# Patient Record
Sex: FEMALE | Race: WHITE | NOT HISPANIC OR LATINO
[De-identification: names, ages, dates, MRNs, and addresses within clinical notes are randomized per-mention and may not be internally consistent; named-entity substitution may affect disease eponyms.]

---

## 2014-07-23 RX ORDER — FLUVOXAMINE MALEATE 25 MG/1
1 TABLET ORAL
Qty: 0 | Refills: 0 | COMMUNITY
Start: 2014-07-23

## 2017-04-17 ENCOUNTER — APPOINTMENT (OUTPATIENT)
Dept: INTERNAL MEDICINE | Facility: CLINIC | Age: 52
End: 2017-04-17

## 2017-04-17 VITALS
SYSTOLIC BLOOD PRESSURE: 115 MMHG | HEIGHT: 66 IN | WEIGHT: 128 LBS | TEMPERATURE: 100.4 F | OXYGEN SATURATION: 98 % | BODY MASS INDEX: 20.57 KG/M2 | HEART RATE: 107 BPM | DIASTOLIC BLOOD PRESSURE: 70 MMHG

## 2017-04-19 ENCOUNTER — APPOINTMENT (OUTPATIENT)
Dept: INTERNAL MEDICINE | Facility: CLINIC | Age: 52
End: 2017-04-19

## 2017-04-21 ENCOUNTER — APPOINTMENT (OUTPATIENT)
Dept: INTERNAL MEDICINE | Facility: CLINIC | Age: 52
End: 2017-04-21

## 2017-04-21 ENCOUNTER — LABORATORY RESULT (OUTPATIENT)
Age: 52
End: 2017-04-21

## 2017-04-21 VITALS
TEMPERATURE: 99.3 F | SYSTOLIC BLOOD PRESSURE: 106 MMHG | HEIGHT: 66 IN | DIASTOLIC BLOOD PRESSURE: 64 MMHG | OXYGEN SATURATION: 98 % | WEIGHT: 128 LBS | BODY MASS INDEX: 20.57 KG/M2 | HEART RATE: 88 BPM

## 2017-04-21 RX ORDER — AZITHROMYCIN DIHYDRATE 250 MG/1
250 TABLET, FILM COATED ORAL
Qty: 1 | Refills: 0 | Status: COMPLETED | COMMUNITY
Start: 2017-04-17 | End: 2017-04-21

## 2017-04-21 RX ORDER — UBIDECARENONE/VIT E ACET 100MG-5
25 MCG CAPSULE ORAL
Qty: 90 | Refills: 3 | Status: ACTIVE | COMMUNITY
Start: 2017-04-21 | End: 1900-01-01

## 2017-04-21 RX ORDER — SACCHAROMYCES BOULARDII 50 MG
250 CAPSULE ORAL
Qty: 30 | Refills: 3 | Status: COMPLETED | COMMUNITY
Start: 2017-04-21 | End: 2017-08-19

## 2017-04-24 LAB
25(OH)D3 SERPL-MCNC: 33.9 NG/ML
ALBUMIN SERPL ELPH-MCNC: 3.4 G/DL
ALP BLD-CCNC: 54 U/L
ALT SERPL-CCNC: 8 U/L
ANION GAP SERPL CALC-SCNC: 15 MMOL/L
ASO AB SER LA-ACNC: 36 IU/ML
AST SERPL-CCNC: 10 U/L
BASOPHILS # BLD AUTO: 0.06 K/UL
BASOPHILS NFR BLD AUTO: 1.8 %
BILIRUB SERPL-MCNC: <0.2 MG/DL
BUN SERPL-MCNC: 12 MG/DL
CALCIUM SERPL-MCNC: 9.2 MG/DL
CHLORIDE SERPL-SCNC: 101 MMOL/L
CO2 SERPL-SCNC: 24 MMOL/L
CREAT SERPL-MCNC: 0.84 MG/DL
EOSINOPHIL # BLD AUTO: 0.14 K/UL
EOSINOPHIL NFR BLD AUTO: 4.4 %
GLUCOSE SERPL-MCNC: 90 MG/DL
HBV CORE IGG+IGM SER QL: NONREACTIVE
HBV SURFACE AB SER QL: NONREACTIVE
HBV SURFACE AG SER QL: NONREACTIVE
HCT VFR BLD CALC: 34.8 %
HCV AB SER QL: NONREACTIVE
HCV S/CO RATIO: 0.2 S/CO
HGB BLD-MCNC: 10.8 G/DL
HIV1+2 AB SPEC QL IA.RAPID: NONREACTIVE
LYMPHOCYTES # BLD AUTO: 1.57 K/UL
LYMPHOCYTES NFR BLD AUTO: 49.6 %
MAN DIFF?: NORMAL
MCHC RBC-ENTMCNC: 29.3 PG
MCHC RBC-ENTMCNC: 31 GM/DL
MCV RBC AUTO: 94.3 FL
MONOCYTES # BLD AUTO: 0.93 K/UL
MONOCYTES NFR BLD AUTO: 29.2 %
NEUTROPHILS # BLD AUTO: 0.34 K/UL
NEUTROPHILS NFR BLD AUTO: 10.6 %
PLATELET # BLD AUTO: 302 K/UL
POTASSIUM SERPL-SCNC: 4 MMOL/L
PROT SERPL-MCNC: 7.2 G/DL
RBC # BLD: 3.69 M/UL
RBC # FLD: 13 %
SODIUM SERPL-SCNC: 140 MMOL/L
VIT B12 SERPL-MCNC: 802 PG/ML
WBC # FLD AUTO: 3.17 K/UL

## 2017-05-14 ENCOUNTER — INPATIENT (INPATIENT)
Facility: HOSPITAL | Age: 52
LOS: 3 days | Discharge: HOME | DRG: 871 | End: 2017-05-18
Attending: HOSPITALIST | Admitting: HOSPITALIST
Payer: COMMERCIAL

## 2017-05-14 VITALS
SYSTOLIC BLOOD PRESSURE: 114 MMHG | TEMPERATURE: 99 F | OXYGEN SATURATION: 98 % | RESPIRATION RATE: 20 BRPM | HEART RATE: 92 BPM | DIASTOLIC BLOOD PRESSURE: 74 MMHG

## 2017-05-14 DIAGNOSIS — R50.9 FEVER, UNSPECIFIED: ICD-10-CM

## 2017-05-14 DIAGNOSIS — R91.8 OTHER NONSPECIFIC ABNORMAL FINDING OF LUNG FIELD: ICD-10-CM

## 2017-05-14 DIAGNOSIS — A41.9 SEPSIS, UNSPECIFIED ORGANISM: ICD-10-CM

## 2017-05-14 DIAGNOSIS — F32.9 MAJOR DEPRESSIVE DISORDER, SINGLE EPISODE, UNSPECIFIED: ICD-10-CM

## 2017-05-14 DIAGNOSIS — D70.0 CONGENITAL AGRANULOCYTOSIS: ICD-10-CM

## 2017-05-14 DIAGNOSIS — F41.9 ANXIETY DISORDER, UNSPECIFIED: ICD-10-CM

## 2017-05-14 DIAGNOSIS — Z29.9 ENCOUNTER FOR PROPHYLACTIC MEASURES, UNSPECIFIED: ICD-10-CM

## 2017-05-14 LAB
ALBUMIN SERPL ELPH-MCNC: 3.3 G/DL — SIGNIFICANT CHANGE UP (ref 3.3–5)
ALP SERPL-CCNC: 50 U/L — SIGNIFICANT CHANGE UP (ref 40–120)
ALT FLD-CCNC: 9 U/L RC — LOW (ref 10–45)
APPEARANCE UR: CLEAR — SIGNIFICANT CHANGE UP
AST SERPL-CCNC: 11 U/L — SIGNIFICANT CHANGE UP (ref 10–40)
BASE EXCESS BLDV CALC-SCNC: 3.6 MMOL/L — HIGH (ref -2–2)
BILIRUB SERPL-MCNC: 0.3 MG/DL — SIGNIFICANT CHANGE UP (ref 0.2–1.2)
BILIRUB UR-MCNC: NEGATIVE — SIGNIFICANT CHANGE UP
BUN SERPL-MCNC: 10 MG/DL — SIGNIFICANT CHANGE UP (ref 7–23)
CA-I SERPL-SCNC: 1.16 MMOL/L — SIGNIFICANT CHANGE UP (ref 1.12–1.3)
CALCIUM SERPL-MCNC: 9 MG/DL — SIGNIFICANT CHANGE UP (ref 8.4–10.5)
CHLORIDE BLDV-SCNC: 102 MMOL/L — SIGNIFICANT CHANGE UP (ref 96–108)
CHLORIDE SERPL-SCNC: 100 MMOL/L — SIGNIFICANT CHANGE UP (ref 96–108)
CO2 BLDV-SCNC: 28 MMOL/L — SIGNIFICANT CHANGE UP (ref 22–30)
CO2 SERPL-SCNC: 25 MMOL/L — SIGNIFICANT CHANGE UP (ref 22–31)
COLOR SPEC: SIGNIFICANT CHANGE UP
CREAT SERPL-MCNC: 0.8 MG/DL — SIGNIFICANT CHANGE UP (ref 0.5–1.3)
DIFF PNL FLD: NEGATIVE — SIGNIFICANT CHANGE UP
GAS PNL BLDV: 133 MMOL/L — LOW (ref 136–145)
GAS PNL BLDV: SIGNIFICANT CHANGE UP
GAS PNL BLDV: SIGNIFICANT CHANGE UP
GLUCOSE BLDV-MCNC: 135 MG/DL — HIGH (ref 70–99)
GLUCOSE SERPL-MCNC: 134 MG/DL — HIGH (ref 70–99)
GLUCOSE UR QL: NEGATIVE — SIGNIFICANT CHANGE UP
HCO3 BLDV-SCNC: 26 MMOL/L — SIGNIFICANT CHANGE UP (ref 21–29)
HCT VFR BLD CALC: 31.8 % — LOW (ref 34.5–45)
HCT VFR BLDA CALC: 35 % — LOW (ref 39–50)
HGB BLD CALC-MCNC: 11.4 G/DL — LOW (ref 11.5–15.5)
HGB BLD-MCNC: 10.9 G/DL — LOW (ref 11.5–15.5)
HOROWITZ INDEX BLDV+IHG-RTO: SIGNIFICANT CHANGE UP
KETONES UR-MCNC: NEGATIVE — SIGNIFICANT CHANGE UP
LACTATE BLDV-MCNC: 1.1 MMOL/L — SIGNIFICANT CHANGE UP (ref 0.7–2)
LEUKOCYTE ESTERASE UR-ACNC: NEGATIVE — SIGNIFICANT CHANGE UP
LYMPHOCYTES # BLD AUTO: 0.7 K/UL — LOW (ref 1–3.3)
LYMPHOCYTES # BLD AUTO: 25 % — SIGNIFICANT CHANGE UP (ref 13–44)
MCHC RBC-ENTMCNC: 31.4 PG — SIGNIFICANT CHANGE UP (ref 27–34)
MCHC RBC-ENTMCNC: 34.2 GM/DL — SIGNIFICANT CHANGE UP (ref 32–36)
MCV RBC AUTO: 91.6 FL — SIGNIFICANT CHANGE UP (ref 80–100)
MONOCYTES # BLD AUTO: SIGNIFICANT CHANGE UP (ref 0–0.9)
MONOCYTES NFR BLD AUTO: 24 % — HIGH (ref 2–14)
NEUTROPHILS # BLD AUTO: SIGNIFICANT CHANGE UP (ref 1.8–7.4)
NEUTROPHILS NFR BLD AUTO: 35 % — LOW (ref 43–77)
NEUTS BAND # BLD: 9 % — HIGH (ref 0–8)
NITRITE UR-MCNC: NEGATIVE — SIGNIFICANT CHANGE UP
PCO2 BLDV: 35 MMHG — SIGNIFICANT CHANGE UP (ref 35–50)
PH BLDV: 7.49 — HIGH (ref 7.35–7.45)
PH UR: 6.5 — SIGNIFICANT CHANGE UP (ref 5–8)
PLAT MORPH BLD: NORMAL — SIGNIFICANT CHANGE UP
PLATELET # BLD AUTO: 233 K/UL — SIGNIFICANT CHANGE UP (ref 150–400)
PO2 BLDV: 64 MMHG — HIGH (ref 25–45)
POTASSIUM BLDV-SCNC: 3.6 MMOL/L — SIGNIFICANT CHANGE UP (ref 3.5–5)
POTASSIUM SERPL-MCNC: 3.9 MMOL/L — SIGNIFICANT CHANGE UP (ref 3.5–5.3)
POTASSIUM SERPL-SCNC: 3.9 MMOL/L — SIGNIFICANT CHANGE UP (ref 3.5–5.3)
PROT SERPL-MCNC: 7.1 G/DL — SIGNIFICANT CHANGE UP (ref 6–8.3)
PROT UR-MCNC: NEGATIVE — SIGNIFICANT CHANGE UP
RBC # BLD: 3.47 M/UL — LOW (ref 3.8–5.2)
RBC # FLD: 11.5 % — SIGNIFICANT CHANGE UP (ref 10.3–14.5)
RBC BLD AUTO: SIGNIFICANT CHANGE UP
SAO2 % BLDV: 93 % — HIGH (ref 67–88)
SODIUM SERPL-SCNC: 139 MMOL/L — SIGNIFICANT CHANGE UP (ref 135–145)
SP GR SPEC: 1.01 — LOW (ref 1.01–1.02)
UROBILINOGEN FLD QL: NEGATIVE — SIGNIFICANT CHANGE UP
VARIANT LYMPHS # BLD: 7 % — HIGH (ref 0–6)
WBC # BLD: 2.9 K/UL — LOW (ref 3.8–10.5)
WBC # FLD AUTO: 2.9 K/UL — LOW (ref 3.8–10.5)

## 2017-05-14 PROCEDURE — 99223 1ST HOSP IP/OBS HIGH 75: CPT | Mod: GC

## 2017-05-14 PROCEDURE — 93010 ELECTROCARDIOGRAM REPORT: CPT

## 2017-05-14 PROCEDURE — 99285 EMERGENCY DEPT VISIT HI MDM: CPT | Mod: 25

## 2017-05-14 PROCEDURE — 71250 CT THORAX DX C-: CPT | Mod: 26

## 2017-05-14 RX ORDER — FLUVOXAMINE MALEATE 25 MG/1
150 TABLET ORAL AT BEDTIME
Qty: 0 | Refills: 0 | Status: DISCONTINUED | OUTPATIENT
Start: 2017-05-14 | End: 2017-05-15

## 2017-05-14 RX ORDER — CLONAZEPAM 1 MG
1 TABLET ORAL THREE TIMES A DAY
Qty: 0 | Refills: 0 | Status: DISCONTINUED | OUTPATIENT
Start: 2017-05-14 | End: 2017-05-18

## 2017-05-14 RX ORDER — ACETAMINOPHEN 500 MG
650 TABLET ORAL EVERY 6 HOURS
Qty: 0 | Refills: 0 | Status: DISCONTINUED | OUTPATIENT
Start: 2017-05-14 | End: 2017-05-18

## 2017-05-14 RX ORDER — SODIUM CHLORIDE 9 MG/ML
1000 INJECTION INTRAMUSCULAR; INTRAVENOUS; SUBCUTANEOUS ONCE
Qty: 0 | Refills: 0 | Status: COMPLETED | OUTPATIENT
Start: 2017-05-14 | End: 2017-05-14

## 2017-05-14 RX ORDER — CEFEPIME 1 G/1
INJECTION, POWDER, FOR SOLUTION INTRAMUSCULAR; INTRAVENOUS
Qty: 0 | Refills: 0 | Status: DISCONTINUED | OUTPATIENT
Start: 2017-05-14 | End: 2017-05-15

## 2017-05-14 RX ORDER — ONDANSETRON 8 MG/1
4 TABLET, FILM COATED ORAL EVERY 6 HOURS
Qty: 0 | Refills: 0 | Status: DISCONTINUED | OUTPATIENT
Start: 2017-05-14 | End: 2017-05-18

## 2017-05-14 RX ORDER — OLANZAPINE 15 MG/1
30 TABLET, FILM COATED ORAL DAILY
Qty: 0 | Refills: 0 | Status: DISCONTINUED | OUTPATIENT
Start: 2017-05-14 | End: 2017-05-18

## 2017-05-14 RX ORDER — ACETAMINOPHEN 500 MG
1000 TABLET ORAL ONCE
Qty: 0 | Refills: 0 | Status: COMPLETED | OUTPATIENT
Start: 2017-05-14 | End: 2017-05-14

## 2017-05-14 RX ORDER — CEFEPIME 1 G/1
1000 INJECTION, POWDER, FOR SOLUTION INTRAMUSCULAR; INTRAVENOUS EVERY 8 HOURS
Qty: 0 | Refills: 0 | Status: DISCONTINUED | OUTPATIENT
Start: 2017-05-15 | End: 2017-05-15

## 2017-05-14 RX ORDER — CHOLECALCIFEROL (VITAMIN D3) 125 MCG
1000 CAPSULE ORAL DAILY
Qty: 0 | Refills: 0 | Status: DISCONTINUED | OUTPATIENT
Start: 2017-05-14 | End: 2017-05-18

## 2017-05-14 RX ORDER — SODIUM CHLORIDE 9 MG/ML
3 INJECTION INTRAMUSCULAR; INTRAVENOUS; SUBCUTANEOUS ONCE
Qty: 0 | Refills: 0 | Status: COMPLETED | OUTPATIENT
Start: 2017-05-14 | End: 2017-05-14

## 2017-05-14 RX ORDER — ENOXAPARIN SODIUM 100 MG/ML
40 INJECTION SUBCUTANEOUS EVERY 24 HOURS
Qty: 0 | Refills: 0 | Status: DISCONTINUED | OUTPATIENT
Start: 2017-05-14 | End: 2017-05-15

## 2017-05-14 RX ORDER — PIPERACILLIN AND TAZOBACTAM 4; .5 G/20ML; G/20ML
3.38 INJECTION, POWDER, LYOPHILIZED, FOR SOLUTION INTRAVENOUS ONCE
Qty: 0 | Refills: 0 | Status: COMPLETED | OUTPATIENT
Start: 2017-05-14 | End: 2017-05-14

## 2017-05-14 RX ORDER — CEFEPIME 1 G/1
1000 INJECTION, POWDER, FOR SOLUTION INTRAMUSCULAR; INTRAVENOUS ONCE
Qty: 0 | Refills: 0 | Status: COMPLETED | OUTPATIENT
Start: 2017-05-14 | End: 2017-05-14

## 2017-05-14 RX ADMIN — SODIUM CHLORIDE 3 MILLILITER(S): 9 INJECTION INTRAMUSCULAR; INTRAVENOUS; SUBCUTANEOUS at 15:00

## 2017-05-14 RX ADMIN — SODIUM CHLORIDE 1000 MILLILITER(S): 9 INJECTION INTRAMUSCULAR; INTRAVENOUS; SUBCUTANEOUS at 15:09

## 2017-05-14 RX ADMIN — ENOXAPARIN SODIUM 40 MILLIGRAM(S): 100 INJECTION SUBCUTANEOUS at 23:00

## 2017-05-14 RX ADMIN — CEFEPIME 100 MILLIGRAM(S): 1 INJECTION, POWDER, FOR SOLUTION INTRAMUSCULAR; INTRAVENOUS at 23:00

## 2017-05-14 RX ADMIN — Medication 400 MILLIGRAM(S): at 18:34

## 2017-05-14 RX ADMIN — PIPERACILLIN AND TAZOBACTAM 200 GRAM(S): 4; .5 INJECTION, POWDER, LYOPHILIZED, FOR SOLUTION INTRAVENOUS at 19:42

## 2017-05-14 NOTE — H&P ADULT. - HISTORY OF PRESENT ILLNESS
51F PMH congential neutropenia (ANC range 100-900), MVP, anxiety/depression, 15 pack year smoking history, recently seen in [facility] clinic on 4/21/2017 for sore throat and fever was given Z-pack without any symptomatic improvement presents to the hospital with 1 month of cough and cyclic fevers. Patient states that cough and fever presented together ~1 month ago. She will get a fever between 101-104F usually in the AM or in the PM that will spontaneously break on its own. The fevers recur without a noticeable defined pattern, but will usually occur multiple times a week. patient states that she has not been taking any advil or tylenol. The patient went to her psychiatrist to see if a new medication that she had started, Olanzapine, could be responsible and was told that this is very unlikely. The patient was referred to an ID physcian who told the patient that she likely does not have any infection. She later went to an urgent clinic and obtained a CXR and she was told that she has a "mass" somewhere in her lung, but she cannot remember where. At this point is when she presented to the ED for evaluation.    Of note the patient has been hospitalized multiple times for psychiatric reasons, never for medical or surgical reasons. She lives in an apartment in [place] with her mother.     In ED: T: 101  BP: 144/78  HR: 114  RR: 20  SpO2: 96%RA. Patient was provided Zosyn x 1, 1L NS, IV tylenol, and BCx and UCx were sent.

## 2017-05-14 NOTE — ED PROVIDER NOTE - ATTENDING CONTRIBUTION TO CARE
presents for work-up of abnormal chest radiograph, intermittent subjective fever, on my exam:  awake, alert, anxious, normal respiratory pattern Ty Evans MD (attending)

## 2017-05-14 NOTE — H&P ADULT. - NEUROLOGICAL DETAILS
responds to pain/sensation intact/responds to verbal commands/deep reflexes intact/cranial nerves intact/alert and oriented x 3

## 2017-05-14 NOTE — H&P ADULT. - PROBLEM SELECTOR PLAN 6
Patient has h/o congenital neutropenia and is currently not on any long-term treatment. ANC varies between 100 and 900 with an average in to 500's.   - patient currently does not require any neutropenic precautions and does not need to be treated for neutropenic fever at this time  - check CBC with diff daily Patient has h/o congenital neutropenia and is currently not on any long-term treatment. ANC varies between 100 and 900 with an average in to 500's.   - patient currently does not require any neutropenic precautions  - check CBC with diff daily

## 2017-05-14 NOTE — H&P ADULT. - LYMPHATIC
posterior cervical R/supraclavicular R/supraclavicular L/anterior cervical L/posterior cervical L/anterior cervical R

## 2017-05-14 NOTE — H&P ADULT. - PROBLEM SELECTOR PLAN 3
In the context of strong past smoking history, congenital neutropenia, cyclic fevers, and cough, need to r/o neoplasm as primary diagnosis  - TTBx with US  - infective w/u as above In the context of strong past smoking history, congenital neutropenia, cyclic fevers, and cough, need to r/o neoplasm as primary diagnosis  - transthoracic Bx with US  - infective w/u as above

## 2017-05-14 NOTE — H&P ADULT. - NEGATIVE MUSCULOSKELETAL SYMPTOMS
no arthralgia/no joint swelling/no muscle weakness/no myalgia/no muscle cramps/no stiffness/no arthritis/no neck pain

## 2017-05-14 NOTE — H&P ADULT. - PROBLEM SELECTOR PLAN 2
patient with leukopenia, tachycardia and tachypnia suggestive of systemic stress. Doubt infection at this point in favor of neoplasm patient with leukopenia, tachycardia and tachypnia suggestive of systemic stress. Doubt infection at this point but cannot r/o completely  - patient received 1L NS in ED  - f/u BCx and UCx  - Lactate non elevated  - check RVP  - If BCx or UCx return positive or patient develops constitutional symptoms, will pursue antibiotics patient with leukopenia, tachycardia and tachypnia suggestive of systemic stress. Doubt infection at this point but cannot r/o completely  - patient received 1L NS in ED  - f/u BCx and UCx  - Cefepime  - Lactate non elevated  - check RVP patient with leukopenia and bandemia, tachycardia, and tachypnea suggestive of systemic stress. Doubt infection at this point but cannot r/o completely  - patient received 1L NS in ED  - f/u BCx and UCx  - Cefepime  - Lactate non elevated  - check RVP

## 2017-05-14 NOTE — H&P ADULT. - LAB RESULTS AND INTERPRETATION
Labs reviewed and interpreted personally: leukopenia at 2.9, no ANC reported. Stable H/H of 10.9/31.8 which is at patients baseline. Platelets normal. No electrolyte derangements. Creatinine stable at 0.80. Lactate 1.1. UA negative for infection.

## 2017-05-14 NOTE — H&P ADULT. - EKG AND INTERPRETATION
sinus tachycardia to 114. No QTc prolongation or ST changes c/w ischemia. personally reviewed: sinus tachycardia to 114. No QTc prolongation or ST changes c/w ischemia.

## 2017-05-14 NOTE — H&P ADULT. - RS GEN PE MLT RESP DETAILS PC
no rales/clear to auscultation bilaterally/good air movement/no chest wall tenderness/no rhonchi/no wheezes/breath sounds equal/airway patent/no intercostal retractions/respirations non-labored

## 2017-05-14 NOTE — ED ADULT NURSE NOTE - OBJECTIVE STATEMENT
Patient  is  alert  and  oriented  x3.   Color is  good  and  skin warm  to  touch. She  is  c/o  fever, cough, weight  loss  and  weakness.  No   SOB  or  congestion   noted.

## 2017-05-14 NOTE — H&P ADULT. - ASSESSMENT
51F PMH congential neutropenia (ANC range 100-900), MVP, anxiety/depression, 15 pack year smoking history, recently seen in [facility] on 4/21/2017 for sore throat and fever was given Z-pack without any symptomatic improvement presents to the hospital with 1 month of cough and cyclic fevers in the context of a peripheral lung mass found on CT concerning for neoplasm vs infection etiology.

## 2017-05-14 NOTE — H&P ADULT. - MUSCULOSKELETAL
detailed exam normal strength/no calf tenderness/no joint erythema/ROM intact/no joint swelling/no joint warmth details…

## 2017-05-14 NOTE — H&P ADULT. - NEGATIVE ENMT SYMPTOMS
no hearing difficulty/no nasal congestion/no nasal discharge/no vertigo/no sinus symptoms/no ear pain/no tinnitus

## 2017-05-14 NOTE — H&P ADULT. - PROBLEM SELECTOR PLAN 1
Patient has had a fever a/w cough for the past 1 month without and production of phlegm or constitutional symptoms that would suggest systemic infection. currently fever presents in AM or PM and will spontaneously break. Need to r/o neoplasm  - tylenol PRN for fever  - trend fever curve  - patient denies any travel outside of [place] in 10 years, doubt parasitemia, however will check peripheral smear Patient has had a fever a/w cough for the past 1 month without and production of phlegm or constitutional symptoms that would suggest systemic infection. currently fever presents in AM or PM and will spontaneously break. Need to r/o neoplasm  - tylenol PRN for fever  - trend fever curve  - patient denies any travel outside of [place] in 10 years, doubt parasitemia, however will check peripheral smear  -continue with broad spectrum antibiotics  -check RVP Patient has had a fever a/w cough for the past 1 month without any production of phlegm. occasional chills and sweat. currently fever presents in AM or PM and will spontaneously break. Need to r/o neoplasm  - tylenol PRN for fever  - trend fever curve  - patient denies any travel outside of [place] in 10 years, doubt parasitemia, however will check peripheral smear  -continue with broad spectrum antibiotics  -check RVP

## 2017-05-14 NOTE — H&P ADULT. - ATTENDING COMMENTS
52 yo woman with PMH congential neutropenia (ANC range 100-900), MVP, anxiety/depression, 15 pack year smoking history, recently seen in [facility] on 4/21/2017 for sore throat and fever was given Z-pack without any symptomatic improvement presents to the hospital with 1 month of cough and cyclic fevers. Confirmed HPI and ROS. In addition patient states last mammogram without detected malignancy, last colonoscopy without abnormal findings with repeat colo in 10 years, last pap also normal.  Vitals reviewed and physically examined patient: agree with resident's findings.   Labs, imaging and EKG reviewed  Fever: 50 yo woman with PMH congential neutropenia (ANC range 100-900), MVP, anxiety/depression, 15 pack year smoking history, recently seen in [facility] on 4/21/2017 for sore throat and fever was given Z-pack without any symptomatic improvement presents to the hospital with 1 month of cough and cyclic fevers. Confirmed HPI and ROS. In addition patient states last mammogram without detected malignancy, last colonoscopy without abnormal findings with repeat colo in 10 years, last pap also normal.  Vitals reviewed and physically examined patient: agree with resident's findings.   Labs, imaging and EKG reviewed  Fever: Patient has had a fever a/w cough for the past 1 month without production of phlegm. Unclear if infectious versus neoplastic process given CT findings of " Right  lower  lobe  mass  and  ground-glass  opacities  in  the  right  upper lobe." UA not suggestive of UTI  -continue with broad spectrum antibiotics -check RVP F/U blood culture urine cultures. Primary day team to consider ID consult in AM  R lower lobe mass: Primary day team to consult pulmonary in AM with regards to mass/malignancy work up i.e need for biopsy via US vs CT.  Remainder of plan as noted above

## 2017-05-14 NOTE — ED PROVIDER NOTE - OBJECTIVE STATEMENT
50y/o F with PMH neutropenia c/o fevers intermittently x 1 month. Tmax is 104 F. fevers worse at night and in morning. + sweats. c/o productive cough x 1 month. + laryngitis. + post nasal drip. vomited a few days ago. pt went to a PMD last month for these symptoms, pt was given a zpack which didn't help. pt had a CXR and was informed to see a pulmonologist for lung nodule. pt states she went to urgent care today had a CXR and was found to have a L lung mass. pt went to [facility] last week and had a CXR which showed the same thing. also c/o constipation, last BM yesterday. denies any CP, SOB, abd pain, chills, urinary symptoms. last took ibuprofen yesterday. hasn't taken any tyelnol. h/o smoking x 30 years. 50y/o F with PMH neutropenia c/o fevers intermittently x 1 month. Tmax is 104 F. fevers worse at night and in morning. + sweats. c/o productive cough x 1 month. + laryngitis. + post nasal drip. vomited a few days ago. pt went to a PMD last month for these symptoms, pt was given a zpack which didn't help. pt had a CXR and was informed to see a pulmonologist for lung nodule. pt states she went to urgent care today had a CXR and was found to have a L lung mass. pt went to [facility] last week and had a CXR which showed the same thing. also c/o constipation, last BM yesterday. denies any CP, SOB, abd pain, chills, urinary symptoms. last took ibuprofen yesterday. hasn't taken any tyelnol. h/o smoking x 30 years.  PMD Dr. MARY Walton [phone]

## 2017-05-14 NOTE — ED PROVIDER NOTE - PROGRESS NOTE DETAILS
pt feeling well. informed pt of CT results and importance of following up with PMD and likely oncology regarding lung mass. - SADIA Lundberg

## 2017-05-14 NOTE — H&P ADULT. - PROBLEM SELECTOR PLAN 5
patient with h/o depression requiring hospitalizations in the past. Currently stable on regimen.   - will c/w Fluvoxamine and Olanzapine

## 2017-05-14 NOTE — H&P ADULT. - NEGATIVE OPHTHALMOLOGIC SYMPTOMS
no blurred vision L/no blurred vision R/no lacrimation L/no photophobia/no diplopia/no lacrimation R

## 2017-05-14 NOTE — H&P ADULT. - RADIOLOGY RESULTS AND INTERPRETATION
Images reviewed and interpreted personally: CTchest demonstrated RLL mass with groundglass opacities in the RUL concerning for infection vs neoplasm. Images reviewed and interpreted personally: CT chest demonstrated RLL mass with groundglass opacities in the RUL concerning for infection vs neoplasm.

## 2017-05-15 LAB
ALBUMIN SERPL ELPH-MCNC: 3.2 G/DL — LOW (ref 3.3–5)
ALP SERPL-CCNC: 50 U/L — SIGNIFICANT CHANGE UP (ref 40–120)
ALT FLD-CCNC: 10 U/L RC — SIGNIFICANT CHANGE UP (ref 10–45)
ANION GAP SERPL CALC-SCNC: 14 MMOL/L — SIGNIFICANT CHANGE UP (ref 5–17)
APTT BLD: 29.2 SEC — SIGNIFICANT CHANGE UP (ref 27.5–37.4)
AST SERPL-CCNC: 15 U/L — SIGNIFICANT CHANGE UP (ref 10–40)
BASOPHILS # BLD AUTO: 0 K/UL — SIGNIFICANT CHANGE UP (ref 0–0.2)
BILIRUB SERPL-MCNC: 0.3 MG/DL — SIGNIFICANT CHANGE UP (ref 0.2–1.2)
BUN SERPL-MCNC: 8 MG/DL — SIGNIFICANT CHANGE UP (ref 7–23)
CALCIUM SERPL-MCNC: 8.8 MG/DL — SIGNIFICANT CHANGE UP (ref 8.4–10.5)
CHLORIDE SERPL-SCNC: 104 MMOL/L — SIGNIFICANT CHANGE UP (ref 96–108)
CO2 SERPL-SCNC: 23 MMOL/L — SIGNIFICANT CHANGE UP (ref 22–31)
CREAT SERPL-MCNC: 0.81 MG/DL — SIGNIFICANT CHANGE UP (ref 0.5–1.3)
EOSINOPHIL # BLD AUTO: 0 K/UL — SIGNIFICANT CHANGE UP (ref 0–0.5)
GLUCOSE SERPL-MCNC: 130 MG/DL — HIGH (ref 70–99)
HCT VFR BLD CALC: 33.5 % — LOW (ref 34.5–45)
HGB BLD-MCNC: 10.7 G/DL — LOW (ref 11.5–15.5)
HPIV3 RNA SPEC QL NAA+PROBE: DETECTED
INR BLD: 1.33 RATIO — HIGH (ref 0.88–1.16)
LYMPHOCYTES # BLD AUTO: 1.1 K/UL — SIGNIFICANT CHANGE UP (ref 1–3.3)
LYMPHOCYTES # BLD AUTO: 19 % — SIGNIFICANT CHANGE UP (ref 13–44)
MAGNESIUM SERPL-MCNC: 2.1 MG/DL — SIGNIFICANT CHANGE UP (ref 1.6–2.6)
MCHC RBC-ENTMCNC: 29.9 PG — SIGNIFICANT CHANGE UP (ref 27–34)
MCHC RBC-ENTMCNC: 32.1 GM/DL — SIGNIFICANT CHANGE UP (ref 32–36)
MCV RBC AUTO: 93.2 FL — SIGNIFICANT CHANGE UP (ref 80–100)
MONOCYTES # BLD AUTO: 1.5 K/UL — HIGH (ref 0–0.9)
MONOCYTES NFR BLD AUTO: 38 % — HIGH (ref 2–14)
NEUTROPHILS # BLD AUTO: 1.6 K/UL — LOW (ref 1.8–7.4)
NEUTROPHILS NFR BLD AUTO: 33 % — LOW (ref 43–77)
PHOSPHATE SERPL-MCNC: 4.2 MG/DL — SIGNIFICANT CHANGE UP (ref 2.5–4.5)
PLATELET # BLD AUTO: 223 K/UL — SIGNIFICANT CHANGE UP (ref 150–400)
POTASSIUM SERPL-MCNC: 4 MMOL/L — SIGNIFICANT CHANGE UP (ref 3.5–5.3)
POTASSIUM SERPL-SCNC: 4 MMOL/L — SIGNIFICANT CHANGE UP (ref 3.5–5.3)
PROT SERPL-MCNC: 7 G/DL — SIGNIFICANT CHANGE UP (ref 6–8.3)
PROTHROM AB SERPL-ACNC: 14.6 SEC — HIGH (ref 9.8–12.7)
RAPID RVP RESULT: DETECTED
RBC # BLD: 3.59 M/UL — LOW (ref 3.8–5.2)
RBC # FLD: 11.7 % — SIGNIFICANT CHANGE UP (ref 10.3–14.5)
SODIUM SERPL-SCNC: 141 MMOL/L — SIGNIFICANT CHANGE UP (ref 135–145)
WBC # BLD: 4.2 K/UL — SIGNIFICANT CHANGE UP (ref 3.8–10.5)
WBC # FLD AUTO: 4.2 K/UL — SIGNIFICANT CHANGE UP (ref 3.8–10.5)

## 2017-05-15 PROCEDURE — 99233 SBSQ HOSP IP/OBS HIGH 50: CPT | Mod: GC

## 2017-05-15 PROCEDURE — 99223 1ST HOSP IP/OBS HIGH 75: CPT | Mod: GC

## 2017-05-15 RX ORDER — FLUVOXAMINE MALEATE 25 MG/1
150 TABLET ORAL
Qty: 0 | Refills: 0 | Status: DISCONTINUED | OUTPATIENT
Start: 2017-05-15 | End: 2017-05-18

## 2017-05-15 RX ORDER — PIPERACILLIN AND TAZOBACTAM 4; .5 G/20ML; G/20ML
3.38 INJECTION, POWDER, LYOPHILIZED, FOR SOLUTION INTRAVENOUS EVERY 8 HOURS
Qty: 0 | Refills: 0 | Status: DISCONTINUED | OUTPATIENT
Start: 2017-05-15 | End: 2017-05-18

## 2017-05-15 RX ORDER — VANCOMYCIN HCL 1 G
VIAL (EA) INTRAVENOUS
Qty: 0 | Refills: 0 | Status: DISCONTINUED | OUTPATIENT
Start: 2017-05-15 | End: 2017-05-17

## 2017-05-15 RX ORDER — SODIUM CHLORIDE 9 MG/ML
1000 INJECTION INTRAMUSCULAR; INTRAVENOUS; SUBCUTANEOUS
Qty: 0 | Refills: 0 | Status: DISCONTINUED | OUTPATIENT
Start: 2017-05-15 | End: 2017-05-16

## 2017-05-15 RX ORDER — VANCOMYCIN HCL 1 G
1000 VIAL (EA) INTRAVENOUS ONCE
Qty: 0 | Refills: 0 | Status: COMPLETED | OUTPATIENT
Start: 2017-05-15 | End: 2017-05-15

## 2017-05-15 RX ORDER — PIPERACILLIN AND TAZOBACTAM 4; .5 G/20ML; G/20ML
3.38 INJECTION, POWDER, LYOPHILIZED, FOR SOLUTION INTRAVENOUS ONCE
Qty: 0 | Refills: 0 | Status: COMPLETED | OUTPATIENT
Start: 2017-05-15 | End: 2017-05-15

## 2017-05-15 RX ORDER — SODIUM CHLORIDE 9 MG/ML
500 INJECTION INTRAMUSCULAR; INTRAVENOUS; SUBCUTANEOUS ONCE
Qty: 0 | Refills: 0 | Status: DISCONTINUED | OUTPATIENT
Start: 2017-05-15 | End: 2017-05-15

## 2017-05-15 RX ORDER — VANCOMYCIN HCL 1 G
1000 VIAL (EA) INTRAVENOUS EVERY 12 HOURS
Qty: 0 | Refills: 0 | Status: DISCONTINUED | OUTPATIENT
Start: 2017-05-16 | End: 2017-05-17

## 2017-05-15 RX ADMIN — OLANZAPINE 30 MILLIGRAM(S): 15 TABLET, FILM COATED ORAL at 22:03

## 2017-05-15 RX ADMIN — Medication 650 MILLIGRAM(S): at 00:49

## 2017-05-15 RX ADMIN — SODIUM CHLORIDE 75 MILLILITER(S): 9 INJECTION INTRAMUSCULAR; INTRAVENOUS; SUBCUTANEOUS at 10:36

## 2017-05-15 RX ADMIN — Medication 100 MILLIGRAM(S): at 18:31

## 2017-05-15 RX ADMIN — Medication 100 MILLIGRAM(S): at 00:47

## 2017-05-15 RX ADMIN — CEFEPIME 100 MILLIGRAM(S): 1 INJECTION, POWDER, FOR SOLUTION INTRAMUSCULAR; INTRAVENOUS at 06:10

## 2017-05-15 RX ADMIN — FLUVOXAMINE MALEATE 150 MILLIGRAM(S): 25 TABLET ORAL at 12:17

## 2017-05-15 RX ADMIN — Medication 100 MILLIGRAM(S): at 23:52

## 2017-05-15 RX ADMIN — Medication 650 MILLIGRAM(S): at 13:32

## 2017-05-15 RX ADMIN — Medication 650 MILLIGRAM(S): at 01:49

## 2017-05-15 RX ADMIN — PIPERACILLIN AND TAZOBACTAM 200 GRAM(S): 4; .5 INJECTION, POWDER, LYOPHILIZED, FOR SOLUTION INTRAVENOUS at 22:03

## 2017-05-15 RX ADMIN — Medication 250 MILLIGRAM(S): at 18:29

## 2017-05-15 RX ADMIN — ONDANSETRON 4 MILLIGRAM(S): 8 TABLET, FILM COATED ORAL at 00:07

## 2017-05-15 RX ADMIN — Medication 100 MILLIGRAM(S): at 12:12

## 2017-05-15 RX ADMIN — Medication 1000 UNIT(S): at 12:12

## 2017-05-15 RX ADMIN — Medication 650 MILLIGRAM(S): at 14:30

## 2017-05-15 RX ADMIN — FLUVOXAMINE MALEATE 150 MILLIGRAM(S): 25 TABLET ORAL at 23:52

## 2017-05-15 RX ADMIN — Medication 650 MILLIGRAM(S): at 02:41

## 2017-05-15 RX ADMIN — Medication 100 MILLIGRAM(S): at 06:29

## 2017-05-15 NOTE — PROVIDER CONTACT NOTE (CRITICAL VALUE NOTIFICATION) - ACTION/TREATMENT ORDERED:
please place pt on droplet isolation. please place pt on droplet isolation, contact isolation precaution.

## 2017-05-15 NOTE — PROVIDER CONTACT NOTE (OTHER) - SITUATION
pt transfer for +parainfluenza on contact given tylenol 00:49 for discomfort, temp oral now 102.3, notify MD

## 2017-05-16 LAB
-  AMPICILLIN: SIGNIFICANT CHANGE UP
-  CIPROFLOXACIN: SIGNIFICANT CHANGE UP
-  NITROFURANTOIN: SIGNIFICANT CHANGE UP
-  TETRACYCLINE: SIGNIFICANT CHANGE UP
-  VANCOMYCIN: SIGNIFICANT CHANGE UP
ANION GAP SERPL CALC-SCNC: 12 MMOL/L — SIGNIFICANT CHANGE UP (ref 5–17)
BASOPHILS # BLD AUTO: 0.03 K/UL — SIGNIFICANT CHANGE UP (ref 0–0.2)
BASOPHILS NFR BLD AUTO: 0.9 % — SIGNIFICANT CHANGE UP (ref 0–2)
BUN SERPL-MCNC: 4 MG/DL — LOW (ref 7–23)
CALCIUM SERPL-MCNC: 8.4 MG/DL — SIGNIFICANT CHANGE UP (ref 8.4–10.5)
CHLORIDE SERPL-SCNC: 103 MMOL/L — SIGNIFICANT CHANGE UP (ref 96–108)
CO2 SERPL-SCNC: 23 MMOL/L — SIGNIFICANT CHANGE UP (ref 22–31)
CREAT SERPL-MCNC: 0.64 MG/DL — SIGNIFICANT CHANGE UP (ref 0.5–1.3)
CULTURE RESULTS: SIGNIFICANT CHANGE UP
EOSINOPHIL # BLD AUTO: 0 K/UL — SIGNIFICANT CHANGE UP (ref 0–0.5)
EOSINOPHIL NFR BLD AUTO: 0 % — SIGNIFICANT CHANGE UP (ref 0–6)
GLUCOSE SERPL-MCNC: 146 MG/DL — HIGH (ref 70–99)
GRAM STN FLD: SIGNIFICANT CHANGE UP
HCT VFR BLD CALC: 28.8 % — LOW (ref 34.5–45)
HGB BLD-MCNC: 9 G/DL — LOW (ref 11.5–15.5)
LYMPHOCYTES # BLD AUTO: 1.32 K/UL — SIGNIFICANT CHANGE UP (ref 1–3.3)
LYMPHOCYTES # BLD AUTO: 39.9 % — SIGNIFICANT CHANGE UP (ref 13–44)
MCHC RBC-ENTMCNC: 28.1 PG — SIGNIFICANT CHANGE UP (ref 27–34)
MCHC RBC-ENTMCNC: 31.3 GM/DL — LOW (ref 32–36)
MCV RBC AUTO: 90 FL — SIGNIFICANT CHANGE UP (ref 80–100)
METHOD TYPE: SIGNIFICANT CHANGE UP
MONOCYTES # BLD AUTO: 0.95 K/UL — HIGH (ref 0–0.9)
MONOCYTES NFR BLD AUTO: 28.7 % — HIGH (ref 2–14)
NEUTROPHILS # BLD AUTO: 0.95 K/UL — LOW (ref 1.8–7.4)
NEUTROPHILS NFR BLD AUTO: 28.7 % — LOW (ref 43–77)
ORGANISM # SPEC MICROSCOPIC CNT: SIGNIFICANT CHANGE UP
ORGANISM # SPEC MICROSCOPIC CNT: SIGNIFICANT CHANGE UP
PLATELET # BLD AUTO: 221 K/UL — SIGNIFICANT CHANGE UP (ref 150–400)
POTASSIUM SERPL-MCNC: 3.7 MMOL/L — SIGNIFICANT CHANGE UP (ref 3.5–5.3)
POTASSIUM SERPL-SCNC: 3.7 MMOL/L — SIGNIFICANT CHANGE UP (ref 3.5–5.3)
RBC # BLD: 3.2 M/UL — LOW (ref 3.8–5.2)
RBC # FLD: 13.2 % — SIGNIFICANT CHANGE UP (ref 10.3–14.5)
SODIUM SERPL-SCNC: 138 MMOL/L — SIGNIFICANT CHANGE UP (ref 135–145)
SPECIMEN SOURCE: SIGNIFICANT CHANGE UP
SPECIMEN SOURCE: SIGNIFICANT CHANGE UP
WBC # BLD: 3.32 K/UL — LOW (ref 3.8–10.5)
WBC # FLD AUTO: 3.32 K/UL — LOW (ref 3.8–10.5)

## 2017-05-16 PROCEDURE — 99233 SBSQ HOSP IP/OBS HIGH 50: CPT | Mod: GC

## 2017-05-16 RX ORDER — BENZOCAINE AND MENTHOL 5; 1 G/100ML; G/100ML
1 LIQUID ORAL
Qty: 0 | Refills: 0 | Status: DISCONTINUED | OUTPATIENT
Start: 2017-05-16 | End: 2017-05-18

## 2017-05-16 RX ORDER — FLUTICASONE PROPIONATE 50 MCG
1 SPRAY, SUSPENSION NASAL
Qty: 0 | Refills: 0 | Status: DISCONTINUED | OUTPATIENT
Start: 2017-05-16 | End: 2017-05-18

## 2017-05-16 RX ADMIN — Medication 1 SPRAY(S): at 18:00

## 2017-05-16 RX ADMIN — Medication 250 MILLIGRAM(S): at 05:26

## 2017-05-16 RX ADMIN — PIPERACILLIN AND TAZOBACTAM 25 GRAM(S): 4; .5 INJECTION, POWDER, LYOPHILIZED, FOR SOLUTION INTRAVENOUS at 21:17

## 2017-05-16 RX ADMIN — PIPERACILLIN AND TAZOBACTAM 25 GRAM(S): 4; .5 INJECTION, POWDER, LYOPHILIZED, FOR SOLUTION INTRAVENOUS at 06:48

## 2017-05-16 RX ADMIN — Medication 250 MILLIGRAM(S): at 18:00

## 2017-05-16 RX ADMIN — PIPERACILLIN AND TAZOBACTAM 25 GRAM(S): 4; .5 INJECTION, POWDER, LYOPHILIZED, FOR SOLUTION INTRAVENOUS at 13:21

## 2017-05-16 RX ADMIN — FLUVOXAMINE MALEATE 150 MILLIGRAM(S): 25 TABLET ORAL at 11:58

## 2017-05-16 RX ADMIN — Medication 200 MILLIGRAM(S): at 16:13

## 2017-05-16 RX ADMIN — BENZOCAINE AND MENTHOL 1 LOZENGE: 5; 1 LIQUID ORAL at 11:58

## 2017-05-16 RX ADMIN — Medication 100 MILLIGRAM(S): at 11:57

## 2017-05-16 RX ADMIN — Medication 200 MILLIGRAM(S): at 21:17

## 2017-05-16 RX ADMIN — Medication 1000 UNIT(S): at 11:57

## 2017-05-16 RX ADMIN — OLANZAPINE 30 MILLIGRAM(S): 15 TABLET, FILM COATED ORAL at 21:16

## 2017-05-16 RX ADMIN — Medication 100 MILLIGRAM(S): at 05:27

## 2017-05-16 NOTE — PROVIDER CONTACT NOTE (OTHER) - SITUATION
pt admit cough RLL mass, ordered tessalon perles, continuing to cough giving her a headache, pt requesting pt admit cough RLL mass, ordered tessalon perle, continuing to cough giving her a headache, pt requesting something for cough

## 2017-05-17 LAB
BASOPHILS # BLD AUTO: 0.07 K/UL — SIGNIFICANT CHANGE UP (ref 0–0.2)
BASOPHILS NFR BLD AUTO: 2.5 % — HIGH (ref 0–2)
EOSINOPHIL # BLD AUTO: 0.48 K/UL — SIGNIFICANT CHANGE UP (ref 0–0.5)
EOSINOPHIL NFR BLD AUTO: 16.7 % — HIGH (ref 0–6)
HCT VFR BLD CALC: 30.3 % — LOW (ref 34.5–45)
HGB BLD-MCNC: 9.6 G/DL — LOW (ref 11.5–15.5)
LYMPHOCYTES # BLD AUTO: 1.18 K/UL — SIGNIFICANT CHANGE UP (ref 1–3.3)
LYMPHOCYTES # BLD AUTO: 40.8 % — SIGNIFICANT CHANGE UP (ref 13–44)
MCHC RBC-ENTMCNC: 28.4 PG — SIGNIFICANT CHANGE UP (ref 27–34)
MCHC RBC-ENTMCNC: 31.7 GM/DL — LOW (ref 32–36)
MCV RBC AUTO: 89.6 FL — SIGNIFICANT CHANGE UP (ref 80–100)
MONOCYTES # BLD AUTO: 0.65 K/UL — SIGNIFICANT CHANGE UP (ref 0–0.9)
MONOCYTES NFR BLD AUTO: 22.5 % — HIGH (ref 2–14)
NEUTROPHILS # BLD AUTO: 0.51 K/UL — LOW (ref 1.8–7.4)
NEUTROPHILS NFR BLD AUTO: 14.2 % — LOW (ref 43–77)
PLATELET # BLD AUTO: 264 K/UL — SIGNIFICANT CHANGE UP (ref 150–400)
RBC # BLD: 3.38 M/UL — LOW (ref 3.8–5.2)
RBC # FLD: 13.4 % — SIGNIFICANT CHANGE UP (ref 10.3–14.5)
VANCOMYCIN TROUGH SERPL-MCNC: 2.2 UG/ML — LOW (ref 10–20)
VANCOMYCIN TROUGH SERPL-MCNC: 5.2 UG/ML — LOW (ref 10–20)
WBC # BLD: 2.9 K/UL — LOW (ref 3.8–10.5)
WBC # FLD AUTO: 2.9 K/UL — LOW (ref 3.8–10.5)

## 2017-05-17 PROCEDURE — 99233 SBSQ HOSP IP/OBS HIGH 50: CPT | Mod: GC

## 2017-05-17 PROCEDURE — 99233 SBSQ HOSP IP/OBS HIGH 50: CPT

## 2017-05-17 RX ORDER — VANCOMYCIN HCL 1 G
1000 VIAL (EA) INTRAVENOUS EVERY 8 HOURS
Qty: 0 | Refills: 0 | Status: DISCONTINUED | OUTPATIENT
Start: 2017-05-17 | End: 2017-05-18

## 2017-05-17 RX ORDER — VANCOMYCIN HCL 1 G
1000 VIAL (EA) INTRAVENOUS EVERY 12 HOURS
Qty: 0 | Refills: 0 | Status: DISCONTINUED | OUTPATIENT
Start: 2017-05-17 | End: 2017-05-17

## 2017-05-17 RX ORDER — VANCOMYCIN HCL 1 G
1500 VIAL (EA) INTRAVENOUS EVERY 12 HOURS
Qty: 0 | Refills: 0 | Status: DISCONTINUED | OUTPATIENT
Start: 2017-05-17 | End: 2017-05-17

## 2017-05-17 RX ADMIN — Medication 1000 UNIT(S): at 11:31

## 2017-05-17 RX ADMIN — FLUVOXAMINE MALEATE 150 MILLIGRAM(S): 25 TABLET ORAL at 11:31

## 2017-05-17 RX ADMIN — BENZOCAINE AND MENTHOL 1 LOZENGE: 5; 1 LIQUID ORAL at 13:37

## 2017-05-17 RX ADMIN — Medication 200 MILLIGRAM(S): at 17:38

## 2017-05-17 RX ADMIN — OLANZAPINE 30 MILLIGRAM(S): 15 TABLET, FILM COATED ORAL at 22:30

## 2017-05-17 RX ADMIN — Medication 1 SPRAY(S): at 17:37

## 2017-05-17 RX ADMIN — BENZOCAINE AND MENTHOL 1 LOZENGE: 5; 1 LIQUID ORAL at 09:47

## 2017-05-17 RX ADMIN — Medication 200 MILLIGRAM(S): at 07:55

## 2017-05-17 RX ADMIN — FLUVOXAMINE MALEATE 150 MILLIGRAM(S): 25 TABLET ORAL at 00:55

## 2017-05-17 RX ADMIN — ONDANSETRON 4 MILLIGRAM(S): 8 TABLET, FILM COATED ORAL at 22:25

## 2017-05-17 RX ADMIN — Medication 200 MILLIGRAM(S): at 13:37

## 2017-05-17 RX ADMIN — Medication 250 MILLIGRAM(S): at 06:15

## 2017-05-17 RX ADMIN — Medication 200 MILLIGRAM(S): at 05:20

## 2017-05-17 RX ADMIN — PIPERACILLIN AND TAZOBACTAM 25 GRAM(S): 4; .5 INJECTION, POWDER, LYOPHILIZED, FOR SOLUTION INTRAVENOUS at 17:59

## 2017-05-17 RX ADMIN — Medication 250 MILLIGRAM(S): at 22:25

## 2017-05-17 RX ADMIN — PIPERACILLIN AND TAZOBACTAM 25 GRAM(S): 4; .5 INJECTION, POWDER, LYOPHILIZED, FOR SOLUTION INTRAVENOUS at 10:31

## 2017-05-17 RX ADMIN — BENZOCAINE AND MENTHOL 1 LOZENGE: 5; 1 LIQUID ORAL at 01:06

## 2017-05-17 RX ADMIN — Medication 200 MILLIGRAM(S): at 22:30

## 2017-05-17 RX ADMIN — Medication 1 SPRAY(S): at 05:20

## 2017-05-17 NOTE — PROVIDER CONTACT NOTE (OTHER) - ACTION/TREATMENT ORDERED:
MD aware-states to monitor pt and recheck temp in 30 min
MD aware-will order droplet isolation. Logistics made aware.
MD notified
MD notified, give additional dose of tylenol now, recheck
SAID HE WILL TAKECARE OF IT.,WILL INCREASE DOSE OF VANCOMYCIN.

## 2017-05-18 ENCOUNTER — TRANSCRIPTION ENCOUNTER (OUTPATIENT)
Age: 52
End: 2017-05-18

## 2017-05-18 VITALS
DIASTOLIC BLOOD PRESSURE: 66 MMHG | HEART RATE: 91 BPM | RESPIRATION RATE: 18 BRPM | TEMPERATURE: 99 F | OXYGEN SATURATION: 97 % | SYSTOLIC BLOOD PRESSURE: 101 MMHG

## 2017-05-18 PROBLEM — F41.9 ANXIETY DISORDER, UNSPECIFIED: Chronic | Status: ACTIVE | Noted: 2017-05-14

## 2017-05-18 LAB
ANION GAP SERPL CALC-SCNC: 13 MMOL/L — SIGNIFICANT CHANGE UP (ref 5–17)
APTT BLD: 26.3 SEC — LOW (ref 27.5–37.4)
BUN SERPL-MCNC: 8 MG/DL — SIGNIFICANT CHANGE UP (ref 7–23)
CALCIUM SERPL-MCNC: 8.5 MG/DL — SIGNIFICANT CHANGE UP (ref 8.4–10.5)
CHLORIDE SERPL-SCNC: 103 MMOL/L — SIGNIFICANT CHANGE UP (ref 96–108)
CO2 SERPL-SCNC: 26 MMOL/L — SIGNIFICANT CHANGE UP (ref 22–31)
CREAT SERPL-MCNC: 0.78 MG/DL — SIGNIFICANT CHANGE UP (ref 0.5–1.3)
CULTURE RESULTS: SIGNIFICANT CHANGE UP
GLUCOSE SERPL-MCNC: 107 MG/DL — HIGH (ref 70–99)
HCT VFR BLD CALC: 31.7 % — LOW (ref 34.5–45)
HGB BLD-MCNC: 9.8 G/DL — LOW (ref 11.5–15.5)
INR BLD: 1.1 RATIO — SIGNIFICANT CHANGE UP (ref 0.88–1.16)
MAGNESIUM SERPL-MCNC: 2.3 MG/DL — SIGNIFICANT CHANGE UP (ref 1.6–2.6)
MCHC RBC-ENTMCNC: 27.6 PG — SIGNIFICANT CHANGE UP (ref 27–34)
MCHC RBC-ENTMCNC: 30.9 GM/DL — LOW (ref 32–36)
MCV RBC AUTO: 89.3 FL — SIGNIFICANT CHANGE UP (ref 80–100)
PHOSPHATE SERPL-MCNC: 4.2 MG/DL — SIGNIFICANT CHANGE UP (ref 2.5–4.5)
PLATELET # BLD AUTO: 304 K/UL — SIGNIFICANT CHANGE UP (ref 150–400)
POTASSIUM SERPL-MCNC: 4.5 MMOL/L — SIGNIFICANT CHANGE UP (ref 3.5–5.3)
POTASSIUM SERPL-SCNC: 4.5 MMOL/L — SIGNIFICANT CHANGE UP (ref 3.5–5.3)
PROTHROM AB SERPL-ACNC: 12.4 SEC — SIGNIFICANT CHANGE UP (ref 10–13.1)
RBC # BLD: 3.55 M/UL — LOW (ref 3.8–5.2)
RBC # FLD: 13.3 % — SIGNIFICANT CHANGE UP (ref 10.3–14.5)
SODIUM SERPL-SCNC: 142 MMOL/L — SIGNIFICANT CHANGE UP (ref 135–145)
SPECIMEN SOURCE: SIGNIFICANT CHANGE UP
WBC # BLD: 3.22 K/UL — LOW (ref 3.8–10.5)
WBC # FLD AUTO: 3.22 K/UL — LOW (ref 3.8–10.5)

## 2017-05-18 PROCEDURE — 80202 ASSAY OF VANCOMYCIN: CPT

## 2017-05-18 PROCEDURE — 99285 EMERGENCY DEPT VISIT HI MDM: CPT | Mod: 25

## 2017-05-18 PROCEDURE — 87186 SC STD MICRODIL/AGAR DIL: CPT

## 2017-05-18 PROCEDURE — 82435 ASSAY OF BLOOD CHLORIDE: CPT

## 2017-05-18 PROCEDURE — 99239 HOSP IP/OBS DSCHRG MGMT >30: CPT

## 2017-05-18 PROCEDURE — 80048 BASIC METABOLIC PNL TOTAL CA: CPT

## 2017-05-18 PROCEDURE — 83735 ASSAY OF MAGNESIUM: CPT

## 2017-05-18 PROCEDURE — 85730 THROMBOPLASTIN TIME PARTIAL: CPT

## 2017-05-18 PROCEDURE — 87486 CHLMYD PNEUM DNA AMP PROBE: CPT

## 2017-05-18 PROCEDURE — 87798 DETECT AGENT NOS DNA AMP: CPT

## 2017-05-18 PROCEDURE — 82803 BLOOD GASES ANY COMBINATION: CPT

## 2017-05-18 PROCEDURE — 87040 BLOOD CULTURE FOR BACTERIA: CPT

## 2017-05-18 PROCEDURE — 87086 URINE CULTURE/COLONY COUNT: CPT

## 2017-05-18 PROCEDURE — 80053 COMPREHEN METABOLIC PANEL: CPT

## 2017-05-18 PROCEDURE — 84100 ASSAY OF PHOSPHORUS: CPT

## 2017-05-18 PROCEDURE — 99233 SBSQ HOSP IP/OBS HIGH 50: CPT

## 2017-05-18 PROCEDURE — 93005 ELECTROCARDIOGRAM TRACING: CPT

## 2017-05-18 PROCEDURE — 84295 ASSAY OF SERUM SODIUM: CPT

## 2017-05-18 PROCEDURE — 96374 THER/PROPH/DIAG INJ IV PUSH: CPT

## 2017-05-18 PROCEDURE — 84132 ASSAY OF SERUM POTASSIUM: CPT

## 2017-05-18 PROCEDURE — 94640 AIRWAY INHALATION TREATMENT: CPT

## 2017-05-18 PROCEDURE — 82330 ASSAY OF CALCIUM: CPT

## 2017-05-18 PROCEDURE — 87581 M.PNEUMON DNA AMP PROBE: CPT

## 2017-05-18 PROCEDURE — 85610 PROTHROMBIN TIME: CPT

## 2017-05-18 PROCEDURE — 85014 HEMATOCRIT: CPT

## 2017-05-18 PROCEDURE — 81003 URINALYSIS AUTO W/O SCOPE: CPT

## 2017-05-18 PROCEDURE — 87633 RESP VIRUS 12-25 TARGETS: CPT

## 2017-05-18 PROCEDURE — 82947 ASSAY GLUCOSE BLOOD QUANT: CPT

## 2017-05-18 PROCEDURE — 83605 ASSAY OF LACTIC ACID: CPT

## 2017-05-18 PROCEDURE — 87070 CULTURE OTHR SPECIMN AEROBIC: CPT

## 2017-05-18 PROCEDURE — 85027 COMPLETE CBC AUTOMATED: CPT

## 2017-05-18 PROCEDURE — 71250 CT THORAX DX C-: CPT

## 2017-05-18 RX ORDER — VANCOMYCIN HCL 1 G
1250 VIAL (EA) INTRAVENOUS EVERY 8 HOURS
Qty: 0 | Refills: 0 | Status: DISCONTINUED | OUTPATIENT
Start: 2017-05-18 | End: 2017-05-18

## 2017-05-18 RX ORDER — VANCOMYCIN HCL 1 G
1000 VIAL (EA) INTRAVENOUS EVERY 8 HOURS
Qty: 0 | Refills: 0 | Status: DISCONTINUED | OUTPATIENT
Start: 2017-05-18 | End: 2017-05-18

## 2017-05-18 RX ORDER — FLUTICASONE PROPIONATE 50 MCG
1 SPRAY, SUSPENSION NASAL
Qty: 1 | Refills: 0 | OUTPATIENT
Start: 2017-05-18 | End: 2017-06-01

## 2017-05-18 RX ADMIN — Medication 1 SPRAY(S): at 06:38

## 2017-05-18 RX ADMIN — Medication 200 MILLIGRAM(S): at 06:38

## 2017-05-18 RX ADMIN — FLUVOXAMINE MALEATE 150 MILLIGRAM(S): 25 TABLET ORAL at 11:37

## 2017-05-18 RX ADMIN — BENZOCAINE AND MENTHOL 1 LOZENGE: 5; 1 LIQUID ORAL at 11:36

## 2017-05-18 RX ADMIN — Medication 250 MILLIGRAM(S): at 06:36

## 2017-05-18 RX ADMIN — PIPERACILLIN AND TAZOBACTAM 25 GRAM(S): 4; .5 INJECTION, POWDER, LYOPHILIZED, FOR SOLUTION INTRAVENOUS at 00:53

## 2017-05-18 RX ADMIN — Medication 200 MILLIGRAM(S): at 13:19

## 2017-05-18 RX ADMIN — Medication 200 MILLIGRAM(S): at 13:18

## 2017-05-18 RX ADMIN — Medication 200 MILLIGRAM(S): at 01:44

## 2017-05-18 RX ADMIN — FLUVOXAMINE MALEATE 150 MILLIGRAM(S): 25 TABLET ORAL at 00:52

## 2017-05-18 RX ADMIN — PIPERACILLIN AND TAZOBACTAM 25 GRAM(S): 4; .5 INJECTION, POWDER, LYOPHILIZED, FOR SOLUTION INTRAVENOUS at 09:08

## 2017-05-18 RX ADMIN — Medication 1000 UNIT(S): at 11:36

## 2017-05-18 NOTE — PROVIDER CONTACT NOTE (MEDICATION) - BACKGROUND
FEVER.
anxiety depression congenital neutropenia coughing nonproductive
hx anxiety depression, cough, fevers, congenital neutropenia

## 2017-05-18 NOTE — DISCHARGE NOTE ADULT - CARE PROVIDER_API CALL
Tyrone Uribe), Internal Medicine; Pulmonary Disease  [address] [place] [address]  Phone: [phone]  Fax: [phone]

## 2017-05-18 NOTE — DISCHARGE NOTE ADULT - PLAN OF CARE
Please follow up with pulmonologist in 1-2 weeks after discharge Unknown etiology of lung mass. Please continue to finish antibiotics at home. If experience repeated fever, sob at home, please return to ER or call pulmonology clinic. Management Your repeated fever, cough likely secondary to parainfluenza virus infection. Please continue to hydrate yourself at home after discharge. Only supportive care needed for viral URI symptoms.

## 2017-05-18 NOTE — PROVIDER CONTACT NOTE (MEDICATION) - SITUATION
see above note.
pt admit cough RLL mass, states takes fluoxamine 150mg bid, discrepancy from what h&p says, notify md
pt admit for cough and RLL mass, coughing nonproductive, c/o sore throat from coughing, notify MD

## 2017-05-18 NOTE — PROVIDER CONTACT NOTE (MEDICATION) - REASON
pt c/o sore throat from coughing
pt states takes fluvoxamine 150mg bid
refused 1400hrs  Vancomycin,for  Discharge home.

## 2017-05-18 NOTE — DISCHARGE NOTE ADULT - CARE PLAN
Principal Discharge DX:	Lung mass  Secondary Diagnosis:	Fever Principal Discharge DX:	Lung mass  Goal:	Please follow up with pulmonologist in 1-2 weeks after discharge  Instructions for follow-up, activity and diet:	Unknown etiology of lung mass. Please continue to finish antibiotics at home. If experience repeated fever, sob at home, please return to ER or call pulmonology clinic.  Secondary Diagnosis:	Fever  Goal:	Management  Instructions for follow-up, activity and diet:	Your repeated fever, cough likely secondary to parainfluenza virus infection. Please continue to hydrate yourself at home after discharge. Only supportive care needed for viral URI symptoms.

## 2017-05-18 NOTE — DISCHARGE NOTE ADULT - ADDITIONAL INSTRUCTIONS
Please follow up with Dr. Uribe for assessment and management of your lung mass.  You have an appointment scheduled on June 2, 2017 @ 11:00 am.  Please call the office at [phone] if  you should have any questions, or need to re-schedule your appointment time. Dr. Uribe's office is located at [address] [place] [address]

## 2017-05-18 NOTE — DISCHARGE NOTE ADULT - HOSPITAL COURSE
51F PMH congential neutropenia (ANC range 100-900), MVP, anxiety/depression, 15 pack year smoking history, recently seen in [facility] on 4/21/2017 for sore throat and fever was given Z-pack without any symptomatic improvement presents to the hospital with 1 month of cough and cyclic fevers. In the ER, patient was found to have parainfluenza positive and CT chest identified RLL mass consistent with PNA vs. tumor.    After admission, antibiotics was initiated with vanc and zosyn for empiric coverage. Pulm service consulted and plan was made for IR bx of RLL mass. However, this procedure was postponed due to patient's isolation status. Patient remains afebrile after admission and her cough significantly improved with antitussive medications given. Patient's respiratory status remains stable and decision made to dc patient to home and follow up with pulm in 2 weeks for further management. Of note, patient has had hx of severe depression. Throughout this hospitalization, patient's mood remains stable and she was kept on her home dosage of psych meds. 51F PMH congential neutropenia (ANC range 100-900), MVP, anxiety/depression, 15 pack year smoking history, recently seen in [facility] clinic on 4/21/2017 for sore throat and fever was given Z-pack without any symptomatic improvement presents to the hospital with 1 month of cough and cyclic fevers. In the ER, patient was found to have parainfluenza positive and CT chest identified RLL mass consistent with PNA vs. tumor.    After admission, antibiotics was initiated with vanc and zosyn for empiric coverage due to suspicion of patient's RLL mass being a pulmonary abscess. Pulm service consulted and plan was made for IR bx of RLL mass. However, this procedure was postponed due to patient's isolation status. Patient remained afebrile after admission and her cough significantly improved with antitussive medications. Patient's respiratory status remained stable despite consistently subtherapeutic Vancomycin trough, and decision made to dc patient to home on oral doxycycline, and to follow up with pulm in 2 weeks for further management.     Of note, patient has had hx of severe depression/schizoaffective Disorder. Throughout this hospitalization, patient's mood remains stable and she was kept on her home dosage of psych meds.    Patient discarged home in stable condition, afebrile >48 hours, with no outpatient needs for home care or PT 51F PMH congential neutropenia (ANC range 100-900), MVP, anxiety/depression, 15 pack year smoking history, recently seen in [facility] clinic on 4/21/2017 for sore throat and fever was given Z-pack without any symptomatic improvement presents to the hospital with 1 month of cough and cyclic fevers. In the ER, patient was found to have parainfluenza positive and CT chest identified RLL mass consistent with PNA vs. tumor.    After admission, antibiotics was initiated with vanc and zosyn for empiric coverage due to suspicion of patient's RLL mass being a pulmonary abscess. Pulm service consulted and plan was made for IR bx of RLL mass. However, this procedure was postponed due to patient's isolation status. Patient remained afebrile after admission and her cough significantly improved with antitussive medications. Patient's respiratory status remained stable despite consistently subtherapeutic Vancomycin trough, and decision made to dc patient to home on oral doxycycline, and to follow up with pulm in 2 weeks for further management with possible outpatient biopsy and continued surveillance     Of note, patient has had hx of severe depression/schizoaffective Disorder. Throughout this hospitalization, patient's mood remains stable and she was kept on her home dosage of psych meds.    Patient discharged home in stable condition, afebrile >48 hours, with no outpatient needs for home care or PT.    Discharge time spent: 40 min

## 2017-05-18 NOTE — DISCHARGE NOTE ADULT - PATIENT PORTAL LINK FT
“You can access the Follow[facility] Patient Portal, offered by [facility], by registering with the following website: [email or web address]”

## 2017-05-18 NOTE — DISCHARGE NOTE ADULT - MEDICATION SUMMARY - MEDICATIONS TO TAKE
I will START or STAY ON the medications listed below when I get home from the hospital:    clonazePAM 1 mg oral tablet  -- 1 tab(s) by mouth 2 times a day  -- Indication: For Anxiety    fluvoxaMINE 150 mg oral capsule, extended release  -- 1 cap(s) by mouth 2 times a day  -- Indication: For Depression, unspecified depression type    doxycycline hyclate 100 mg oral delayed release tablet  -- 1 tab(s) by mouth 2 times a day  -- Avoid prolonged or excessive exposure to direct and/or artificial sunlight while taking this medication.  Do not chew, break, or crush.  Do not take dairy products, antacids, or iron preparations within one hour of this medication.  Do not take this drug if you are pregnant.  Finish all this medication unless otherwise directed by prescriber.  Medication should be taken with plenty of water.    -- Indication: For PNA    OLANZapine  -- 30 milligram(s) by mouth once a day  -- Indication: For Depression, unspecified depression type    benzonatate 100 mg oral capsule  -- 2 cap(s) by mouth every 8 hours  -- Indication: For Cough    guaiFENesin 100 mg/5 mL oral liquid  -- 10 milliliter(s) by mouth every 6 hours, As needed, Cough  -- Indication: For Cough    fluticasone 50 mcg/inh nasal spray  -- 1 spray(s) into nose 2 times a day  -- Indication: For Congestion    Vitamin D3 1000 intl units oral capsule  -- 1 cap(s) by mouth once a day  -- Indication: For Nutrition    biotin 1000 mcg oral tablet  -- 1 tab(s) by mouth once a day  -- Indication: For Nutrition

## 2017-05-18 NOTE — PROVIDER CONTACT NOTE (MEDICATION) - ASSESSMENT
STABLE
a&ox4, states takes fluoxamine 150mg bid, discrepancy from what h&p says, receiving iv fluids, zosyn and vanco iv on contact yony, last dose 12noon
a&ox4, iv ns infusing, receiving zosyn and vanco iv, c/o sore throat from coughing

## 2017-05-19 LAB
CULTURE RESULTS: SIGNIFICANT CHANGE UP
CULTURE RESULTS: SIGNIFICANT CHANGE UP
SPECIMEN SOURCE: SIGNIFICANT CHANGE UP
SPECIMEN SOURCE: SIGNIFICANT CHANGE UP

## 2017-06-01 ENCOUNTER — APPOINTMENT (OUTPATIENT)
Dept: INTERNAL MEDICINE | Facility: CLINIC | Age: 52
End: 2017-06-01

## 2017-06-01 VITALS
DIASTOLIC BLOOD PRESSURE: 60 MMHG | OXYGEN SATURATION: 98 % | HEART RATE: 93 BPM | BODY MASS INDEX: 20.57 KG/M2 | HEIGHT: 66 IN | WEIGHT: 128 LBS | SYSTOLIC BLOOD PRESSURE: 110 MMHG

## 2017-06-02 ENCOUNTER — APPOINTMENT (OUTPATIENT)
Dept: PULMONOLOGY | Facility: CLINIC | Age: 52
End: 2017-06-02

## 2017-06-02 VITALS
OXYGEN SATURATION: 98 % | SYSTOLIC BLOOD PRESSURE: 112 MMHG | RESPIRATION RATE: 14 BRPM | TEMPERATURE: 98.2 F | DIASTOLIC BLOOD PRESSURE: 78 MMHG | HEART RATE: 86 BPM

## 2017-06-08 ENCOUNTER — APPOINTMENT (OUTPATIENT)
Dept: PULMONOLOGY | Facility: CLINIC | Age: 52
End: 2017-06-08

## 2017-06-08 ENCOUNTER — APPOINTMENT (OUTPATIENT)
Dept: INTERNAL MEDICINE | Facility: CLINIC | Age: 52
End: 2017-06-08

## 2017-06-08 ENCOUNTER — LABORATORY RESULT (OUTPATIENT)
Age: 52
End: 2017-06-08

## 2017-06-08 VITALS
TEMPERATURE: 98.6 F | DIASTOLIC BLOOD PRESSURE: 62 MMHG | HEIGHT: 66 IN | RESPIRATION RATE: 14 BRPM | SYSTOLIC BLOOD PRESSURE: 110 MMHG | BODY MASS INDEX: 20.09 KG/M2 | HEART RATE: 105 BPM | OXYGEN SATURATION: 98 % | WEIGHT: 125 LBS

## 2017-06-08 RX ORDER — OLANZAPINE 10 MG/1
10 TABLET, FILM COATED ORAL
Qty: 30 | Refills: 0 | Status: ACTIVE | COMMUNITY
Start: 2017-02-21

## 2017-06-08 RX ORDER — TOBRAMYCIN AND DEXAMETHASONE 3; 1 MG/ML; MG/ML
0.3-0.1 SUSPENSION/ DROPS OPHTHALMIC
Qty: 5 | Refills: 0 | Status: DISCONTINUED | COMMUNITY
Start: 2017-02-08

## 2017-06-08 RX ORDER — KETOCONAZOLE 20 MG/G
2 CREAM TOPICAL
Qty: 60 | Refills: 0 | Status: DISCONTINUED | COMMUNITY
Start: 2016-10-13

## 2017-06-08 RX ORDER — DOXYCYCLINE HYCLATE 100 MG/1
100 TABLET ORAL
Qty: 28 | Refills: 0 | Status: DISCONTINUED | COMMUNITY
Start: 2017-05-18

## 2017-06-12 ENCOUNTER — RESULT REVIEW (OUTPATIENT)
Age: 52
End: 2017-06-12

## 2017-06-12 LAB
ALBUMIN SERPL ELPH-MCNC: 3.7 G/DL
ALP BLD-CCNC: 55 U/L
ALT SERPL-CCNC: 7 U/L
ANION GAP SERPL CALC-SCNC: 16 MMOL/L
AST SERPL-CCNC: 12 U/L
BASOPHILS # BLD AUTO: 0.04 K/UL
BASOPHILS NFR BLD AUTO: 1 %
BILIRUB SERPL-MCNC: <0.2 MG/DL
BUN SERPL-MCNC: 13 MG/DL
CALCIUM SERPL-MCNC: 9.1 MG/DL
CHLORIDE SERPL-SCNC: 100 MMOL/L
CO2 SERPL-SCNC: 24 MMOL/L
CREAT SERPL-MCNC: 0.68 MG/DL
EOSINOPHIL # BLD AUTO: 0.13 K/UL
EOSINOPHIL NFR BLD AUTO: 3 %
GLUCOSE SERPL-MCNC: 88 MG/DL
HCT VFR BLD CALC: 34.3 %
HGB BLD-MCNC: 10.7 G/DL
LYMPHOCYTES # BLD AUTO: 2.19 K/UL
LYMPHOCYTES NFR BLD AUTO: 50 %
MAN DIFF?: NORMAL
MCHC RBC-ENTMCNC: 28.4 PG
MCHC RBC-ENTMCNC: 31.2 GM/DL
MCV RBC AUTO: 91 FL
MONOCYTES # BLD AUTO: 1.27 K/UL
MONOCYTES NFR BLD AUTO: 29 %
NEUTROPHILS # BLD AUTO: 0.7 K/UL
NEUTROPHILS NFR BLD AUTO: 16 %
PLATELET # BLD AUTO: 257 K/UL
POTASSIUM SERPL-SCNC: 4.1 MMOL/L
PROT SERPL-MCNC: 7.5 G/DL
RBC # BLD: 3.77 M/UL
RBC # FLD: 14.9 %
SODIUM SERPL-SCNC: 140 MMOL/L
WBC # FLD AUTO: 4.38 K/UL

## 2017-06-18 ENCOUNTER — INPATIENT (INPATIENT)
Facility: HOSPITAL | Age: 52
LOS: 10 days | Discharge: HOME | DRG: 871 | End: 2017-06-29
Attending: INTERNAL MEDICINE | Admitting: HOSPITALIST
Payer: COMMERCIAL

## 2017-06-18 VITALS
SYSTOLIC BLOOD PRESSURE: 110 MMHG | OXYGEN SATURATION: 97 % | RESPIRATION RATE: 20 BRPM | HEART RATE: 90 BPM | TEMPERATURE: 98 F | DIASTOLIC BLOOD PRESSURE: 70 MMHG

## 2017-06-18 DIAGNOSIS — J18.0 BRONCHOPNEUMONIA, UNSPECIFIED ORGANISM: ICD-10-CM

## 2017-06-18 DIAGNOSIS — Z29.9 ENCOUNTER FOR PROPHYLACTIC MEASURES, UNSPECIFIED: ICD-10-CM

## 2017-06-18 DIAGNOSIS — D70.0 CONGENITAL AGRANULOCYTOSIS: ICD-10-CM

## 2017-06-18 DIAGNOSIS — D64.9 ANEMIA, UNSPECIFIED: ICD-10-CM

## 2017-06-18 DIAGNOSIS — F25.9 SCHIZOAFFECTIVE DISORDER, UNSPECIFIED: ICD-10-CM

## 2017-06-18 DIAGNOSIS — R91.8 OTHER NONSPECIFIC ABNORMAL FINDING OF LUNG FIELD: ICD-10-CM

## 2017-06-18 DIAGNOSIS — J18.1 LOBAR PNEUMONIA, UNSPECIFIED ORGANISM: ICD-10-CM

## 2017-06-18 DIAGNOSIS — A41.9 SEPSIS, UNSPECIFIED ORGANISM: ICD-10-CM

## 2017-06-18 DIAGNOSIS — F41.9 ANXIETY DISORDER, UNSPECIFIED: ICD-10-CM

## 2017-06-18 LAB
ALBUMIN SERPL ELPH-MCNC: 3.1 G/DL — LOW (ref 3.3–5)
ALP SERPL-CCNC: 49 U/L — SIGNIFICANT CHANGE UP (ref 40–120)
ALT FLD-CCNC: 10 U/L RC — SIGNIFICANT CHANGE UP (ref 10–45)
ANION GAP SERPL CALC-SCNC: 12 MMOL/L — SIGNIFICANT CHANGE UP (ref 5–17)
APPEARANCE UR: CLEAR — SIGNIFICANT CHANGE UP
APTT BLD: 28.4 SEC — SIGNIFICANT CHANGE UP (ref 27.5–37.4)
AST SERPL-CCNC: 7 U/L — LOW (ref 10–40)
BACTERIA # UR AUTO: ABNORMAL /HPF
BASOPHILS # BLD AUTO: 0 K/UL — SIGNIFICANT CHANGE UP (ref 0–0.2)
BASOPHILS NFR BLD AUTO: 0.8 % — SIGNIFICANT CHANGE UP (ref 0–2)
BILIRUB SERPL-MCNC: 0.1 MG/DL — LOW (ref 0.2–1.2)
BILIRUB UR-MCNC: NEGATIVE — SIGNIFICANT CHANGE UP
BUN SERPL-MCNC: 4 MG/DL — LOW (ref 7–23)
CALCIUM SERPL-MCNC: 8.7 MG/DL — SIGNIFICANT CHANGE UP (ref 8.4–10.5)
CHLORIDE SERPL-SCNC: 103 MMOL/L — SIGNIFICANT CHANGE UP (ref 96–108)
CO2 SERPL-SCNC: 27 MMOL/L — SIGNIFICANT CHANGE UP (ref 22–31)
COLOR SPEC: SIGNIFICANT CHANGE UP
CREAT SERPL-MCNC: 0.8 MG/DL — SIGNIFICANT CHANGE UP (ref 0.5–1.3)
DIFF PNL FLD: NEGATIVE — SIGNIFICANT CHANGE UP
EOSINOPHIL # BLD AUTO: 0 K/UL — SIGNIFICANT CHANGE UP (ref 0–0.5)
EOSINOPHIL NFR BLD AUTO: 2 % — SIGNIFICANT CHANGE UP (ref 0–6)
EPI CELLS # UR: SIGNIFICANT CHANGE UP /HPF
GAS PNL BLDV: SIGNIFICANT CHANGE UP
GLUCOSE SERPL-MCNC: 128 MG/DL — HIGH (ref 70–99)
GLUCOSE UR QL: NEGATIVE — SIGNIFICANT CHANGE UP
HCT VFR BLD CALC: 31.9 % — LOW (ref 34.5–45)
HGB BLD-MCNC: 10 G/DL — LOW (ref 11.5–15.5)
INR BLD: 1.32 RATIO — HIGH (ref 0.88–1.16)
KETONES UR-MCNC: NEGATIVE — SIGNIFICANT CHANGE UP
LEUKOCYTE ESTERASE UR-ACNC: NEGATIVE — SIGNIFICANT CHANGE UP
LYMPHOCYTES # BLD AUTO: 1.6 K/UL — SIGNIFICANT CHANGE UP (ref 1–3.3)
LYMPHOCYTES # BLD AUTO: 41 % — SIGNIFICANT CHANGE UP (ref 13–44)
MCHC RBC-ENTMCNC: 28.6 PG — SIGNIFICANT CHANGE UP (ref 27–34)
MCHC RBC-ENTMCNC: 31.4 GM/DL — LOW (ref 32–36)
MCV RBC AUTO: 91.1 FL — SIGNIFICANT CHANGE UP (ref 80–100)
MONOCYTES # BLD AUTO: 1.4 K/UL — HIGH (ref 0–0.9)
MONOCYTES NFR BLD AUTO: 34 % — HIGH (ref 2–14)
NEUTROPHILS # BLD AUTO: 0.5 K/UL — LOW (ref 1.8–7.4)
NEUTROPHILS NFR BLD AUTO: 21 % — LOW (ref 43–77)
NITRITE UR-MCNC: NEGATIVE — SIGNIFICANT CHANGE UP
PH UR: 6.5 — SIGNIFICANT CHANGE UP (ref 5–8)
PLATELET # BLD AUTO: 292 K/UL — SIGNIFICANT CHANGE UP (ref 150–400)
POTASSIUM SERPL-MCNC: 3.3 MMOL/L — LOW (ref 3.5–5.3)
POTASSIUM SERPL-SCNC: 3.3 MMOL/L — LOW (ref 3.5–5.3)
PROT SERPL-MCNC: 6.7 G/DL — SIGNIFICANT CHANGE UP (ref 6–8.3)
PROT UR-MCNC: NEGATIVE — SIGNIFICANT CHANGE UP
PROTHROM AB SERPL-ACNC: 14.5 SEC — HIGH (ref 9.8–12.7)
RBC # BLD: 3.5 M/UL — LOW (ref 3.8–5.2)
RBC # FLD: 13.5 % — SIGNIFICANT CHANGE UP (ref 10.3–14.5)
SODIUM SERPL-SCNC: 142 MMOL/L — SIGNIFICANT CHANGE UP (ref 135–145)
SP GR SPEC: 1 — SIGNIFICANT CHANGE UP (ref 1.01–1.02)
UROBILINOGEN FLD QL: NEGATIVE — SIGNIFICANT CHANGE UP
WBC # BLD: 3.5 K/UL — LOW (ref 3.8–10.5)
WBC # FLD AUTO: 3.5 K/UL — LOW (ref 3.8–10.5)
WBC UR QL: SIGNIFICANT CHANGE UP /HPF (ref 0–5)

## 2017-06-18 PROCEDURE — 71260 CT THORAX DX C+: CPT | Mod: 26

## 2017-06-18 PROCEDURE — 71020: CPT | Mod: 26

## 2017-06-18 PROCEDURE — 99285 EMERGENCY DEPT VISIT HI MDM: CPT

## 2017-06-18 PROCEDURE — 99223 1ST HOSP IP/OBS HIGH 75: CPT

## 2017-06-18 RX ORDER — VANCOMYCIN HCL 1 G
1000 VIAL (EA) INTRAVENOUS ONCE
Qty: 0 | Refills: 0 | Status: COMPLETED | OUTPATIENT
Start: 2017-06-18 | End: 2017-06-18

## 2017-06-18 RX ORDER — OLANZAPINE 15 MG/1
30 TABLET, FILM COATED ORAL DAILY
Qty: 0 | Refills: 0 | Status: DISCONTINUED | OUTPATIENT
Start: 2017-06-18 | End: 2017-06-18

## 2017-06-18 RX ORDER — SODIUM CHLORIDE 9 MG/ML
1000 INJECTION INTRAMUSCULAR; INTRAVENOUS; SUBCUTANEOUS ONCE
Qty: 0 | Refills: 0 | Status: COMPLETED | OUTPATIENT
Start: 2017-06-18 | End: 2017-06-18

## 2017-06-18 RX ORDER — ONDANSETRON 8 MG/1
4 TABLET, FILM COATED ORAL ONCE
Qty: 0 | Refills: 0 | Status: COMPLETED | OUTPATIENT
Start: 2017-06-18 | End: 2017-06-18

## 2017-06-18 RX ORDER — CLONAZEPAM 1 MG
1 TABLET ORAL
Qty: 0 | Refills: 0 | Status: DISCONTINUED | OUTPATIENT
Start: 2017-06-18 | End: 2017-06-18

## 2017-06-18 RX ORDER — FLUVOXAMINE MALEATE 25 MG/1
150 TABLET ORAL
Qty: 0 | Refills: 0 | Status: DISCONTINUED | OUTPATIENT
Start: 2017-06-18 | End: 2017-06-29

## 2017-06-18 RX ORDER — OLANZAPINE 15 MG/1
20 TABLET, FILM COATED ORAL AT BEDTIME
Qty: 0 | Refills: 0 | Status: DISCONTINUED | OUTPATIENT
Start: 2017-06-18 | End: 2017-06-29

## 2017-06-18 RX ORDER — OLANZAPINE 15 MG/1
5 TABLET, FILM COATED ORAL
Qty: 0 | Refills: 0 | Status: DISCONTINUED | OUTPATIENT
Start: 2017-06-18 | End: 2017-06-29

## 2017-06-18 RX ORDER — FLUTICASONE PROPIONATE 50 MCG
1 SPRAY, SUSPENSION NASAL
Qty: 0 | Refills: 0 | Status: DISCONTINUED | OUTPATIENT
Start: 2017-06-18 | End: 2017-06-29

## 2017-06-18 RX ORDER — OLANZAPINE 15 MG/1
30 TABLET, FILM COATED ORAL
Qty: 0 | Refills: 0 | COMMUNITY

## 2017-06-18 RX ORDER — CEFEPIME 1 G/1
1000 INJECTION, POWDER, FOR SOLUTION INTRAMUSCULAR; INTRAVENOUS EVERY 12 HOURS
Qty: 0 | Refills: 0 | Status: DISCONTINUED | OUTPATIENT
Start: 2017-06-18 | End: 2017-06-19

## 2017-06-18 RX ORDER — CHOLECALCIFEROL (VITAMIN D3) 125 MCG
1000 CAPSULE ORAL DAILY
Qty: 0 | Refills: 0 | Status: DISCONTINUED | OUTPATIENT
Start: 2017-06-18 | End: 2017-06-29

## 2017-06-18 RX ORDER — HEPARIN SODIUM 5000 [USP'U]/ML
5000 INJECTION INTRAVENOUS; SUBCUTANEOUS EVERY 8 HOURS
Qty: 0 | Refills: 0 | Status: COMPLETED | OUTPATIENT
Start: 2017-06-18 | End: 2017-06-18

## 2017-06-18 RX ORDER — ACETAMINOPHEN 500 MG
1000 TABLET ORAL ONCE
Qty: 0 | Refills: 0 | Status: COMPLETED | OUTPATIENT
Start: 2017-06-18 | End: 2017-06-18

## 2017-06-18 RX ORDER — VANCOMYCIN HCL 1 G
1000 VIAL (EA) INTRAVENOUS EVERY 12 HOURS
Qty: 0 | Refills: 0 | Status: DISCONTINUED | OUTPATIENT
Start: 2017-06-18 | End: 2017-06-22

## 2017-06-18 RX ORDER — POTASSIUM CHLORIDE 20 MEQ
40 PACKET (EA) ORAL ONCE
Qty: 0 | Refills: 0 | Status: COMPLETED | OUTPATIENT
Start: 2017-06-18 | End: 2017-06-18

## 2017-06-18 RX ADMIN — FLUVOXAMINE MALEATE 150 MILLIGRAM(S): 25 TABLET ORAL at 23:03

## 2017-06-18 RX ADMIN — SODIUM CHLORIDE 1000 MILLILITER(S): 9 INJECTION INTRAMUSCULAR; INTRAVENOUS; SUBCUTANEOUS at 12:58

## 2017-06-18 RX ADMIN — Medication 1000 MILLIGRAM(S): at 19:45

## 2017-06-18 RX ADMIN — Medication 40 MILLIEQUIVALENT(S): at 15:02

## 2017-06-18 RX ADMIN — OLANZAPINE 20 MILLIGRAM(S): 15 TABLET, FILM COATED ORAL at 22:33

## 2017-06-18 RX ADMIN — ONDANSETRON 4 MILLIGRAM(S): 8 TABLET, FILM COATED ORAL at 18:15

## 2017-06-18 RX ADMIN — HEPARIN SODIUM 5000 UNIT(S): 5000 INJECTION INTRAVENOUS; SUBCUTANEOUS at 22:34

## 2017-06-18 RX ADMIN — Medication 400 MILLIGRAM(S): at 18:15

## 2017-06-18 RX ADMIN — CEFEPIME 100 MILLIGRAM(S): 1 INJECTION, POWDER, FOR SOLUTION INTRAMUSCULAR; INTRAVENOUS at 15:12

## 2017-06-18 RX ADMIN — SODIUM CHLORIDE 1000 MILLILITER(S): 9 INJECTION INTRAMUSCULAR; INTRAVENOUS; SUBCUTANEOUS at 15:30

## 2017-06-18 RX ADMIN — Medication 250 MILLIGRAM(S): at 16:15

## 2017-06-18 NOTE — H&P ADULT - HISTORY OF PRESENT ILLNESS
51F PMH congenital cyclic neutropenia, anxiety/depression, RLL lung mass identified approx 1 month ago presents for further evaluation of fever and cough. The patient was admitted at [facility] (5/14-5/18/17) for evaluation of similar symptoms and at the time was RVP positive for parainfluenza but was found to have 4cm RLL lung mass. She was treated initially with vancomycin and zosyn and had subsequent improvement. Plan was made initially to obtain biopsy of the mass at that time, but this was deferred as the patient had clinical improvement. She was discharged on doxycycline PO and continued to improve.     About one week ago,       However, over the last week to 10 days she has had progressive coughing with clear sputum and mild shortness of breath. She has had low grade fevers but then today had a temperature 102.7 at home. Her cough has not resolved without nebulizers. She denies sick contacts. She is a former smoker. On her last visit she was supposed to get a biospy of her RLL mass but this was deferred because of her clinical improvement. 51F PMH congenital cyclic neutropenia, anxiety/depression, RLL lung mass identified approx 1 month ago presents for further evaluation of fever and cough. The patient was admitted at [facility] (5/14-5/18/17) for evaluation of similar symptoms and at the time was RVP positive for parainfluenza but was found to have 4cm RLL lung mass. She was treated initially with vancomycin and zosyn and had subsequent improvement. Plan was made initially to obtain biopsy of the mass at that time, but this was deferred as the patient had clinical improvement. She was discharged on doxycycline PO and continued to improve.     About one week ago, she again developed a cough productive of clear sputum. Also endorses mild dyspnea. Starting a few days ago, she had low grade fevers, but had a fever of 102.7F today at home and so presented. Former smoker, no sick contacts, no travel.     ED course: had fever of 103, tachycardia during fever episodes, but otherwise HD stable. Received 2L bolus, vancomycin x 1 and cefepime x 1. BCx sent. 51F PMH congenital cyclic neutropenia, anxiety/depression/schizoaffective disorder, RLL lung mass identified approx 1 month ago presents for further evaluation of fever and cough. The patient was admitted at [facility] (5/14-5/18/17) for evaluation of similar symptoms and at the time was RVP positive for parainfluenza but was found to have 4cm RLL lung mass. She was treated initially with vancomycin and zosyn and had subsequent improvement. Plan was made initially to obtain biopsy of the mass at that time, but this was deferred as the patient had clinical improvement. She was discharged on doxycycline PO and continued to improve.     About one week ago, she again developed a cough productive of clear sputum. Also endorses mild dyspnea. Starting a few days ago, she had low grade fevers, but had a fever of 102.7F today at home and so presented. Former smoker, no sick contacts, no travel.     ED course: had fever of 103, tachycardia during fever episodes, but otherwise HD stable. Received 2L bolus, vancomycin x 1 and cefepime x 1. BCx sent. 51F PMH congenital cyclic neutropenia, anxiety/depression/schizoaffective disorder, RLL lung mass identified approx 1 month ago presents for further evaluation of fever and cough. The patient was admitted at [facility] (5/14-5/18/17) for evaluation of similar symptoms and at the time was RVP positive for parainfluenza but was found to have 4cm RLL lung mass. She was treated initially with vancomycin and zosyn and had subsequent improvement. Plan was made initially to obtain biopsy of the mass at that time, but this was deferred as the patient had clinical improvement. She was discharged on doxycycline PO and continued to improve.     About one week ago, she again developed a cough productive of clear sputum. Not as severe as previous, but can occur in 20 minute fits. Also endorses mild dyspnea with exertion. Starting a few days ago, she had low grade fevers, but had a fever of 102.7F today at home and so presented. Former smoker, no sick contacts, no travel. Endorses 5lbs weight loss since May.      ED course: had fever of 103, tachycardia during fever episodes, but otherwise HD stable. Received 2L bolus, vancomycin x 1 and cefepime x 1. BCx sent.

## 2017-06-18 NOTE — H&P ADULT - NSHPLABSRESULTS_GEN_ALL_CORE
LABS:             10.0   3.5   )-----------( 292               31.9     142  |  103  |  4<L>  ----------------------------<  128<H>  3.3<L>   |  27  |  0.80    Ca    8.7      2017 12:15  TPro  6.7  /  Alb  3.1<L>  /  TBili  0.1<L>  /  DBili  x   /  AST  7<L>  /  ALT  10  /  AlkPhos  49  06-18  PT: 14.5 sec;   INR: 1.32 ratio     PTT: 28.4 sec      Urinalysis Basic -    Color: PL Yellow / Appearance: Clear / S.005 / pH: x  Gluc: x / Ketone: Negative  / Bili: Negative / Urobili: Negative   Blood: x / Protein: Negative / Nitrite: Negative   Leuk Esterase: Negative / RBC: x / WBC 0-2 /HPF   Sq Epi: x / Non Sq Epi: OCC /HPF / Bacteria: Few /HPF LABS:             10.0   3.5   )-----------( 292               31.9     142  |  103  |  4<L>  ----------------------------<  128<H>  3.3<L>   |  27  |  0.80    Ca    8.7      2017 12:15  TPro  6.7  /  Alb  3.1<L>  /  TBili  0.1<L>  /  DBili  x   /  AST  7<L>  /  ALT  10  /  AlkPhos  49  06-18  PT: 14.5 sec;   INR: 1.32 ratio     PTT: 28.4 sec    Urinalysis Basic -    Color: PL Yellow / Appearance: Clear / S.005 / pH: x  Gluc: x / Ketone: Negative  / Bili: Negative / Urobili: Negative   Blood: x / Protein: Negative / Nitrite: Negative   Leuk Esterase: Negative / RBC: x / WBC 0-2 /HPF   Sq Epi: x / Non Sq Epi: OCC /HPF / Bacteria: Few /HPF    CT chest with contrast:  FINDINGS:     AIRWAYS, LUNGS AND PLEURA: The central tracheobronchial tree is patent.   There is a 3.7 x 4.3 x 3.5 cm (series 2, image 60) mass in the right   lower lobe, previously measuring 3.9 x 3.8 cm. There has been interval   development of a 3 x 2.9 cm hypodense rim-enhancing fluid collection   containing internal focus of gas. There is a 4 mm right lower lobe nodule   (series 2, image 74) series.    MEDIASTINUM : There are no enlarged mediastinal, hilar or axillary lymph   nodes. The visualized portion of the thyroid gland is unremarkable.     HEART AND VESSELS: Theheart is normal in size.  There are no   atherosclerotic calcifications of the aorta.  There is trace pericardial   fluid.     UPPER ABDOMEN: Small hiatal hernia. Several scattered hypodense lesions   are noted throughout the liver and spleen.     BONES AND SOFT TISSUES: The bones are unremarkable.  The soft tissues are   unremarkable.    IMPRESSION:   A 4.3 cm mass in the right lower lobe demonstrates interval enlargement   and development of an internal hypodense, rim-enhancing fluid collection  with a focus of gas. This likely represents a pulmonary abscess. However   an underlying malignancy must be excluded and follow-up CT chest is   recommended to confirm resolution.    Labs and imaging personally reviewed and interpreted by me.

## 2017-06-18 NOTE — H&P ADULT - PROBLEM SELECTOR PLAN 6
--VTE PPX with lovenox as IR guided biopsy unlikely to occur tomorrow. --VTE PPX with HSQ x 1, then hold further doses in case of IR biopsy tomorrow. --will us broad spectrum antibiotics as patient currently neutropenic (vancomycin/cefepime)

## 2017-06-18 NOTE — H&P ADULT - NSHPREVIEWOFSYSTEMS_GEN_ALL_CORE
Review of Systems:   CONSTITUTIONAL: No fever, weight loss, or fatigue  EYES: No eye pain, visual disturbances, or discharge  ENMT:  No difficulty hearing, tinnitus, vertigo; No sinus or throat pain  NECK: No pain or stiffness  BREASTS: No pain, masses, or nipple discharge  RESPIRATORY: No cough, wheezing, chills or hemoptysis; No shortness of breath  CARDIOVASCULAR: No chest pain, palpitations, dizziness, or leg swelling  GASTROINTESTINAL: No abdominal or epigastric pain. No nausea, vomiting, or hematemesis; No diarrhea or constipation. No melena or hematochezia.  GENITOURINARY: No dysuria, frequency, hematuria, or incontinence  NEUROLOGICAL: No headaches, memory loss, loss of strength, numbness, or tremors  SKIN: No itching, burning, rashes, or lesions   LYMPH NODES: No enlarged glands  ENDOCRINE: No heat or cold intolerance; No hair loss  MUSCULOSKELETAL: No joint pain or swelling; No muscle, back, or extremity pain  PSYCHIATRIC: No depression, anxiety, mood swings, or difficulty sleeping  HEME/LYMPH: No easy bruising, or bleeding gums  ALLERY AND IMMUNOLOGIC: No hives or eczema Review of Systems:   CONSTITUTIONAL: +FEVER, WEIGHT LOSS, MILD FATIGUE; -night sweats  EYES: No eye pain, visual disturbances, or discharge  ENMT:  No difficulty hearing, tinnitus, vertigo; No sinus or throat pain  NECK: No pain or stiffness  BREASTS: No pain, masses, or nipple discharge  RESPIRATORY: +COUGH, MILD DYSPNEA; No wheezing, chills or hemoptysis;  CARDIOVASCULAR: No chest pain, palpitations, dizziness, or leg swelling  GASTROINTESTINAL: No abdominal or epigastric pain. No nausea, vomiting, or hematemesis; No diarrhea or constipation. No melena or hematochezia.  GENITOURINARY: No dysuria, frequency, hematuria, or incontinence  NEUROLOGICAL: No headaches, memory loss, loss of strength, numbness, or tremors  SKIN: No itching, burning, rashes, or lesions   LYMPH NODES: No enlarged glands  ENDOCRINE: No heat or cold intolerance; No hair loss  MUSCULOSKELETAL: No joint pain or swelling; No muscle, back, or extremity pain  PSYCHIATRIC: No depression, anxiety, mood swings, or difficulty sleeping  HEME/LYMPH: No easy bruising, or bleeding gums  ALLERY AND IMMUNOLOGIC: No hives or eczema

## 2017-06-18 NOTE — H&P ADULT - PROBLEM SELECTOR PLAN 2
--Recurrent fevers and cough, initially symptoms had responded to antibiotics  --will continue to cover with broad spectrum antibiotics with vancomycin and cefepime given pt's immunocompromised status.

## 2017-06-18 NOTE — H&P ADULT - PROBLEM SELECTOR PLAN 3
--Secondary to lung mass which possibly represents complicated infection.  --Lactate WNL  --IVF resuscitated with 2L NS; pt eating and drinking so will hold of on further IVF  --follow up cultures  --hemodynamic monitoring

## 2017-06-18 NOTE — CONSULT NOTE ADULT - PROBLEM SELECTOR RECOMMENDATION 9
-will review imaging with radiology  -the lesion is the same lesion as prior but now appears to have become cystic with a well defined wall and an area of cavitation  -this may be a primary malignancy that has cavitated vs a cavitary pneumonia vs lung absess  -I suspect this will need to be biopsied - I think the safest and easiest biopsy would be a transthoracic approach but given the cystic nature I am not sure how good a biopsy would be. We will need to discuss this with the radiology team and get their thoughts.  -I think she needs to be admitted for IV antibiotics and to determine a plan of action for her RLL mass.

## 2017-06-18 NOTE — ED PROVIDER NOTE - PHYSICAL EXAMINATION
Kristina Alvarez, DO: PE: CONSTITUTIONAL: Well appearing, well nourished, in no apparent distress. ENMT: Airway patent, nasal mucosa clear, mouth with normal mucosa. HEAD: NCAT EYES: PERRL, EOMI CARDIAC: RRR, no m/r/g, no pedal edema RESPIRATORY: CTA b/l, no adventitious sounds, no active coughing, non-tachypneic, talking in full sentences GI: Abdomen non-distended, non-tender MSK: Spine appears normal, range of motion is not limited, no muscle/joint tenderness NEURO: CNII-XII grossly intact, 5/5 strength, full sensation all extremities, gait intact SKIN: No rash

## 2017-06-18 NOTE — H&P ADULT - ATTENDING COMMENTS
NIGHT HOSPITALIST:  Patient UNKNOWN to me previously, assigned to me at this point via the ER to admit this 50 y/o F--patient followed at [address] [place] [address] and also by Dr. Uribe of pulmonary medicine--patient with a history of congenital cyclic neutropenia, anxiety/depression/schizoaffective disorder with a RIGHT lower lobe mass identified one month previously with referral for persistent paroxysms of fever and cough, with patient with a recent admission to [place] last month with treatment with IV antibiotics with tissue diagnosis deferred with improvement of symptoms but patient self refers as above.  Patient with continued paroxysms of coughing with examiner.  Patient also notes episodes from the past fall and earlier this year of dripping sensation with hoarseness>>no symptoms present.  Patient with 2.5 kg weight loss in the past month.  No chest pain/pressure.  No HA, no focal weakness.  No abdominal pain.  No red blood per rectum or melena.  No back pain, no tearing back pain.  NO hematuria, no dysuria.  No suicidal or homicidal ideation.  Remaining review of systems not contributory.  Past tobacco history as above. NIGHT HOSPITALIST:  Patient UNKNOWN to me previously, assigned to me at this point via the ER to admit this 52 y/o F--patient followed at [address] [place] [address] and also by Dr. Uribe of pulmonary medicine--patient with a history of congenital cyclic neutropenia, anxiety/depression/schizoaffective disorder with a RIGHT lower lobe mass identified one month previously with referral for persistent paroxysms of fever and cough, with patient with a recent admission to [place] last month with treatment with IV antibiotics with tissue diagnosis deferred with improvement of symptoms but patient self refers as above.  Patient with continued paroxysms of coughing with examiner.  Patient also notes episodes from the past fall and earlier this year of dripping sensation with hoarseness>>no symptoms present.  Patient with 2.5 kg weight loss in the past month.  No chest pain/pressure.  No HA, no focal weakness.  No abdominal pain.  No red blood per rectum or melena.  No back pain, no tearing back pain.  NO hematuria, no dysuria.  No suicidal or homicidal ideation.  Remaining review of systems not contributory.  Past tobacco history with 15 pack year history.  Physical exam with a middle aged, chronically ill appearing F, appears older than stated age with diffuse sarcopenia. NIGHT HOSPITALIST:  Patient UNKNOWN to me previously, assigned to me at this point via the ER to admit this 52 y/o F--patient followed at [address] [place] [address] and also by Dr. Uribe of pulmonary medicine--patient with a history of congenital cyclic neutropenia, anxiety/depression/schizoaffective disorder with a RIGHT lower lobe mass identified one month previously with referral for persistent paroxysms of fever and cough, with patient with a recent admission to [place] last month with treatment with IV antibiotics with tissue diagnosis deferred with improvement of symptoms but patient self refers as above.  Patient with continued paroxysms of coughing with examiner.  Patient also notes episodes from the past fall and earlier this year of dripping sensation with hoarseness>>no symptoms present.  Patient with 2.5 kg weight loss in the past month.  No chest pain/pressure.  No HA, no focal weakness.  No abdominal pain.  No red blood per rectum or melena.  No back pain, no tearing back pain.  NO hematuria, no dysuria.  No suicidal or homicidal ideation.  Remaining review of systems not contributory.  Past tobacco history with 15 pack year history.  Physical exam with a middle aged, chronically ill appearing F, appears older than stated age with diffuse sarcopenia.  BP  94/58  HR  100  RR 16.  Afebrile.  98% on RA.  HEENT< PERRL EOMI, bitemporal wasting.  Neck supple, chest with decreased breath sounds at the bases with occasional polyphonic wheeze.  Cor s1 s2 tachycardia.  Abdomen scaphoid soft nontender, no rebound.  Declined breast exam.  Ext with diffuse sarcopenia.  WBC 3.5.  Hgb 10.0.  Platelets of 292K.  ANC of 805.  INR 1.3.  K+ 3.3.  Random glucose of 128.  Cr 0.8.  Alb 3.1.  U/A negative.  Chest radiograph reviewed with RIGHT lower lobe opacity.  CTT with 4.3 cm mass RIGHT lower lobe with rim enhancing fluid collection. NIGHT HOSPITALIST:  Patient UNKNOWN to me previously, assigned to me at this point via the ER to admit this 50 y/o F--patient followed at [address] [place] [address] and also by Dr. Uribe of pulmonary medicine--patient with a history of congenital cyclic neutropenia, anxiety/depression/schizoaffective disorder with a RIGHT lower lobe mass identified one month previously with referral for persistent paroxysms of fever and cough, with patient with a recent admission to [place] last month with treatment with IV antibiotics with tissue diagnosis deferred with improvement of symptoms but patient self refers as above.  Patient with continued paroxysms of coughing with examiner.  Patient also notes episodes from the past fall and earlier this year of dripping sensation with hoarseness>>no symptoms present.  Patient with 2.5 kg weight loss in the past month.  No chest pain/pressure.  No HA, no focal weakness.  No abdominal pain.  No red blood per rectum or melena.  No back pain, no tearing back pain.  NO hematuria, no dysuria.  No suicidal or homicidal ideation.  Remaining review of systems not contributory.  Past tobacco history with 15 pack year history.  Physical exam with a middle aged, chronically ill appearing F, appears older than stated age with diffuse sarcopenia.  BP  94/58  HR  100  RR 16.  Afebrile.  98% on RA.  HEENT< PERRL EOMI, bitemporal wasting.  Neck supple, chest with decreased breath sounds at the bases with occasional polyphonic wheeze.  Cor s1 s2 tachycardia.  Abdomen scaphoid soft nontender, no rebound.  Declined breast exam.  Ext with diffuse sarcopenia.  WBC 3.5.  Hgb 10.0.  Platelets of 292K.  ANC of 805.  INR 1.3.  K+ 3.3.  Random glucose of 128.  Cr 0.8.  Alb 3.1.  U/A negative.  Chest radiograph reviewed with RIGHT lower lobe opacity.  CTT with 4.3 cm mass RIGHT lower lobe with rim enhancing fluid collection.  EKG tracing reviewed with NSR at 85.  Admitted to medicine.   Appreciate Dr. Uribe's evaluation.  Will continue with IV antibiotics as above with anticipated plan for tissue diagnosis--TB excluded from previous work-up.  Would consider formal ENT evaluation to exclude associated processes parallel to the pulmonary process (i.e. nasopharyngeal or throat neoplasm).  Prognosis guarded.  Emotional support provided to patient.  Patient aware of course and agrees with plan/care as above.  Care reviewed with Dr. Koenig.  Care assumed by the DAY HOSPITALIST.

## 2017-06-18 NOTE — H&P ADULT - ASSESSMENT
51F PMH congenital cyclic neutropenia, anxiety/depression/schizoaffective disorder, RLL lung mass identified approx 1 month ago presents for further evaluation of recurrent fever and cough with associated RLL lung mass, concerning for complicated infection vs malignancy.

## 2017-06-18 NOTE — CONSULT NOTE ADULT - ASSESSMENT
51F congenital cyclic neutropenia, anxiety/depression presenting with cough and fevers and now with a persistent RLL mass which has a cystic quality possibly related to superinfection vs cavitation

## 2017-06-18 NOTE — ED PROVIDER NOTE - ATTENDING CONTRIBUTION TO CARE
****ATTENDING**** 52yo f hx listed pw cough x 1 week. Patient states she has history of congenital neutropenia, treated recently for lung infection completed 2 weeks ago now w new non productive cough. No SOB, palp. + Chronic fevers. No recent travel. On exam, Patient is awake,alert,oriented x 3. Patient is well appearing and in no acute distress. Patient's chest is clear to ausculation, +s1s2. Abdomen is soft nd/nt +BS. Extremity with no swelling or calf tenderness. Patient being eval for lung infection vs mass. Check Labs, CT and Pulm consult.

## 2017-06-18 NOTE — CONSULT NOTE ADULT - RS GEN PE MLT RESP DETAILS PC
airway patent/good air movement/no rhonchi/no wheezes/breath sounds equal/normal/no rales/respirations non-labored

## 2017-06-18 NOTE — H&P ADULT - NSHPSOCIALHISTORY_GEN_ALL_CORE
lives at home with parents lives at home with parents, former smoker lives at home with mother, former smoker 15 pack years, quit 5 years ago, rare ETOH use, no illicit drugs, works as office temp, independent of ADLs, psychiatric disease well controlled

## 2017-06-18 NOTE — H&P ADULT - PROBLEM SELECTOR PLAN 4
--c/w home medications klonopin, fluvoxamine, olanzapine --not present on labwork 2 years ago, new since May  --no signs of bleeding based on history  --possibly secondary to inflammatory process vs malignancy in the setting of lung mass  --will do preliminary workup with iron studies

## 2017-06-18 NOTE — CONSULT NOTE ADULT - SUBJECTIVE AND OBJECTIVE BOX
INDIRA SMITH  MRN-[id number]  Patient is a 51y old  Female who presents with a chief complaint of cough and fevers    HPI: 51F congenital cyclic neutropenia, anxiety/depression presents for evaluation of cough and fever. She was here a little more than 1 month ago with similar symptoms at which time she was found to have a RLL mass and parainfluenza infection. At that time she improved, and defervesced, with IV antibiotics. She was discharged on PO antibiotics and she felt much better. She was well enough to return to work.    However, over the last week to 10 days she has had progressive coughing with clear sputum and mild shortness of breath. She has had low grade fevers but then today had a temperature 102.7 at home. Her cough has not resolved without nebulizers. She denies sick contacts. She is a former smoker. On her last visit she was supposed to get a biospy of her RLL mass but this was deferred because of her clinical improvement.     PAST MEDICAL & SURGICAL HISTORY:  Congenital neutropenia  Anxiety  Depression  No significant past surgical history    FAMILY HISTORY:  Family history of heart disease (Mother)    SOCIAL HISTORY:  Smoking: former  EtOH Use: denies  Marital Status: single  Occupation: works in [place]  Exposures: denies  Recent Travel: denies    Allergies: No Known Allergies    HOME MEDICATIONS: reviewed    OBJECTIVE:  ICU Vital Signs Last 24 Hrs  T(C): 36.8, Max: 36.8 (06-18 @ 13:03)  T(F): 98.3, Max: 98.3 (06-18 @ 13:03)  HR: 89 (89 - 90)  BP: 105/66 (105/66 - 110/70)  RR: 20 (20 - 20)  SpO2: 99% (97% - 99%)    HOSPITAL MEDICATIONS:  MEDICATIONS  (STANDING):  potassium chloride    Tablet ER 40milliEquivalent(s) Oral once  cefepime  IVPB 1000milliGRAM(s) IV Intermittent every 12 hours  vancomycin  IVPB 1000milliGRAM(s) IV Intermittent once    MEDICATIONS  (PRN):      LABS:                        10.0   3.5   )-----------( 292      ( 18 Jun 2017 12:15 )             31.9     06-18    142  |  103  |  4<L>  ----------------------------<  128<H>  3.3<L>   |  27  |  0.80    Ca    8.7      18 Jun 2017 12:15    TPro  6.7  /  Alb  3.1<L>  /  TBili  0.1<L>  /  DBili  x   /  AST  7<L>  /  ALT  10  /  AlkPhos  49  06-18  PT/INR - ( 18 Jun 2017 12:15 )   PT: 14.5 sec;   INR: 1.32 ratio    PTT - ( 18 Jun 2017 12:15 )  PTT:28.4 sec  Venous Blood Gas:  06-18 @ 12:15  7.39/51/30/30/47  VBG Lactate: 1.6      MICROBIOLOGY:     RADIOLOGY:  [ x] Reviewed and interpreted by me  RLL cystic mass with pocket of air -slightly larger and different central area than on prior CT

## 2017-06-18 NOTE — ED PROVIDER NOTE - OBJECTIVE STATEMENT
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 Pt dx with PNA vs. pulmonary abscess ~1 month ago, In ED 1 mo. ago, CT chest identified RLL mass consistent with PNA vs. tumor, pt treated with vanc/zosyn for empiric coverage due to suspicion of pulmonary abscess - pt d/c'd home on oral doxy for possible o/p biopsy. Pt f/u with Dr. Richard (Pulm) on June 2nd as scheduled who recommended f/u CT in ~2 weeks. Cough had resolved 1 week after d/c. Now with NP cough x 1 week. Pt with intermittent fevers x months. 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 Pt dx with PNA vs. pulmonary abscess ~1 month ago, In ED 1 mo. ago, CT chest identified RLL mass consistent with PNA vs. tumor, pt treated with vanc/zosyn for empiric coverage due to suspicion of pulmonary abscess - pt d/c'd home on oral doxy for possible o/p biopsy. Pt f/u with Dr. Richard (Pulm) on June 2nd as scheduled who recommended f/u CT in ~2 weeks. Cough had resolved 1 week after d/c. Now with NP cough x 1 week. Pt with intermittent fevers x months.    PMD: Clinic 51F PMH congential neutropenia (ANC range 100-900), MVP, anxiety/depression, 15 pack year smoking history per chart review here for cough. Pt dx with PNA vs. pulmonary abscess ~1 month ago, In ED 1 mo. ago, CT chest identified RLL mass consistent with PNA vs. tumor, pt treated with vanc/zosyn for empiric coverage due to suspicion of pulmonary abscess - pt d/c'd home on oral doxy for possible o/p biopsy. Pt f/u with Dr. Richard (Pulm) on June 2nd as scheduled who recommended f/u CT in ~2 weeks. Cough had resolved 1 week after d/c. Now with NP cough x 1 week. Pt with intermittent fevers x months. Took Tylenol at 7 AM.     PMD: Clinic

## 2017-06-18 NOTE — ED PROVIDER NOTE - MEDICAL DECISION MAKING DETAILS
Kristina Alvarez, DO: 51F with abnormal findings on previous CT. Well appearing here with no acute distress & new NP cough. Will touch base with pulmonologist & repeat CT here. Can likely f/u o/p for w/u of acute on chronic findings pending CT.

## 2017-06-18 NOTE — H&P ADULT - PROBLEM SELECTOR PLAN 5
--will us broad spectrum antibiotics as patient currently neutropenic (vancomycin/cefepime) --c/w home medications klonopin, fluvoxamine, olanzapine

## 2017-06-18 NOTE — CONSULT NOTE ADULT - PROBLEM SELECTOR RECOMMENDATION 5
-would start empiric broad spectrum abx - Vanco/Cefepime  -check sputum culture  -check blood cultures  -will likely need lung biopsy

## 2017-06-18 NOTE — ED ADULT NURSE REASSESSMENT NOTE - NS ED NURSE REASSESS COMMENT FT1
pt is resting comfortably in stretcher A&Ox4. IV fluids being administered. VSS. pt is currently afebrile upon assessment. pt awaiting CT scan. safety and comfort measures maintained. plan of care discussed with pt. family at bedside.
pt resting in stretcher A&Ox4. pt has a fever, IV tylenol administered and a liter of fluids is being administered. pt awaiting bed. safety and comfort measures maintained.

## 2017-06-18 NOTE — CONSULT NOTE ADULT - NEGATIVE PSYCHIATRIC SYMPTOMS
no memory loss/no suicidal ideation/no depression/no auditory hallucinations/no visual hallucinations

## 2017-06-18 NOTE — ED PROVIDER NOTE - NS ED ROS FT
Kristina Alvarez DO: No nausea, no vomiting, no dizziness, no change in vision, no neck stiffness, sore throat, no chest pain, no SOB, no abdominal pain, no diarrhea, no joint pain, no rashes, no focal neurologic complaints, no dysuria, no hematuria, no dark or bloody stools, no night sweats, otherwise as HPI.

## 2017-06-18 NOTE — H&P ADULT - PROBLEM SELECTOR PLAN 1
--differential diagnosis concerning for complicated infection such as abscess given walled-off appearance on CT vs malignancy  --Pulm consult following, planning to discuss with radiology best approach for biopsy of lesion, most likely to be performed by IR.  --not hypoxic or tachypnea  --c/w vancomycin and cefepime as per pulmonary --differential diagnosis concerning for complicated infection such as abscess given walled-off appearance on CT vs malignancy  --Pulm consult following, planning to discuss with radiology best approach for biopsy of lesion, most likely to be performed by IR. Will keep NPO past midnight in case she can be added on tomorrow's schedule.  --not hypoxic or tachypnea  --c/w vancomycin and cefepime as per pulmonary

## 2017-06-18 NOTE — ED ADULT NURSE NOTE - OBJECTIVE STATEMENT
50 yo presents to the ED from home c/o cough for a few months. pt states she has recently been treated for pneumonia in may. pt has followed up with pulmonologist and is due for a CT scan next week. according to pt, insurance told her it was too early to get a repeat  is not coughing up any 52 yo presents to the ED from home A&Ox4 c/o cough for a few months. pt states she has recently been treated for pneumonia in may. pt has followed up with pulmonologist and is due for a CT scan next week. according to pt, insurance told her it was too early to get a repeat CT scan. pt denies blood in sputum, runny nose, chills, diaphoresis, chest pain, SOB. lung sounds clear bilaterally. pt states she spikes a fever intermittently, according to pt fever was 102.5 this morning. she took tylenol at 0800, pt afebrile upon arrival to ED. safety and comfort measures maintained.

## 2017-06-18 NOTE — H&P ADULT - NSHPPHYSICALEXAM_GEN_ALL_CORE
Vital Signs Last 24 Hrs  T(C): 39.5, Max: 39.5   HR: 113 (89 - 113)  BP: 116/78 (105/66 - 116/78)  RR: 18 (18 - 20)  SpO2: 100% (97% - 100%)    PHYSICAL EXAM:  GENERAL: NAD, well-developed  HEAD:  Atraumatic, Normocephalic  EYES: EOMI, PERRLA, conjunctiva and sclera clear  NECK: Supple, No JVD  CHEST/LUNG: Clear to auscultation bilaterally; No wheeze  HEART: Regular rate and rhythm; No murmurs, rubs, or gallops  ABDOMEN: Soft, Nontender, Nondistended; Bowel sounds present  EXTREMITIES:  2+ Peripheral Pulses, No clubbing, cyanosis, or edema  PSYCH: AAOx3  NEUROLOGY: non-focal  SKIN: No rashes or lesions Vital Signs Last 24 Hrs  T(C): 39.5, Max: 39.5   HR: 113 (89 - 113)  BP: 116/78 (105/66 - 116/78)  RR: 18 (18 - 20)  SpO2: 100% (97% - 100%)    PHYSICAL EXAM:  GENERAL: NAD, well-developed  HEAD:  Atraumatic, Normocephalic  EYES: EOMI, PERRLA, conjunctiva and sclera clear  NECK: Supple, No JVD  CHEST/LUNG: Course transmitted sounds R base, remaining fields clear to auscultation; No wheeze  HEART: Regular rate and rhythm; No murmurs, rubs, or gallops  ABDOMEN: Soft, Nontender, Nondistended; Bowel sounds present  EXTREMITIES:  2+ Peripheral Pulses, No clubbing, cyanosis, or edema  PSYCH: AAOx3  NEUROLOGY: non-focal  SKIN: No rashes or lesions

## 2017-06-19 ENCOUNTER — APPOINTMENT (OUTPATIENT)
Dept: CT IMAGING | Facility: CLINIC | Age: 52
End: 2017-06-19

## 2017-06-19 DIAGNOSIS — J18.1 LOBAR PNEUMONIA, UNSPECIFIED ORGANISM: ICD-10-CM

## 2017-06-19 DIAGNOSIS — F25.8 OTHER SCHIZOAFFECTIVE DISORDERS: ICD-10-CM

## 2017-06-19 DIAGNOSIS — T17.908A UNSPECIFIED FOREIGN BODY IN RESPIRATORY TRACT, PART UNSPECIFIED CAUSING OTHER INJURY, INITIAL ENCOUNTER: ICD-10-CM

## 2017-06-19 DIAGNOSIS — A41.9 SEPSIS, UNSPECIFIED ORGANISM: ICD-10-CM

## 2017-06-19 LAB
ANION GAP SERPL CALC-SCNC: 13 MMOL/L — SIGNIFICANT CHANGE UP (ref 5–17)
BUN SERPL-MCNC: 6 MG/DL — LOW (ref 7–23)
CALCIUM SERPL-MCNC: 9.3 MG/DL — SIGNIFICANT CHANGE UP (ref 8.4–10.5)
CHLORIDE SERPL-SCNC: 103 MMOL/L — SIGNIFICANT CHANGE UP (ref 96–108)
CO2 SERPL-SCNC: 25 MMOL/L — SIGNIFICANT CHANGE UP (ref 22–31)
CREAT SERPL-MCNC: 0.83 MG/DL — SIGNIFICANT CHANGE UP (ref 0.5–1.3)
CULTURE RESULTS: SIGNIFICANT CHANGE UP
FERRITIN SERPL-MCNC: 116 NG/ML — SIGNIFICANT CHANGE UP (ref 15–150)
FOLATE SERPL-MCNC: 18.9 NG/ML — SIGNIFICANT CHANGE UP (ref 4.8–24.2)
GLUCOSE SERPL-MCNC: 119 MG/DL — HIGH (ref 70–99)
GRAM STN FLD: SIGNIFICANT CHANGE UP
HAPTOGLOB SERPL-MCNC: 315 MG/DL — HIGH (ref 34–200)
HCT VFR BLD CALC: 31.5 % — LOW (ref 34.5–45)
HGB BLD-MCNC: 9.8 G/DL — LOW (ref 11.5–15.5)
HIV 1+2 AB+HIV1 P24 AG SERPL QL IA: SIGNIFICANT CHANGE UP
IRON SATN MFR SERPL: 18 % — SIGNIFICANT CHANGE UP (ref 14–50)
IRON SATN MFR SERPL: 28 UG/DL — LOW (ref 30–160)
LDH SERPL L TO P-CCNC: 155 U/L — SIGNIFICANT CHANGE UP (ref 50–242)
MCHC RBC-ENTMCNC: 27.6 PG — SIGNIFICANT CHANGE UP (ref 27–34)
MCHC RBC-ENTMCNC: 31.1 GM/DL — LOW (ref 32–36)
MCV RBC AUTO: 88.7 FL — SIGNIFICANT CHANGE UP (ref 80–100)
PLATELET # BLD AUTO: 321 K/UL — SIGNIFICANT CHANGE UP (ref 150–400)
POTASSIUM SERPL-MCNC: 4.5 MMOL/L — SIGNIFICANT CHANGE UP (ref 3.5–5.3)
POTASSIUM SERPL-SCNC: 4.5 MMOL/L — SIGNIFICANT CHANGE UP (ref 3.5–5.3)
RBC # BLD: 3.55 M/UL — LOW (ref 3.8–5.2)
RBC # BLD: 3.55 M/UL — LOW (ref 3.8–5.2)
RBC # FLD: 14.8 % — HIGH (ref 10.3–14.5)
RETICS #: 40.8 K/UL — SIGNIFICANT CHANGE UP (ref 25–125)
RETICS/RBC NFR: 1.2 % — SIGNIFICANT CHANGE UP (ref 0.5–2.5)
SODIUM SERPL-SCNC: 141 MMOL/L — SIGNIFICANT CHANGE UP (ref 135–145)
SPECIMEN SOURCE: SIGNIFICANT CHANGE UP
SPECIMEN SOURCE: SIGNIFICANT CHANGE UP
TIBC SERPL-MCNC: 155 UG/DL — LOW (ref 220–430)
UIBC SERPL-MCNC: 127 UG/DL — SIGNIFICANT CHANGE UP (ref 110–370)
VIT B12 SERPL-MCNC: 891 PG/ML — SIGNIFICANT CHANGE UP (ref 243–894)
WBC # BLD: 3.11 K/UL — LOW (ref 3.8–10.5)
WBC # FLD AUTO: 3.11 K/UL — LOW (ref 3.8–10.5)

## 2017-06-19 PROCEDURE — 99233 SBSQ HOSP IP/OBS HIGH 50: CPT

## 2017-06-19 PROCEDURE — 99223 1ST HOSP IP/OBS HIGH 75: CPT | Mod: GC

## 2017-06-19 PROCEDURE — 99222 1ST HOSP IP/OBS MODERATE 55: CPT

## 2017-06-19 PROCEDURE — 99233 SBSQ HOSP IP/OBS HIGH 50: CPT | Mod: GC

## 2017-06-19 RX ORDER — ONDANSETRON 8 MG/1
4 TABLET, FILM COATED ORAL ONCE
Qty: 0 | Refills: 0 | Status: COMPLETED | OUTPATIENT
Start: 2017-06-19 | End: 2017-06-19

## 2017-06-19 RX ORDER — CEFEPIME 1 G/1
2000 INJECTION, POWDER, FOR SOLUTION INTRAMUSCULAR; INTRAVENOUS EVERY 8 HOURS
Qty: 0 | Refills: 0 | Status: DISCONTINUED | OUTPATIENT
Start: 2017-06-19 | End: 2017-06-29

## 2017-06-19 RX ORDER — ACETAMINOPHEN 500 MG
650 TABLET ORAL ONCE
Qty: 0 | Refills: 0 | Status: COMPLETED | OUTPATIENT
Start: 2017-06-19 | End: 2017-06-19

## 2017-06-19 RX ADMIN — Medication 200 MILLIGRAM(S): at 02:09

## 2017-06-19 RX ADMIN — Medication 100 MILLIGRAM(S): at 12:20

## 2017-06-19 RX ADMIN — FLUVOXAMINE MALEATE 150 MILLIGRAM(S): 25 TABLET ORAL at 06:17

## 2017-06-19 RX ADMIN — Medication 1 SPRAY(S): at 06:15

## 2017-06-19 RX ADMIN — FLUVOXAMINE MALEATE 150 MILLIGRAM(S): 25 TABLET ORAL at 22:04

## 2017-06-19 RX ADMIN — Medication 1000 UNIT(S): at 12:20

## 2017-06-19 RX ADMIN — ONDANSETRON 4 MILLIGRAM(S): 8 TABLET, FILM COATED ORAL at 13:11

## 2017-06-19 RX ADMIN — OLANZAPINE 20 MILLIGRAM(S): 15 TABLET, FILM COATED ORAL at 22:04

## 2017-06-19 RX ADMIN — Medication 100 MILLIGRAM(S): at 22:03

## 2017-06-19 RX ADMIN — Medication 250 MILLIGRAM(S): at 17:34

## 2017-06-19 RX ADMIN — Medication 200 MILLIGRAM(S): at 04:32

## 2017-06-19 RX ADMIN — Medication 250 MILLIGRAM(S): at 06:18

## 2017-06-19 RX ADMIN — Medication 650 MILLIGRAM(S): at 10:10

## 2017-06-19 RX ADMIN — Medication 200 MILLIGRAM(S): at 16:53

## 2017-06-19 RX ADMIN — CEFEPIME 100 MILLIGRAM(S): 1 INJECTION, POWDER, FOR SOLUTION INTRAMUSCULAR; INTRAVENOUS at 04:22

## 2017-06-19 RX ADMIN — Medication 200 MILLIGRAM(S): at 20:36

## 2017-06-19 RX ADMIN — CEFEPIME 100 MILLIGRAM(S): 1 INJECTION, POWDER, FOR SOLUTION INTRAMUSCULAR; INTRAVENOUS at 17:34

## 2017-06-19 RX ADMIN — OLANZAPINE 5 MILLIGRAM(S): 15 TABLET, FILM COATED ORAL at 06:16

## 2017-06-19 RX ADMIN — Medication 1 SPRAY(S): at 17:34

## 2017-06-19 RX ADMIN — Medication 200 MILLIGRAM(S): at 10:10

## 2017-06-19 NOTE — PROGRESS NOTE ADULT - PROBLEM SELECTOR PLAN 2
-continue broad spectrum antibiotics  -please check sputum cultures  -please check cultures from abscess drainage/biopsy tomorrow  -will need long term IV abx -continue broad spectrum antibiotics  -please check sputum cultures  -please check cultures from abscess drainage/biopsy tomorrow  -will need long term IV abx  -consider ID eval  -quant gold

## 2017-06-19 NOTE — CONSULT NOTE ADULT - SUBJECTIVE AND OBJECTIVE BOX
Patient is a 51y old  Female who presents with a chief complaint of neutropenic sepsis (18 Jun 2017 21:00)    HPI:  51F PMH congenital cyclic neutropenia, anxiety/depression/schizoaffective disorder, RLL lung mass identified approx 1 month ago presents for further evaluation of fever and cough. The patient was admitted at [facility] (5/14-5/18/17) for evaluation of similar symptoms and at the time was RVP positive for parainfluenza but was found to have 4cm RLL lung mass. She was treated initially with vancomycin and zosyn and had subsequent improvement. Plan was made initially to obtain biopsy of the mass at that time, but this was deferred as the patient had clinical improvement. She was discharged on doxycycline PO and continued to improve. Per pt she gets sick 2-3 times a year, doesn't get her counts monitored periodically so doesn't know if the episodes coincide with her cyclical neutropenia.    About one week ago, she again developed a cough productive of clear sputum. Not as severe as previous, but can occur in 20 minute fits. Also endorses mild dyspnea with exertion. Starting a few days ago, she had low grade fevers, but had a fever of 102.7F today at home and so presented. Former smoker, no sick contacts, no pets, no travel. Endorses 5lbs weight loss since May.      ED course: had fever of 103, tachycardia during fever episodes, but otherwise HD stable.      PAST MEDICAL & SURGICAL HISTORY:  Lung mass  Congenital neutropenia  Anxiety  Depression  No significant past surgical history      Social history:  Lives with family, former smoker.    FAMILY HISTORY:  Family history of heart disease (Father)    REVIEW OF SYSTEMS  General: Some malaise. + Fevers.    Skin: No rash  	  ENMT: No nasal congestion or throat pain.    Respiratory and Thorax: + cough, +sputum. MIld shortness of breath on exertion.  	  Cardiovascular:	No chest pain,    Gastrointestinal:	+ nausea, no abdominal pain or diarrhea. vomited twice yesterday.    Genitourinary:	No dysuria, frequency.     Musculoskeletal; No joint swelling or pain.    Neurological: No confusion.	    Psychiatric: Flat affect    Endocrine: Some weight loss.      Allergies    No Known Allergies        Antimicrobials:    cefepime  IVPB 1000milliGRAM(s) IV Intermittent every 12 hours  vancomycin  IVPB 1000milliGRAM(s) IV Intermittent every 12 hours        Vital Signs Last 24 Hrs  T(C): 36.8, Max: 39.5 (06-18 @ 18:12)  T(F): 98.2, Max: 103.1 (06-18 @ 18:12)  HR: 86 (86 - 114)  BP: 103/69 (94/58 - 116/78)  BP(mean): --  RR: 16 (16 - 18)  SpO2: 100% (96% - 100%)    PHYSICAL EXAM:    Constitutional: Comfortable. Awake and alert    Eyes: No discharge or conjunctival injection    ENMT:  No thrush. No pharyngeal exudate or erythema.    Neck: Supple, No LN.    Respiratory: Good air entry bilaterally.    Cardiovascular: S1 S2 wnl, No murmurs.    Gastrointestinal: Soft BS(+) no tenderness.    Genitourinary: No CVA tendereness     Extremities: No edema.    Vascular: peripheral pulses felt    Neurological: AAO X 3,No grossly focal deficits    Skin: No rash    Musculoskeletal: No joint swelling or LOM    Psychiatric: Slight flat affect.                                9.8    3.11  )-----------( 321      ( 19 Jun 2017 07:40 )             31.5         06-19    141  |  103  |  6<L>  ----------------------------<  119<H>  4.5   |  25  |  0.83    Ca    9.3      19 Jun 2017 07:44    TPro  6.7  /  Alb  3.1<L>  /  TBili  0.1<L>  /  DBili  x   /  AST  7<L>  /  ALT  10  /  AlkPhos  49  06-18      RECENT CULTURES:  In lab         Radiology: Films Reviewed by me [ x]    EXAM:  CT CHEST IC                            PROCEDURE DATE:  06/18/2017        IMPRESSION:   A 4.3 cm mass in the right lower lobe demonstrates interval enlargement   and development of an internal hypodense, rim-enhancing fluid collection  with a focus of gas. This likely represents a pulmonary abscess. However   an underlying malignancy must be excluded and follow-up CT chest is   recommended to confirm resolution.

## 2017-06-19 NOTE — PROGRESS NOTE ADULT - PROBLEM SELECTOR PLAN 1
from [place] pneumonia/ Abscess -Continue with Cefepime and Vancomycin, follow up cultures.  ID consult called. Follow up recs.

## 2017-06-19 NOTE — PROGRESS NOTE ADULT - PROBLEM SELECTOR PLAN 2
--Abscess vs Mass   --Pulm follow up appreciated.   Ultrasound biopsy of lesion likely by IR - AM tomorrow.   Keep patient NPO after Midnight. --Abscess vs Mass   --Pulm follow up appreciated.   Ultrasound biopsy of lesion by IR - AM tomorrow.   Keep patient NPO after Midnight.

## 2017-06-19 NOTE — PROGRESS NOTE ADULT - SUBJECTIVE AND OBJECTIVE BOX
Patient is a 51y old  Female who presents with a chief complaint of fever, cough     SUBJECTIVE / OVERNIGHT EVENTS: no events Overnight, has cough.     MEDICATIONS  (STANDING):  cefepime  IVPB 1000milliGRAM(s) IV Intermittent every 12 hours  fluvoxaMINE 150milliGRAM(s) Oral two times a day  fluticasone propionate 50 MICROgram(s)/spray Nasal Spray 1Spray(s) Both Nostrils two times a day  cholecalciferol 1000Unit(s) Oral daily  vancomycin  IVPB 1000milliGRAM(s) IV Intermittent every 12 hours  OLANZapine 20milliGRAM(s) Oral at bedtime  OLANZapine 5milliGRAM(s) Oral <User Schedule>    MEDICATIONS  (PRN):  benzonatate 100milliGRAM(s) Oral every 8 hours PRN Cough  clonazePAM Tablet 1milliGRAM(s) Oral two times a day PRN anxiety  guaiFENesin    Syrup 200milliGRAM(s) Oral every 4 hours PRN Cough    T(C): 37, Max: 38.5 (06-19 @ 05:27)  HR: 86 (86 - 114)  BP: 108/73 (108/73 - 109/66)  RR: 18 (18 - 18)  SpO2: 98% (98% - 98%)  Wt(kg): --    PHYSICAL EXAM:  GENERAL: NAD, well-developed  HEAD:  Atraumatic, Normocephalic  NECK: Supple, No JVD  CHEST/LUNG: Clear to auscultation bilaterally; No wheeze  HEART: Regular rate and rhythm; No murmurs, rubs, or gallops  ABDOMEN: Soft, Nontender, Nondistended; Bowel sounds present  EXTREMITIES:  2+ Peripheral Pulses  PSYCH: AAOx3  NEUROLOGY: non-focal  SKIN: No rashes or lesions                          9.8    3.11  )-----------( 321      ( 19 Jun 2017 07:40 )             31.5           LIVER FUNCTIONS - ( 18 Jun 2017 12:15 )  Alb: 3.1 g/dL / Pro: 6.7 g/dL / ALK PHOS: 49 U/L / ALT: 10 U/L RC / AST: 7 U/L / GGT: x           PT/INR - ( 18 Jun 2017 12:15 )   PT: 14.5 sec;   INR: 1.32 ratio         PTT - ( 18 Jun 2017 12:15 )  PTT:28.4 sec  141|103|6<119  4.5|25|0.83  9.3,--,--  06-19 @ 07:44      RADIOLOGY & ADDITIONAL TESTS:    Imaging Personally Reviewed:    Consultant(s) Notes Reviewed:  Pulmonary     Care Discussed with Consultants/Other Providers:

## 2017-06-19 NOTE — CONSULT NOTE ADULT - ASSESSMENT
51 y o female with cyclical neutropenia, RLL lung mass identified approx 1 month ago a/w with fever, neutropenia concern for Lung abscess.    Plan:   Continue with cefepime,  increase dose to 2 gm iv q8h.  Continue with Vancomycin 1gm iv q12h.  Follow up Blood cultures and urine cultures.  Sputum cx pending collection.  Plan for biopsy tmrw.  Please send Bacterial, Fungal and AFB cultures along with path.  Ordered fungitell, galactomannan, crypt serum antigen.  HIV in lab.  Pulmonary on board.

## 2017-06-19 NOTE — PROGRESS NOTE ADULT - ASSESSMENT
51F congenital cyclic neutropenia, schizoaffective presenting with cough and fevers and now with a persistent RLL mass which has a cystic quality possibly related to superinfection vs cavitation

## 2017-06-19 NOTE — PROGRESS NOTE ADULT - SUBJECTIVE AND OBJECTIVE BOX
INDIRA SMITH: [id number]  CHIEF COMPLAINT: Patient is a 51y old  Female who presents with a chief complaint of fever, cough (18 Jun 2017 19:40)    Interval Events: Chest imaging reviewed with radiology - suspicious for lung abscess  Still having productive cough. Anxious about diagnosis    REVIEW OF SYSTEMS:  Constitutional: feels anxious  CV: denies CP or palpitations  Resp: productive cough, intermittent SOB  GI: no abdominal pain  Hematologic/Lymphatic: neutropenia  [x ] All other systems negative      OBJECTIVE:  ICU Vital Signs Last 24 Hrs  T(C): 37.8, Max: 39.5 (06-18 @ 18:12)  T(F): 100.1, Max: 103.1 (06-18 @ 18:12)  HR: 106 (89 - 114)  BP: 109/61 (94/58 - 116/78)  RR: 18 (17 - 20)  SpO2: 98% (96% - 100%)    HOSPITAL MEDICATIONS:  MEDICATIONS  (STANDING):  cefepime  IVPB 1000milliGRAM(s) IV Intermittent every 12 hours  fluvoxaMINE 150milliGRAM(s) Oral two times a day  fluticasone propionate 50 MICROgram(s)/spray Nasal Spray 1Spray(s) Both Nostrils two times a day  cholecalciferol 1000Unit(s) Oral daily  vancomycin  IVPB 1000milliGRAM(s) IV Intermittent every 12 hours  OLANZapine 20milliGRAM(s) Oral at bedtime  OLANZapine 5milliGRAM(s) Oral <User Schedule>  acetaminophen   Tablet 650milliGRAM(s) Oral once    MEDICATIONS  (PRN):  benzonatate 100milliGRAM(s) Oral every 8 hours PRN Cough  clonazePAM Tablet 1milliGRAM(s) Oral two times a day PRN anxiety  guaiFENesin    Syrup 200milliGRAM(s) Oral every 4 hours PRN Cough      LABS:                        9.8    3.11  )-----------( 321      ( 19 Jun 2017 07:40 )             31.5     06-18    142  |  103  |  4<L>  ----------------------------<  128<H>  3.3<L>   |  27  |  0.80    Ca    8.7      18 Jun 2017 12:15    TPro  6.7  /  Alb  3.1<L>  /  TBili  0.1<L>  /  DBili  x   /  AST  7<L>  /  ALT  10  /  AlkPhos  49  06-18    PT/INR - ( 18 Jun 2017 12:15 )   PT: 14.5 sec;   INR: 1.32 ratio         PTT - ( 18 Jun 2017 12:15 )  PTT:28.4 sec    Venous Blood Gas:  06-18 @ 12:15  7.39/51/30/30/47  VBG Lactate: 1.6      MICROBIOLOGY:     RADIOLOGY:  [ x] Reviewed and interpreted by me  IMPRESSION: CT CHEST  A 4.3 cm mass in the right lower lobe demonstrates interval enlargement   and development of an internal hypodense, rim-enhancing fluid collection  with a focus of gas. This likely represents a pulmonary abscess. However   an underlying malignancy must be excluded and follow-up CT chest is   recommended to confirm resolution.

## 2017-06-19 NOTE — PROGRESS NOTE ADULT - PROBLEM SELECTOR PLAN 1
-persistent lung mass with cystic features - possibly consistent with lung abscess  -will need US guided biopsy - have arranged for biopsy with Dr. Beth of radiology for tomorrow 6/20/17 at 3pm

## 2017-06-20 ENCOUNTER — RESULT REVIEW (OUTPATIENT)
Age: 52
End: 2017-06-20

## 2017-06-20 DIAGNOSIS — R05 COUGH: ICD-10-CM

## 2017-06-20 LAB
ANION GAP SERPL CALC-SCNC: 13 MMOL/L — SIGNIFICANT CHANGE UP (ref 5–17)
BASOPHILS # BLD AUTO: 0 K/UL — SIGNIFICANT CHANGE UP (ref 0–0.2)
BASOPHILS NFR BLD AUTO: 0 % — SIGNIFICANT CHANGE UP (ref 0–2)
BUN SERPL-MCNC: 5 MG/DL — LOW (ref 7–23)
CALCIUM SERPL-MCNC: 9.1 MG/DL — SIGNIFICANT CHANGE UP (ref 8.4–10.5)
CHLORIDE SERPL-SCNC: 103 MMOL/L — SIGNIFICANT CHANGE UP (ref 96–108)
CO2 SERPL-SCNC: 27 MMOL/L — SIGNIFICANT CHANGE UP (ref 22–31)
CREAT SERPL-MCNC: 0.64 MG/DL — SIGNIFICANT CHANGE UP (ref 0.5–1.3)
CRYPTOC AG FLD QL: NEGATIVE — SIGNIFICANT CHANGE UP
EOSINOPHIL # BLD AUTO: 0.1 K/UL — SIGNIFICANT CHANGE UP (ref 0–0.5)
EOSINOPHIL NFR BLD AUTO: 1 % — SIGNIFICANT CHANGE UP (ref 0–6)
GLUCOSE SERPL-MCNC: 119 MG/DL — HIGH (ref 70–99)
HCT VFR BLD CALC: 31.1 % — LOW (ref 34.5–45)
HGB BLD-MCNC: 9.9 G/DL — LOW (ref 11.5–15.5)
LYMPHOCYTES # BLD AUTO: 1 K/UL — SIGNIFICANT CHANGE UP (ref 1–3.3)
LYMPHOCYTES # BLD AUTO: 36 % — SIGNIFICANT CHANGE UP (ref 13–44)
MCHC RBC-ENTMCNC: 28.8 PG — SIGNIFICANT CHANGE UP (ref 27–34)
MCHC RBC-ENTMCNC: 31.8 GM/DL — LOW (ref 32–36)
MCV RBC AUTO: 90.6 FL — SIGNIFICANT CHANGE UP (ref 80–100)
MONOCYTES # BLD AUTO: 1 K/UL — HIGH (ref 0–0.9)
MONOCYTES NFR BLD AUTO: 22 % — HIGH (ref 2–14)
NEUTROPHILS # BLD AUTO: 1.1 K/UL — LOW (ref 1.8–7.4)
NEUTROPHILS NFR BLD AUTO: 29 % — LOW (ref 43–77)
PLATELET # BLD AUTO: 281 K/UL — SIGNIFICANT CHANGE UP (ref 150–400)
POTASSIUM SERPL-MCNC: 3.8 MMOL/L — SIGNIFICANT CHANGE UP (ref 3.5–5.3)
POTASSIUM SERPL-SCNC: 3.8 MMOL/L — SIGNIFICANT CHANGE UP (ref 3.5–5.3)
RBC # BLD: 3.43 M/UL — LOW (ref 3.8–5.2)
RBC # FLD: 13.4 % — SIGNIFICANT CHANGE UP (ref 10.3–14.5)
SODIUM SERPL-SCNC: 143 MMOL/L — SIGNIFICANT CHANGE UP (ref 135–145)
WBC # BLD: 3.2 K/UL — LOW (ref 3.8–10.5)
WBC # FLD AUTO: 3.2 K/UL — LOW (ref 3.8–10.5)

## 2017-06-20 PROCEDURE — 88305 TISSUE EXAM BY PATHOLOGIST: CPT | Mod: 26

## 2017-06-20 PROCEDURE — 88341 IMHCHEM/IMCYTCHM EA ADD ANTB: CPT | Mod: 26

## 2017-06-20 PROCEDURE — 10160 PNXR ASPIR ABSC HMTMA BULLA: CPT

## 2017-06-20 PROCEDURE — 76942 ECHO GUIDE FOR BIOPSY: CPT | Mod: 26

## 2017-06-20 PROCEDURE — 99232 SBSQ HOSP IP/OBS MODERATE 35: CPT

## 2017-06-20 PROCEDURE — 99233 SBSQ HOSP IP/OBS HIGH 50: CPT

## 2017-06-20 PROCEDURE — 88342 IMHCHEM/IMCYTCHM 1ST ANTB: CPT | Mod: 26

## 2017-06-20 PROCEDURE — 71010: CPT | Mod: 26

## 2017-06-20 PROCEDURE — 88173 CYTOPATH EVAL FNA REPORT: CPT | Mod: 26

## 2017-06-20 PROCEDURE — 88312 SPECIAL STAINS GROUP 1: CPT | Mod: 26

## 2017-06-20 RX ORDER — ONDANSETRON 8 MG/1
4 TABLET, FILM COATED ORAL ONCE
Qty: 0 | Refills: 0 | Status: COMPLETED | OUTPATIENT
Start: 2017-06-20 | End: 2017-06-20

## 2017-06-20 RX ADMIN — ONDANSETRON 4 MILLIGRAM(S): 8 TABLET, FILM COATED ORAL at 21:10

## 2017-06-20 RX ADMIN — FLUVOXAMINE MALEATE 150 MILLIGRAM(S): 25 TABLET ORAL at 05:19

## 2017-06-20 RX ADMIN — Medication 200 MILLIGRAM(S): at 05:19

## 2017-06-20 RX ADMIN — Medication 1 SPRAY(S): at 21:27

## 2017-06-20 RX ADMIN — Medication 100 MILLIGRAM(S): at 08:19

## 2017-06-20 RX ADMIN — Medication 1 SPRAY(S): at 05:19

## 2017-06-20 RX ADMIN — CEFEPIME 100 MILLIGRAM(S): 1 INJECTION, POWDER, FOR SOLUTION INTRAMUSCULAR; INTRAVENOUS at 00:26

## 2017-06-20 RX ADMIN — CEFEPIME 100 MILLIGRAM(S): 1 INJECTION, POWDER, FOR SOLUTION INTRAMUSCULAR; INTRAVENOUS at 08:19

## 2017-06-20 RX ADMIN — Medication 250 MILLIGRAM(S): at 05:23

## 2017-06-20 RX ADMIN — Medication 250 MILLIGRAM(S): at 17:51

## 2017-06-20 RX ADMIN — OLANZAPINE 20 MILLIGRAM(S): 15 TABLET, FILM COATED ORAL at 21:13

## 2017-06-20 RX ADMIN — OLANZAPINE 5 MILLIGRAM(S): 15 TABLET, FILM COATED ORAL at 05:19

## 2017-06-20 RX ADMIN — CEFEPIME 100 MILLIGRAM(S): 1 INJECTION, POWDER, FOR SOLUTION INTRAMUSCULAR; INTRAVENOUS at 17:52

## 2017-06-20 RX ADMIN — FLUVOXAMINE MALEATE 150 MILLIGRAM(S): 25 TABLET ORAL at 21:13

## 2017-06-20 RX ADMIN — Medication 200 MILLIGRAM(S): at 22:55

## 2017-06-20 NOTE — DISCHARGE NOTE ADULT - MEDICATION SUMMARY - MEDICATIONS TO TAKE
I will START or STAY ON the medications listed below when I get home from the hospital:    fluvoxaMINE 50 mg oral tablet  -- 3 tab(s) by mouth 2 times a day  -- Indication: For Schizoaffective disorder    OLANZapine 5 mg oral tablet  -- 1 tab(s) by mouth once a day  at 6 AM  -- Indication: For Schizoaffective disorder    OLANZapine 20 mg oral tablet  -- 1 tab(s) by mouth once a day (at bedtime)  -- Indication: For Schizoaffective disorder    benzonatate 100 mg oral capsule  -- 1 cap(s) by mouth every 8 hours, As needed, Cough  -- Indication: For Cough    ketoconazole 2% topical cream  -- 1 application on skin 2 times a day  to toe nails  -- Indication: For toe fungus    polyethylene glycol 3350 oral powder for reconstitution  -- 17 gram(s) by mouth once a day  -- Indication: For Constipation    senna oral tablet  -- 2 tab(s) by mouth once a day (at bedtime)  -- Indication: For Constipation    fluticasone 50 mcg/inh nasal spray  -- 1 spray(s) into nose 2 times a day  -- Indication: For Nasal congestion    amoxicillin-clavulanate 875 mg-125 mg oral tablet  -- 1 tab(s) by mouth every 12 hours  until 7/17/17  -- Finish all this medication unless otherwise directed by prescriber.  Take with food or milk.    -- Indication: For Lung abscess    pantoprazole 40 mg oral delayed release tablet  -- 1 tab(s) by mouth once a day (before a meal)  -- Indication: For Stomach  protection    cholecalciferol oral tablet  -- 1000 unit(s) by mouth once a day  -- Indication: For Ssupplement    biotin 1000 mcg oral tablet  -- 1 tab(s) by mouth once a day  -- Indication: For Supplement

## 2017-06-20 NOTE — PROGRESS NOTE ADULT - PROBLEM SELECTOR PLAN 1
--differential diagnosis concerning for complicated infection such as abscess given walled-off appearance on CT vs malignancy  --Appreciate pulm recs, plan for biopsy of rll mass/ lesion Pulm.  --not hypoxic or tachypnic  --c/w vancomycin and cefepime

## 2017-06-20 NOTE — DISCHARGE NOTE ADULT - HOSPITAL COURSE
HPI:    51F PMH congenital cyclic neutropenia, anxiety/depression/schizoaffective disorder, RLL lung mass identified approx 1 month ago presents for further evaluation of fever and cough. The patient was admitted at [facility] (5/14-5/18/17) for evaluation of similar symptoms and at the time was RVP positive for parainfluenza but was found to have 4cm RLL lung mass. She was treated initially with vancomycin and zosyn and had subsequent improvement. Plan was made initially to obtain biopsy of the mass at that time, but this was deferred as the patient had clinical improvement. She was discharged on doxycycline PO and continued to improve.     About one week ago, she again developed a cough productive of clear sputum. Not as severe as previous, but can occur in 20 minute fits. Also endorses mild dyspnea with exertion. Starting a few days ago, she had low grade fevers, but had a fever of 102.7F today at home and so presented. Former smoker, no sick contacts, no travel. Endorses 5lbs weight loss since May.      ED course: had fever of 103, tachycardia during fever episodes, but otherwise HD stable. Received 2L bolus, vancomycin x 1 and cefepime x 1. BCx sent.    Hospital course:    Pt admitted sepsis secondary to lung consolidation/ abscess s/p drainage. ID consulted initially on IV vancomycin and cefepime, aspirate biopsy growing alpha strep, continued on cefepime. Serum quant, fungitell, hiv negative. Pending full path results from drainage. To follow up with pulm as outpt. Hospital course complicated by dysphagia, s/p SS eval/ MBS showing oropharyngeal dysphagia. CT head negative for acute infarct. Evaluated by GI for outpt endoscopic intervention/ dysmotility studies. Psych eval pending if dysphagia 2/2 antipsychotics. HPI:    51F PMH congenital cyclic neutropenia, anxiety/depression/schizoaffective disorder, RLL lung mass identified approx 1 month ago presents for further evaluation of fever and cough. The patient was admitted at [facility] (5/14-5/18/17) for evaluation of similar symptoms and at the time was RVP positive for parainfluenza but was found to have 4cm RLL lung mass. She was treated initially with vancomycin and zosyn and had subsequent improvement. Plan was made initially to obtain biopsy of the mass at that time, but this was deferred as the patient had clinical improvement. She was discharged on doxycycline PO and continued to improve.     About one week ago, she again developed a cough productive of clear sputum. Not as severe as previous, but can occur in 20 minute fits. Also endorses mild dyspnea with exertion. Starting a few days ago, she had low grade fevers, but had a fever of 102.7F today at home and so presented. Former smoker, no sick contacts, no travel. Endorses 5lbs weight loss since May.      ED course: had fever of 103, tachycardia during fever episodes, but otherwise HD stable. Received 2L bolus, vancomycin x 1 and cefepime x 1. BCx sent.    Hospital course:    Pt admitted sepsis secondary to lung consolidation/ abscess s/p drainage. ID consulted initially on IV vancomycin and cefepime, aspirate biopsy growing alpha strep, continued on cefepime. Serum quant, fungitell, hiv negative. Pending full path results from drainage. To follow up with pulm as outpt. Hospital course complicated by dysphagia, s/p SS eval/ MBS showing oropharyngeal dysphagia Patient was started on Dysphasic 3 diet which she tolerates. CT head negative for acute infarct. Evaluated by GI for outpt endoscopic intervention/ dysmotility studies. Patient was seen by Psychiatry who recommend to continue her current Psychiatry medications since she has been stable on the current medications and is unlikely to be the culprit for the Dysphagia,        Patient will be discharged on  Augmentin 875mg oral BID  until   7/17/17 as per ID   - CT chest in 6-8 weeks to ensure resolution  - Cont flonase for post nasal drip and upper airway cough syndrome  - Chest PT, incentive spirometry, OBC/ ambulate as tolerated    Pt will F/up with her PCP ( Dr. THEODORE Walton, Psychiatrist ( Dr Slim Galindo ) , Hematologist and Pulmonary specialist up D/C  F/UP with ID specialist upon D/C   Patient understands and agrees with the instructions

## 2017-06-20 NOTE — PROGRESS NOTE ADULT - ASSESSMENT
51 y o female with cyclical neutropenia, RLL lung mass identified approx 1 month ago a/w with fever, neutropenia concern for Lung abscess. Resolved fever.    Plan:   Continue with cefepime at 2 gm iv q8h.  Continue with Vancomycin 1gm iv q12h.  Follow up Blood cultures and urine cultures.  Sputum cx prelim.  Plan for biopsy today.  Please send Bacterial, Fungal and AFB cultures along with path.  Follow up fungitell, galactomannan, crypt serum antigen.  HIV negative.  Pulmonary on board.

## 2017-06-20 NOTE — PROGRESS NOTE ADULT - ASSESSMENT
51F congenital cyclic neutropenia, schizoaffective presenting with cough and fevers and now with a persistent RLL mass which has a cystic quality possibly related to superinfection vs cavitation vs malignancy

## 2017-06-20 NOTE — PROGRESS NOTE ADULT - SUBJECTIVE AND OBJECTIVE BOX
51y old  Female who presents with a chief complaint of neutropenic sepsis (20 Jun 2017 12:26)      Interval history:  Afebrile today, no N/V/D, no abdominal pain, cough persists, some phlegm, denies dysuria.      Antimicrobials:  vancomycin  IVPB 1000milliGRAM(s) IV Intermittent every 12 hours  cefepime  IVPB 2000milliGRAM(s) IV Intermittent every 8 hours    REVIEW OF SYSTEMS:  As above.    Vital Signs Last 24 Hrs  T(C): 37.3, Max: 37.6 (06-19 @ 21:04)  T(F): 99.1, Max: 99.7 (06-19 @ 21:04)  HR: 79 (78 - 94)  BP: 100/69 (100/69 - 116/75)  BP(mean): --  RR: 18 (16 - 18)  SpO2: 98% (98% - 100%)    PHYSICAL EXAM:  No acute distress. Awake, communicative.  Cardiovascular: S1S2 normal.  Lungs: positive air entry B/L lung fields.  Gastrointestinal: soft, nontender, nondistended.  Extremities: no edema.  IV sites not inflammed.                            9.9    3.2   )-----------( 281      ( 20 Jun 2017 09:40 )             31.1   06-20    143  |  103  |  5<L>  ----------------------------<  119<H>  3.8   |  27  |  0.64    Ca    9.1      20 Jun 2017 09:40          RECENT CULTURES:  06-19 @ 16:42 .Sputum Sputum   prelim           06-18 @ 17:05 .Blood Blood   No growth to date.    06-18 @ 16:53 .Urine Clean Catch (Midstream)   <10,000 CFU/ml Normal Urogenital renny present

## 2017-06-20 NOTE — DISCHARGE NOTE ADULT - MEDICATION SUMMARY - MEDICATIONS TO STOP TAKING
I will STOP taking the medications listed below when I get home from the hospital:    clonazePAM 1 mg oral tablet  -- 1 tab(s) by mouth 2 times a day    doxycycline hyclate 100 mg oral delayed release tablet  -- 1 tab(s) by mouth 2 times a day  -- Avoid prolonged or excessive exposure to direct and/or artificial sunlight while taking this medication.  Do not chew, break, or crush.  Do not take dairy products, antacids, or iron preparations within one hour of this medication.  Do not take this drug if you are pregnant.  Finish all this medication unless otherwise directed by prescriber.  Medication should be taken with plenty of water.    guaiFENesin 100 mg/5 mL oral liquid  -- 10 milliliter(s) by mouth every 6 hours, As needed, Cough

## 2017-06-20 NOTE — PROGRESS NOTE ADULT - PROBLEM SELECTOR PLAN 1
-persistent lung mass with cystic features - possibly consistent with lung abscess  -will need US guided biopsy - have arranged for biopsy with Dr. Beth of radiology for today at 3pm  -please check cultures and pathology from biopsy

## 2017-06-20 NOTE — DISCHARGE NOTE ADULT - NS AS DC FOLLOWUP STROKE INST
Smoking Cessation/Influenza vaccination (VIS Pub Date: August 19, 2014)/Pneumonia Smoking Cessation/Pneumonia

## 2017-06-20 NOTE — PROGRESS NOTE ADULT - PROBLEM SELECTOR PLAN 2
--Recurrent fevers and cough, initially symptoms had responded to antibiotics  --will continue to cover with broad spectrum antibiotics with vancomycin and cefepime given pt's immunocompromised status.  -ID follow up  -follow up blood cultures  -Follow Bacterial, Fungal and AFB cultures along with path.  -Follow up fungitell, galactomannan, crypt serum antigen.

## 2017-06-20 NOTE — PROGRESS NOTE ADULT - SUBJECTIVE AND OBJECTIVE BOX
Patient is a 51y old  Female who presents with a chief complaint of neutropenic sepsis (20 Jun 2017 12:26)      SUBJECTIVE / OVERNIGHT EVENTS: No chest pain, sob, chills, fevers, appetite ok    MEDICATIONS  (STANDING):  fluvoxaMINE 150milliGRAM(s) Oral two times a day  fluticasone propionate 50 MICROgram(s)/spray Nasal Spray 1Spray(s) Both Nostrils two times a day  cholecalciferol 1000Unit(s) Oral daily  vancomycin  IVPB 1000milliGRAM(s) IV Intermittent every 12 hours  OLANZapine 20milliGRAM(s) Oral at bedtime  OLANZapine 5milliGRAM(s) Oral <User Schedule>  cefepime  IVPB 2000milliGRAM(s) IV Intermittent every 8 hours    MEDICATIONS  (PRN):  benzonatate 100milliGRAM(s) Oral every 8 hours PRN Cough  clonazePAM Tablet 1milliGRAM(s) Oral two times a day PRN anxiety  guaiFENesin    Syrup 200milliGRAM(s) Oral every 4 hours PRN Cough      Vital Signs Last 24 Hrs  T(C): 37.3, Max: 37.6 (06-19 @ 21:04)  HR: 79 (78 - 94)  BP: 100/69 (100/69 - 116/75)  RR: 18 (16 - 18)  SpO2: 98% (98% - 100%)  Wt(kg): --  CAPILLARY BLOOD GLUCOSE    I&O's Summary  I & Os for 24h ending 20 Jun 2017 07:00  =============================================  IN: 1210 ml / OUT: 0 ml / NET: 1210 ml    I & Os for current day (as of 20 Jun 2017 15:30)  =============================================  IN: 0 ml / OUT: 0 ml / NET: 0 ml      PHYSICAL EXAM:  GENERAL: NAD, thin  HEAD:  Atraumatic, Normocephalic  EYES: EOMI, PERRLA, conjunctiva and sclera clear  NECK: Supple, No JVD  CHEST/LUNG: Clear to auscultation bilaterally; No wheeze  HEART: Regular rate and rhythm; No murmurs, rubs, or gallops  ABDOMEN: Soft, Nontender, Nondistended; Bowel sounds present  EXTREMITIES:  2+ Peripheral Pulses, No clubbing, cyanosis, or edema  PSYCH: AAOx3  NEUROLOGY: non-focal  SKIN: No rashes or lesions    LABS:                        9.9    3.2   )-----------( 281      ( 20 Jun 2017 09:40 )             31.1     06-20    143  |  103  |  5<L>  ----------------------------<  119<H>  3.8   |  27  |  0.64    Ca    9.1      20 Jun 2017 09:40                RADIOLOGY & ADDITIONAL TESTS:    Imaging Personally Reviewed:    Consultant(s) Notes Reviewed:  pulm, id    Care Discussed with Consultants/Other Providers:

## 2017-06-20 NOTE — DISCHARGE NOTE ADULT - MEDICATION SUMMARY - MEDICATIONS TO CHANGE
I will SWITCH the dose or number of times a day I take the medications listed below when I get home from the hospital:    OLANZapine  -- 30 milligram(s) by mouth once a day    benzonatate 100 mg oral capsule  -- 2 cap(s) by mouth every 8 hours

## 2017-06-20 NOTE — PROGRESS NOTE ADULT - SUBJECTIVE AND OBJECTIVE BOX
INDIRA SMITHRUPINDER-[id number]  CHIEF COMPLAINT: Patient is a 51y old  Female who presents with a chief complaint of neutropenic sepsis (18 Jun 2017 21:00)    Interval Events: For IR guided lung biopsy today.  Cough slightly improved. Some diaphoresis.    REVIEW OF SYSTEMS:  Constitutional: [ ] negative [x ] fevers [- ] chills [x ] weight loss [ ] weight gain  HEENT: [x ] negative [ ] dry eyes [ ] eye irritation [ ] postnasal drip [ ] nasal congestion  CV: [x ] negative  [ ] chest pain [ ] orthopnea [ ] palpitations [ ] murmur  Resp: [ ] negative [x ] cough [x ] shortness of breath [x ] dyspnea [ ] wheezing [x ] sputum [ ] hemoptysis  GI: [x ] negative [ ] nausea [ ] vomiting [ ] diarrhea [ ] constipation [ ] abd pain [ ] dysphagia   : [ x] negative [ ] dysuria [ ] nocturia [ ] hematuria [ ] increased urinary frequency  Musculoskeletal: [x ] negative [ ] back pain [ ] myalgias [ ] arthralgias [ ] fracture  Skin: [x ] negative [- ] rash [ ] itch  Neurological: [ x] negative [ ] headache [ ] dizziness [ ] syncope [ ] weakness [ ] numbness  Psychiatric: [ ] negative [ x] anxiety [ ] depression  Endocrine: x[ ] negative [ ] diabetes [ ] thyroid problem  Hematologic/Lymphatic: [ ] negative [ ] anemia [ ] bleeding problem [x] neutropenia  Allergic/Immunologic: [x ] negative [ ] itchy eyes [ ] nasal discharge [ ] hives [ ] angioedema  [x ] All other systems negative  [ ] Unable to assess ROS because ________    OBJECTIVE:  ICU Vital Signs Last 24 Hrs  T(C): 37.2, Max: 38 (06-19 @ 10:05)  T(F): 99, Max: 100.4 (06-19 @ 10:05)  HR: 78 (78 - 94)  BP: 109/65 (103/69 - 116/75)  RR: 18 (16 - 18)  SpO2: 99% (98% - 100%)    I & Os for current day (as of 06-20 @ 07:39)  =============================================  IN: 1210 ml / OUT: 0 ml / NET: 1210 ml      HOSPITAL MEDICATIONS:    vancomycin  IVPB 1000milliGRAM(s) IV Intermittent every 12 hours  cefepime  IVPB 2000milliGRAM(s) IV Intermittent every 8 hours    benzonatate 100milliGRAM(s) Oral every 8 hours PRN  guaiFENesin    Syrup 200milliGRAM(s) Oral every 4 hours PRN    fluvoxaMINE 150milliGRAM(s) Oral two times a day  OLANZapine 20milliGRAM(s) Oral at bedtime  OLANZapine 5milliGRAM(s) Oral <User Schedule>  clonazePAM Tablet 1milliGRAM(s) Oral two times a day PRN    cholecalciferol 1000Unit(s) Oral daily    fluticasone propionate 50 MICROgram(s)/spray Nasal Spray 1Spray(s) Both Nostrils two times a day      LABS:                        9.8    3.11  )-----------( 321      ( 19 Jun 2017 07:40 )             31.5     Hgb Trend: 9.8<--, 10.0<--  06-19    141  |  103  |  6<L>  ----------------------------<  119<H>  4.5   |  25  |  0.83    Ca    9.3      19 Jun 2017 07:44    TPro  6.7  /  Alb  3.1<L>  /  TBili  0.1<L>  /  DBili  x   /  AST  7<L>  /  ALT  10  /  AlkPhos  49  06-18    Creatinine Trend: 0.83<--, 0.80<--  PT/INR - ( 18 Jun 2017 12:15 )   PT: 14.5 sec;   INR: 1.32 ratio         PTT - ( 18 Jun 2017 12:15 )  PTT:28.4 sec    Venous Blood Gas:  06-18 @ 12:15  7.39/51/30/30/47  VBG Lactate: 1.6      MICROBIOLOGY:   Culture - Sputum . (06.19.17 @ 16:42)    Gram Stain: Rare Squamous epithelial cells per low power field: No polymorphonuclear leukocytes per low power field: Rare Gram Negative Rods per oil power field    HIV-1/2 Antigen/Antibody Screen by [facility] (06.19.17 @ 07:50)  HIV-1/2 Combo Result: Nonreact:     RADIOLOGY:  [ ] Reviewed and interpreted by me

## 2017-06-20 NOTE — DISCHARGE NOTE ADULT - PATIENT PORTAL LINK FT
“You can access the Follow[employer] Patient Portal, offered by [facility], by registering with the following website: [email or web address]”

## 2017-06-20 NOTE — DISCHARGE NOTE ADULT - PLAN OF CARE
sepsis resolves Take all antibiotics as ordered.  Call you Health care provider upon arrival home to make a one week follow up appointment.  If you develop fever, chills, malaise, or change in mental status call your Health Care Provider or go to the Emergency Department.  Nutrition is important, eat small frequent meals to help ensure you get adequate calories.  Do not stay in bed all day!  Increase your activity daily as tolerated. complete antibiotcs  will need repeat CT chest in 6 weeks   Follow up with pulmonlogy Follow up with hematology in 1 week strict aspiration precautions  sit upright for at least1 hour after eating continue current medications  Follow up with Psychiatry Take soft food with nectar consistency fluid  Follow up with PMD for referral to speech pathology continue current medications  Follow up with Psychiatry for medication management

## 2017-06-20 NOTE — DISCHARGE NOTE ADULT - CARE PROVIDER_API CALL
Farhan Dhaliwal), Hematology; HospicePalliative Medicine; Internal Medicine; Medical Oncology  [address] [place] [address]  Phone: [phone]  Fax: [phone]    Annamarie Herrera), Critical Care Medicine; Internal Medicine; Pulmonary Disease  [address] [place] [address]  Phone: [phone]  Fax: [phone]    Casimiro Walton), Internal Medicine  [address] [place] [address]  Phone: [phone]  Fax: [phone]

## 2017-06-20 NOTE — PROGRESS NOTE ADULT - PROBLEM SELECTOR PLAN 2
-continue broad spectrum antibiotics  -followup official sputum culture  -please check cultures from abscess drainage/biopsy tomorrow  -will need long term IV abx  -appreciate ID input  -followup quant gold, fungitell, aspergillus, immunoglobulins -continue broad spectrum antibiotics  -followup official sputum culture  -please check cultures from abscess drainage/biopsy today  -will need long term IV abx  -appreciate ID input  -followup quant gold, fungitell, aspergillus, immunoglobulins

## 2017-06-20 NOTE — DISCHARGE NOTE ADULT - SECONDARY DIAGNOSIS.
Lung abscess Congenital neutropenia Aspiration into airway, subsequent encounter Schizoaffective disorder, unspecified type Dysphagia Depression

## 2017-06-20 NOTE — DISCHARGE NOTE ADULT - ADDITIONAL INSTRUCTIONS
Hematology office will call you for an appointment next week; Ph; [phone] ( [address] [place] [address], [place], [place])  Pulmonary follow up for repeat CT chest in 6 weeks  Psychiatry follow up  PMD follow up

## 2017-06-21 DIAGNOSIS — K22.8 OTHER SPECIFIED DISEASES OF ESOPHAGUS: ICD-10-CM

## 2017-06-21 LAB
ANION GAP SERPL CALC-SCNC: 10 MMOL/L — SIGNIFICANT CHANGE UP (ref 5–17)
BUN SERPL-MCNC: 8 MG/DL — SIGNIFICANT CHANGE UP (ref 7–23)
CALCIUM SERPL-MCNC: 9.1 MG/DL — SIGNIFICANT CHANGE UP (ref 8.4–10.5)
CHLORIDE SERPL-SCNC: 102 MMOL/L — SIGNIFICANT CHANGE UP (ref 96–108)
CO2 SERPL-SCNC: 25 MMOL/L — SIGNIFICANT CHANGE UP (ref 22–31)
CREAT SERPL-MCNC: 0.74 MG/DL — SIGNIFICANT CHANGE UP (ref 0.5–1.3)
CULTURE RESULTS: SIGNIFICANT CHANGE UP
GLUCOSE SERPL-MCNC: 108 MG/DL — HIGH (ref 70–99)
IGA FLD-MCNC: 366 MG/DL — SIGNIFICANT CHANGE UP (ref 68–378)
IGG FLD-MCNC: 1430 MG/DL — SIGNIFICANT CHANGE UP (ref 694–1618)
IGM SERPL-MCNC: 154 MG/DL — SIGNIFICANT CHANGE UP (ref 40–230)
KAPPA LC SER QL IFE: 3.13 MG/DL — HIGH (ref 0.33–1.94)
KAPPA/LAMBDA FREE LIGHT CHAIN RATIO, SERUM: 1.13 RATIO — SIGNIFICANT CHANGE UP (ref 0.26–1.65)
LAMBDA LC SER QL IFE: 2.78 MG/DL — HIGH (ref 0.57–2.63)
NIGHT BLUE STAIN TISS: SIGNIFICANT CHANGE UP
POTASSIUM SERPL-MCNC: 4.3 MMOL/L — SIGNIFICANT CHANGE UP (ref 3.5–5.3)
POTASSIUM SERPL-SCNC: 4.3 MMOL/L — SIGNIFICANT CHANGE UP (ref 3.5–5.3)
SODIUM SERPL-SCNC: 137 MMOL/L — SIGNIFICANT CHANGE UP (ref 135–145)
SPECIMEN SOURCE: SIGNIFICANT CHANGE UP
SPECIMEN SOURCE: SIGNIFICANT CHANGE UP
VANCOMYCIN TROUGH SERPL-MCNC: 9.2 UG/ML — LOW (ref 10–20)

## 2017-06-21 PROCEDURE — 99233 SBSQ HOSP IP/OBS HIGH 50: CPT

## 2017-06-21 PROCEDURE — 99232 SBSQ HOSP IP/OBS MODERATE 35: CPT

## 2017-06-21 RX ORDER — HEPARIN SODIUM 5000 [USP'U]/ML
5000 INJECTION INTRAVENOUS; SUBCUTANEOUS EVERY 8 HOURS
Qty: 0 | Refills: 0 | Status: DISCONTINUED | OUTPATIENT
Start: 2017-06-21 | End: 2017-06-29

## 2017-06-21 RX ORDER — SENNA PLUS 8.6 MG/1
2 TABLET ORAL AT BEDTIME
Qty: 0 | Refills: 0 | Status: DISCONTINUED | OUTPATIENT
Start: 2017-06-21 | End: 2017-06-29

## 2017-06-21 RX ORDER — SODIUM CHLORIDE 9 MG/ML
1000 INJECTION, SOLUTION INTRAVENOUS
Qty: 0 | Refills: 0 | Status: DISCONTINUED | OUTPATIENT
Start: 2017-06-21 | End: 2017-06-22

## 2017-06-21 RX ORDER — DOCUSATE SODIUM 100 MG
200 CAPSULE ORAL AT BEDTIME
Qty: 0 | Refills: 0 | Status: DISCONTINUED | OUTPATIENT
Start: 2017-06-21 | End: 2017-06-29

## 2017-06-21 RX ADMIN — CEFEPIME 100 MILLIGRAM(S): 1 INJECTION, POWDER, FOR SOLUTION INTRAMUSCULAR; INTRAVENOUS at 00:45

## 2017-06-21 RX ADMIN — FLUVOXAMINE MALEATE 150 MILLIGRAM(S): 25 TABLET ORAL at 22:25

## 2017-06-21 RX ADMIN — FLUVOXAMINE MALEATE 150 MILLIGRAM(S): 25 TABLET ORAL at 05:28

## 2017-06-21 RX ADMIN — CEFEPIME 100 MILLIGRAM(S): 1 INJECTION, POWDER, FOR SOLUTION INTRAMUSCULAR; INTRAVENOUS at 17:40

## 2017-06-21 RX ADMIN — Medication 100 MILLIGRAM(S): at 01:25

## 2017-06-21 RX ADMIN — CEFEPIME 100 MILLIGRAM(S): 1 INJECTION, POWDER, FOR SOLUTION INTRAMUSCULAR; INTRAVENOUS at 08:53

## 2017-06-21 RX ADMIN — Medication 1000 UNIT(S): at 11:53

## 2017-06-21 RX ADMIN — SODIUM CHLORIDE 60 MILLILITER(S): 9 INJECTION, SOLUTION INTRAVENOUS at 22:26

## 2017-06-21 RX ADMIN — HEPARIN SODIUM 5000 UNIT(S): 5000 INJECTION INTRAVENOUS; SUBCUTANEOUS at 22:25

## 2017-06-21 RX ADMIN — Medication 1 SPRAY(S): at 17:45

## 2017-06-21 RX ADMIN — Medication 1 SPRAY(S): at 08:57

## 2017-06-21 RX ADMIN — SENNA PLUS 2 TABLET(S): 8.6 TABLET ORAL at 22:25

## 2017-06-21 RX ADMIN — Medication 200 MILLIGRAM(S): at 22:25

## 2017-06-21 RX ADMIN — Medication 250 MILLIGRAM(S): at 17:40

## 2017-06-21 RX ADMIN — OLANZAPINE 5 MILLIGRAM(S): 15 TABLET, FILM COATED ORAL at 05:28

## 2017-06-21 RX ADMIN — OLANZAPINE 20 MILLIGRAM(S): 15 TABLET, FILM COATED ORAL at 22:25

## 2017-06-21 RX ADMIN — Medication 250 MILLIGRAM(S): at 06:43

## 2017-06-21 NOTE — PROGRESS NOTE ADULT - PROBLEM SELECTOR PLAN 1
-persistent lung mass with cystic features - possibly consistent with lung abscess  -s/p lung aspiration  -followup cultures and cytology  -will need followup CXR imaging in few days

## 2017-06-21 NOTE — SWALLOW BEDSIDE ASSESSMENT ADULT - SLP GENERAL OBSERVATIONS
Pt awake, alert, able to follow commands and answer questions appropriately, fluent verbal output, denied communication deficits. Pt observed on SLP arrival in reclined position and soda in her hand, with frequent baseline cough noted. Pt was asked to sit more upright and hold off drinking for a few minutes, and cough was observed to reduce in frequency during the course of the preliminary interview.

## 2017-06-21 NOTE — PROGRESS NOTE ADULT - ASSESSMENT
51F congenital cyclic neutropenia, schizoaffective disorder presenting with cough and fevers and now with a persistent RLL mass which has a cystic quality possibly related to superinfection vs cavitation vs malignancy

## 2017-06-21 NOTE — SWALLOW BEDSIDE ASSESSMENT ADULT - ASR SWALLOW REFERRAL
Suggest GI consult, possible esophagram to further assess c/o s/s suggestive of esophageal dysphagia

## 2017-06-21 NOTE — PROGRESS NOTE ADULT - PROBLEM SELECTOR PLAN 2
-continue broad spectrum antibiotics  -followup official sputum culture  -followup aspiration cultures  -will need long term antibiotics  -appreciate ID input  -followup quant gold, fungitell, aspergillus, immunoglobulins

## 2017-06-21 NOTE — SWALLOW BEDSIDE ASSESSMENT ADULT - SLP PERTINENT HISTORY OF CURRENT PROBLEM
51F PMH congenital cyclic neutropenia, anxiety/depression/schizoaffective disorder, RLL lung mass identified approx 1 month ago presents for further evaluation of fever and cough. Pt admitted at [facility] (5/14-5/18/17) for evaluation of similar symptoms, at the time was RVP positive for parainfluenza but was found to have 4cm RLL lung mass. She was treated initially with vanco and zosyn with improvement. Plan made to obtain biopsy of mass at that time, but then was deferred as Pt had clinical improvement. Pt d/danielle on doxycycline PO and continued to improve. ~1 week ago, again developed cough productive of clear sputum. Not as severe as previous, but can occur in 20 minute fits. Also endorses mild dyspnea with exertion. Starting a few days ago, had low grade fevers. Spiked fever of 102.7F today at home and so presented to ER. Former smoker, no sick contacts, no travel. Endorses 5lbs weight loss since May.

## 2017-06-21 NOTE — SWALLOW BEDSIDE ASSESSMENT ADULT - SWALLOW EVAL: PATIENT/FAMILY GOALS STATEMENT
Pt reports gradual onset of dysphagia over the last couple months. Pt reports that it sometimes "feels like food wont go down." She also reports that she coughs more with drinking than with chewables. Pt reports that symptoms are worse when she eats or drinks in a reclined position.

## 2017-06-21 NOTE — SWALLOW BEDSIDE ASSESSMENT ADULT - SWALLOW EVAL: DIAGNOSIS
Pt presents with evidence of oropharyngeal dysphagia and c/o s/s suggestive of esophageal dysphagia.

## 2017-06-21 NOTE — PROGRESS NOTE ADULT - SUBJECTIVE AND OBJECTIVE BOX
Patient is a 51y old  Female who presents with a chief complaint of neutropenic sepsis (20 Jun 2017 12:26)      SUBJECTIVE / OVERNIGHT EVENTS: No acute events overnight, feels better, cough better, no chest pain, no shortness of breath, bm today     MEDICATIONS  (STANDING):  fluvoxaMINE 150milliGRAM(s) Oral two times a day  fluticasone propionate 50 MICROgram(s)/spray Nasal Spray 1Spray(s) Both Nostrils two times a day  cholecalciferol 1000Unit(s) Oral daily  vancomycin  IVPB 1000milliGRAM(s) IV Intermittent every 12 hours  OLANZapine 20milliGRAM(s) Oral at bedtime  OLANZapine 5milliGRAM(s) Oral <User Schedule>  cefepime  IVPB 2000milliGRAM(s) IV Intermittent every 8 hours    MEDICATIONS  (PRN):  benzonatate 100milliGRAM(s) Oral every 8 hours PRN Cough  clonazePAM Tablet 1milliGRAM(s) Oral two times a day PRN anxiety  guaiFENesin    Syrup 200milliGRAM(s) Oral every 4 hours PRN Cough      Vital Signs Last 24 Hrs  T(C): 37.1, Max: 37.5 (06-20 @ 21:38)  HR: 85 (85 - 96)  BP: 109/74 (109/74 - 131/75)  RR: 18 (17 - 18)  SpO2: 96% (96% - 99%)  Wt(kg): --  CAPILLARY BLOOD GLUCOSE    I&O's Summary  I & Os for 24h ending 21 Jun 2017 07:00  =============================================  IN: 950 ml / OUT: 0 ml / NET: 950 ml    I & Os for current day (as of 21 Jun 2017 14:12)  =============================================  IN: 360 ml / OUT: 0 ml / NET: 360 ml      PHYSICAL EXAM:  GENERAL: NAD, well-developed  HEAD:  Atraumatic, Normocephalic  EYES: EOMI, PERRLA, conjunctiva and sclera clear  NECK: Supple, No JVD  CHEST/LUNG: Clear to auscultation bilaterally; No wheeze  HEART: Regular rate and rhythm; No murmurs, rubs, or gallops  ABDOMEN: Soft, Nontender, Nondistended; Bowel sounds present  EXTREMITIES:  2+ Peripheral Pulses, No clubbing, cyanosis, or edema  PSYCH: AAOx3  NEUROLOGY: non-focal  SKIN: No rashes or lesions    LABS:                        9.9    3.2   )-----------( 281      ( 20 Jun 2017 09:40 )             31.1     06-21    137  |  102  |  8   ----------------------------<  108<H>  4.3   |  25  |  0.74    Ca    9.1      21 Jun 2017 07:23                RADIOLOGY & ADDITIONAL TESTS:    Imaging Personally Reviewed:    Consultant(s) Notes Reviewed:      Care Discussed with Consultants/Other Providers:

## 2017-06-21 NOTE — PROGRESS NOTE ADULT - PROBLEM SELECTOR PLAN 1
--differential diagnosis concerning for complicated infection such as abscess given walled-off appearance on CT vs malignancy  --Appreciate pulm recs, pt s/p lung aspiration and biopsy   --not hypoxic or tachypnic  --c/w vancomycin and cefepime

## 2017-06-21 NOTE — PROGRESS NOTE ADULT - SUBJECTIVE AND OBJECTIVE BOX
INDIRA SMITH  MRN-[id number]  CHIEF COMPLAINT: Patient is a 51y old  Female who presents with a chief complaint of neutropenic sepsis (20 Jun 2017 12:26)    Interval Events: s/p IR aspiration of lung abscess yesterday - await results  Still with intermittent cough but with less phlegm  No pain at site    REVIEW OF SYSTEMS:  Constitutional: afebrile overnight  HEENT: [x ] negative [ ] dry eyes [ ] eye irritation [ ] postnasal drip [ ] nasal congestion  CV: [ x] negative  [ ] chest pain [ ] orthopnea [ ] palpitations [ ] murmur  Resp: [ ] negative [x ] cough [ ] shortness of breath [ ] dyspnea [ ] wheezing [x ] sputum [ ] hemoptysis  Musculoskeletal: [ x] negative [ ] back pain [ ] myalgias [ ] arthralgias [ ] fracture  Skin: [x ] negative [ ] rash [ ] itch  Neurological: [ ] negative [ ] headache [ ] dizziness [ ] syncope [ ] weakness [ ] numbness  Psychiatric: [ ] negative [x ] anxiety [ ] depression  Endocrine: [ ] negative [ ] diabetes [ ] thyroid problem  Hematologic/Lymphatic: [ ] negative [ ] anemia [ ] bleeding problem [x] neutropenia  [x ] All other systems negative  [ ] Unable to assess ROS because ________    OBJECTIVE:  ICU Vital Signs Last 24 Hrs  T(C): 37.4, Max: 37.5 (06-20 @ 21:38)  T(F): 99.4, Max: 99.5 (06-20 @ 21:38)  HR: 90 (79 - 96)  BP: 131/75 (100/69 - 131/75)  RR: 17 (17 - 18)  SpO2: 99% (98% - 99%)    I & Os for current day (as of 06-21 @ 08:08)  =============================================  IN: 950 ml / OUT: 0 ml / NET: 950 ml    CAPILLARY BLOOD GLUCOSE    HOSPITAL MEDICATIONS:    vancomycin  IVPB 1000milliGRAM(s) IV Intermittent every 12 hours  cefepime  IVPB 2000milliGRAM(s) IV Intermittent every 8 hours    benzonatate 100milliGRAM(s) Oral every 8 hours PRN  guaiFENesin    Syrup 200milliGRAM(s) Oral every 4 hours PRN    fluvoxaMINE 150milliGRAM(s) Oral two times a day  OLANZapine 20milliGRAM(s) Oral at bedtime  OLANZapine 5milliGRAM(s) Oral <User Schedule>  clonazePAM Tablet 1milliGRAM(s) Oral two times a day PRN    cholecalciferol 1000Unit(s) Oral daily    fluticasone propionate 50 MICROgram(s)/spray Nasal Spray 1Spray(s) Both Nostrils two times a day      LABS:                        9.9    3.2   )-----------( 281      ( 20 Jun 2017 09:40 )             31.1     Hgb Trend: 9.9<--, 9.8<--, 10.0<--  06-20    143  |  103  |  5<L>  ----------------------------<  119<H>  3.8   |  27  |  0.64    Ca    9.1      20 Jun 2017 09:40      Creatinine Trend: 0.64<--, 0.83<--, 0.80<--    MICROBIOLOGY:     RADIOLOGY:  [ ] Reviewed and interpreted by me

## 2017-06-21 NOTE — SWALLOW BEDSIDE ASSESSMENT ADULT - COMMENTS
ED course: fever of 103, tachycardia during fever episodes, otherwise HD stable. Received 2L bolus, vanco x 1, cefepime x 1.  Pt admitted for lung mass, bronchopneumonia, Neutropenic sepsis, on Abx, normocytic anemia. Pt found to have RLL abscess, s/p aspiration & Bx (6/20).  CT Chest 6/18: 4.3 cm mass in RLL demonstrates interval enlargement and development of an internal hypodense, rim-enhancing fluid collection with a focus of gas. This likely represents a pulmonary abscess. However underlying malignancy must be excluded and f/u CT chest is recommended to confirm resolution.  CXR 6/20: RLL opacity c/w known abscess. No pneumothorax. Clear left lung.  Pulm following: RLL mass which has a cystic quality possibly related to superinfection vs cavitation vs malignancy, lung consolidation - possibly consistent with lung abscess; "on review of her CT scan there is some dilation of her esophagus as well as a hiatal hernia. It is possible that she is chronically aspirating which has lead to the development of this RLL lesion"

## 2017-06-21 NOTE — PROGRESS NOTE ADULT - SUBJECTIVE AND OBJECTIVE BOX
51y old  Female who presents with a chief complaint of neutropenic sepsis (20 Jun 2017 12:26)      Interval history:  Afebrile, no N/V/D, no abdominal pain, cough persists but improved, some blood tinged sputum, denies dysuria.        Antimicrobials:    vancomycin  IVPB 1000milliGRAM(s) IV Intermittent every 12 hours  cefepime  IVPB 2000milliGRAM(s) IV Intermittent every 8 hours    REVIEW OF SYSTEMS:  As above.    Vital Signs Last 24 Hrs  T(C): 37.1, Max: 37.5 (06-20 @ 21:38)  T(F): 98.7, Max: 99.5 (06-20 @ 21:38)  HR: 85 (85 - 96)  BP: 109/74 (109/74 - 131/75)  BP(mean): --  RR: 18 (17 - 18)  SpO2: 96% (96% - 99%)    PHYSICAL EXAM:  No acute distress. AAOX3.  Cardiovascular: S1S2 normal.  Lungs: Good air entry B/L lung fields.  Gastrointestinal: soft, nontender, nondistended.  Extremities: no edema.  IV sites not inflammed.                          9.9    3.2   )-----------( 281      ( 20 Jun 2017 09:40 )             31.1   06-21    137  |  102  |  8   ----------------------------<  108<H>  4.3   |  25  |  0.74    Ca    9.1      21 Jun 2017 07:23          RECENT CULTURES:  06-20 @ 21:15 .Other Lung - Lower Lobe Right     06-19 @ 16:42 .Sputum Sputum   Normal Respiratory Renny present    06-18 @ 17:05 .Blood Blood   No growth to date.    06-18 @ 16:53 .Urine Clean Catch (Midstream)   <10,000 CFU/ml Normal Urogenital renny present      Vancomycin Level, Trough: 9.2 ug/mL (06-21 @ 05:13)

## 2017-06-21 NOTE — PROGRESS NOTE ADULT - ASSESSMENT
51 y o female with cyclical neutropenia, RLL lung mass identified approx 1 month ago a/w with fever, neutropenia concern for Lung abscess. Resolved fever. Clinically improved.     Plan:   Continue with cefepime at 2 gm iv q8h.  Continue with Vancomycin, trough low increase dose to 1250 mg iv q12h.  Follow up Blood cultures and urine cultures.  Sputum cx prelim.  S/P biopsy with aspiration of purulent material.  Follow up aspirate cx. and cytopathology.  Please send Bacterial, Fungal and AFB cultures along with path.  Follow up fungitell, galactomannan.  Crypt serum antigen negative.  HIV negative.  Pulmonary on board.

## 2017-06-22 LAB
ANION GAP SERPL CALC-SCNC: 12 MMOL/L — SIGNIFICANT CHANGE UP (ref 5–17)
BASOPHILS # BLD AUTO: 0.15 K/UL — SIGNIFICANT CHANGE UP (ref 0–0.2)
BASOPHILS NFR BLD AUTO: 6.2 % — HIGH (ref 0–2)
BUN SERPL-MCNC: 6 MG/DL — LOW (ref 7–23)
CALCIUM SERPL-MCNC: 8.7 MG/DL — SIGNIFICANT CHANGE UP (ref 8.4–10.5)
CHLORIDE SERPL-SCNC: 103 MMOL/L — SIGNIFICANT CHANGE UP (ref 96–108)
CO2 SERPL-SCNC: 26 MMOL/L — SIGNIFICANT CHANGE UP (ref 22–31)
CREAT SERPL-MCNC: 0.64 MG/DL — SIGNIFICANT CHANGE UP (ref 0.5–1.3)
EOSINOPHIL # BLD AUTO: 0.2 K/UL — SIGNIFICANT CHANGE UP (ref 0–0.5)
EOSINOPHIL NFR BLD AUTO: 8 % — HIGH (ref 0–6)
FUNGITELL: 46 PG/ML — SIGNIFICANT CHANGE UP (ref 0–59)
GALACTOMANNAN AG SERPL-ACNC: <0.5 INDEX — SIGNIFICANT CHANGE UP
GLUCOSE SERPL-MCNC: 114 MG/DL — HIGH (ref 70–99)
H CAPSUL AG SPEC-ACNC: SIGNIFICANT CHANGE UP
H CAPSUL AG UR QL IA: SIGNIFICANT CHANGE UP
HCT VFR BLD CALC: 30.7 % — LOW (ref 34.5–45)
HGB BLD-MCNC: 9.7 G/DL — LOW (ref 11.5–15.5)
LYMPHOCYTES # BLD AUTO: 1.35 K/UL — SIGNIFICANT CHANGE UP (ref 1–3.3)
LYMPHOCYTES # BLD AUTO: 54.9 % — HIGH (ref 13–44)
M TB TUBERC IFN-G BLD QL: 0 IU/ML — SIGNIFICANT CHANGE UP
M TB TUBERC IFN-G BLD QL: 0.02 IU/ML — SIGNIFICANT CHANGE UP
M TB TUBERC IFN-G BLD QL: NEGATIVE — SIGNIFICANT CHANGE UP
MCHC RBC-ENTMCNC: 27.4 PG — SIGNIFICANT CHANGE UP (ref 27–34)
MCHC RBC-ENTMCNC: 31.6 GM/DL — LOW (ref 32–36)
MCV RBC AUTO: 86.7 FL — SIGNIFICANT CHANGE UP (ref 80–100)
MITOGEN IGNF BCKGRD COR BLD-ACNC: >10 IU/ML — SIGNIFICANT CHANGE UP
MONOCYTES # BLD AUTO: 0.54 K/UL — SIGNIFICANT CHANGE UP (ref 0–0.9)
MONOCYTES NFR BLD AUTO: 22.1 % — HIGH (ref 2–14)
NEUTROPHILS # BLD AUTO: 0.22 K/UL — LOW (ref 1.8–7.4)
NEUTROPHILS NFR BLD AUTO: 8.8 % — LOW (ref 43–77)
PLATELET # BLD AUTO: 321 K/UL — SIGNIFICANT CHANGE UP (ref 150–400)
POTASSIUM SERPL-MCNC: 4.1 MMOL/L — SIGNIFICANT CHANGE UP (ref 3.5–5.3)
POTASSIUM SERPL-SCNC: 4.1 MMOL/L — SIGNIFICANT CHANGE UP (ref 3.5–5.3)
RBC # BLD: 3.54 M/UL — LOW (ref 3.8–5.2)
RBC # FLD: 14.5 % — SIGNIFICANT CHANGE UP (ref 10.3–14.5)
SODIUM SERPL-SCNC: 141 MMOL/L — SIGNIFICANT CHANGE UP (ref 135–145)
WBC # BLD: 2.46 K/UL — LOW (ref 3.8–10.5)
WBC # FLD AUTO: 2.46 K/UL — LOW (ref 3.8–10.5)

## 2017-06-22 PROCEDURE — 70450 CT HEAD/BRAIN W/O DYE: CPT | Mod: 26

## 2017-06-22 PROCEDURE — 74230 X-RAY XM SWLNG FUNCJ C+: CPT | Mod: 26

## 2017-06-22 PROCEDURE — 99233 SBSQ HOSP IP/OBS HIGH 50: CPT

## 2017-06-22 PROCEDURE — 99232 SBSQ HOSP IP/OBS MODERATE 35: CPT

## 2017-06-22 RX ORDER — VANCOMYCIN HCL 1 G
1250 VIAL (EA) INTRAVENOUS EVERY 12 HOURS
Qty: 0 | Refills: 0 | Status: DISCONTINUED | OUTPATIENT
Start: 2017-06-22 | End: 2017-06-23

## 2017-06-22 RX ORDER — POLYETHYLENE GLYCOL 3350 17 G/17G
17 POWDER, FOR SOLUTION ORAL DAILY
Qty: 0 | Refills: 0 | Status: DISCONTINUED | OUTPATIENT
Start: 2017-06-22 | End: 2017-06-29

## 2017-06-22 RX ADMIN — SENNA PLUS 2 TABLET(S): 8.6 TABLET ORAL at 21:05

## 2017-06-22 RX ADMIN — FLUVOXAMINE MALEATE 150 MILLIGRAM(S): 25 TABLET ORAL at 21:05

## 2017-06-22 RX ADMIN — Medication 1 SPRAY(S): at 17:54

## 2017-06-22 RX ADMIN — OLANZAPINE 5 MILLIGRAM(S): 15 TABLET, FILM COATED ORAL at 06:54

## 2017-06-22 RX ADMIN — OLANZAPINE 20 MILLIGRAM(S): 15 TABLET, FILM COATED ORAL at 21:05

## 2017-06-22 RX ADMIN — HEPARIN SODIUM 5000 UNIT(S): 5000 INJECTION INTRAVENOUS; SUBCUTANEOUS at 21:05

## 2017-06-22 RX ADMIN — HEPARIN SODIUM 5000 UNIT(S): 5000 INJECTION INTRAVENOUS; SUBCUTANEOUS at 14:56

## 2017-06-22 RX ADMIN — CEFEPIME 100 MILLIGRAM(S): 1 INJECTION, POWDER, FOR SOLUTION INTRAMUSCULAR; INTRAVENOUS at 03:16

## 2017-06-22 RX ADMIN — CEFEPIME 100 MILLIGRAM(S): 1 INJECTION, POWDER, FOR SOLUTION INTRAMUSCULAR; INTRAVENOUS at 11:51

## 2017-06-22 RX ADMIN — Medication 250 MILLIGRAM(S): at 06:54

## 2017-06-22 RX ADMIN — HEPARIN SODIUM 5000 UNIT(S): 5000 INJECTION INTRAVENOUS; SUBCUTANEOUS at 07:00

## 2017-06-22 RX ADMIN — POLYETHYLENE GLYCOL 3350 17 GRAM(S): 17 POWDER, FOR SOLUTION ORAL at 21:16

## 2017-06-22 RX ADMIN — Medication 1 SPRAY(S): at 06:56

## 2017-06-22 RX ADMIN — Medication 200 MILLIGRAM(S): at 21:05

## 2017-06-22 RX ADMIN — CEFEPIME 100 MILLIGRAM(S): 1 INJECTION, POWDER, FOR SOLUTION INTRAMUSCULAR; INTRAVENOUS at 20:58

## 2017-06-22 RX ADMIN — Medication 166.67 MILLIGRAM(S): at 17:53

## 2017-06-22 RX ADMIN — FLUVOXAMINE MALEATE 150 MILLIGRAM(S): 25 TABLET ORAL at 06:54

## 2017-06-22 RX ADMIN — Medication 1000 UNIT(S): at 11:52

## 2017-06-22 NOTE — SWALLOW VFSS/MBS ASSESSMENT ADULT - ROSENBEK'S PENETRATION ASPIRATION SCALE
(1) no aspiration, contrast does not enter airway (2) contrast enters airway, remains above the vocal cords, no residue remains (penetration) (8) contrast passes glottis, visible subglottic residue remains, absent patient response (aspiration)

## 2017-06-22 NOTE — PROGRESS NOTE ADULT - PROBLEM SELECTOR PLAN 2
--continue vancomycin and cefepime  --aspirate cx. and cytopathology pending  --Negative fungitell  --galactomannan pending.  --crypt serum antigen negative.  --HIV negative  --Quantiferon negative.  --ID recs appreciated

## 2017-06-22 NOTE — SWALLOW VFSS/MBS ASSESSMENT ADULT - NS SWALLOW VFSS REC ASPIR MON
Monitor for s/s aspiration/laryngeal penetration. If noted:  D/C p.o. intake, provide non-oral nutrition/hydration/meds, and contact this service @ [phone]/pneumonia/change of breathing pattern/fever/throat clearing/cough/gurgly voice/upper respiratory infection

## 2017-06-22 NOTE — SWALLOW VFSS/MBS ASSESSMENT ADULT - RECOMMENDED CONSISTENCY
From an oropharyngeal standpoint, Pt appears to tolerate Dysphagia 3 with Nectar-thick liquids: 1) Small single bites and sips at slow rate 5) Successive swallows for oral and pharyngeal clearance, 6) Alternate food and liquids to aid in oral, pharyngeal and esophageal clearance, 7) Aspiration precautions. Monitor for s/s aspiration/laryngeal penetration. If noted:  D/C p.o. intake, provide non-oral nutrition/hydration/meds.

## 2017-06-22 NOTE — PROGRESS NOTE ADULT - PROBLEM SELECTOR PLAN 1
-persistent lung mass with cystic features - possibly consistent with lung abscess  -s/p lung aspiration  -followup cytology  -cultures so far without any growth  -will need followup CXR imaging Sun/Mon

## 2017-06-22 NOTE — SWALLOW VFSS/MBS ASSESSMENT ADULT - DEMONSTRATES NEED FOR REFERRAL TO ANOTHER SERVICE
Consider GI consult, esophagram to further assess esophageal phase of swallow, given CT Chest findings as commented on by Pulmonology, and given Pt's c/o substernal retention. Consider Neuro, ENT for w/u for etiology of dysphagia

## 2017-06-22 NOTE — PROGRESS NOTE ADULT - PROBLEM SELECTOR PLAN 2
-continue broad spectrum antibiotics as per ID  -sputum culture with normal respiratory renny  -aspiration cultures thus far nonrevealing  -f/u ID regarding duration of antibiotics - patient has defervesced and had clinical improvement of cough with antibiotics and aspiration of fluid  -quant gold and fungitell negative

## 2017-06-22 NOTE — SWALLOW VFSS/MBS ASSESSMENT ADULT - LARYNGEAL PENETRATION DURING THE SWALLOW - SILENT
Mild/over the laryngeal surface of the epiglottis, suspected combination of new material and residue, with retrieval on successive swallows shallow, over the laryngeal surface of the epiglottis, with retrieval/Trace deep, over the laryngeal surface of the epiglottis, without retrieval/Mild

## 2017-06-22 NOTE — PROGRESS NOTE ADULT - ASSESSMENT
51 y o female with cyclical neutropenia, RLL lung mass identified approx 1 month ago a/w with fever, neutropenia concern for Lung abscess. Resolved fever. Clinically improved.     Plan:   Continue with cefepime at 2 gm iv q8h.  Continue with Vancomycin, trough low, increased dose to 1250 mg iv q12h.  S/P biopsy with aspiration of purulent material.  Follow up aspirate cx. and cytopathology.  Negative fungitell, galactomannan pending.  Crypt serum antigen negative.  HIV negative. Quantiferon gold negative.  Pulmonary on board.

## 2017-06-22 NOTE — PROGRESS NOTE ADULT - SUBJECTIVE AND OBJECTIVE BOX
51y old  Female who presents with a chief complaint of neutropenic sepsis       Interval history:  Afebrile, no N/V/D, no abdominal pain, cough, dry, better, denies dysuria. + constipation.        Antimicrobials:    vancomycin  IVPB 1000milliGRAM(s) IV Intermittent every 12 hours  cefepime  IVPB 2000milliGRAM(s) IV Intermittent every 8 hours    REVIEW OF SYSTEMS:  As above.    Vital Signs Last 24 Hrs  T(C): 36.7, Max: 37.4 (06-21 @ 21:25)  T(F): 98, Max: 99.4 (06-21 @ 21:25)  HR: 71 (71 - 85)  BP: 96/60 (96/60 - 108/70)  BP(mean): --  RR: 17 (17 - 18)  SpO2: 98% (96% - 98%)    PHYSICAL EXAM:  Pleasant patient in no acute distress. AAOX3.  Lungs: Good air entry B/L lung fields.  Gastrointestinal: soft, nontender, nondistended.  Extremities: no edema.  IV sites Rt arm some erythema.                            9.7    2.46  )-----------( 321      ( 22 Jun 2017 07:21 )             30.7   06-22    141  |  103  |  6<L>  ----------------------------<  114<H>  4.1   |  26  |  0.64    Ca    8.7      22 Jun 2017 07:30          RECENT CULTURES:  06-20 @ 21:15  .Other Lung - Lower Lobe Right  NTD    06-20 @ 21:12  .Abscess  AFB smear negative.  No growth

## 2017-06-22 NOTE — SWALLOW VFSS/MBS ASSESSMENT ADULT - SLP GENERAL OBSERVATIONS
Pt received in radiology, secure in GILBERT chair. Pt awake and alert, cooperative, following directions for the purposes of the exam.

## 2017-06-22 NOTE — SWALLOW VFSS/MBS ASSESSMENT ADULT - LARYNGEAL PENETRATION AFTER THE SWALLOW - SILENT
Trace/shallow, over the laryngeal surface of the epiglottis, suspected over the laryngeal surface of the aryepiglottic folds from vallecular residue, with retrieval

## 2017-06-22 NOTE — SWALLOW VFSS/MBS ASSESSMENT ADULT - ESOPHAGEAL STAGE
trace retention in the cervical esophagus + trace retention in the cervical esophagus  + premature esophageal open noted on one trial  ? delayed clearance of esophagus on A-P view + trace retention in the cervical esophagus  + air distention + air distention  ? delayed clearance of esophagus on A-P view + premature esophageal opening  + air distention  ? delayed emptying of esophagus on A-P view +premature esophageal opening

## 2017-06-22 NOTE — SWALLOW VFSS/MBS ASSESSMENT ADULT - DIAGNOSTIC IMPRESSIONS
Pt presents with an oropharyngeal dysphagia notable for pharyngeal residue and silent aspiration of thin liquids. Pt presents with an oropharyngeal dysphagia notable for pharyngeal residue and silent aspiration of thin liquids. Esophageal phase of swallow notable for trace retention of material in proximal esophagus in lateral view, and ? latency of clearance of material on cursory scan in A-P view. Consider GI consult, esophagram to further assess esophageal phase of swallow, given CT Chest findings as commented on by Pulmonology, and given Pt's c/o substernal retention.    Disorders: reduced tongue to palate contact, reduced BOT to posterior pharyngeal wall contact, reduced anterior hyo-laryngeal excursion, reduced supraglottic sensation, reduced subglottic sensation.

## 2017-06-22 NOTE — SWALLOW VFSS/MBS ASSESSMENT ADULT - SLP PERTINENT HISTORY OF CURRENT PROBLEM
51F PMH congenital cyclic neutropenia, anxiety/depression/schizoaffective disorder, RLL lung mass identified approx 1 month ago presents for further evaluation of fever and cough. Pt admitted at [facility] (5/14-5/18/17) for evaluation of similar symptoms, at the time was RVP positive for parainfluenza but was found to have 4cm RLL lung mass. She was treated initially with vanco and zosyn with improvement. Plan made to obtain biopsy of mass at that time, but then was deferred as Pt had clinical improvement. Pt d/danielle on doxycycline PO and continued to improve. ~1 week ago, again developed cough productive of clear sputum. Not as severe as previous, but can occur in 20 minute fits. Also endorses mild dyspnea with exertion. Starting a few days ago, had low grade fevers. Spiked fever of 102.7F today at home and so presented to ER. Former smoker, no sick contacts, no travel. Endorses 5lbs weight loss since May.

## 2017-06-22 NOTE — PROGRESS NOTE ADULT - SUBJECTIVE AND OBJECTIVE BOX
Patient is a 51y old  Female who presents with a chief complaint of neutropenic sepsis (20 Jun 2017 12:26)      SUBJECTIVE / OVERNIGHT EVENTS: Feels better, cough better, wants to eat, no chest pain,no sob     MEDICATIONS  (STANDING):  fluvoxaMINE 150milliGRAM(s) Oral two times a day  fluticasone propionate 50 MICROgram(s)/spray Nasal Spray 1Spray(s) Both Nostrils two times a day  cholecalciferol 1000Unit(s) Oral daily  OLANZapine 20milliGRAM(s) Oral at bedtime  OLANZapine 5milliGRAM(s) Oral <User Schedule>  cefepime  IVPB 2000milliGRAM(s) IV Intermittent every 8 hours  heparin  Injectable 5000Unit(s) SubCutaneous every 8 hours  docusate sodium 200milliGRAM(s) Oral at bedtime  senna 2Tablet(s) Oral at bedtime  polyethylene glycol 3350 17Gram(s) Oral daily  vancomycin  IVPB 1250milliGRAM(s) IV Intermittent every 12 hours    MEDICATIONS  (PRN):  benzonatate 100milliGRAM(s) Oral every 8 hours PRN Cough  clonazePAM Tablet 1milliGRAM(s) Oral two times a day PRN anxiety  guaiFENesin    Syrup 200milliGRAM(s) Oral every 4 hours PRN Cough      Vital Signs Last 24 Hrs  T(C): 36.7, Max: 37.4 (06-21 @ 21:25)  HR: 71 (71 - 85)  BP: 96/60 (96/60 - 108/70)  RR: 17 (17 - 18)  SpO2: 98% (96% - 98%)  Wt(kg): --  CAPILLARY BLOOD GLUCOSE    I&O's Summary  I & Os for 24h ending 22 Jun 2017 07:00  =============================================  IN: 720 ml / OUT: 725 ml / NET: -5 ml    I & Os for current day (as of 22 Jun 2017 14:50)  =============================================  IN: 0 ml / OUT: 0 ml / NET: 0 ml      PHYSICAL EXAM:  GENERAL: NAD, thin  HEAD:  Atraumatic, Normocephalic  EYES: EOMI, PERRLA, conjunctiva and sclera clear  NECK: Supple, No JVD  CHEST/LUNG: Clear to auscultation bilaterally; No wheeze  HEART: Regular rate and rhythm;S1S2  ABDOMEN: Soft, Nontender, Nondistended; Bowel sounds present  EXTREMITIES:  2+ Peripheral Pulses, No clubbing, cyanosis, or edema  PSYCH: AAOx3  NEUROLOGY: non-focal  SKIN: No rashes or lesions    LABS:                        9.7    2.46  )-----------( 321      ( 22 Jun 2017 07:21 )             30.7     06-22    141  |  103  |  6<L>  ----------------------------<  114<H>  4.1   |  26  |  0.64    Ca    8.7      22 Jun 2017 07:30                RADIOLOGY & ADDITIONAL TESTS:    Imaging Personally Reviewed:    Consultant(s) Notes Reviewed:  pulm, id    Care Discussed with Consultants/Other Providers:

## 2017-06-22 NOTE — PROGRESS NOTE ADULT - MS EXT PE MLT D E PC
no cyanosis/normal/no clubbing/no pedal edema
no pedal edema/no cyanosis/normal/no clubbing
no cyanosis/no clubbing/normal/no pedal edema

## 2017-06-22 NOTE — SWALLOW VFSS/MBS ASSESSMENT ADULT - THE ABOVE FINDINGS WERE DISCUSSED WITH
Nursing/Covering NP GABRIELLA Amin Nursing/Family/Covering NP GABRIELLA Amin, Pt's mother/Patient Patient/Family/Nursing/Covering NP Eloisa, GABRIELLA Carlin, Pt's mother, Dr. Taylor

## 2017-06-22 NOTE — SWALLOW VFSS/MBS ASSESSMENT ADULT - ORAL PHASE
trace nasal regurgitation, trace lingual residue trace nasal regurgitation with trace residue on posterior velar surface, trace lingual residue max spillover to the valleculae max spillover to the valleculae, up to max spillover to the pyriform sinuses, mild deep silent laryngeal penetration just before swallow trigger, trace nasal regurgitation, trace lingual residue trace lingual residue

## 2017-06-22 NOTE — PROGRESS NOTE ADULT - PROBLEM SELECTOR PLAN 1
--differential diagnosis concerning for complicated infection such as abscess given walled-off appearance on CT vs malignancy  --Appreciate pulm recs, pt s/p lung aspiration and biopsy   --not hypoxic or tachypnic  --c/w vancomycin and cefepime  --vancomycin increased given low trough

## 2017-06-23 DIAGNOSIS — R13.12 DYSPHAGIA, OROPHARYNGEAL PHASE: ICD-10-CM

## 2017-06-23 LAB
ANION GAP SERPL CALC-SCNC: 11 MMOL/L — SIGNIFICANT CHANGE UP (ref 5–17)
BASOPHILS # BLD AUTO: 0.14 K/UL — SIGNIFICANT CHANGE UP (ref 0–0.2)
BASOPHILS NFR BLD AUTO: 4.4 % — HIGH (ref 0–2)
BUN SERPL-MCNC: 9 MG/DL — SIGNIFICANT CHANGE UP (ref 7–23)
CALCIUM SERPL-MCNC: 9.1 MG/DL — SIGNIFICANT CHANGE UP (ref 8.4–10.5)
CHLORIDE SERPL-SCNC: 99 MMOL/L — SIGNIFICANT CHANGE UP (ref 96–108)
CO2 SERPL-SCNC: 29 MMOL/L — SIGNIFICANT CHANGE UP (ref 22–31)
CREAT SERPL-MCNC: 0.65 MG/DL — SIGNIFICANT CHANGE UP (ref 0.5–1.3)
CULTURE RESULTS: SIGNIFICANT CHANGE UP
CULTURE RESULTS: SIGNIFICANT CHANGE UP
EOSINOPHIL # BLD AUTO: 0.14 K/UL — SIGNIFICANT CHANGE UP (ref 0–0.5)
EOSINOPHIL NFR BLD AUTO: 4.4 % — SIGNIFICANT CHANGE UP (ref 0–6)
FERRITIN SERPL-MCNC: 99 NG/ML — SIGNIFICANT CHANGE UP (ref 15–150)
FOLATE SERPL-MCNC: 10.3 NG/ML — SIGNIFICANT CHANGE UP (ref 4.8–24.2)
GLUCOSE SERPL-MCNC: 100 MG/DL — HIGH (ref 70–99)
HCT VFR BLD CALC: 32.2 % — LOW (ref 34.5–45)
HGB BLD-MCNC: 9.8 G/DL — LOW (ref 11.5–15.5)
IRON SATN MFR SERPL: 27 % — SIGNIFICANT CHANGE UP (ref 14–50)
IRON SATN MFR SERPL: 58 UG/DL — SIGNIFICANT CHANGE UP (ref 30–160)
LYMPHOCYTES # BLD AUTO: 1.47 K/UL — SIGNIFICANT CHANGE UP (ref 1–3.3)
LYMPHOCYTES # BLD AUTO: 47.8 % — HIGH (ref 13–44)
MCHC RBC-ENTMCNC: 26.9 PG — LOW (ref 27–34)
MCHC RBC-ENTMCNC: 30.4 GM/DL — LOW (ref 32–36)
MCV RBC AUTO: 88.5 FL — SIGNIFICANT CHANGE UP (ref 80–100)
MONOCYTES # BLD AUTO: 0.63 K/UL — SIGNIFICANT CHANGE UP (ref 0–0.9)
MONOCYTES NFR BLD AUTO: 20.4 % — HIGH (ref 2–14)
NEUTROPHILS # BLD AUTO: 0.68 K/UL — LOW (ref 1.8–7.4)
NEUTROPHILS NFR BLD AUTO: 22.1 % — LOW (ref 43–77)
PLATELET # BLD AUTO: 346 K/UL — SIGNIFICANT CHANGE UP (ref 150–400)
POTASSIUM SERPL-MCNC: 4.9 MMOL/L — SIGNIFICANT CHANGE UP (ref 3.5–5.3)
POTASSIUM SERPL-SCNC: 4.9 MMOL/L — SIGNIFICANT CHANGE UP (ref 3.5–5.3)
RBC # BLD: 3.64 M/UL — LOW (ref 3.8–5.2)
RBC # FLD: 14.6 % — HIGH (ref 10.3–14.5)
SODIUM SERPL-SCNC: 139 MMOL/L — SIGNIFICANT CHANGE UP (ref 135–145)
SPECIMEN SOURCE: SIGNIFICANT CHANGE UP
SPECIMEN SOURCE: SIGNIFICANT CHANGE UP
T PALLIDUM AB TITR SER: NEGATIVE — SIGNIFICANT CHANGE UP
TIBC SERPL-MCNC: 211 UG/DL — LOW (ref 220–430)
UIBC SERPL-MCNC: 153 UG/DL — SIGNIFICANT CHANGE UP (ref 110–370)
VIT B12 SERPL-MCNC: 1030 PG/ML — HIGH (ref 243–894)
WBC # BLD: 3.08 K/UL — LOW (ref 3.8–10.5)
WBC # FLD AUTO: 3.08 K/UL — LOW (ref 3.8–10.5)

## 2017-06-23 PROCEDURE — 99232 SBSQ HOSP IP/OBS MODERATE 35: CPT

## 2017-06-23 PROCEDURE — 99233 SBSQ HOSP IP/OBS HIGH 50: CPT

## 2017-06-23 PROCEDURE — 99223 1ST HOSP IP/OBS HIGH 75: CPT | Mod: GC

## 2017-06-23 PROCEDURE — 99232 SBSQ HOSP IP/OBS MODERATE 35: CPT | Mod: GC

## 2017-06-23 RX ORDER — PANTOPRAZOLE SODIUM 20 MG/1
40 TABLET, DELAYED RELEASE ORAL
Qty: 0 | Refills: 0 | Status: DISCONTINUED | OUTPATIENT
Start: 2017-06-23 | End: 2017-06-29

## 2017-06-23 RX ORDER — ONDANSETRON 8 MG/1
4 TABLET, FILM COATED ORAL ONCE
Qty: 0 | Refills: 0 | Status: DISCONTINUED | OUTPATIENT
Start: 2017-06-23 | End: 2017-06-29

## 2017-06-23 RX ADMIN — CEFEPIME 100 MILLIGRAM(S): 1 INJECTION, POWDER, FOR SOLUTION INTRAMUSCULAR; INTRAVENOUS at 04:47

## 2017-06-23 RX ADMIN — CEFEPIME 100 MILLIGRAM(S): 1 INJECTION, POWDER, FOR SOLUTION INTRAMUSCULAR; INTRAVENOUS at 12:00

## 2017-06-23 RX ADMIN — HEPARIN SODIUM 5000 UNIT(S): 5000 INJECTION INTRAVENOUS; SUBCUTANEOUS at 14:23

## 2017-06-23 RX ADMIN — Medication 1 SPRAY(S): at 06:31

## 2017-06-23 RX ADMIN — Medication 100 MILLIGRAM(S): at 06:37

## 2017-06-23 RX ADMIN — Medication 1 SPRAY(S): at 17:25

## 2017-06-23 RX ADMIN — FLUVOXAMINE MALEATE 150 MILLIGRAM(S): 25 TABLET ORAL at 21:51

## 2017-06-23 RX ADMIN — Medication 200 MILLIGRAM(S): at 21:51

## 2017-06-23 RX ADMIN — CEFEPIME 100 MILLIGRAM(S): 1 INJECTION, POWDER, FOR SOLUTION INTRAMUSCULAR; INTRAVENOUS at 20:42

## 2017-06-23 RX ADMIN — Medication 1000 UNIT(S): at 11:52

## 2017-06-23 RX ADMIN — HEPARIN SODIUM 5000 UNIT(S): 5000 INJECTION INTRAVENOUS; SUBCUTANEOUS at 21:52

## 2017-06-23 RX ADMIN — SENNA PLUS 2 TABLET(S): 8.6 TABLET ORAL at 21:52

## 2017-06-23 RX ADMIN — Medication 5 MILLIGRAM(S): at 15:58

## 2017-06-23 RX ADMIN — FLUVOXAMINE MALEATE 150 MILLIGRAM(S): 25 TABLET ORAL at 06:32

## 2017-06-23 RX ADMIN — POLYETHYLENE GLYCOL 3350 17 GRAM(S): 17 POWDER, FOR SOLUTION ORAL at 11:52

## 2017-06-23 RX ADMIN — Medication 166.67 MILLIGRAM(S): at 06:31

## 2017-06-23 RX ADMIN — HEPARIN SODIUM 5000 UNIT(S): 5000 INJECTION INTRAVENOUS; SUBCUTANEOUS at 06:32

## 2017-06-23 RX ADMIN — Medication 200 MILLIGRAM(S): at 02:14

## 2017-06-23 RX ADMIN — OLANZAPINE 5 MILLIGRAM(S): 15 TABLET, FILM COATED ORAL at 06:32

## 2017-06-23 RX ADMIN — OLANZAPINE 20 MILLIGRAM(S): 15 TABLET, FILM COATED ORAL at 21:52

## 2017-06-23 NOTE — CONSULT NOTE ADULT - SUBJECTIVE AND OBJECTIVE BOX
Chief Complaint:  Patient is a 51y old  Female who presents with a chief complaint of neutropenic sepsis (20 Jun 2017 12:26)      HPI:  50 y/o F PMH congenital cyclic neutropenia, anxiety/depression/schizoaffective disorder, RLL lung mass identified approx 1 month ago presents for further evaluation of fever and cough. The patient was admitted at [facility] (5/14-5/18/17) for evaluation of similar symptoms and at the time was RVP positive for parainfluenza but was found to have 4cm RLL lung mass. She was treated initially with vancomycin and zosyn and had subsequent improvement. Plan was made initially to obtain biopsy of the mass at that time, but this was deferred as the patient had clinical improvement. She was discharged on doxycycline PO and continued to improve.   5/14- Ct chest showed The esophagus is mildly dilated with debris more distally.   6/18 repeat ct did not demonstrate the esophageal dilation or debris  S+w MBS done and showed : Pt presents with an oropharyngeal dysphagia notable for pharyngeal residue and silent aspiration of thin liquids.      Allergies:  No Known Allergies          Hospital Medications:  fluvoxaMINE 150milliGRAM(s) Oral two times a day  benzonatate 100milliGRAM(s) Oral every 8 hours PRN  fluticasone propionate 50 MICROgram(s)/spray Nasal Spray 1Spray(s) Both Nostrils two times a day  cholecalciferol 1000Unit(s) Oral daily  OLANZapine 20milliGRAM(s) Oral at bedtime  OLANZapine 5milliGRAM(s) Oral <User Schedule>  clonazePAM Tablet 1milliGRAM(s) Oral two times a day PRN  guaiFENesin    Syrup 200milliGRAM(s) Oral every 4 hours PRN  cefepime  IVPB 2000milliGRAM(s) IV Intermittent every 8 hours  heparin  Injectable 5000Unit(s) SubCutaneous every 8 hours  docusate sodium 200milliGRAM(s) Oral at bedtime  senna 2Tablet(s) Oral at bedtime  polyethylene glycol 3350 17Gram(s) Oral daily  vancomycin  IVPB 1250milliGRAM(s) IV Intermittent every 12 hours  ondansetron Injectable 4milliGRAM(s) IV Push once      PMHX/PSHX:  Lung mass  Congenital neutropenia  Anxiety  Depression  No significant past surgical history      Family history:  Family history of heart disease (Father)      Social History:     ROS:     General:  No wt loss, fevers, chills,   Eyes:  Good vision,   ENT:  No sore throat, pain, dysphagia, odynophagia  CV:  No pain, palpitations, hypo/hypertension  Resp:  No dyspnea, cough, tachypnea, wheezing  GI:  See HPI  :  No pain, bleeding,   Muscle:  No pain, weakness  Neuro:  No weakness,  memory problems  Psych:  No insomnia, mood problems, depression  Heme:  No petechiae, ecchymosis, easy bruisability  Skin:  No rash, edema      PHYSICAL EXAM:     GENERAL:  No distress  HEENT:  Conjunctivae clear and pink,  no JVD  CHEST:  Full & symmetric excursion, no increased effort, breath sounds clear  HEART:  Regular rhythm, S1, S2, no murmur/rub/S3/S4, no abdominal bruit, no edema  ABDOMEN:  Soft, non-tender, non-distended, normoactive bowel sounds,  no masses ,  EXTREMITIES:  no cyanosis,clubbing or edema  SKIN:  No rash/erythema/warm/dry  NEURO:  Alert, oriented    Vital Signs:  Vital Signs Last 24 Hrs  T(C): 36.8, Max: 37.1 (06-22 @ 21:41)  T(F): 98.3, Max: 98.7 (06-22 @ 21:41)  HR: 84 (71 - 84)  BP: 122/83 (96/60 - 122/83)  BP(mean): --  RR: 18 (16 - 18)  SpO2: 98% (98% - 99%)  Daily     Daily     LABS:                        9.8    3.08  )-----------( 346      ( 23 Jun 2017 07:14 )             32.2     06-22    141  |  103  |  6<L>  ----------------------------<  114<H>  4.1   |  26  |  0.64    Ca    8.7      22 Jun 2017 07:30                Imaging:

## 2017-06-23 NOTE — BEHAVIORAL HEALTH ASSESSMENT NOTE - RISK ASSESSMENT
Patient has h/o chronic mental illness, multiple hospitalizations, suicide attempts and c/o side-effects with meds.  But she has been stable on outpatient treatment for 3 years, compliant with medications and follow-up. No SI/HI/hallucinations or delusions at this time. Lives at home with mother, has support and is currently working at a temp job.  Not an acute risk to self or others at this time

## 2017-06-23 NOTE — BEHAVIORAL HEALTH ASSESSMENT NOTE - NSBHCHARTREVIEWVS_PSY_A_CORE FT
T(C): 36.9, Max: 37.1 (06-22 @ 21:41)  HR: 74 (74 - 84)  BP: 110/65 (108/68 - 122/83)  RR: 18 (16 - 18)  SpO2: 94% (94% - 99%)

## 2017-06-23 NOTE — PROGRESS NOTE ADULT - SUBJECTIVE AND OBJECTIVE BOX
51y old  Female who presents with a chief complaint of neutropenic sepsis (20 Jun 2017 12:26)      Interval history:  Afebrile, no N/V/D, no abdominal pain, cough much better, denies dysuria, occasional phlegm mostly clear.        Antimicrobials:    cefepime  IVPB 2000milliGRAM(s) IV Intermittent every 8 hours    REVIEW OF SYSTEMS:  As above.    Vital Signs Last 24 Hrs  T(C): 36.9, Max: 37.1 (06-22 @ 21:41)  T(F): 98.4, Max: 98.7 (06-22 @ 21:41)  HR: 74 (74 - 84)  BP: 110/65 (108/68 - 122/83)  BP(mean): --  RR: 18 (16 - 18)  SpO2: 94% (94% - 99%)    PHYSICAL EXAM:  AAOX3.  Cardiovascular: S1S2 normal.  Lungs: Good air entry B/L lung fields.  Gastrointestinal: soft, nontender, nondistended.                   9.8    3.08  )-----------( 346      ( 23 Jun 2017 07:14 )             32.2   06-23    139  |  99  |  9   ----------------------------<  100<H>  4.9   |  29  |  0.65    Ca    9.1      23 Jun 2017 07:25          RECENT CULTURES:    06-20 @ 21:12  .Abscess  Moderate Alpha hemolytic strep    06-19 @ 16:42  .Sputum Sputum  Normal Respiratory Noni present    06-18 @ 17:05  .Blood Blood  No growth to date.

## 2017-06-23 NOTE — BEHAVIORAL HEALTH ASSESSMENT NOTE - OTHER PAST PSYCHIATRIC HISTORY (INCLUDE DETAILS REGARDING ONSET, COURSE OF ILLNESS, INPATIENT/OUTPATIENT TREATMENT)
see above tiana prior admissions, last in 2014 at [facility], prior SA by OD and attempting to jump from window, currently in tx with OP psychiatrist Dr. Smalls

## 2017-06-23 NOTE — PROGRESS NOTE ADULT - SUBJECTIVE AND OBJECTIVE BOX
Patient is a 51y old  Female who presents with a chief complaint of neutropenic sepsis (20 Jun 2017 12:26)      SUBJECTIVE / OVERNIGHT EVENTS: Cough better, no chest pain, sob, palpitations, appetite ok    MEDICATIONS  (STANDING):  fluvoxaMINE 150milliGRAM(s) Oral two times a day  fluticasone propionate 50 MICROgram(s)/spray Nasal Spray 1Spray(s) Both Nostrils two times a day  cholecalciferol 1000Unit(s) Oral daily  OLANZapine 20milliGRAM(s) Oral at bedtime  OLANZapine 5milliGRAM(s) Oral <User Schedule>  cefepime  IVPB 2000milliGRAM(s) IV Intermittent every 8 hours  heparin  Injectable 5000Unit(s) SubCutaneous every 8 hours  docusate sodium 200milliGRAM(s) Oral at bedtime  senna 2Tablet(s) Oral at bedtime  polyethylene glycol 3350 17Gram(s) Oral daily  ondansetron Injectable 4milliGRAM(s) IV Push once  pantoprazole    Tablet 40milliGRAM(s) Oral before breakfast    MEDICATIONS  (PRN):  benzonatate 100milliGRAM(s) Oral every 8 hours PRN Cough  clonazePAM Tablet 1milliGRAM(s) Oral two times a day PRN anxiety  guaiFENesin    Syrup 200milliGRAM(s) Oral every 4 hours PRN Cough      Vital Signs Last 24 Hrs  T(C): 36.8, Max: 37.1 (06-22 @ 21:41)  HR: 84 (71 - 84)  BP: 122/83 (96/60 - 122/83)  RR: 18 (16 - 18)  SpO2: 98% (98% - 99%)  Wt(kg): --  CAPILLARY BLOOD GLUCOSE    I&O's Summary  I & Os for 24h ending 23 Jun 2017 07:00  =============================================  IN: 1700 ml / OUT: 0 ml / NET: 1700 ml    I & Os for current day (as of 23 Jun 2017 11:43)  =============================================  IN: 360 ml / OUT: 0 ml / NET: 360 ml      PHYSICAL EXAM:  GENERAL: NAD, thin  HEAD:  Atraumatic, Normocephalic  EYES: EOMI, PERRLA, conjunctiva and sclera clear  NECK: Supple, No JVD  CHEST/LUNG: Clear to auscultation bilaterally; No wheeze  HEART: Regular rate and rhythm; No murmurs, rubs, or gallops  ABDOMEN: Soft, Nontender, Nondistended; Bowel sounds present  EXTREMITIES:  2+ Peripheral Pulses, No clubbing, cyanosis, or edema  PSYCH: AAOx3  NEUROLOGY: non-focal  SKIN: No rashes or lesions    LABS:                        9.8    3.08  )-----------( 346      ( 23 Jun 2017 07:14 )             32.2     06-23    139  |  99  |  9   ----------------------------<  100<H>  4.9   |  29  |  0.65    Ca    9.1      23 Jun 2017 07:25                RADIOLOGY & ADDITIONAL TESTS:    Imaging Personally Reviewed:    Consultant(s) Notes Reviewed:  pulmonary, gi    Care Discussed with Consultants/Other Providers:

## 2017-06-23 NOTE — BEHAVIORAL HEALTH ASSESSMENT NOTE - NSBHCHARTREVIEWINVESTIGATE_PSY_A_CORE FT
Ventricular Rate 85 BPM    Atrial Rate 85 BPM    P-R Interval 128 ms    QRS Duration 74 ms     ms    QTc 452 ms    P Axis 59 degrees    R Axis 55 degrees    T Axis 41 degrees    Diagnosis Line NORMAL SINUS RHYTHM  NORMAL ECG

## 2017-06-23 NOTE — PROGRESS NOTE ADULT - SUBJECTIVE AND OBJECTIVE BOX
INDIRA SMITH  MRN-[id number]  CHIEF COMPLAINT: lung abscess - cough and fevers    Interval Events: Doing well. Cough improving  Cytology still pending  Abscess culture growing alpha strep    REVIEW OF SYSTEMS:  Constitutional: Feels well  Lungs: Breathing comforably. Intermittent cough which has improved.  Cardiovascular: No chest pain or palpitations.  GI: Tolerating dysphagia diet.  [ ] All other systems negative  [ ] Unable to assess ROS because ________    OBJECTIVE:  ICU Vital Signs Last 24 Hrs  T(C): 36.8, Max: 37.1 (06-22 @ 21:41)  T(F): 98.3, Max: 98.7 (06-22 @ 21:41)  HR: 84 (71 - 84)  BP: 122/83 (96/60 - 122/83)  RR: 18 (16 - 18)  SpO2: 98% (98% - 99%)    I & Os for current day (as of 06-23 @ 09:24)  =============================================  IN: 1700 ml / OUT: 0 ml / NET: 1700 ml    HOSPITAL MEDICATIONS:  heparin  Injectable 5000Unit(s) SubCutaneous every 8 hours    cefepime  IVPB 2000milliGRAM(s) IV Intermittent every 8 hours    benzonatate 100milliGRAM(s) Oral every 8 hours PRN  guaiFENesin    Syrup 200milliGRAM(s) Oral every 4 hours PRN    fluvoxaMINE 150milliGRAM(s) Oral two times a day  OLANZapine 20milliGRAM(s) Oral at bedtime  OLANZapine 5milliGRAM(s) Oral <User Schedule>  clonazePAM Tablet 1milliGRAM(s) Oral two times a day PRN  ondansetron Injectable 4milliGRAM(s) IV Push once    docusate sodium 200milliGRAM(s) Oral at bedtime  senna 2Tablet(s) Oral at bedtime  polyethylene glycol 3350 17Gram(s) Oral daily    cholecalciferol 1000Unit(s) Oral daily    fluticasone propionate 50 MICROgram(s)/spray Nasal Spray 1Spray(s) Both Nostrils two times a day      LABS:                        9.8    3.08  )-----------( 346      ( 23 Jun 2017 07:14 )             32.2     Hgb Trend: 9.8<--, 9.7<--, 9.9<--, 9.8<--, 10.0<--  06-23    139  |  99  |  9   ----------------------------<  100<H>  4.9   |  29  |  0.65    Ca    9.1      23 Jun 2017 07:25      Creatinine Trend: 0.65<--, 0.64<--, 0.74<--, 0.64<--, 0.83<--, 0.80<--    MICROBIOLOGY: Culture - Abscess with Gram Stain (06.20.17 @ 21:12) Culture Results: Moderate Alpha hemolytic strep    CT HEAD - Impression: Unremarkable noncontrast CT of the brain. INDIRA SMITH  MRN-[id number]  CHIEF COMPLAINT: lung abscess - cough and fevers    Interval Events: Doing well. Cough improving  Cytology still pending  Abscess culture growing alpha strep    REVIEW OF SYSTEMS:  Constitutional: Feels well  Lungs: Breathing comforably. Intermittent cough which has improved.  Cardiovascular: No chest pain or palpitations.  GI: Tolerating dysphagia diet.  [x ] All other systems negative  [ ] Unable to assess ROS because ________    OBJECTIVE:  ICU Vital Signs Last 24 Hrs  T(C): 36.8, Max: 37.1 (06-22 @ 21:41)  T(F): 98.3, Max: 98.7 (06-22 @ 21:41)  HR: 84 (71 - 84)  BP: 122/83 (96/60 - 122/83)  RR: 18 (16 - 18)  SpO2: 98% (98% - 99%)    I & Os for current day (as of 06-23 @ 09:24)  =============================================  IN: 1700 ml / OUT: 0 ml / NET: 1700 ml    HOSPITAL MEDICATIONS:  heparin  Injectable 5000Unit(s) SubCutaneous every 8 hours    cefepime  IVPB 2000milliGRAM(s) IV Intermittent every 8 hours    benzonatate 100milliGRAM(s) Oral every 8 hours PRN  guaiFENesin    Syrup 200milliGRAM(s) Oral every 4 hours PRN    fluvoxaMINE 150milliGRAM(s) Oral two times a day  OLANZapine 20milliGRAM(s) Oral at bedtime  OLANZapine 5milliGRAM(s) Oral <User Schedule>  clonazePAM Tablet 1milliGRAM(s) Oral two times a day PRN  ondansetron Injectable 4milliGRAM(s) IV Push once    docusate sodium 200milliGRAM(s) Oral at bedtime  senna 2Tablet(s) Oral at bedtime  polyethylene glycol 3350 17Gram(s) Oral daily    cholecalciferol 1000Unit(s) Oral daily    fluticasone propionate 50 MICROgram(s)/spray Nasal Spray 1Spray(s) Both Nostrils two times a day      LABS:                        9.8    3.08  )-----------( 346      ( 23 Jun 2017 07:14 )             32.2     Hgb Trend: 9.8<--, 9.7<--, 9.9<--, 9.8<--, 10.0<--  06-23    139  |  99  |  9   ----------------------------<  100<H>  4.9   |  29  |  0.65    Ca    9.1      23 Jun 2017 07:25      Creatinine Trend: 0.65<--, 0.64<--, 0.74<--, 0.64<--, 0.83<--, 0.80<--    MICROBIOLOGY: Culture - Abscess with Gram Stain (06.20.17 @ 21:12) Culture Results: Moderate Alpha hemolytic strep    CT HEAD - Impression: Unremarkable noncontrast CT of the brain.

## 2017-06-23 NOTE — BEHAVIORAL HEALTH ASSESSMENT NOTE - OTHER
h/o suicidality temp job coming to en end causing her to be stressed patient in bed, not assessed not at this time, intermittent, patient aware with intact reality testing appears younger than stated age, well-groomed, relates in a friendly but anxious manner temp job coming to an end causing her to be stressed

## 2017-06-23 NOTE — BEHAVIORAL HEALTH ASSESSMENT NOTE - SUMMARY
50 y/o female with h/o cyclic neutropenia, Schizophrenia and Depressive Dos NOS came in with c/o cough, RLL lung mass identified. MBS eval, swallowing impaired.  Antipsychotics can cause esophageal dysmotility in patients but unlikely to have caused aspiration and lung abscess in this patient who has been on the current medication and was on same dose for a long time. Patient also has chronic Neutropenia which could be the cause of infection here. Consider Pulm input and follow-up on aspirate lab results.  Considering risks and benefits, it is advisable to contin ue patient on current regimen for psychiatric stability  Outpatient psychiatrist informed about primary team concerns, patient will discuss regimen at next appointment.

## 2017-06-23 NOTE — BEHAVIORAL HEALTH ASSESSMENT NOTE - NSBHCHARTREVIEWIMAGING_PSY_A_CORE FT
CT CHEST IC                          PROCEDURE DATE:  06/18/2017    INTERPRETATION:  EXAMINATION: CT Chest  CLINICAL INDICATION: Patient is presenting with fever cough and mildly   elevated white count. Evaluate for abscess versus mass versus pneumonia.  TECHNIQUE: Noncontrast CT of the chest was obtained.  MIP Images were   reconstructed.  COMPARISON: CT Chest without Contrast on 5/14/2017.    FINDINGS:     AIRWAYS, LUNGS AND PLEURA: The central tracheobronchial tree is patent.   There is a 3.7 x 4.3 x 3.5 cm (series 2, image 60) mass in the right   lower lobe, previously measuring 3.9 x 3.8 cm. There has been interval   development of a 3 x 2.9 cm hypodense rim-enhancing fluid collection   containing internal focus of gas. There is a 4 mm right lower lobe nodule   (series 2, image 74) series.    MEDIASTINUM : There are no enlarged mediastinal, hilar or axillary lymph   nodes. The visualized portion of the thyroid gland is unremarkable.     HEART AND VESSELS: Theheart is normal in size.  There are no   atherosclerotic calcifications of the aorta.  There is trace pericardial   fluid.     UPPER ABDOMEN: Small hiatal hernia. Several scattered hypodense lesions   are noted throughout the liver and spleen.     BONES AND SOFT TISSUES: The bones are unremarkable.  The soft tissues are   unremarkable.    IMPRESSION:   A 4.3 cm mass in the right lower lobe demonstrates interval enlargement   and development of an internal hypodense, rim-enhancing fluid collection  with a focus of gas. This likely represents a pulmonary abscess. However   an underlying malignancy must be excluded and follow-up CT chest is   recommended to confirm resolution.

## 2017-06-23 NOTE — PROGRESS NOTE ADULT - PROBLEM SELECTOR PLAN 2
--continue cefepime  --abscess culture growing alpha strep, path pending  --Negative fungitell  --galactomannan pending.  --crypt serum antigen negative.  --HIV negative  --Quantiferon negative.  --ID recs appreciated

## 2017-06-23 NOTE — BEHAVIORAL HEALTH ASSESSMENT NOTE - NSBHSUICPROTECTFACT_PSY_A_CORE
Future oriented/Positive therapeutic relationships/Responsibility to family and others/Supportive social network or family/Ability to cope with stress/Identifies reasons for living/Engaged in work or school

## 2017-06-23 NOTE — PROGRESS NOTE ADULT - PROBLEM SELECTOR PLAN 1
-persistent lung mass with cystic features - possibly consistent with lung abscess  -s/p lung aspiration  -followup cytology  -cultures now growing alpha strep  -will need followup CXR imaging Sun/Mon - if she is going to be discharged please have CXR done before she leaves and I will follow up the results with her next week

## 2017-06-23 NOTE — PROGRESS NOTE ADULT - PROBLEM SELECTOR PLAN 1
--differential diagnosis concerning for complicated infection such as abscess given walled-off appearance on CT vs malignancy  --Appreciate pulm recs, pt s/p lung aspiration and biopsy   --not hypoxic or tachypnic  --c/w cefepime  --vancomycin discontinued  --Follow up cxr in am

## 2017-06-23 NOTE — BEHAVIORAL HEALTH ASSESSMENT NOTE - NSBHCONSULTMEDS_PSY_A_CORE FT
Continue current doses of home meds  Zyprexa 20+5 mg  Fluvoxamine 150 mg bid  Klonopin1 mg bid PRN anxiety

## 2017-06-23 NOTE — BEHAVIORAL HEALTH ASSESSMENT NOTE - NSBHCHARTREVIEWLAB_PSY_A_CORE FT
CBC Full  -  ( 23 Jun 2017 07:14 )  WBC Count : 3.08 K/uL  Hemoglobin : 9.8 g/dL  Hematocrit : 32.2 %  Platelet Count - Automated : 346 K/uL  Mean Cell Volume : 88.5 fl  Mean Cell Hemoglobin : 26.9 pg  Mean Cell Hemoglobin Concentration : 30.4 gm/dL  Auto Neutrophil # : 0.68 K/uL  Auto Lymphocyte # : 1.47 K/uL  Auto Monocyte # : 0.63 K/uL  Auto Eosinophil # : 0.14 K/uL  Auto Basophil # : 0.14 K/uL  Auto Neutrophil % : 22.1 %  Auto Lymphocyte % : 47.8 %  Auto Monocyte % : 20.4 %  Auto Eosinophil % : 4.4 %  Auto Basophil % : 4.4 %    06-23    139  |  99  |  9   ----------------------------<  100<H>  4.9   |  29  |  0.65    Ca    9.1      23 Jun 2017 07:25

## 2017-06-23 NOTE — CONSULT NOTE ADULT - ASSESSMENT
Impression:  1. Dysphagia- likely oropharyngeal transfer dysphagia  2. Debris in esophagus on CT- resolved on followup.     Plan:  1. Recommend c/w dysphagia diet as per s+w  2. Keep up right at least 45 degrees when eating or drinking  3. PPI qdaily Impression:  1. Dysphagia- likely oropharyngeal transfer dysphagia  2. Debris in esophagus on CT- resolved on followup.     Plan:  1. Recommend c/w dysphagia diet as per s+w  2. Keep up right at least 45 degrees when eating or drinking  3. PPI qdaily  4. F/u Gi outpt for EGD and possible motility study. [phone]

## 2017-06-23 NOTE — BEHAVIORAL HEALTH ASSESSMENT NOTE - CASE SUMMARY
51F with PPHx schizoaffective disorder, PMH cyclic neutropenia a/w RLL lung mass identified approx 1 month ago presents for further evaluation of fever and cough, found to have dysphagia on swallow study, psych called to evaluate potential relation to psychotropics.  Pt with hx of severe psychosis when medications suboptimal, including multiple psychiatric admissions and suicide attempts.  Has been psychiatrically stable since 2014, stable on regimen of Luvox/Zyprexa which has not been recently changed.  Pt denies SI/HI, but presents as somatically preoccupied- focused on a "pulsating sensation" in her vagina which she is able to ignore when engaged in work or activities, a "movement disorder" in her foot, etc.  Pt has a history of multiple medication trials, multiple c/o medication side effects.  Denies substance abuse, persistent depressive sx or manic sx.  Future oriented to finding work, reading a newspaper at bedside.  Dx- Schizoaffective d/o.  Recs: 1. no CO 1:1. 2. C/w home dose Luvox and Zyprexa- unlikely to be cause for dysphagia given these are longstanding medications and risks of psychiatric decompensation outweigh potential benefits.  3. PRN: Haldol 2.5mg PO/IM q6h PRN agitation; Benadryl 50mg PO/IM q6h PRN EPS ppx. 4. Pt prefers to f/u with her own psychiatrist Dr. Smalls after discharge as well as her psychotherapist.  5. Pt does not meet criteria for psychiatric hospitalization. 6. Supportive interventions provided. 51F with PPHx schizoaffective disorder, PMH cyclic neutropenia a/w RLL lung mass identified approx 1 month ago presents for further evaluation of fever and cough, found to have dysphagia on swallow study, psych called to evaluate potential relation to psychotropics.  Pt with hx of severe psychosis when medications suboptimal, including multiple psychiatric admissions and suicide attempts.  Has been psychiatrically stable since 2014, stable on regimen of Luvox/Zyprexa which has not been recently changed.  Pt denies SI/HI, but presents as somatically preoccupied- focused on a "pulsating sensation" in her vagina which she is able to ignore when engaged in work or activities, a "movement disorder" in her foot, etc.  Pt has a history of multiple medication trials, multiple c/o medication side effects.  Denies substance abuse, persistent depressive sx or manic sx.  Denies SIIP/HIIP.  Able to engage in some reality testing regarding her overvalued ideation/somatic preoccupations.  Future oriented to finding work, reading a newspaper at bedside.  Dx- Schizoaffective d/o.  Recs: 1. no CO 1:1. 2. C/w home dose Luvox and Zyprexa- unlikely to be cause for dysphagia given these are longstanding medications and risks of psychiatric decompensation are significant.  3. PRN: Haldol 2.5mg PO/IM q6h PRN agitation; Benadryl 50mg PO/IM q6h PRN EPS ppx. 4. Pt prefers to f/u with her own psychiatrist Dr. Smalls after discharge as well as her psychotherapist.  5. Pt does not meet criteria for psychiatric hospitalization. 6. Supportive interventions provided. 51F with PPHx schizoaffective disorder, PMH cyclic neutropenia a/w RLL lung mass identified approx 1 month ago presents for further evaluation of fever and cough, found to have dysphagia on swallow study, psych called to evaluate potential relation to psychotropics.  Pt with hx of severe psychosis when medications suboptimal, including multiple psychiatric admissions and suicide attempts.  Has been psychiatrically stable since 2014, stable on regimen of Luvox/Zyprexa which has not been recently changed.  Pt denies SI/HI, but presents as somatically preoccupied- focused on a "pulsating sensation" in her vagina which she is able to ignore when engaged in work or activities, a "movement disorder" in her foot, etc.  Pt has a history of multiple medication trials, multiple c/o medication side effects.  Denies substance abuse, persistent depressive sx or manic sx.  Denies SIIP/HIIP.  Able to engage in some reality testing regarding her overvalued ideation/somatic preoccupations.  Future oriented to finding work, reading a newspaper at bedside.  Dx- Schizoaffective d/o.  Recs: 1. no CO 1:1. 2. C/w home dose Luvox and Zyprexa- unlikely to be cause for dysphagia given these are longstanding medications and risks of psychiatric decompensation are significant.  3. PRN: Haldol 2.5mg PO/IM q6h PRN agitation; Benadryl 50mg PO/IM q6h PRN EPS ppx. 4. Pt prefers to f/u with her own psychiatrist Dr. Smalls after discharge as well as her psychotherapist.  5. Pt does not meet criteria for psychiatric hospitalization. 6. Supportive interventions provided.  -ZAINAB [phone]

## 2017-06-24 LAB
ANION GAP SERPL CALC-SCNC: 16 MMOL/L — SIGNIFICANT CHANGE UP (ref 5–17)
BASOPHILS # BLD AUTO: 0.06 K/UL — SIGNIFICANT CHANGE UP (ref 0–0.2)
BASOPHILS NFR BLD AUTO: 2.6 % — HIGH (ref 0–2)
BUN SERPL-MCNC: 9 MG/DL — SIGNIFICANT CHANGE UP (ref 7–23)
CALCIUM SERPL-MCNC: 9.2 MG/DL — SIGNIFICANT CHANGE UP (ref 8.4–10.5)
CHLORIDE SERPL-SCNC: 104 MMOL/L — SIGNIFICANT CHANGE UP (ref 96–108)
CO2 SERPL-SCNC: 22 MMOL/L — SIGNIFICANT CHANGE UP (ref 22–31)
CREAT SERPL-MCNC: 0.73 MG/DL — SIGNIFICANT CHANGE UP (ref 0.5–1.3)
EOSINOPHIL # BLD AUTO: 0.19 K/UL — SIGNIFICANT CHANGE UP (ref 0–0.5)
EOSINOPHIL NFR BLD AUTO: 7.8 % — HIGH (ref 0–6)
GLUCOSE SERPL-MCNC: 96 MG/DL — SIGNIFICANT CHANGE UP (ref 70–99)
HCT VFR BLD CALC: 30.4 % — LOW (ref 34.5–45)
HGB BLD-MCNC: 9.4 G/DL — LOW (ref 11.5–15.5)
LYMPHOCYTES # BLD AUTO: 1.64 K/UL — SIGNIFICANT CHANGE UP (ref 1–3.3)
LYMPHOCYTES # BLD AUTO: 67 % — HIGH (ref 13–44)
MCHC RBC-ENTMCNC: 27.2 PG — SIGNIFICANT CHANGE UP (ref 27–34)
MCHC RBC-ENTMCNC: 30.9 GM/DL — LOW (ref 32–36)
MCV RBC AUTO: 87.9 FL — SIGNIFICANT CHANGE UP (ref 80–100)
MONOCYTES # BLD AUTO: 0.28 K/UL — SIGNIFICANT CHANGE UP (ref 0–0.9)
MONOCYTES NFR BLD AUTO: 11.3 % — SIGNIFICANT CHANGE UP (ref 2–14)
NEUTROPHILS # BLD AUTO: 0.28 K/UL — LOW (ref 1.8–7.4)
NEUTROPHILS NFR BLD AUTO: 9.6 % — LOW (ref 43–77)
PLATELET # BLD AUTO: 334 K/UL — SIGNIFICANT CHANGE UP (ref 150–400)
POTASSIUM SERPL-MCNC: 4.2 MMOL/L — SIGNIFICANT CHANGE UP (ref 3.5–5.3)
POTASSIUM SERPL-SCNC: 4.2 MMOL/L — SIGNIFICANT CHANGE UP (ref 3.5–5.3)
RBC # BLD: 3.46 M/UL — LOW (ref 3.8–5.2)
RBC # FLD: 14.9 % — HIGH (ref 10.3–14.5)
SODIUM SERPL-SCNC: 142 MMOL/L — SIGNIFICANT CHANGE UP (ref 135–145)
WBC # BLD: 2.45 K/UL — LOW (ref 3.8–10.5)
WBC # FLD AUTO: 2.45 K/UL — LOW (ref 3.8–10.5)

## 2017-06-24 PROCEDURE — 71010: CPT | Mod: 26

## 2017-06-24 PROCEDURE — 99233 SBSQ HOSP IP/OBS HIGH 50: CPT | Mod: GC

## 2017-06-24 RX ADMIN — HEPARIN SODIUM 5000 UNIT(S): 5000 INJECTION INTRAVENOUS; SUBCUTANEOUS at 05:24

## 2017-06-24 RX ADMIN — Medication 200 MILLIGRAM(S): at 21:25

## 2017-06-24 RX ADMIN — Medication 1000 UNIT(S): at 12:29

## 2017-06-24 RX ADMIN — CEFEPIME 100 MILLIGRAM(S): 1 INJECTION, POWDER, FOR SOLUTION INTRAMUSCULAR; INTRAVENOUS at 04:49

## 2017-06-24 RX ADMIN — PANTOPRAZOLE SODIUM 40 MILLIGRAM(S): 20 TABLET, DELAYED RELEASE ORAL at 06:23

## 2017-06-24 RX ADMIN — FLUVOXAMINE MALEATE 150 MILLIGRAM(S): 25 TABLET ORAL at 05:24

## 2017-06-24 RX ADMIN — Medication 1 SPRAY(S): at 05:23

## 2017-06-24 RX ADMIN — SENNA PLUS 2 TABLET(S): 8.6 TABLET ORAL at 21:25

## 2017-06-24 RX ADMIN — POLYETHYLENE GLYCOL 3350 17 GRAM(S): 17 POWDER, FOR SOLUTION ORAL at 12:29

## 2017-06-24 RX ADMIN — OLANZAPINE 5 MILLIGRAM(S): 15 TABLET, FILM COATED ORAL at 05:24

## 2017-06-24 RX ADMIN — HEPARIN SODIUM 5000 UNIT(S): 5000 INJECTION INTRAVENOUS; SUBCUTANEOUS at 14:10

## 2017-06-24 RX ADMIN — CEFEPIME 100 MILLIGRAM(S): 1 INJECTION, POWDER, FOR SOLUTION INTRAMUSCULAR; INTRAVENOUS at 20:09

## 2017-06-24 RX ADMIN — FLUVOXAMINE MALEATE 150 MILLIGRAM(S): 25 TABLET ORAL at 21:25

## 2017-06-24 RX ADMIN — OLANZAPINE 20 MILLIGRAM(S): 15 TABLET, FILM COATED ORAL at 21:25

## 2017-06-24 RX ADMIN — Medication 200 MILLIGRAM(S): at 03:31

## 2017-06-24 RX ADMIN — Medication 1 SPRAY(S): at 17:37

## 2017-06-24 RX ADMIN — CEFEPIME 100 MILLIGRAM(S): 1 INJECTION, POWDER, FOR SOLUTION INTRAMUSCULAR; INTRAVENOUS at 12:29

## 2017-06-24 RX ADMIN — HEPARIN SODIUM 5000 UNIT(S): 5000 INJECTION INTRAVENOUS; SUBCUTANEOUS at 21:25

## 2017-06-24 NOTE — PROGRESS NOTE ADULT - SUBJECTIVE AND OBJECTIVE BOX
CC: Fever    HPI: 51 years old woman with a history of leukopenia and depression presents with fever, lung abscess, found to have oropharyngeal dysphagia.  She presently feels well and denies complaints.  ROS is negative.      Meds: cefepime 2 g q8, advair, fluvoxamine, olanzapine, zofran, hsq, vit d, protonix, senna, miralax, klonipin    Physical Exam:  96/64, HR 70, RR 18, O2 98%, T37.2C  GEN: NAD  HEENT: anicteric  CV: S1 and S2  PULM: coarse upper airway breath sounds  ABD: non tender no HSM  NEURO: non focal  VASC: no cyanosis  SKIN: dry  PSYCH: appropriate    Labs:  WBC 2.49  HGB 9.4  normal basic metabolic panel.    CXR -- reviewed by me personally.  right lung opacity noted.

## 2017-06-24 NOTE — PROGRESS NOTE ADULT - PROBLEM SELECTOR PLAN 1
--differential diagnosis concerning for complicated infection such as abscess given walled-off appearance on CT vs malignancy  --Appreciate pulm recs, pt s/p lung aspiration and biopsy   --not hypoxic or tachypnic  --c/w cefepime  --vancomycin discontinued

## 2017-06-25 LAB
BASOPHILS # BLD AUTO: 0.06 K/UL — SIGNIFICANT CHANGE UP (ref 0–0.2)
BASOPHILS NFR BLD AUTO: 2 % — SIGNIFICANT CHANGE UP (ref 0–2)
CULTURE RESULTS: SIGNIFICANT CHANGE UP
EOSINOPHIL # BLD AUTO: 0.09 K/UL — SIGNIFICANT CHANGE UP (ref 0–0.5)
EOSINOPHIL NFR BLD AUTO: 3 % — SIGNIFICANT CHANGE UP (ref 0–6)
HCT VFR BLD CALC: 33 % — LOW (ref 34.5–45)
HGB BLD-MCNC: 10.1 G/DL — LOW (ref 11.5–15.5)
LYMPHOCYTES # BLD AUTO: 1.77 K/UL — SIGNIFICANT CHANGE UP (ref 1–3.3)
LYMPHOCYTES # BLD AUTO: 57 % — HIGH (ref 13–44)
MANUAL SMEAR VERIFICATION: SIGNIFICANT CHANGE UP
MCHC RBC-ENTMCNC: 27 PG — SIGNIFICANT CHANGE UP (ref 27–34)
MCHC RBC-ENTMCNC: 30.6 GM/DL — LOW (ref 32–36)
MCV RBC AUTO: 88.2 FL — SIGNIFICANT CHANGE UP (ref 80–100)
MONOCYTES # BLD AUTO: 0.9 K/UL — SIGNIFICANT CHANGE UP (ref 0–0.9)
MONOCYTES NFR BLD AUTO: 29 % — HIGH (ref 2–14)
NEUTROPHILS # BLD AUTO: 0.28 K/UL — LOW (ref 1.8–7.4)
NEUTROPHILS NFR BLD AUTO: 9 % — LOW (ref 43–77)
PLAT MORPH BLD: NORMAL — SIGNIFICANT CHANGE UP
PLATELET # BLD AUTO: 327 K/UL — SIGNIFICANT CHANGE UP (ref 150–400)
RBC # BLD: 3.74 M/UL — LOW (ref 3.8–5.2)
RBC # FLD: 15 % — HIGH (ref 10.3–14.5)
RBC BLD AUTO: SIGNIFICANT CHANGE UP
SPECIMEN SOURCE: SIGNIFICANT CHANGE UP
WBC # BLD: 3.11 K/UL — LOW (ref 3.8–10.5)
WBC # FLD AUTO: 3.11 K/UL — LOW (ref 3.8–10.5)

## 2017-06-25 PROCEDURE — 99233 SBSQ HOSP IP/OBS HIGH 50: CPT | Mod: GC

## 2017-06-25 RX ADMIN — CEFEPIME 100 MILLIGRAM(S): 1 INJECTION, POWDER, FOR SOLUTION INTRAMUSCULAR; INTRAVENOUS at 19:58

## 2017-06-25 RX ADMIN — Medication 1 SPRAY(S): at 17:23

## 2017-06-25 RX ADMIN — POLYETHYLENE GLYCOL 3350 17 GRAM(S): 17 POWDER, FOR SOLUTION ORAL at 11:25

## 2017-06-25 RX ADMIN — PANTOPRAZOLE SODIUM 40 MILLIGRAM(S): 20 TABLET, DELAYED RELEASE ORAL at 06:55

## 2017-06-25 RX ADMIN — FLUVOXAMINE MALEATE 150 MILLIGRAM(S): 25 TABLET ORAL at 21:36

## 2017-06-25 RX ADMIN — SENNA PLUS 2 TABLET(S): 8.6 TABLET ORAL at 21:36

## 2017-06-25 RX ADMIN — FLUVOXAMINE MALEATE 150 MILLIGRAM(S): 25 TABLET ORAL at 05:27

## 2017-06-25 RX ADMIN — HEPARIN SODIUM 5000 UNIT(S): 5000 INJECTION INTRAVENOUS; SUBCUTANEOUS at 11:26

## 2017-06-25 RX ADMIN — Medication 200 MILLIGRAM(S): at 21:36

## 2017-06-25 RX ADMIN — Medication 1000 UNIT(S): at 11:25

## 2017-06-25 RX ADMIN — OLANZAPINE 5 MILLIGRAM(S): 15 TABLET, FILM COATED ORAL at 05:27

## 2017-06-25 RX ADMIN — CEFEPIME 100 MILLIGRAM(S): 1 INJECTION, POWDER, FOR SOLUTION INTRAMUSCULAR; INTRAVENOUS at 05:22

## 2017-06-25 RX ADMIN — HEPARIN SODIUM 5000 UNIT(S): 5000 INJECTION INTRAVENOUS; SUBCUTANEOUS at 05:29

## 2017-06-25 RX ADMIN — Medication 1 SPRAY(S): at 05:27

## 2017-06-25 RX ADMIN — CEFEPIME 100 MILLIGRAM(S): 1 INJECTION, POWDER, FOR SOLUTION INTRAMUSCULAR; INTRAVENOUS at 11:25

## 2017-06-25 RX ADMIN — OLANZAPINE 20 MILLIGRAM(S): 15 TABLET, FILM COATED ORAL at 21:37

## 2017-06-25 RX ADMIN — HEPARIN SODIUM 5000 UNIT(S): 5000 INJECTION INTRAVENOUS; SUBCUTANEOUS at 21:37

## 2017-06-25 NOTE — PROGRESS NOTE ADULT - SUBJECTIVE AND OBJECTIVE BOX
CC: Fever    HPI: 51 years old woman with a history of leukopenia and depression presents with fever, lung abscess, found to have oropharyngeal dysphagia.  She presently feels well and denies complaints.  ROS is negative.      Meds: cefepime 2 g q8, advair, fluvoxamine, olanzapine, zofran, hsq, vit d, protonix, senna, miralax, klonipin    Physical Exam:  98/62, HR 69, RR 18, O2 98%, T98.5  GEN: NAD  HEENT: anicteric  CV: S1 and S2  PULM: coarse upper airway breath sounds  ABD: non tender no HSM  NEURO: non focal  VASC: no cyanosis  SKIN: dry  PSYCH: appropriate    Labs:  WBC 3.11  HGB 10.1      CXR -- reviewed by me personally.  right lung opacity noted.

## 2017-06-26 DIAGNOSIS — F25.9 SCHIZOAFFECTIVE DISORDER, UNSPECIFIED: ICD-10-CM

## 2017-06-26 DIAGNOSIS — T17.908D UNSPECIFIED FOREIGN BODY IN RESPIRATORY TRACT, PART UNSPECIFIED CAUSING OTHER INJURY, SUBSEQUENT ENCOUNTER: ICD-10-CM

## 2017-06-26 LAB
HCT VFR BLD CALC: 31.5 % — LOW (ref 34.5–45)
HGB BLD-MCNC: 9.9 G/DL — LOW (ref 11.5–15.5)
MCHC RBC-ENTMCNC: 28.2 PG — SIGNIFICANT CHANGE UP (ref 27–34)
MCHC RBC-ENTMCNC: 31.4 GM/DL — LOW (ref 32–36)
MCV RBC AUTO: 89.7 FL — SIGNIFICANT CHANGE UP (ref 80–100)
PLATELET # BLD AUTO: 322 K/UL — SIGNIFICANT CHANGE UP (ref 150–400)
RBC # BLD: 3.51 M/UL — LOW (ref 3.8–5.2)
RBC # FLD: 15.4 % — HIGH (ref 10.3–14.5)
WBC # BLD: 2.78 K/UL — LOW (ref 3.8–10.5)
WBC # FLD AUTO: 2.78 K/UL — LOW (ref 3.8–10.5)

## 2017-06-26 PROCEDURE — 99232 SBSQ HOSP IP/OBS MODERATE 35: CPT

## 2017-06-26 PROCEDURE — 99233 SBSQ HOSP IP/OBS HIGH 50: CPT

## 2017-06-26 RX ORDER — KETOCONAZOLE 20 MG/G
1 AEROSOL, FOAM TOPICAL
Qty: 0 | Refills: 0 | Status: DISCONTINUED | OUTPATIENT
Start: 2017-06-26 | End: 2017-06-29

## 2017-06-26 RX ADMIN — KETOCONAZOLE 1 APPLICATION(S): 20 AEROSOL, FOAM TOPICAL at 21:21

## 2017-06-26 RX ADMIN — Medication 1 SPRAY(S): at 05:17

## 2017-06-26 RX ADMIN — HEPARIN SODIUM 5000 UNIT(S): 5000 INJECTION INTRAVENOUS; SUBCUTANEOUS at 21:12

## 2017-06-26 RX ADMIN — Medication 1 SPRAY(S): at 18:07

## 2017-06-26 RX ADMIN — OLANZAPINE 5 MILLIGRAM(S): 15 TABLET, FILM COATED ORAL at 05:17

## 2017-06-26 RX ADMIN — Medication 200 MILLIGRAM(S): at 21:13

## 2017-06-26 RX ADMIN — SENNA PLUS 2 TABLET(S): 8.6 TABLET ORAL at 21:12

## 2017-06-26 RX ADMIN — HEPARIN SODIUM 5000 UNIT(S): 5000 INJECTION INTRAVENOUS; SUBCUTANEOUS at 05:20

## 2017-06-26 RX ADMIN — Medication 1000 UNIT(S): at 12:01

## 2017-06-26 RX ADMIN — FLUVOXAMINE MALEATE 150 MILLIGRAM(S): 25 TABLET ORAL at 05:17

## 2017-06-26 RX ADMIN — POLYETHYLENE GLYCOL 3350 17 GRAM(S): 17 POWDER, FOR SOLUTION ORAL at 12:01

## 2017-06-26 RX ADMIN — CEFEPIME 100 MILLIGRAM(S): 1 INJECTION, POWDER, FOR SOLUTION INTRAMUSCULAR; INTRAVENOUS at 21:10

## 2017-06-26 RX ADMIN — CEFEPIME 100 MILLIGRAM(S): 1 INJECTION, POWDER, FOR SOLUTION INTRAMUSCULAR; INTRAVENOUS at 04:47

## 2017-06-26 RX ADMIN — PANTOPRAZOLE SODIUM 40 MILLIGRAM(S): 20 TABLET, DELAYED RELEASE ORAL at 05:17

## 2017-06-26 RX ADMIN — HEPARIN SODIUM 5000 UNIT(S): 5000 INJECTION INTRAVENOUS; SUBCUTANEOUS at 13:59

## 2017-06-26 RX ADMIN — OLANZAPINE 20 MILLIGRAM(S): 15 TABLET, FILM COATED ORAL at 21:12

## 2017-06-26 RX ADMIN — CEFEPIME 100 MILLIGRAM(S): 1 INJECTION, POWDER, FOR SOLUTION INTRAMUSCULAR; INTRAVENOUS at 12:09

## 2017-06-26 RX ADMIN — FLUVOXAMINE MALEATE 150 MILLIGRAM(S): 25 TABLET ORAL at 21:12

## 2017-06-26 NOTE — PROGRESS NOTE ADULT - GASTROINTESTINAL DETAILS
nontender/soft/normal/no rigidity/no guarding/bowel sounds normal/no distention
no distention/soft/normal/no guarding/no rigidity/nontender
soft/no rigidity/bowel sounds normal/nontender/normal/no guarding/no distention
soft/no rigidity/normal/no distention/nontender/no guarding
nontender/soft/no rigidity/no distention/normal/no guarding

## 2017-06-26 NOTE — PROGRESS NOTE ADULT - PROBLEM SELECTOR PLAN 6
-Dilatation of esophagus evident on imaging  - pt with evidence of oropharyngeal dysphagia, aspiration to thin liquids, delayed emptying, ?dysmotility  -Dysphagia 3 diet with nectar  -Unusual presentation given age , appreciate GI recs, plan for endoscopic intervention/ dysmotility study as outpt    -No focal neuro defecits on exam, CT head negative for acute infarct   -?side effects of antipsychotics, psych eval pending for optimization/tititration of medications for schizophrenia
-Dilatation of esophagus evident on imaging  - pt with evidence of oropharyngeal dysphagia, aspiration to thin liquids, delayed emptying, ?dysmotility  -Dysphagia 3 diet with nectar  -Unusual presentation given age , appreciate GI recs, plan for endoscopic intervention/ dysmotility study as outpt    -No focal neuro defecits on exam, CT head negative for acute infarct   -?side effects of antipsychotics, psych eval pending for optimization/tititration of medications for schizophrenia
-Hep sq
-on review of her CT scan there is some dilation of her esophagus as well as a hiatal hernia. It is possible that she is chronically aspirating which has lead to the development of this RLL lesion  -speech/swallow evaluation - f/u official MBS report
-on review of her CT scan there is some dilation of her esophagus as well as a hiatal hernia. It is possible that she is chronically aspirating which has lead to the development of this RLL lesion  -Dysphagia diet as per S/S eval  -Aspiration precautions
-on review of her CT scan there is some dilation of her esophagus as well as a hiatal hernia. It is possible that she is chronically aspirating which has lead to the development of this RLL lesion  -speech/swallow evaluation
-on review of her CT scan there is some dilation of her esophagus as well as a hiatal hernia. It is possible that she is chronically aspirating which has lead to the development of this RLL lesion  -speech/swallow evaluation
-on review of her CT scan there is some dilation of her esophagus as well as a hiatal hernia. It is possible that she is chronically aspirating which has lead to the development of this RLL lesion  -speech/swallow evaluation - f/u official MBS report
-on review of her CT scan there is some dilation of her esophagus as well as a hiatal hernia. It is possible that she is chronically aspirating which has lead to the development of this RLL lesion  -speech/swallow evaluation

## 2017-06-26 NOTE — PROGRESS NOTE ADULT - PROBLEM SELECTOR PROBLEM 6
Esophageal dilatation
Esophageal dilatation
Prophylactic measure
Aspiration into airway, initial encounter

## 2017-06-26 NOTE — PROGRESS NOTE ADULT - PROBLEM SELECTOR PLAN 7
-Dilatation of esophagus evident on imaging  -D/w speech and swallow, pt with evidence of oropharyngeal dysphagia, aspiration to thin liquids, delayed emptying, ?dysmotility  -Dysphagia 3 diet with nectar  -Unusual presentation given age , GI eval pending ?esophagram vs endoscopic intervention   -No focal neuro defecits on exam, will work up neurologic etiologies, initially with CT head, may warrant MRI of brain   -?side effects of antipsychotics if above workup unremarkable, will consult psych for optimization/tititration of medications for schizophrenia
-Dilatation of esophagus evident on imaging  -D/w speech and swallow, pt with evidence of oropharyngeal dysphagia, aspiration to thin liquids, delayed emptying, ?dysmotility  -Dysphagia 3 diet with nectar  -Unusual presentation given age , appreciate GI recs, plan for endoscopic intervention/ dysmotility study as outpt    -No focal neuro defecits on exam, CT head negative for acute infarct   -?side effects of antipsychotics, psych eval pending for optimization/tititration of medications for schizophrenia
-Dilatation of esophagus evident on imaging, pending speech swallow evaluation
-continue flonase

## 2017-06-26 NOTE — PROGRESS NOTE ADULT - RS GEN PE MLT RESP DETAILS PC
no wheezes/no rhonchi/airway patent/normal/breath sounds equal/respirations non-labored/clear to auscultation bilaterally/no rales/good air movement
airway patent/no rhonchi/no rales/no wheezes/breath sounds equal/normal/good air movement/respirations non-labored/clear to auscultation bilaterally
breath sounds equal/airway patent/no rhonchi/no rales/clear to auscultation bilaterally/normal/good air movement/no wheezes/respirations non-labored
breath sounds equal/no wheezes/no rhonchi/no rales/good air movement/airway patent/normal
good air movement/breath sounds equal/no rales/no rhonchi/no wheezes/clear to auscultation bilaterally/normal/airway patent/respirations non-labored
airway patent/normal/no rhonchi/no rales/no wheezes/respirations non-labored/clear to auscultation bilaterally/breath sounds equal/good air movement

## 2017-06-26 NOTE — PROGRESS NOTE ADULT - PSYCHIATRIC
Affect and characteristics of appearance, verbalizations, behaviors are appropriate

## 2017-06-26 NOTE — PROGRESS NOTE ADULT - PROBLEM SELECTOR PLAN 1
-persistent lung mass with cystic features - possibly consistent with lung abscess  -s/p lung aspiration -cultures now growing alpha strep  -still awaiting cytology  -CXR reviewed  -will need a followup CT chest in 3 months

## 2017-06-26 NOTE — PROGRESS NOTE ADULT - PROBLEM SELECTOR PLAN 1
Will continue Cefepime / Awaiting on ID f/up about duration of therapy   continue to closely monitor patient   Pulm f/up is appreciated

## 2017-06-26 NOTE — PROGRESS NOTE ADULT - SUBJECTIVE AND OBJECTIVE BOX
INDIRA SMITH  MRN-[id number]  CHIEF COMPLAINT: cough and fever in setting of lung abscess    Interval Events: Cough much improved. No hemoptysis. Walking around without SOB.  Does not really like the dysphagia diet.  Had a Tmax 99.4  CXR reviewed     REVIEW OF SYSTEMS:  Constitutional: low grade temp 99.4  HEENT: [ ] negative [ ] dry eyes [ ] eye irritation [x ] postnasal drip [ ] nasal congestion  CV: [x ] negative  [- ] chest pain [- ] orthopnea [- ] palpitations [ ] murmur  Resp: [x ] negative [- ] cough [- ] shortness of breath [- ] dyspnea [- ] wheezing [- ] sputum [ -] hemoptysis  Skin: [x ] negative [ ] rash [ ] itch  Neurological: [x ] negative [ ] headache [ ] dizziness [ ] syncope [ ] weakness [ ] numbness  Psychiatric: [ ] negative [ x] anxiety [ ] depression  [x ] All other systems negative  [ ] Unable to assess ROS because ________    OBJECTIVE:  ICU Vital Signs Last 24 Hrs  T(C): 37, Max: 37.4 (06-25 @ 17:22)  T(F): 98.6, Max: 99.4 (06-25 @ 17:22)  HR: 72 (72 - 80)  BP: 135/87 (99/69 - 135/87)  RR: 18 (18 - 18)  SpO2: 100% (97% - 100%)    I & Os for current day (as of 06-26 @ 08:15)  =============================================  IN: 800 ml / OUT: 0 ml / NET: 800 ml      HOSPITAL MEDICATIONS:  heparin  Injectable 5000Unit(s) SubCutaneous every 8 hours    cefepime  IVPB 2000milliGRAM(s) IV Intermittent every 8 hours    benzonatate 100milliGRAM(s) Oral every 8 hours PRN  guaiFENesin    Syrup 200milliGRAM(s) Oral every 4 hours PRN    fluvoxaMINE 150milliGRAM(s) Oral two times a day  OLANZapine 20milliGRAM(s) Oral at bedtime  OLANZapine 5milliGRAM(s) Oral <User Schedule>  ondansetron Injectable 4milliGRAM(s) IV Push once    docusate sodium 200milliGRAM(s) Oral at bedtime  senna 2Tablet(s) Oral at bedtime  polyethylene glycol 3350 17Gram(s) Oral daily  pantoprazole    Tablet 40milliGRAM(s) Oral before breakfast    cholecalciferol 1000Unit(s) Oral daily    fluticasone propionate 50 MICROgram(s)/spray Nasal Spray 1Spray(s) Both Nostrils two times a day      LABS:                        9.9    2.78  )-----------( 322      ( 26 Jun 2017 07:15 )             31.5     Hgb Trend: 9.9<--, 10.1<--, 9.4<--, 9.8<--, 9.7<--  06-24    142  |  104  |  9   ----------------------------<  96  4.2   |  22  |  0.73    Ca    9.2      24 Jun 2017 08:44      Creatinine Trend: 0.73<--, 0.65<--, 0.64<--, 0.74<--, 0.64<--, 0.83<--

## 2017-06-26 NOTE — PROGRESS NOTE ADULT - EXTREMITIES
No cyanosis, clubbing or edema
detailed exam

## 2017-06-26 NOTE — PROGRESS NOTE ADULT - GASTROINTESTINAL
detailed exam
Soft, non-tender, no hepatosplenomegaly, normal bowel sounds
detailed exam

## 2017-06-26 NOTE — PROGRESS NOTE ADULT - NS NEC GEN PE MLT EXAM PC
No bruits; no thyromegaly or nodules
detailed exam

## 2017-06-26 NOTE — PROGRESS NOTE ADULT - MUSCULOSKELETAL
No joint pain, swelling or deformity; no limitation of movement

## 2017-06-26 NOTE — PROGRESS NOTE ADULT - ASSESSMENT
51F congenital cyclic neutropenia, schizoaffective disorder presenting with cough and fevers and now with a persistent RLL mass and currently on Cefepime for possible lung abscess.

## 2017-06-26 NOTE — PROGRESS NOTE ADULT - VASCULAR
Equal and normal pulses (carotid, femoral, dorsalis pedis)
detailed exam
detailed exam
Equal and normal pulses (carotid, femoral, dorsalis pedis)

## 2017-06-26 NOTE — PROGRESS NOTE ADULT - PROBLEM SELECTOR PLAN 8
-dysphagia diet as per speech/swallow recs  -GI evaluation noted  -CT Head unrevealing  -patient will need to eat slowly and keep upright for a period of time after eating. All her meals should be eaten while sitting up OOB.
-dysphagia diet as per speech/swallow recs  -GI evaluation in progress  -CT Head unrevealing  -patient will need to eat slowly and keep upright for a period of time after eating. All her meals should be eaten while sitting up OOB.

## 2017-06-26 NOTE — PROGRESS NOTE ADULT - SUBJECTIVE AND OBJECTIVE BOX
51y old  Female who presents with a chief complaint of neutropenic sepsis      Interval history:  Afebrile, no N/V/D, no abdominal pain, cough almost resolved, denies dysuria.        Antimicrobials:    cefepime  IVPB 2000milliGRAM(s) IV Intermittent every 8 hours    REVIEW OF SYSTEMS:  As above.    Vital Signs Last 24 Hrs  T(C): 36.8, Max: 37.1 (06-25 @ 20:47)  T(F): 98.2, Max: 98.8 (06-25 @ 20:47)  HR: 76 (72 - 80)  BP: 98/66 (98/66 - 135/87)  BP(mean): --  RR: 18 (18 - 18)  SpO2: 97% (97% - 100%)    PHYSICAL EXAM:  AAOX3.  Cardiovascular: S1S2 normal.  Lungs: Good air entry B/L lung fields.  Gastrointestinal: soft, nontender, nondistended.  Extremities: no edema.                              9.9    2.78  )-----------( 322      ( 26 Jun 2017 07:15 )             31.5       RECENT CULTURES:    06-20 @ 21:12  .Abscess  Moderate Alpha hemolytic strep

## 2017-06-26 NOTE — PROGRESS NOTE ADULT - PROBLEM SELECTOR PLAN 2
-continue antibiotics as per ID  -sputum culture with normal respiratory renny  -aspiration cultures growing alpha strep  -f/u ID regarding duration of antibiotics - patient has defervesced and had clinical improvement of cough with antibiotics and aspiration of fluid

## 2017-06-26 NOTE — PROGRESS NOTE ADULT - PROBLEM SELECTOR PROBLEM 7
Esophageal dilatation
Upper airway cough syndrome

## 2017-06-26 NOTE — PROGRESS NOTE ADULT - CARDIOVASCULAR DETAILS
positive S1/positive S2
positive S2/positive S1
positive S2/positive S1/tachycardia

## 2017-06-26 NOTE — PROGRESS NOTE ADULT - ASSESSMENT
51 y o female with cyclical neutropenia, RLL lung mass identified approx 1 month ago a/w with fever, neutropenia concern for Lung abscess. Resolved fever. Clinically improved.   Afebrile now, cytopath still pending.    Plan:   Continue with cefepime at 2 gm iv q8h.  Pt with absolute neutropenia.  Will wait another 24 hrs.  Please perform CBC with diff in am.  Culture growing kamari strep.  Negative fungitell, galactomannan negative.  Crypt serum antigen negative.  HIV negative. Quantiferon gold negative.  Histo ag, syphilis screen negative.

## 2017-06-26 NOTE — PROGRESS NOTE ADULT - NEUROLOGICAL
Alert & oriented; no sensory, motor or coordination deficits, normal reflexes
detailed exam
Alert & oriented; no sensory, motor or coordination deficits, normal reflexes

## 2017-06-27 DIAGNOSIS — D70.9 NEUTROPENIA, UNSPECIFIED: ICD-10-CM

## 2017-06-27 LAB
ANION GAP SERPL CALC-SCNC: 14 MMOL/L — SIGNIFICANT CHANGE UP (ref 5–17)
BASOPHILS # BLD AUTO: 0.09 K/UL — SIGNIFICANT CHANGE UP (ref 0–0.2)
BASOPHILS NFR BLD AUTO: 3.5 % — HIGH (ref 0–2)
BUN SERPL-MCNC: 11 MG/DL — SIGNIFICANT CHANGE UP (ref 7–23)
CALCIUM SERPL-MCNC: 8.7 MG/DL — SIGNIFICANT CHANGE UP (ref 8.4–10.5)
CHLORIDE SERPL-SCNC: 100 MMOL/L — SIGNIFICANT CHANGE UP (ref 96–108)
CO2 SERPL-SCNC: 26 MMOL/L — SIGNIFICANT CHANGE UP (ref 22–31)
CREAT SERPL-MCNC: 0.61 MG/DL — SIGNIFICANT CHANGE UP (ref 0.5–1.3)
EOSINOPHIL # BLD AUTO: 0.21 K/UL — SIGNIFICANT CHANGE UP (ref 0–0.5)
EOSINOPHIL NFR BLD AUTO: 7.9 % — HIGH (ref 0–6)
GLUCOSE SERPL-MCNC: 96 MG/DL — SIGNIFICANT CHANGE UP (ref 70–99)
HCT VFR BLD CALC: 32.1 % — LOW (ref 34.5–45)
HGB BLD-MCNC: 10.1 G/DL — LOW (ref 11.5–15.5)
LYMPHOCYTES # BLD AUTO: 1.49 K/UL — SIGNIFICANT CHANGE UP (ref 1–3.3)
LYMPHOCYTES # BLD AUTO: 57.1 % — HIGH (ref 13–44)
MCHC RBC-ENTMCNC: 28 PG — SIGNIFICANT CHANGE UP (ref 27–34)
MCHC RBC-ENTMCNC: 31.5 GM/DL — LOW (ref 32–36)
MCV RBC AUTO: 88.9 FL — SIGNIFICANT CHANGE UP (ref 80–100)
MONOCYTES # BLD AUTO: 0.46 K/UL — SIGNIFICANT CHANGE UP (ref 0–0.9)
MONOCYTES NFR BLD AUTO: 17.5 % — HIGH (ref 2–14)
NEUTROPHILS # BLD AUTO: 0.27 K/UL — LOW (ref 1.8–7.4)
NEUTROPHILS NFR BLD AUTO: 10.5 % — LOW (ref 43–77)
NON-GYNECOLOGICAL CYTOLOGY STUDY: SIGNIFICANT CHANGE UP
PLATELET # BLD AUTO: 331 K/UL — SIGNIFICANT CHANGE UP (ref 150–400)
POTASSIUM SERPL-MCNC: 4.3 MMOL/L — SIGNIFICANT CHANGE UP (ref 3.5–5.3)
POTASSIUM SERPL-SCNC: 4.3 MMOL/L — SIGNIFICANT CHANGE UP (ref 3.5–5.3)
RBC # BLD: 3.61 M/UL — LOW (ref 3.8–5.2)
RBC # FLD: 15.6 % — HIGH (ref 10.3–14.5)
SODIUM SERPL-SCNC: 140 MMOL/L — SIGNIFICANT CHANGE UP (ref 135–145)
WBC # BLD: 2.61 K/UL — LOW (ref 3.8–10.5)
WBC # FLD AUTO: 2.61 K/UL — LOW (ref 3.8–10.5)

## 2017-06-27 PROCEDURE — 99233 SBSQ HOSP IP/OBS HIGH 50: CPT

## 2017-06-27 PROCEDURE — 99233 SBSQ HOSP IP/OBS HIGH 50: CPT | Mod: GC

## 2017-06-27 PROCEDURE — 99232 SBSQ HOSP IP/OBS MODERATE 35: CPT

## 2017-06-27 RX ORDER — FILGRASTIM 480MCG/1.6
300 VIAL (ML) INJECTION DAILY
Qty: 0 | Refills: 0 | Status: DISCONTINUED | OUTPATIENT
Start: 2017-06-27 | End: 2017-06-28

## 2017-06-27 RX ORDER — ACETAMINOPHEN 500 MG
650 TABLET ORAL ONCE
Qty: 0 | Refills: 0 | Status: COMPLETED | OUTPATIENT
Start: 2017-06-27 | End: 2017-06-27

## 2017-06-27 RX ADMIN — Medication 650 MILLIGRAM(S): at 18:40

## 2017-06-27 RX ADMIN — PANTOPRAZOLE SODIUM 40 MILLIGRAM(S): 20 TABLET, DELAYED RELEASE ORAL at 06:12

## 2017-06-27 RX ADMIN — CEFEPIME 100 MILLIGRAM(S): 1 INJECTION, POWDER, FOR SOLUTION INTRAMUSCULAR; INTRAVENOUS at 20:00

## 2017-06-27 RX ADMIN — Medication 1000 UNIT(S): at 12:04

## 2017-06-27 RX ADMIN — OLANZAPINE 20 MILLIGRAM(S): 15 TABLET, FILM COATED ORAL at 22:16

## 2017-06-27 RX ADMIN — Medication 300 MICROGRAM(S): at 18:29

## 2017-06-27 RX ADMIN — Medication 1 SPRAY(S): at 06:11

## 2017-06-27 RX ADMIN — KETOCONAZOLE 1 APPLICATION(S): 20 AEROSOL, FOAM TOPICAL at 06:11

## 2017-06-27 RX ADMIN — POLYETHYLENE GLYCOL 3350 17 GRAM(S): 17 POWDER, FOR SOLUTION ORAL at 12:04

## 2017-06-27 RX ADMIN — KETOCONAZOLE 1 APPLICATION(S): 20 AEROSOL, FOAM TOPICAL at 17:53

## 2017-06-27 RX ADMIN — FLUVOXAMINE MALEATE 150 MILLIGRAM(S): 25 TABLET ORAL at 06:12

## 2017-06-27 RX ADMIN — SENNA PLUS 2 TABLET(S): 8.6 TABLET ORAL at 22:16

## 2017-06-27 RX ADMIN — HEPARIN SODIUM 5000 UNIT(S): 5000 INJECTION INTRAVENOUS; SUBCUTANEOUS at 14:01

## 2017-06-27 RX ADMIN — CEFEPIME 100 MILLIGRAM(S): 1 INJECTION, POWDER, FOR SOLUTION INTRAMUSCULAR; INTRAVENOUS at 06:10

## 2017-06-27 RX ADMIN — CEFEPIME 100 MILLIGRAM(S): 1 INJECTION, POWDER, FOR SOLUTION INTRAMUSCULAR; INTRAVENOUS at 12:06

## 2017-06-27 RX ADMIN — HEPARIN SODIUM 5000 UNIT(S): 5000 INJECTION INTRAVENOUS; SUBCUTANEOUS at 22:16

## 2017-06-27 RX ADMIN — Medication 1 SPRAY(S): at 17:52

## 2017-06-27 RX ADMIN — Medication 650 MILLIGRAM(S): at 17:55

## 2017-06-27 RX ADMIN — OLANZAPINE 5 MILLIGRAM(S): 15 TABLET, FILM COATED ORAL at 06:12

## 2017-06-27 RX ADMIN — Medication 200 MILLIGRAM(S): at 22:16

## 2017-06-27 RX ADMIN — HEPARIN SODIUM 5000 UNIT(S): 5000 INJECTION INTRAVENOUS; SUBCUTANEOUS at 06:11

## 2017-06-27 RX ADMIN — FLUVOXAMINE MALEATE 150 MILLIGRAM(S): 25 TABLET ORAL at 22:16

## 2017-06-27 NOTE — PROGRESS NOTE ADULT - SUBJECTIVE AND OBJECTIVE BOX
51y old  Female who presents with a chief complaint of neutropenic sepsis       Interval history:  Afebrile, no N/V/D, no abdominal pain, occasional cough, denies dysuria.        Antimicrobials:  cefepime  IVPB 2000 milliGRAM(s) IV Intermittent every 8 hours    REVIEW OF SYSTEMS:  As above.    Vital Signs Last 24 Hrs  T(C): 36.8 (06-27-17 @ 12:05), Max: 36.8 (06-26-17 @ 21:20)  T(F): 98.2 (06-27-17 @ 12:05), Max: 98.3 (06-26-17 @ 21:20)  HR: 79 (06-27-17 @ 12:05) (79 - 81)  BP: 103/67 (06-27-17 @ 12:05) (103/67 - 116/80)  BP(mean): --  RR: 18 (06-27-17 @ 12:05) (18 - 18)  SpO2: 100% (06-27-17 @ 12:05) (98% - 100%)    PHYSICAL EXAM:  Pleasant patient in no acute distress. AAOX3.  Cardiovascular: S1S2 normal.  Lungs: Good air entry B/L lung fields.  Gastrointestinal: soft, nontender, nondistended.                              10.1   2.61  )-----------( 331      ( 27 Jun 2017 07:38 )             32.1   06-27    140  |  100  |  11  ----------------------------<  96  4.3   |  26  |  0.61    Ca    8.7      27 Jun 2017 07:21      Cyto-Pathology:   Cytology specimen and cell block consist of benign bronchial  cells and acute inflammatory exudate in a background of  proteinaceous material.    Immunohistochemical stains for MOC31 and BerEP4 reveal no new  findings.    GMS stains were performed twice and are negative for fungal  microorganisms.

## 2017-06-27 NOTE — PROGRESS NOTE ADULT - SUBJECTIVE AND OBJECTIVE BOX
CHIEF COMPLAINT:  Cough and fever, found to have a lung abscess due to alpha hemolytic strept    Interval Events:    No acute events overnight, remains afebrile, on IV Cefepime. Cytopath from lung FNA negative for malignant cells  REVIEW OF SYSTEMS:  Constitutional: [x ] negative [- ] fevers [- ] chills [ ] weight loss [ ] weight gain  HEENT: [x ] negative [- ] dry eyes [ ] eye irritation [- ] postnasal drip [- ] nasal congestion  CV: [x ] negative  [ -] chest pain [- ] orthopnea [- ] palpitations [- ] murmur  Resp: [ ] negative [+ ] cough [- ] shortness of breath [ ] dyspnea [- ] wheezing [+ ] sputum [- ] hemoptysis  GI: [x ] negative [- ] nausea [- ] vomiting [ -] diarrhea [ ] constipation [- ] abd pain [ ] dysphagia   : [ x] negative [- ] dysuria [- ] nocturia [- ] hematuria [ ] increased urinary frequency  Musculoskeletal: [x ] negative [- ] back pain [- ] myalgias [- ] arthralgias [ ] fracture  Skin: [x ] negative [- ] rash [ ] itch  Neurological: [x ] negative [ -] headache [- ] dizziness [ ] syncope [ ] weakness [ ] numbness  Psychiatric: [x ] negative [- ] anxiety [ ] depression  Endocrine: [x ] negative [ ] diabetes [ ] thyroid problem  Hematologic/Lymphatic: [ ] negative [ ] anemia [x ] neutropenia  Allergic/Immunologic: [x ] negative [ ] itchy eyes [ ] nasal discharge [ -] hives [ ] angioedema  [x ] All other systems negative  [ ] Unable to assess ROS because ________    OBJECTIVE:  ICU Vital Signs Last 24 Hrs  T(C): 36.8 (27 Jun 2017 12:05), Max: 36.8 (26 Jun 2017 21:20)  T(F): 98.2 (27 Jun 2017 12:05), Max: 98.3 (26 Jun 2017 21:20)  HR: 79 (27 Jun 2017 12:05) (79 - 81)  BP: 103/67 (27 Jun 2017 12:05) (103/67 - 116/80)  BP(mean): --  ABP: --  ABP(mean): --  RR: 18 (27 Jun 2017 12:05) (18 - 18)  SpO2: 100% (27 Jun 2017 12:05) (98% - 100%)        06-26 @ 07:01 - 06-27 @ 07:00  --------------------------------------------------------  IN: 850 mL / OUT: 0 mL / NET: 850 mL    06-27 @ 07:01 - 06-27 @ 14:48  --------------------------------------------------------  IN: 380 mL / OUT: 0 mL / NET: 380 mL      CAPILLARY BLOOD GLUCOSE          PHYSICAL EXAM:  General: Frail appearing female, NAD, speaking in full sentences  HEENT: PERRLA  Lymph Nodes: No palpable LNs  Neck: Supple  Respiratory: Decreased BS b/l bases, no wheezing or crackles  Cardiovascular: RRR, S1 + S2+0  Abdomen: Soft, NT, ND, BS+  Extremities: No edema, no clubbing or cyanosis  Skin: No rash, chronic skin changes b/l LEs  Neurological: AAOx3, grossly non focal  Psychiatry: No anxiety/depression    HOSPITAL MEDICATIONS:  heparin  Injectable 5000 Unit(s) SubCutaneous every 8 hours    cefepime  IVPB 2000 milliGRAM(s) IV Intermittent every 8 hours        benzonatate 100 milliGRAM(s) Oral every 8 hours PRN  guaiFENesin    Syrup 200 milliGRAM(s) Oral every 4 hours PRN    fluvoxaMINE 150 milliGRAM(s) Oral two times a day  OLANZapine 20 milliGRAM(s) Oral at bedtime  OLANZapine 5 milliGRAM(s) Oral <User Schedule>  ondansetron Injectable 4 milliGRAM(s) IV Push once    docusate sodium 200 milliGRAM(s) Oral at bedtime  senna 2 Tablet(s) Oral at bedtime  polyethylene glycol 3350 17 Gram(s) Oral daily  pantoprazole    Tablet 40 milliGRAM(s) Oral before breakfast        cholecalciferol 1000 Unit(s) Oral daily      fluticasone propionate 50 MICROgram(s)/spray Nasal Spray 1 Spray(s) Both Nostrils two times a day  ketoconazole 2% Cream 1 Application(s) Topical two times a day            LABS:                        10.1   2.61  )-----------( 331      ( 27 Jun 2017 07:38 )             32.1     Hgb Trend: 10.1<--, 9.9<--, 10.1<--, 9.4<--, 9.8<--  06-27    140  |  100  |  11  ----------------------------<  96  4.3   |  26  |  0.61    < from: Xray Modified Barium Swallow (06.22.17 @ 09:51) >  Evidence of silent aspiration on thin liquids.    < end of copied text >  < from: Xray Modified Barium Swallow (06.22.17 @ 09:51) >  Evidence of silent aspiration on thin liquids.    < end of copied text >  Ca    8.7      27 Jun 2017 07:21      Creatinine Trend: 0.61<--, 0.73<--, 0.65<--, 0.64<--, 0.74<--, 0.64<--            MICROBIOLOGY:   Culture - Acid Fast - Other w/Smear (06.20.17 @ 21:15)    Specimen Source: .Other Lung - Lower Lobe Right    Acid Fast Bacilli Smear:   No acid fast bacilli seen by fluorochrome stain    Culture - Abscess with Gram Stain (06.20.17 @ 21:12)    Specimen Source: .Abscess    Culture Results:   Moderate Alpha hemolytic strep    Culture - Sputum . (06.19.17 @ 16:42)    Gram Stain:   Rare Squamous epithelial cells per low power field  No polymorphonuclear leukocytes per low power field  Rare Gram Negative Rods per oil power field    Specimen Source: .Sputum Sputum    Culture Results:   Normal Respiratory Noni present      RADIOLOGY:    < from: Xray Chest 1 View AP/PA (06.24.17 @ 11:03) >  Right lower lobe opacity, displays interval reduction in size.    6/22: Barium Swallow: Evidence of silent aspiration with thin liquids    [ ] Reviewed and interpreted by me    PULMONARY FUNCTION TESTS:    EKG:< from: 12 Lead ECG (06.19.17 @ 00:14) >   NORMAL SINUS RHYTHM

## 2017-06-27 NOTE — DIETITIAN INITIAL EVALUATION ADULT. - NS AS NUTRI INTERV ED CONTENT
Dysphagia 3 diet education provided. Discussed foods recommended and not recommended in diet, good souces of protein to add to meals, healthy snack ideas, importance of thickening liquids to recommended nectar like consistency, and sample menu provided.

## 2017-06-27 NOTE — DIETITIAN INITIAL EVALUATION ADULT. - ENERGY NEEDS
Height:65 inches, Weight: 125 pounds  BMI: 20.8 kg/m2 IBW:125 pounds (+/-10%), %IBW:100%  Pertinent Info: Per chart, 52 y/o female with PMH of schizoaffective disorder, anxiety, depression, RLL lung mass 1 month ago, admitted with neutropenic sepsis, aspiration and lung mass likely lung abscess. No edema, no pressure ulcers noted at this time.

## 2017-06-27 NOTE — PROGRESS NOTE ADULT - ASSESSMENT
51 F with cogenital cyclical neutropenia, schizoaffective disorder, admitted with cough, fever and a RLL opacity, found to have a lung abscess, s/p IR guided aspiration with cultures growing alpha hemolytic strept     1. Lung abscess:  - On IV cefepime per ID recs  - FNA cxs showing alpha hemolytic strept, cytopath negative for malignant cells  - Remaining infectious work up including HIV, fungal studies and quant gold are negative.   - Clinically improving on current antibiotics, awaiting ID recs regarding duration of antibiotic therapy  - Will require a follow up CT chest in 6-8 weeks to ensure resolution  - Cont flonase for post nasal drip and upper airway cough syndrome  - Chest PT, incentive spirometry, OBC/ ambulate as tolerated    2. Cyclical neutropenia:  - Cont to monitor counts    3. Schizoaffective disorder:   - Cont Luvox and Zyprexa    4. Dysphagia/ Aspiration:  - Barium study showing silent aspiration with thin liquids. Now on a dysphagia  diet per speech and swallow recs  - CT head unremarkable, evaluated by GI  - Cont careful monitoring of resp status, aspiration precautions    5. Prophylaxis:  - DVT px with Hep SQ  - PT/OT, OBC as tolerated

## 2017-06-27 NOTE — DIETITIAN INITIAL EVALUATION ADULT. - NS AS NUTRI INTERV MEALS SNACK
Reviewed alternate menu options and menu ordering procedure. Provide food preferences within therapeutic diet when requested. Observed thin water with ice at bedside, discussed importance of modified diet compliance for safety, RN notified.

## 2017-06-27 NOTE — CONSULT NOTE ADULT - SUBJECTIVE AND OBJECTIVE BOX
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     Patient was being worked up outpatient for fever and cough and was found to have a RLL lung mass around 1 month ago. The patient was admitted at [facility] (5/14-5/18/17) for evaluation of similar symptoms and at the time was RVP positive for parainfluenza but was found to have 4cm RLL lung mass. She was treated initially with vancomycin and zosyn and had subsequent improvement. Plan was made initially to obtain biopsy of the mass at that time, but this was deferred as the patient had clinical improvement. She was discharged on doxycycline PO and continued to improve.     About one week ago, she again developed a cough productive of clear sputum. Not as severe as previous, but can occur in 20 minute fits. Also endorses mild dyspnea with exertion. Starting a few days ago, she had low grade fevers, but had a fever of 102.7F today at home and so presented. Former smoker, no sick contacts, no travel. Endorses 5lbs weight loss since May.      ED course: had fever of 103, tachycardia during fever episodes, but otherwise HD stable. Received 2L bolus, vancomycin x 1 and cefepime x 1. BCx sent. (18 Jun 2017 19:40)      Allergies    No Known Allergies    Intolerances        MEDICATIONS  (STANDING):  fluvoxaMINE 150 milliGRAM(s) Oral two times a day  fluticasone propionate 50 MICROgram(s)/spray Nasal Spray 1 Spray(s) Both Nostrils two times a day  cholecalciferol 1000 Unit(s) Oral daily  OLANZapine 20 milliGRAM(s) Oral at bedtime  OLANZapine 5 milliGRAM(s) Oral <User Schedule>  cefepime  IVPB 2000 milliGRAM(s) IV Intermittent every 8 hours  heparin  Injectable 5000 Unit(s) SubCutaneous every 8 hours  docusate sodium 200 milliGRAM(s) Oral at bedtime  senna 2 Tablet(s) Oral at bedtime  polyethylene glycol 3350 17 Gram(s) Oral daily  ondansetron Injectable 4 milliGRAM(s) IV Push once  pantoprazole    Tablet 40 milliGRAM(s) Oral before breakfast  ketoconazole 2% Cream 1 Application(s) Topical two times a day    MEDICATIONS  (PRN):  benzonatate 100 milliGRAM(s) Oral every 8 hours PRN Cough  guaiFENesin    Syrup 200 milliGRAM(s) Oral every 4 hours PRN Cough      PAST MEDICAL & SURGICAL HISTORY:  Lung mass  Congenital neutropenia  Anxiety  Depression  No significant past surgical history      FAMILY HISTORY:  Family history of heart disease (Father)      SOCIAL HISTORY: No EtOH, no tobacco    REVIEW OF SYSTEMS:    CONSTITUTIONAL: No weakness, fevers or chills  EYES/ENT: No visual changes;  No vertigo or throat pain   NECK: No pain or stiffness  RESPIRATORY: No cough, wheezing, hemoptysis; No shortness of breath  CARDIOVASCULAR: No chest pain or palpitations  GASTROINTESTINAL: No abdominal or epigastric pain. No nausea, vomiting, or hematemesis; No diarrhea or constipation. No melena or hematochezia.  GENITOURINARY: No dysuria, frequency or hematuria  NEUROLOGICAL: No numbness or weakness  SKIN: No itching, burning, rashes, or lesions   All other review of systems is negative unless indicated above.        T(F): 98.2 (06-27-17 @ 12:05), Max: 98.3 (06-26-17 @ 21:20)  HR: 79 (06-27-17 @ 12:05)  BP: 103/67 (06-27-17 @ 12:05)  RR: 18 (06-27-17 @ 12:05)  SpO2: 100% (06-27-17 @ 12:05)  Wt(kg): --    GENERAL: NAD, well-developed  HEAD:  Atraumatic, Normocephalic  EYES: EOMI, PERRLA, conjunctiva and sclera clear  NECK: Supple, No JVD  CHEST/LUNG: Clear to auscultation bilaterally; No wheeze  HEART: Regular rate and rhythm; No murmurs, rubs, or gallops  ABDOMEN: Soft, Nontender, Nondistended; Bowel sounds present  EXTREMITIES:  2+ Peripheral Pulses, No clubbing, cyanosis, or edema  NEUROLOGY: non-focal  SKIN: No rashes or lesions                          10.1   2.61  )-----------( 331      ( 27 Jun 2017 07:38 )             32.1       06-27    140  |  100  |  11  ----------------------------<  96  4.3   |  26  |  0.61    Ca    8.7      27 Jun 2017 07:21 HPI:  51F reported congenital cyclic neutropenia, anxiety/depression/schizoaffective disorder presents with worsening fever and cough.     Patient was being worked up outpatient for fever and cough and was found to have a 4cm RLL lung mass during hospitalization at [facility] (5/14-5/18) when she was RVP positive for parainfluenza and was discharged on oral doxycyline. Patient represented to [place] with 102.7F.     Allergies  No Known Allergies    MEDICATIONS  (STANDING):  fluvoxaMINE 150 milliGRAM(s) Oral two times a day  fluticasone propionate 50 MICROgram(s)/spray Nasal Spray 1 Spray(s) Both Nostrils two times a day  cholecalciferol 1000 Unit(s) Oral daily  OLANZapine 20 milliGRAM(s) Oral at bedtime  OLANZapine 5 milliGRAM(s) Oral <User Schedule>  cefepime  IVPB 2000 milliGRAM(s) IV Intermittent every 8 hours  heparin  Injectable 5000 Unit(s) SubCutaneous every 8 hours  docusate sodium 200 milliGRAM(s) Oral at bedtime  senna 2 Tablet(s) Oral at bedtime  polyethylene glycol 3350 17 Gram(s) Oral daily  ondansetron Injectable 4 milliGRAM(s) IV Push once  pantoprazole    Tablet 40 milliGRAM(s) Oral before breakfast  ketoconazole 2% Cream 1 Application(s) Topical two times a day    MEDICATIONS  (PRN):  benzonatate 100 milliGRAM(s) Oral every 8 hours PRN Cough  guaiFENesin    Syrup 200 milliGRAM(s) Oral every 4 hours PRN Cough      PAST MEDICAL & SURGICAL HISTORY:  Lung mass  Congenital neutropenia  Anxiety  Depression    FAMILY HISTORY:  Family history of heart disease (Father)    SOCIAL HISTORY: No EtOH, no tobacco    T(F): 98.2 (06-27-17 @ 12:05), Max: 98.3 (06-26-17 @ 21:20)  HR: 79 (06-27-17 @ 12:05)  BP: 103/67 (06-27-17 @ 12:05)  RR: 18 (06-27-17 @ 12:05)  SpO2: 100% (06-27-17 @ 12:05)  Wt(kg): --    GENERAL: NAD, well-developed  HEAD:  Atraumatic, Normocephalic  EYES: EOMI, PERRLA, conjunctiva and sclera clear  NECK: Supple, No JVD  CHEST/LUNG: Clear to auscultation bilaterally; No wheeze  HEART: Regular rate and rhythm; No murmurs, rubs, or gallops  ABDOMEN: Soft, Nontender, Nondistended; Bowel sounds present  EXTREMITIES:  2+ Peripheral Pulses, No clubbing, cyanosis, or edema  NEUROLOGY: non-focal  SKIN: No rashes or lesions                          10.1   2.61  )-----------( 331      ( 27 Jun 2017 07:38 )             32.1       06-27    140  |  100  |  11  ----------------------------<  96  4.3   |  26  |  0.61    Ca    8.7      27 Jun 2017 07:21 HPI:  51F reported congenital cyclic neutropenia, anxiety/depression/schizoaffective disorder presents with worsening fever and cough.     Patient was being worked up outpatient for fever and cough and was found to have a 4cm RLL lung mass during hospitalization at [facility] (5/14-5/18) when she was RVP positive for parainfluenza and was discharged on oral doxycyline. Patient represented to [place] with 102.7F. Patient states in the past she has been treated with neupogen but she does not follow with a hematologist. She states that she believes she developed mitral valve prolapse as a consequence of neupogen shot.    Allergies  No Known Allergies    MEDICATIONS  (STANDING):  fluvoxaMINE 150 milliGRAM(s) Oral two times a day  fluticasone propionate 50 MICROgram(s)/spray Nasal Spray 1 Spray(s) Both Nostrils two times a day  cholecalciferol 1000 Unit(s) Oral daily  OLANZapine 20 milliGRAM(s) Oral at bedtime  OLANZapine 5 milliGRAM(s) Oral <User Schedule>  cefepime  IVPB 2000 milliGRAM(s) IV Intermittent every 8 hours  heparin  Injectable 5000 Unit(s) SubCutaneous every 8 hours  docusate sodium 200 milliGRAM(s) Oral at bedtime  senna 2 Tablet(s) Oral at bedtime  polyethylene glycol 3350 17 Gram(s) Oral daily  ondansetron Injectable 4 milliGRAM(s) IV Push once  pantoprazole    Tablet 40 milliGRAM(s) Oral before breakfast  ketoconazole 2% Cream 1 Application(s) Topical two times a day    MEDICATIONS  (PRN):  benzonatate 100 milliGRAM(s) Oral every 8 hours PRN Cough  guaiFENesin    Syrup 200 milliGRAM(s) Oral every 4 hours PRN Cough      PAST MEDICAL & SURGICAL HISTORY:  Lung mass  Congenital neutropenia  Anxiety  Depression    FAMILY HISTORY:  Family history of heart disease (Father)    SOCIAL HISTORY: No EtOH, no tobacco    T(F): 98.2 (06-27-17 @ 12:05), Max: 98.3 (06-26-17 @ 21:20)  HR: 79 (06-27-17 @ 12:05)  BP: 103/67 (06-27-17 @ 12:05)  RR: 18 (06-27-17 @ 12:05)  SpO2: 100% (06-27-17 @ 12:05)  Wt(kg): --    GENERAL: NAD, well-developed  HEAD:  Atraumatic, Normocephalic  EYES: EOMI, PERRLA, conjunctiva and sclera clear  NECK: Supple, No JVD  CHEST/LUNG: Clear to auscultation bilaterally; No wheeze  HEART: Regular rate and rhythm; + systolic murmur  ABDOMEN: Soft, Nontender, Nondistended; Bowel sounds present  EXTREMITIES:  2+ Peripheral Pulses, No clubbing, cyanosis, or edema  NEUROLOGY: non-focal  SKIN: No rashes or lesions                          10.1   2.61  )-----------( 331      ( 27 Jun 2017 07:38 )             32.1       06-27    140  |  100  |  11  ----------------------------<  96  4.3   |  26  |  0.61    Ca    8.7      27 Jun 2017 07:21

## 2017-06-27 NOTE — DIETITIAN INITIAL EVALUATION ADULT. - PERTINENT LABORATORY DATA
Na 140 [135 - 145], K+ 4.3 [3.5 - 5.3], BUN 11 [7 - 23], Cr 0.61 [0.50 - 1.30], BG 96 [70 - 99], Ca 8.7 [8.4 - 10.5]

## 2017-06-27 NOTE — CONSULT NOTE ADULT - PROBLEM SELECTOR RECOMMENDATION 9
Patient would benefit from granulocyte colony - stimulating factor such as Neupogen, however patient refused earlier this visit because she states she had 'heart pain' in past with shot and is worried it is contraindicated with olanzapine Patient would benefit from granulocyte colony - stimulating factor such as Neupogen, however patient refused earlier this visit because she was worried it may interact with antipsychotic or cause heart problems  -reviewed side effect profile of neupogen and that it does not interact with olanzapine, patient is agreeable to neupogen now and states she will follow up with [facility] after discharge, [facility] will call patient however if patient does not receive a call for appointment by next week please call [phone] to schedule appointment. [facility] is located at [address] [place] [address]    Ortega Zendejas  PGY-4, Hematology-Oncology Fellow  [phone] ([place]) [phone] ([facility])

## 2017-06-27 NOTE — CONSULT NOTE ADULT - ATTENDING COMMENTS
Noted with oropharyngeal dysphagia. Agree with diet as recommended by speech and swallow. Currently patient states to be tolerating. Will need EGD and possible motility study as outpatient. PPI therapy
Samir Napier  Pager: [phone]. If no response or past 5 pm call [phone].
The patient has cyclic neutropenia and infection. I discussed filgastrin therapy with the patient who has agreed to receive treatment. Discussion of the side effects of treatment was held in the presence of the patient's other
Tyrone Uribe MD

## 2017-06-27 NOTE — DIETITIAN INITIAL EVALUATION ADULT. - OTHER INFO
Pt seen for LOS, verbal consult also received for dysphagia diet education from NP. Pt reports UBW as 128 pounds, dosing wt is 125 pounds and current wt today is 128 pounds. Pt with good appetite and po intakes of meals, 100% po intakes noted per flowsheet Pt seen for LOS, verbal consult also received for dysphagia diet education from NP. Pt reports UBW as 128 pounds, dosing wt is 125 pounds and current wt today is 128 pounds. Pt with good appetite and po intakes of meals, 100% po intakes noted per flowsheet. No GI distress, +BM today. Pt admitted with aspiration, states this is a 'new problem' for her. Per chart, s/p MBS (6/22) recommends Dysphagia 3 Nectar like consistency diet. Pt tolerating current diet consistency with no chewing/swallowing difficulties. NKFA. PTA takes vitamin D3, biotin, and fish oil. Pt receptive to diet education provided.

## 2017-06-27 NOTE — PROGRESS NOTE ADULT - ASSESSMENT
51 y o female with cyclical neutropenia, RLL lung mass identified approx 1 month ago a/w with fever, neutropenia concern for Lung abscess. Resolved fever. Clinically improved.   Afebrile now, cytopath still pending.    Plan:   Pt still with absolute neutropenia, continue with cefepime at 2 gm iv q8h.  Please perform CBC with diff in am.  Culture growing kamari strep.  Negative fungitell, galactomannan negative.  Crypt serum antigen negative.  HIV negative. Quantiferon gold negative.  Histo ag, syphilis screen negative.  Consider hematology evaluation for neutropenia.

## 2017-06-27 NOTE — PROGRESS NOTE ADULT - SUBJECTIVE AND OBJECTIVE BOX
INDIRA SMITH  51y  Female      Patient is a 51y old  Female who presents with a chief complaint of neutropenic sepsis (20 Jun 2017 12:26)      INTERVAL HPI/OVERNIGHT EVENTS:     No overnight event     REVIEW OF SYSTEMS:  CONSTITUTIONAL: No fever, weight loss, or fatigue  EYES: No eye pain, visual disturbances  RESPIRATORY: No cough, wheezing, chills or hemoptysis; No shortness of breath  CARDIOVASCULAR: No chest pain, palpitations, dizziness, or leg swelling  GASTROINTESTINAL: No abdominal or epigastric pain. No nausea, vomiting, or hematemesis; No diarrhea or constipation. No melena or hematochezia.  GENITOURINARY: No dysuria, frequency  NEUROLOGICAL: No headache  MUSCULOSKELETAL: No joint pain or swelling    T(C): 36.8 (06-27-17 @ 12:05), Max: 36.8 (06-26-17 @ 21:20)  HR: 79 (06-27-17 @ 12:05) (79 - 81)  BP: 103/67 (06-27-17 @ 12:05) (103/67 - 116/80)  RR: 18 (06-27-17 @ 12:05) (18 - 18)  SpO2: 100% (06-27-17 @ 12:05) (98% - 100%)  Wt(kg): --Vital Signs Last 24 Hrs  T(C): 36.8 (27 Jun 2017 12:05), Max: 36.8 (26 Jun 2017 21:20)  T(F): 98.2 (27 Jun 2017 12:05), Max: 98.3 (26 Jun 2017 21:20)  HR: 79 (27 Jun 2017 12:05) (79 - 81)  BP: 103/67 (27 Jun 2017 12:05) (103/67 - 116/80)  BP(mean): --  RR: 18 (27 Jun 2017 12:05) (18 - 18)  SpO2: 100% (27 Jun 2017 12:05) (98% - 100%)  Wt(kg): --    PHYSICAL EXAM:  GENERAL: NAD, well-groomed, well-developed  HEAD:  Atraumatic, Normocephalic  EYES: EOMI, PERRLA, conjunctiva and sclera clear  NECK: Supple, No JVD, Normal thyroid  NERVOUS SYSTEM:  Alert & Oriented X3  CHEST/LUNG: Clear to auscultation bilaterally; No rales, rhonchi, wheezing, or rubs  HEART: Regular rate and rhythm; No murmurs, rubs, or gallops  ABDOMEN: Soft, Nontender, Nondistended; Bowel sounds present      LABS:                        10.1   2.61  )-----------( 331      ( 27 Jun 2017 07:38 )             32.1     06-27    140  |  100  |  11  ----------------------------<  96  4.3   |  26  |  0.61    Ca    8.7      27 Jun 2017 07:21          HEALTH ISSUES - PROBLEM Dx:  Neutropenic fever: Neutropenic fever  Schizoaffective disorder, unspecified type: Schizoaffective disorder, unspecified type  Aspiration into airway, subsequent encounter: Aspiration into airway, subsequent encounter  Oropharyngeal dysphagia: Oropharyngeal dysphagia  Esophageal dilatation: Esophageal dilatation  Upper airway cough syndrome: Upper airway cough syndrome  Neutropenic sepsis: Neutropenic sepsis  Aspiration into airway, initial encounter: Aspiration into airway, initial encounter  Other schizoaffective disorders: Other schizoaffective disorders  Lung consolidation: Lung consolidation  Normocytic anemia: Normocytic anemia  Schizoaffective disorder: Schizoaffective disorder  Sepsis: Sepsis  Bronchopneumonia: Bronchopneumonia  Anxiety: Anxiety  Prophylactic measure: Prophylactic measure  Congenital neutropenia: Congenital neutropenia  Lung mass: Lung mass

## 2017-06-28 LAB
BASOPHILS # BLD AUTO: 0.1 K/UL — SIGNIFICANT CHANGE UP (ref 0–0.2)
BASOPHILS NFR BLD AUTO: 0 % — SIGNIFICANT CHANGE UP (ref 0–2)
EOSINOPHIL # BLD AUTO: 0.1 K/UL — SIGNIFICANT CHANGE UP (ref 0–0.5)
EOSINOPHIL NFR BLD AUTO: 1 % — SIGNIFICANT CHANGE UP (ref 0–6)
HCT VFR BLD CALC: 34.2 % — LOW (ref 34.5–45)
HGB BLD-MCNC: 10.9 G/DL — LOW (ref 11.5–15.5)
LYMPHOCYTES # BLD AUTO: 1.9 K/UL — SIGNIFICANT CHANGE UP (ref 1–3.3)
LYMPHOCYTES # BLD AUTO: 12 % — LOW (ref 13–44)
MCHC RBC-ENTMCNC: 28.8 PG — SIGNIFICANT CHANGE UP (ref 27–34)
MCHC RBC-ENTMCNC: 31.8 GM/DL — LOW (ref 32–36)
MCV RBC AUTO: 90.6 FL — SIGNIFICANT CHANGE UP (ref 80–100)
MONOCYTES # BLD AUTO: 1 K/UL — HIGH (ref 0–0.9)
MONOCYTES NFR BLD AUTO: 2 % — SIGNIFICANT CHANGE UP (ref 2–14)
NEUTROPHILS # BLD AUTO: 20.8 K/UL — HIGH (ref 1.8–7.4)
NEUTROPHILS NFR BLD AUTO: 74 % — SIGNIFICANT CHANGE UP (ref 43–77)
PLATELET # BLD AUTO: 267 K/UL — SIGNIFICANT CHANGE UP (ref 150–400)
RBC # BLD: 3.77 M/UL — LOW (ref 3.8–5.2)
RBC # FLD: 15 % — HIGH (ref 10.3–14.5)
WBC # BLD: 23.9 K/UL — HIGH (ref 3.8–10.5)
WBC # FLD AUTO: 23.9 K/UL — HIGH (ref 3.8–10.5)

## 2017-06-28 PROCEDURE — 99232 SBSQ HOSP IP/OBS MODERATE 35: CPT

## 2017-06-28 PROCEDURE — 99233 SBSQ HOSP IP/OBS HIGH 50: CPT

## 2017-06-28 RX ORDER — ACETAMINOPHEN 500 MG
650 TABLET ORAL ONCE
Qty: 0 | Refills: 0 | Status: COMPLETED | OUTPATIENT
Start: 2017-06-28 | End: 2017-06-28

## 2017-06-28 RX ADMIN — OLANZAPINE 20 MILLIGRAM(S): 15 TABLET, FILM COATED ORAL at 21:04

## 2017-06-28 RX ADMIN — PANTOPRAZOLE SODIUM 40 MILLIGRAM(S): 20 TABLET, DELAYED RELEASE ORAL at 06:38

## 2017-06-28 RX ADMIN — CEFEPIME 100 MILLIGRAM(S): 1 INJECTION, POWDER, FOR SOLUTION INTRAMUSCULAR; INTRAVENOUS at 21:03

## 2017-06-28 RX ADMIN — HEPARIN SODIUM 5000 UNIT(S): 5000 INJECTION INTRAVENOUS; SUBCUTANEOUS at 05:04

## 2017-06-28 RX ADMIN — Medication 1 SPRAY(S): at 05:04

## 2017-06-28 RX ADMIN — FLUVOXAMINE MALEATE 150 MILLIGRAM(S): 25 TABLET ORAL at 21:04

## 2017-06-28 RX ADMIN — Medication 650 MILLIGRAM(S): at 01:09

## 2017-06-28 RX ADMIN — Medication 1 SPRAY(S): at 17:58

## 2017-06-28 RX ADMIN — POLYETHYLENE GLYCOL 3350 17 GRAM(S): 17 POWDER, FOR SOLUTION ORAL at 11:31

## 2017-06-28 RX ADMIN — KETOCONAZOLE 1 APPLICATION(S): 20 AEROSOL, FOAM TOPICAL at 17:58

## 2017-06-28 RX ADMIN — SENNA PLUS 2 TABLET(S): 8.6 TABLET ORAL at 21:04

## 2017-06-28 RX ADMIN — OLANZAPINE 5 MILLIGRAM(S): 15 TABLET, FILM COATED ORAL at 05:04

## 2017-06-28 RX ADMIN — KETOCONAZOLE 1 APPLICATION(S): 20 AEROSOL, FOAM TOPICAL at 05:04

## 2017-06-28 RX ADMIN — Medication 200 MILLIGRAM(S): at 21:04

## 2017-06-28 RX ADMIN — CEFEPIME 100 MILLIGRAM(S): 1 INJECTION, POWDER, FOR SOLUTION INTRAMUSCULAR; INTRAVENOUS at 13:41

## 2017-06-28 RX ADMIN — HEPARIN SODIUM 5000 UNIT(S): 5000 INJECTION INTRAVENOUS; SUBCUTANEOUS at 13:41

## 2017-06-28 RX ADMIN — CEFEPIME 100 MILLIGRAM(S): 1 INJECTION, POWDER, FOR SOLUTION INTRAMUSCULAR; INTRAVENOUS at 05:03

## 2017-06-28 RX ADMIN — Medication 1000 UNIT(S): at 11:31

## 2017-06-28 RX ADMIN — FLUVOXAMINE MALEATE 150 MILLIGRAM(S): 25 TABLET ORAL at 05:04

## 2017-06-28 RX ADMIN — HEPARIN SODIUM 5000 UNIT(S): 5000 INJECTION INTRAVENOUS; SUBCUTANEOUS at 21:04

## 2017-06-28 NOTE — PROGRESS NOTE ADULT - SUBJECTIVE AND OBJECTIVE BOX
INDIRA SMITH  51y  Female      Patient is a 51y old  Female who presents with a chief complaint of neutropenic sepsis (20 Jun 2017 12:26)      INTERVAL HPI/OVERNIGHT EVENTS:   Patient was seen by Hem/onc team and she had received one dose of Xarxio on 6/27/17.      REVIEW OF SYSTEMS:  CONSTITUTIONAL: No fever  EYES: No eye pain, visual disturbances, or discharge  NECK: No pain or stiffness  RESPIRATORY: occasional cough, wheezing, chills or hemoptysis; No shortness of breath  CARDIOVASCULAR: No chest pain, palpitations, dizziness, or leg swelling  GASTROINTESTINAL: No abdominal or epigastric pain. No nausea, vomiting, or hematemesis; No diarrhea or constipation. No melena or hematochezia.  NEUROLOGICAL: No headaches  MUSCULOSKELETAL: No joint pain or swelling  PSYCHIATRIC: ho hallucinations     T(C): 37.1 (06-28-17 @ 13:12), Max: 37.1 (06-28-17 @ 05:33)  HR: 89 (06-28-17 @ 13:12) (87 - 97)  BP: 119/68 (06-28-17 @ 13:12) (97/64 - 119/68)  RR: 17 (06-28-17 @ 13:12) (17 - 17)  SpO2: 97% (06-28-17 @ 13:12) (97% - 98%)  Wt(kg): --Vital Signs Last 24 Hrs  T(C): 37.1 (28 Jun 2017 13:12), Max: 37.1 (28 Jun 2017 05:33)  T(F): 98.7 (28 Jun 2017 13:12), Max: 98.7 (28 Jun 2017 05:33)  HR: 89 (28 Jun 2017 13:12) (87 - 97)  BP: 119/68 (28 Jun 2017 13:12) (97/64 - 119/68)  BP(mean): --  RR: 17 (28 Jun 2017 13:12) (17 - 17)  SpO2: 97% (28 Jun 2017 13:12) (97% - 98%)  Wt(kg): --    PHYSICAL EXAM:  GENERAL: NAD, well-groomed, well-developed  HEAD:  Atraumatic, Normocephalic  EYES: EOMI, PERRLA, conjunctiva and sclera clear  NECK: Supple, No JVD, Normal thyroid  NERVOUS SYSTEM:  Alert & Oriented X3, Good concentration  CHEST/LUNG: Clear to auscultation bilaterally; No rales, rhonchi, wheezing, or rubs  HEART: Regular rate and rhythm; No murmurs, rubs, or gallops  ABDOMEN: Soft, Nontender, Nondistended; Bowel sounds present      Consultant(s) Notes Reviewed:  [x ] YES  [ ] NO  Care Discussed with Consultants/Other Providers [ x] YES  [ ] NO    LABS:                        10.9   23.9  )-----------( 267      ( 28 Jun 2017 10:48 )             34.2     06-27    140  |  100  |  11  ----------------------------<  96  4.3   |  26  |  0.61    Ca    8.7      27 Jun 2017           RADIOLOGY & ADDITIONAL TESTS:    Imaging Personally Reviewed:  [ X] YES  CXR which revealed decrease in the size of the RLL opacity     HEALTH ISSUES - PROBLEM Dx:  Neutropenic fever: Neutropenic fever  Schizoaffective disorder, unspecified type: Schizoaffective disorder, unspecified type  Aspiration into airway, subsequent encounter: Aspiration into airway, subsequent encounter  Oropharyngeal dysphagia: Oropharyngeal dysphagia  Esophageal dilatation: Esophageal dilatation  Upper airway cough syndrome: Upper airway cough syndrome  Neutropenic sepsis: Neutropenic sepsis  Aspiration into airway, initial encounter: Aspiration into airway, initial encounter  Other schizoaffective disorders: Other schizoaffective disorders  Lung consolidation: Lung consolidation  Normocytic anemia: Normocytic anemia  Schizoaffective disorder: Schizoaffective disorder  Sepsis: Sepsis  Bronchopneumonia: Bronchopneumonia  Anxiety: Anxiety  Prophylactic measure: Prophylactic measure  Congenital neutropenia: Congenital neutropenia  Lung mass: Lung mass

## 2017-06-28 NOTE — PROGRESS NOTE ADULT - ASSESSMENT
51F congenital cyclic neutropenia, schizoaffective disorder presenting with cough and fevers and now with a persistent RLL mass and currently on Cefepime for possible lung abscess. Patient was seen by Hem/ onc team and has received one dose of Xarxio on 6/27/17 with resolution of Neutropenia

## 2017-06-28 NOTE — PROGRESS NOTE ADULT - SUBJECTIVE AND OBJECTIVE BOX
51y old  Female who presents with a chief complaint of neutropenic sepsis       Interval history:  Afebrile, no N/V/D, no abdominal pain, minimal cough, denies dysuria, had bone pain after neupogen shot.      Antimicrobials:    cefepime  IVPB 2000 milliGRAM(s) IV Intermittent every 8 hours    REVIEW OF SYSTEMS:  As above.    Vital Signs Last 24 Hrs  T(C): 37.1 (06-28-17 @ 13:12), Max: 37.1 (06-28-17 @ 05:33)  T(F): 98.7 (06-28-17 @ 13:12), Max: 98.7 (06-28-17 @ 05:33)  HR: 89 (06-28-17 @ 13:12) (87 - 97)  BP: 119/68 (06-28-17 @ 13:12) (97/64 - 119/68)  BP(mean): --  RR: 17 (06-28-17 @ 13:12) (17 - 17)  SpO2: 97% (06-28-17 @ 13:12) (97% - 98%)    PHYSICAL EXAM:  Patient in no acute distress. AAOX3.  Lungs: Good air entry B/L lung fields.  Gastrointestinal: soft, nontender, nondistended.  Extremities: no edema.                            10.9   23.9  )-----------( 267      ( 28 Jun 2017 10:48 )             34.2   06-27    140  |  100  |  11  ----------------------------<  96  4.3   |  26  |  0.61    Ca    8.7      27 Jun 2017 07:21

## 2017-06-28 NOTE — PROGRESS NOTE ADULT - PROBLEM SELECTOR PLAN 1
Pt will be continued on Cefepime which will be changed to Augment PO BID until 7/17/17 as per ID / Pt is to f/up with ID and pulmonary upon D/C   Patient will need f/up with Hem/onc upon D/C   Increased WBC post Xarxio ( MOnitor CBC as outpatietnt )

## 2017-06-28 NOTE — PROGRESS NOTE ADULT - ASSESSMENT
51 y o female with cyclical neutropenia, RLL lung mass identified approx 1 month ago a/w with fever, neutropenia concern for Lung abscess. Resolved fever. Clinically improved.   Afebrile now, cytopath with acute inflammation.    Plan:   Pt with resolved neutropenia, continue with cefepime at 2 gm iv q8h.  Culture growing kamari strep.  S/P hematology evaluation for neutropenia and neupogen administration.  Needs 4 weeks of abx total for lung abscess until 7/17/17.  Can switch to augmentin 875 mg po bid on discharge to complete the course.

## 2017-06-28 NOTE — PROGRESS NOTE ADULT - ASSESSMENT
51 F with cogenital cyclical neutropenia, schizoaffective disorder, admitted with cough, fever and a RLL opacity, found to have a lung abscess, s/p IR guided aspiration with cultures growing alpha hemolytic strept     1. Lung abscess:  - On IV cefepime per ID recs  - FNA cxs showing alpha hemolytic strept, cytopath negative for malignant cells  - Remaining infectious work up including HIV, fungal studies and quant gold are negative.   - Clinically improving on current antibiotics, plan for a total of 4 weeks of antibiotics (oral Augmentin upon discharge) per ID  - Will require a follow up CT chest in 6-8 weeks to ensure resolution  - Cont flonase for post nasal drip and upper airway cough syndrome  - Chest PT, incentive spirometry, OBC/ ambulate as tolerated    2. Cyclical neutropenia:  - Neutropenia resolved with neupogen    3. Schizoaffective disorder:   - Cont Luvox and Zyprexa    4. Dysphagia/ Aspiration:  - Barium study showing silent aspiration with thin liquids. Now on a dysphagia  diet per speech and swallow recs  - CT head unremarkable, evaluated by GI  - Cont careful monitoring of resp status, aspiration precautions    5. Prophylaxis:  - DVT px with Hep SQ  - PT/OT, OBC as tolerated 51 F with cogenital cyclical neutropenia, schizoaffective disorder, admitted with cough, fever and a RLL opacity, found to have a lung abscess, s/p IR guided aspiration with cultures growing alpha hemolytic strept     1. Lung abscess:  - On IV cefepime per ID recs  - FNA cxs showing alpha hemolytic strept, cytopath negative for malignant cells  - Remaining infectious work up including HIV, fungal studies and quant gold are negative.   - Clinically improving on current antibiotics  - Plan for a total of 4 weeks of antibiotics (oral Augmentin upon discharge) per ID  - Will require a follow up CT chest in 6-8 weeks to ensure resolution  - Cont flonase for post nasal drip and upper airway cough syndrome  - Chest PT, incentive spirometry, OBC/ ambulate as tolerated    2. Cyclical neutropenia:  - Neutropenia resolved with Neupogen    3. Schizoaffective disorder:   - Cont Luvox and Zyprexa    4. Dysphagia/ Aspiration:  - Barium study showing silent aspiration with thin liquids. Now on a dysphagia  diet per speech and swallow recs  - CT head unremarkable, evaluated by GI  - Cont careful monitoring of resp status, aspiration precautions    5. Prophylaxis:  - DVT px with Hep SQ  - PT/OT, OBC as tolerated

## 2017-06-28 NOTE — PROGRESS NOTE ADULT - SUBJECTIVE AND OBJECTIVE BOX
CHIEF COMPLAINT:  Cough and fever, found to have a lung abscess due to alpha hemolytic strept    Interval Events:    Remains afebrile on IV cefepime, neutropenia resolved with Neupogen. Cough improved    REVIEW OF SYSTEMS:  Constitutional: [ ] negative [- ] fevers [- ] chills [ ] weight loss [ ] weight gain  HEENT: [ ] negative [ ] dry eyes [ ] eye irritation [- ] postnasal drip [- ] nasal congestion  CV: [ ] negative  [- ] chest pain [ -] orthopnea [- ] palpitations [ ] murmur  Resp: [ ] negative [+ ] cough [- ] shortness of breath [- ] dyspnea [- ] wheezing [+ ] sputum [- ] hemoptysis  GI: [ ] negative [- ] nausea [- ] vomiting [- ] diarrhea [- ] constipation [- ] abd pain [+ ] dysphagia   : [ ] negative [- ] dysuria [- ] nocturia [- ] hematuria [- ] increased urinary frequency  Musculoskeletal: [ ] negative [- ] back pain [- ] myalgias [- ] arthralgias [ ] fracture  Skin: [ ] negative [- ] rash [ ] itch  Neurological: [ ] negative [- ] headache [- ] dizziness [- ] syncope [- ] weakness [ ] numbness  Psychiatric: [ ] negative [+ ] anxiety [ ] depression  Endocrine: [ ] negative [ ] diabetes [ ] thyroid problem  Hematologic/Lymphatic: [ ] negative [+ ] anemia [ ] bleeding problem   Allergic/Immunologic: [ ] negative [- ] itchy eyes [ ] nasal discharge [- ] hives [ ] angioedema  [x ] All other systems negative  [ ] Unable to assess ROS because ________    OBJECTIVE:  ICU Vital Signs Last 24 Hrs  T(C): 37.1 (28 Jun 2017 13:12), Max: 37.1 (28 Jun 2017 05:33)  T(F): 98.7 (28 Jun 2017 13:12), Max: 98.7 (28 Jun 2017 05:33)  HR: 89 (28 Jun 2017 13:12) (87 - 97)  BP: 119/68 (28 Jun 2017 13:12) (97/64 - 119/68)  BP(mean): --  ABP: --  ABP(mean): --  RR: 17 (28 Jun 2017 13:12) (17 - 17)  SpO2: 97% (28 Jun 2017 13:12) (97% - 98%)        06-27 @ 07:01 - 06-28 @ 07:00  --------------------------------------------------------  IN: 830 mL / OUT: 0 mL / NET: 830 mL    06-28 @ 07:01 - 06-28 @ 16:23  --------------------------------------------------------  IN: 650 mL / OUT: 0 mL / NET: 650 mL      CAPILLARY BLOOD GLUCOSE          PHYSICAL EXAM:  General:  Frail appearing female, NAD, speaking in full sentences  HEENT: PERRLA  Lymph Nodes: No palpable LNs  Neck: Supple  Respiratory: Decreased BS b/l bases, no wheezing or crackles  Cardiovascular: RRR, S1+S2+0  Abdomen: Soft, NT, ND, BS+  Extremities:  No edema, no clubbing or cyanosis  Skin: No rash, chronic skin changes b/l LEs  Neurological: AAOx3, grossly non focal  Psychiatry: flat affect    HOSPITAL MEDICATIONS:  heparin  Injectable 5000 Unit(s) SubCutaneous every 8 hours  cefepime  IVPB 2000 milliGRAM(s) IV Intermittent every 8 hours  benzonatate 100 milliGRAM(s) Oral every 8 hours PRN  guaiFENesin    Syrup 200 milliGRAM(s) Oral every 4 hours PRN  fluvoxaMINE 150 milliGRAM(s) Oral two times a day  OLANZapine 20 milliGRAM(s) Oral at bedtime  OLANZapine 5 milliGRAM(s) Oral <User Schedule>  ondansetron Injectable 4 milliGRAM(s) IV Push once  docusate sodium 200 milliGRAM(s) Oral at bedtime  senna 2 Tablet(s) Oral at bedtime  polyethylene glycol 3350 17 Gram(s) Oral daily  pantoprazole    Tablet 40 milliGRAM(s) Oral before breakfast  cholecalciferol 1000 Unit(s) Oral daily  fluticasone propionate 50 MICROgram(s)/spray Nasal Spray 1 Spray(s) Both Nostrils two times a day  ketoconazole 2% Cream 1 Application(s) Topical two times a day      LABS:                        10.9   23.9  )-----------( 267      ( 28 Jun 2017 10:48 )             34.2     Hgb Trend: 10.9<--, 10.1<--, 9.9<--, 10.1<--, 9.4<--  06-27    140  |  100  |  11  ----------------------------<  96  4.3   |  26  |  0.61    Ca    8.7      27 Jun 2017 07:21      Creatinine Trend: 0.61<--, 0.73<--, 0.65<--, 0.64<--, 0.74<--, 0.64<--            MICROBIOLOGY:   Culture - Acid Fast - Other w/Smear (06.20.17 @ 21:15)    Specimen Source: .Other Lung - Lower Lobe Right    Acid Fast Bacilli Smear:   No acid fast bacilli seen by fluorochrome stain    Culture Results:   No growth at 1 week.    Culture - Fungal, Other (06.20.17 @ 21:15)    Specimen Source: .Other Lung - Lower Lobe Right    Culture Results:   No fungus isolated at 1 week. No additional interim reports will be  issued unless there is a change in culture status.    Culture - Abscess with Gram Stain (06.20.17 @ 21:12)    Specimen Source: .Abscess    Culture Results:   Moderate Alpha hemolytic strep      RADIOLOGY:    < from: Xray Chest 1 View AP/PA (06.24.17 @ 11:03) >  Right lower lobe opacity, displays interval reduction in size.      [ ] Reviewed and interpreted by me    PULMONARY FUNCTION TESTS:    EKG:

## 2017-06-29 VITALS
OXYGEN SATURATION: 99 % | RESPIRATION RATE: 18 BRPM | SYSTOLIC BLOOD PRESSURE: 100 MMHG | TEMPERATURE: 99 F | HEART RATE: 87 BPM | DIASTOLIC BLOOD PRESSURE: 66 MMHG

## 2017-06-29 DIAGNOSIS — J85.2 ABSCESS OF LUNG WITHOUT PNEUMONIA: ICD-10-CM

## 2017-06-29 LAB
BASOPHILS # BLD AUTO: 0.1 K/UL — SIGNIFICANT CHANGE UP (ref 0–0.2)
BASOPHILS NFR BLD AUTO: 1.3 % — SIGNIFICANT CHANGE UP (ref 0–2)
EOSINOPHIL # BLD AUTO: 0.2 K/UL — SIGNIFICANT CHANGE UP (ref 0–0.5)
EOSINOPHIL NFR BLD AUTO: 4.4 % — SIGNIFICANT CHANGE UP (ref 0–6)
HCT VFR BLD CALC: 32.6 % — LOW (ref 34.5–45)
HGB BLD-MCNC: 10.5 G/DL — LOW (ref 11.5–15.5)
LYMPHOCYTES # BLD AUTO: 1.9 K/UL — SIGNIFICANT CHANGE UP (ref 1–3.3)
LYMPHOCYTES # BLD AUTO: 45.5 % — HIGH (ref 13–44)
MCHC RBC-ENTMCNC: 29.5 PG — SIGNIFICANT CHANGE UP (ref 27–34)
MCHC RBC-ENTMCNC: 32.4 GM/DL — SIGNIFICANT CHANGE UP (ref 32–36)
MCV RBC AUTO: 91.3 FL — SIGNIFICANT CHANGE UP (ref 80–100)
MONOCYTES # BLD AUTO: 1 K/UL — HIGH (ref 0–0.9)
MONOCYTES NFR BLD AUTO: 23.1 % — HIGH (ref 2–14)
NEUTROPHILS # BLD AUTO: 1.1 K/UL — LOW (ref 1.8–7.4)
NEUTROPHILS NFR BLD AUTO: 25.7 % — LOW (ref 43–77)
PLATELET # BLD AUTO: 230 K/UL — SIGNIFICANT CHANGE UP (ref 150–400)
RBC # BLD: 3.57 M/UL — LOW (ref 3.8–5.2)
RBC # FLD: 15.1 % — HIGH (ref 10.3–14.5)
WBC # BLD: 4.1 K/UL — SIGNIFICANT CHANGE UP (ref 3.8–10.5)
WBC # FLD AUTO: 4.1 K/UL — SIGNIFICANT CHANGE UP (ref 3.8–10.5)

## 2017-06-29 PROCEDURE — 10160 PNXR ASPIR ABSC HMTMA BULLA: CPT

## 2017-06-29 PROCEDURE — 87075 CULTR BACTERIA EXCEPT BLOOD: CPT

## 2017-06-29 PROCEDURE — 71046 X-RAY EXAM CHEST 2 VIEWS: CPT

## 2017-06-29 PROCEDURE — 88312 SPECIAL STAINS GROUP 1: CPT

## 2017-06-29 PROCEDURE — 82728 ASSAY OF FERRITIN: CPT

## 2017-06-29 PROCEDURE — 86780 TREPONEMA PALLIDUM: CPT

## 2017-06-29 PROCEDURE — 87205 SMEAR GRAM STAIN: CPT

## 2017-06-29 PROCEDURE — 87040 BLOOD CULTURE FOR BACTERIA: CPT

## 2017-06-29 PROCEDURE — 71260 CT THORAX DX C+: CPT

## 2017-06-29 PROCEDURE — 87102 FUNGUS ISOLATION CULTURE: CPT

## 2017-06-29 PROCEDURE — 82803 BLOOD GASES ANY COMBINATION: CPT

## 2017-06-29 PROCEDURE — 70450 CT HEAD/BRAIN W/O DYE: CPT

## 2017-06-29 PROCEDURE — 82746 ASSAY OF FOLIC ACID SERUM: CPT

## 2017-06-29 PROCEDURE — 81001 URINALYSIS AUTO W/SCOPE: CPT

## 2017-06-29 PROCEDURE — 88341 IMHCHEM/IMCYTCHM EA ADD ANTB: CPT

## 2017-06-29 PROCEDURE — 87206 SMEAR FLUORESCENT/ACID STAI: CPT

## 2017-06-29 PROCEDURE — 85014 HEMATOCRIT: CPT

## 2017-06-29 PROCEDURE — 74230 X-RAY XM SWLNG FUNCJ C+: CPT

## 2017-06-29 PROCEDURE — 82330 ASSAY OF CALCIUM: CPT

## 2017-06-29 PROCEDURE — 83605 ASSAY OF LACTIC ACID: CPT

## 2017-06-29 PROCEDURE — 86403 PARTICLE AGGLUT ANTBDY SCRN: CPT

## 2017-06-29 PROCEDURE — 84295 ASSAY OF SERUM SODIUM: CPT

## 2017-06-29 PROCEDURE — 85045 AUTOMATED RETICULOCYTE COUNT: CPT

## 2017-06-29 PROCEDURE — 85610 PROTHROMBIN TIME: CPT

## 2017-06-29 PROCEDURE — 83615 LACTATE (LD) (LDH) ENZYME: CPT

## 2017-06-29 PROCEDURE — 86480 TB TEST CELL IMMUN MEASURE: CPT

## 2017-06-29 PROCEDURE — 87116 MYCOBACTERIA CULTURE: CPT

## 2017-06-29 PROCEDURE — 84132 ASSAY OF SERUM POTASSIUM: CPT

## 2017-06-29 PROCEDURE — 92526 ORAL FUNCTION THERAPY: CPT

## 2017-06-29 PROCEDURE — 80202 ASSAY OF VANCOMYCIN: CPT

## 2017-06-29 PROCEDURE — 88342 IMHCHEM/IMCYTCHM 1ST ANTB: CPT

## 2017-06-29 PROCEDURE — 71045 X-RAY EXAM CHEST 1 VIEW: CPT

## 2017-06-29 PROCEDURE — 82784 ASSAY IGA/IGD/IGG/IGM EACH: CPT

## 2017-06-29 PROCEDURE — 92611 MOTION FLUOROSCOPY/SWALLOW: CPT

## 2017-06-29 PROCEDURE — 82607 VITAMIN B-12: CPT

## 2017-06-29 PROCEDURE — 99239 HOSP IP/OBS DSCHRG MGMT >30: CPT

## 2017-06-29 PROCEDURE — 85730 THROMBOPLASTIN TIME PARTIAL: CPT

## 2017-06-29 PROCEDURE — 82435 ASSAY OF BLOOD CHLORIDE: CPT

## 2017-06-29 PROCEDURE — 87385 HISTOPLASMA CAPSUL AG IA: CPT

## 2017-06-29 PROCEDURE — 85027 COMPLETE CBC AUTOMATED: CPT

## 2017-06-29 PROCEDURE — 80048 BASIC METABOLIC PNL TOTAL CA: CPT

## 2017-06-29 PROCEDURE — 83010 ASSAY OF HAPTOGLOBIN QUANT: CPT

## 2017-06-29 PROCEDURE — 80053 COMPREHEN METABOLIC PANEL: CPT

## 2017-06-29 PROCEDURE — 87389 HIV-1 AG W/HIV-1&-2 AB AG IA: CPT

## 2017-06-29 PROCEDURE — 87086 URINE CULTURE/COLONY COUNT: CPT

## 2017-06-29 PROCEDURE — 92610 EVALUATE SWALLOWING FUNCTION: CPT

## 2017-06-29 PROCEDURE — 96374 THER/PROPH/DIAG INJ IV PUSH: CPT | Mod: XU

## 2017-06-29 PROCEDURE — 99233 SBSQ HOSP IP/OBS HIGH 50: CPT

## 2017-06-29 PROCEDURE — 76942 ECHO GUIDE FOR BIOPSY: CPT

## 2017-06-29 PROCEDURE — 88173 CYTOPATH EVAL FNA REPORT: CPT

## 2017-06-29 PROCEDURE — 88305 TISSUE EXAM BY PATHOLOGIST: CPT

## 2017-06-29 PROCEDURE — 99285 EMERGENCY DEPT VISIT HI MDM: CPT | Mod: 25

## 2017-06-29 PROCEDURE — 87449 NOS EACH ORGANISM AG IA: CPT

## 2017-06-29 PROCEDURE — 94640 AIRWAY INHALATION TREATMENT: CPT

## 2017-06-29 PROCEDURE — 83550 IRON BINDING TEST: CPT

## 2017-06-29 PROCEDURE — 82947 ASSAY GLUCOSE BLOOD QUANT: CPT

## 2017-06-29 PROCEDURE — 87070 CULTURE OTHR SPECIMN AEROBIC: CPT

## 2017-06-29 RX ORDER — KETOCONAZOLE 20 MG/G
1 AEROSOL, FOAM TOPICAL
Qty: 0 | Refills: 0 | DISCHARGE
Start: 2017-06-29

## 2017-06-29 RX ORDER — OLANZAPINE 15 MG/1
1 TABLET, FILM COATED ORAL
Qty: 0 | Refills: 0 | DISCHARGE
Start: 2017-06-29

## 2017-06-29 RX ORDER — CHOLECALCIFEROL (VITAMIN D3) 125 MCG
1 CAPSULE ORAL
Qty: 0 | Refills: 0 | COMMUNITY

## 2017-06-29 RX ORDER — SENNA PLUS 8.6 MG/1
2 TABLET ORAL
Qty: 0 | Refills: 0 | DISCHARGE
Start: 2017-06-29

## 2017-06-29 RX ORDER — POLYETHYLENE GLYCOL 3350 17 G/17G
17 POWDER, FOR SOLUTION ORAL
Qty: 0 | Refills: 0 | DISCHARGE
Start: 2017-06-29

## 2017-06-29 RX ORDER — PANTOPRAZOLE SODIUM 20 MG/1
1 TABLET, DELAYED RELEASE ORAL
Qty: 30 | Refills: 0
Start: 2017-06-29 | End: 2017-07-29

## 2017-06-29 RX ORDER — FLUTICASONE PROPIONATE 50 MCG
1 SPRAY, SUSPENSION NASAL
Qty: 0 | Refills: 0 | DISCHARGE
Start: 2017-06-29

## 2017-06-29 RX ORDER — OLANZAPINE 15 MG/1
1 TABLET, FILM COATED ORAL
Qty: 0 | Refills: 0 | COMMUNITY

## 2017-06-29 RX ORDER — CHOLECALCIFEROL (VITAMIN D3) 125 MCG
1000 CAPSULE ORAL
Qty: 0 | Refills: 0 | DISCHARGE
Start: 2017-06-29

## 2017-06-29 RX ORDER — FLUVOXAMINE MALEATE 25 MG/1
3 TABLET ORAL
Qty: 0 | Refills: 0 | DISCHARGE
Start: 2017-06-29

## 2017-06-29 RX ADMIN — FLUVOXAMINE MALEATE 150 MILLIGRAM(S): 25 TABLET ORAL at 05:22

## 2017-06-29 RX ADMIN — OLANZAPINE 5 MILLIGRAM(S): 15 TABLET, FILM COATED ORAL at 05:21

## 2017-06-29 RX ADMIN — HEPARIN SODIUM 5000 UNIT(S): 5000 INJECTION INTRAVENOUS; SUBCUTANEOUS at 05:21

## 2017-06-29 RX ADMIN — PANTOPRAZOLE SODIUM 40 MILLIGRAM(S): 20 TABLET, DELAYED RELEASE ORAL at 06:46

## 2017-06-29 RX ADMIN — KETOCONAZOLE 1 APPLICATION(S): 20 AEROSOL, FOAM TOPICAL at 05:21

## 2017-06-29 RX ADMIN — CEFEPIME 100 MILLIGRAM(S): 1 INJECTION, POWDER, FOR SOLUTION INTRAMUSCULAR; INTRAVENOUS at 05:21

## 2017-06-29 RX ADMIN — Medication 1000 UNIT(S): at 12:34

## 2017-06-29 RX ADMIN — Medication 1 SPRAY(S): at 05:21

## 2017-06-29 NOTE — PROGRESS NOTE ADULT - SUBJECTIVE AND OBJECTIVE BOX
CHIEF COMPLAINT:    Cough and fever, found to have a lung abscess due to alpha hemolytic strept    Interval Events:    Received Zarxio on 6/27, intially neutropenia resolved now trending down again, clinically improving on IV cefepime, decreased cough, afebrile.     REVIEW OF SYSTEMS:  Constitutional: [ ] negative [- ] fevers [- ] chills [- ] weight loss [ ] weight gain  HEENT: [ ] negative [ ] dry eyes [ ] eye irritation [- ] postnasal drip [- ] nasal congestion  CV: [ ] negative  [- ] chest pain [- ] orthopnea [- ] palpitations [ ] murmur  Resp: [ ] negative [+ ] cough [- ] shortness of breath [- ] dyspnea [- ] wheezing [+ ] sputum [-] hemoptysis  GI: [ ] negative [- ] nausea [- ] vomiting [- ] diarrhea [ ] constipation [- ] abd pain [+ ] dysphagia   : [ ] negative [- ] dysuria [ ] nocturia [- ] hematuria [- ] increased urinary frequency  Musculoskeletal: [ ] negative [ -] back pain [- ] myalgias [ ] arthralgias [ ] fracture  Skin: [ ] negative [- ] rash [ ] itch  Neurological: [ ] negative [- ] headache [- ] dizziness [ ] syncope [ ] weakness [ ] numbness  Psychiatric: [ ] negative [+ ] anxiety [ ] depression  Endocrine: [ ] negative [- ] diabetes [ ] thyroid problem  Hematologic/Lymphatic: [ ] negative [+ ] anemia [ ] bleeding problem  Allergic/Immunologic: [ ] negative [ ] itchy eyes [- ] nasal discharge [ -] hives [ ] angioedema  [x ] All other systems negative  [ ] Unable to assess ROS because ________    OBJECTIVE:  ICU Vital Signs Last 24 Hrs  T(C): 36.6 (29 Jun 2017 04:27), Max: 37.1 (28 Jun 2017 13:12)  T(F): 97.9 (29 Jun 2017 04:27), Max: 98.7 (28 Jun 2017 13:12)  HR: 69 (29 Jun 2017 04:27) (69 - 89)  BP: 115/66 (29 Jun 2017 04:27) (99/69 - 119/68)  BP(mean): --  ABP: --  ABP(mean): --  RR: 18 (29 Jun 2017 04:27) (17 - 18)  SpO2: 96% (29 Jun 2017 04:27) (96% - 97%)        06-28 @ 07:01  -  06-29 @ 07:00  --------------------------------------------------------  IN: 1090 mL / OUT: 0 mL / NET: 1090 mL      CAPILLARY BLOOD GLUCOSE          PHYSICAL EXAM:  General: NAD  HEENT: PERRLA  Lymph Nodes: None palpable  Neck: Supple  Respiratory: Decreased BS b/l bases, no wheezing or crackles  Cardiovascular: RRR, S1+S2+ 0  Abdomen: Soft, NT, ND, BS+  Extremities: No edema  Skin: Chronic changes, no rash  Neurological: AAOx3, grossly non focal  Psychiatry: Flat affect    HOSPITAL MEDICATIONS:  heparin  Injectable 5000 Unit(s) SubCutaneous every 8 hours  cefepime  IVPB 2000 milliGRAM(s) IV Intermittent every 8 hours  benzonatate 100 milliGRAM(s) Oral every 8 hours PRN  guaiFENesin    Syrup 200 milliGRAM(s) Oral every 4 hours PRN  fluvoxaMINE 150 milliGRAM(s) Oral two times a day  OLANZapine 20 milliGRAM(s) Oral at bedtime  OLANZapine 5 milliGRAM(s) Oral <User Schedule>  ondansetron Injectable 4 milliGRAM(s) IV Push once  docusate sodium 200 milliGRAM(s) Oral at bedtime  senna 2 Tablet(s) Oral at bedtime  polyethylene glycol 3350 17 Gram(s) Oral daily  pantoprazole    Tablet 40 milliGRAM(s) Oral before breakfast  cholecalciferol 1000 Unit(s) Oral daily  fluticasone propionate 50 MICROgram(s)/spray Nasal Spray 1 Spray(s) Both Nostrils two times a day  ketoconazole 2% Cream 1 Application(s) Topical two times a day      LABS:                        10.5   4.1   )-----------( 230      ( 29 Jun 2017 06:32 )             32.6     Hgb Trend: 10.5<--, 10.9<--, 10.1<--, 9.9<--, 10.1<--        Creatinine Trend: 0.61<--, 0.73<--, 0.65<--, 0.64<--, 0.74<--, 0.64<--      MICROBIOLOGY:   Culture - Acid Fast - Other w/Smear (06.20.17 @ 21:15)    Specimen Source: .Other Lung - Lower Lobe Right    Acid Fast Bacilli Smear:   No acid fast bacilli seen by fluorochrome stain    Culture Results:   No growth at 1 week.    Culture - Fungal, Other (06.20.17 @ 21:15)    Specimen Source: .Other Lung - Lower Lobe Right    Culture Results:   No fungus isolated at 1 week. No additional interim reports will be  issued unless there is a change in culture status.    Culture - Abscess with Gram Stain (06.20.17 @ 21:12)    Specimen Source: .Abscess    Culture Results:   Moderate Alpha hemolytic strep      RADIOLOGY:    < from: Xray Chest 1 View AP/PA (06.24.17 @ 11:03) >  Right lower lobe opacity, displays interval reduction in size.      [ ] Reviewed and interpreted by me

## 2017-06-29 NOTE — PROGRESS NOTE ADULT - SUBJECTIVE AND OBJECTIVE BOX
INDIRA SMITH  51y  Female      Patient is a 51y old  Female who presents with a chief complaint of neutropenic sepsis (20 Jun 2017 12:26)      INTERVAL HPI/OVERNIGHT EVENTS:    No overnight event. Patient offers no complaints today and is ready to go home.        REVIEW OF SYSTEMS:  General     : no fever or fatigue    ENMT       :  No difficulty hearing, tinnitus, vertigo; No sinus or throat pain  NECK       : No pain or stiffness  RESPIRATORY:  improved cough, No shortness of breath  CARDIOVASCULAR: No chest pain, palpitations, dizziness, or leg swelling  GASTROINTESTINAL: No abdominal or epigastric pain. No nausea, vomiting, or hematemesis; No diarrhea or constipation  NEUROLOGICAL: No headaches  SKIN: No itching  MUSCULOSKELETAL: No joint pain or swelling      T(C): 37 (06-29-17 @ 12:05), Max: 37.1 (06-28-17 @ 13:12)  HR: 87 (06-29-17 @ 12:05) (69 - 89)  BP: 100/66 (06-29-17 @ 12:05) (99/69 - 119/68)  RR: 18 (06-29-17 @ 12:05) (17 - 18)  SpO2: 99% (06-29-17 @ 12:05) (96% - 99%)  Wt(kg): --Vital Signs Last 24 Hrs  T(C): 37 (29 Jun 2017 12:05), Max: 37.1 (28 Jun 2017 13:12)  T(F): 98.6 (29 Jun 2017 12:05), Max: 98.7 (28 Jun 2017 13:12)  HR: 87 (29 Jun 2017 12:05) (69 - 89)  BP: 100/66 (29 Jun 2017 12:05) (99/69 - 119/68)  BP(mean): --  RR: 18 (29 Jun 2017 12:05) (17 - 18)  SpO2: 99% (29 Jun 2017 12:05) (96% - 99%)  Wt(kg): --    PHYSICAL EXAM:  GENERAL: NAD, well-groomed, well-developed  HEAD:  Atraumatic, Normocephalic  EYES: conjunctiva and sclera clear  NECK: Supple, No JVD, Normal thyroid  NERVOUS SYSTEM:  Alert & Oriented X3, Good concentration; Motor Strength 5/5 B/L upper and lower extremities  CHEST/LUNG: Clear to ausculation bilaterally; No rales, rhonchi, wheezing, or rubs  HEART: Regular rate and rhythm; No murmurs, rubs, or gallops  ABDOMEN: Soft, Nontender, Nondistended; Bowel sounds present  EXTREMITIES:  2+ Peripheral Pulses, No clubbing, cyanosis, or edema  SKIN: No gross rashes or lesions    Consultant(s) Notes Reviewed:  [x ] YES/ pulmonary / ID / Hem onc consultants  Care Discussed with Consultants/Other Providers [ x] YES NP     LABS:                        10.5   4.1   )-----------( 230      ( 29 Jun 2017 06:32 )             32.6       HEALTH ISSUES - PROBLEM Dx:  Neutropenic fever: Neutropenic fever  Schizoaffective disorder, unspecified type: Schizoaffective disorder, unspecified type  Aspiration into airway, subsequent encounter: Aspiration into airway, subsequent encounter  Oropharyngeal dysphagia: Oropharyngeal dysphagia  Esophageal dilatation: Esophageal dilatation  Upper airway cough syndrome: Upper airway cough syndrome  Neutropenic sepsis: Neutropenic sepsis  Aspiration into airway, initial encounter: Aspiration into airway, initial encounter  Other schizoaffective disorders: Other schizoaffective disorders  Lung consolidation: Lung consolidation  Normocytic anemia: Normocytic anemia  Schizoaffective disorder: Schizoaffective disorder  Sepsis: Sepsis  Bronchopneumonia: Bronchopneumonia  Anxiety: Anxiety  Prophylactic measure: Prophylactic measure  Congenital neutropenia: Congenital neutropenia  Lung mass: Lung mass

## 2017-06-29 NOTE — PROGRESS NOTE ADULT - PROBLEM SELECTOR PLAN 1
Pt was started on Cefepime on  5/14/17   - FNA cxs showing alpha hemolytic strept, cytopath negative for malignant cells   - Clinically improving on current antibiotics/ Pt will remain on oral antibiotic until 7/17/17 as per ID   - Will require a follow up CT chest in 6-8 weeks to ensure resolution  - Cont flonase for post nasal drip and upper airway cough syndrome  - Chest PT, incentive spirometry, OBC/ ambulate as tolerated Pt was started on Cefepime on  5/14/17   - FNA cxs showing alpha hemolytic strept, cytopath negative for malignant cells   - Clinically improving on current antibiotics/ Pt will remain on oral antibiotic ( Augmentin)  until   7/17/17 as per ID   - CT chest in 6-8 weeks to ensure resolution  - Cont flonase for post nasal drip and upper airway cough syndrome  - Chest PT, incentive spirometry, OBC/ ambulate as tolerated    Pt will F/up with her PCP ( Dr. THEODORE Walton, Psychiatrist ( Dr Slim Galindo ) , Hematologist and Pulmonary specialist up D/C Pt was started on Cefepime on  6/18/17   - FNA cxs showing alpha hemolytic strept, cytopath negative for malignant cells   - Clinically improving on current antibiotics/ Pt will remain on oral antibiotic ( Augmentin)  until   7/17/17 as per ID   - CT chest in 6-8 weeks to ensure resolution  - Cont flonase for post nasal drip and upper airway cough syndrome  - Chest PT, incentive spirometry, OBC/ ambulate as tolerated    Pt will F/up with her PCP ( Dr. THEODORE Walton, Psychiatrist ( Dr Slim Galindo ) , Hematologist and Pulmonary specialist up D/C  F/up with ID specialist

## 2017-06-29 NOTE — PROGRESS NOTE ADULT - PROBLEM SELECTOR PROBLEM 2
Lung mass
Bronchopneumonia
Aspiration into airway, subsequent encounter
Aspiration into airway, subsequent encounter
Congenital neutropenia
Schizoaffective disorder, unspecified type
Lung consolidation

## 2017-06-29 NOTE — PROGRESS NOTE ADULT - PROBLEM SELECTOR PLAN 5
--c/w home medications klonopin, fluvoxamine, olanzapine
-Hep sq
-Hep sq
DVT proph  GI proph   OOB
cont to monitor H/H   F/up with oncologist upon D/C
DVT proph  GI proph   OOB

## 2017-06-29 NOTE — PROGRESS NOTE ADULT - PROBLEM SELECTOR PROBLEM 3
Schizoaffective disorder
Normocytic anemia
Normocytic anemia
Sepsis
Congenital neutropenia
Congenital neutropenia
Schizoaffective disorder
Schizoaffective disorder, unspecified type
Other schizoaffective disorders

## 2017-06-29 NOTE — PROGRESS NOTE ADULT - PROVIDER SPECIALTY LIST ADULT
Hospitalist
Infectious Disease
Pulmonology
Infectious Disease
Hospitalist

## 2017-06-29 NOTE — PROGRESS NOTE ADULT - PROBLEM SELECTOR PROBLEM 5
Prophylactic measure
Prophylactic measure
Schizoaffective disorder
Normocytic anemia
Prophylactic measure

## 2017-06-29 NOTE — PROGRESS NOTE ADULT - PROBLEM SELECTOR PLAN 3
--c/w home medications klonopin, fluvoxamine, olanzapine
--H/H stable  --No active signs or symptoms of bleeding   --Continue to monitor  --may benefit from iron supplement
--H/H stable  --No active signs or symptoms of bleeding   --Continue to monitor  --may benefit from iron supplement
--Secondary to lung consolidation   --Lactate WNL  --follow up cultures  --hemodynamically stable
--Secondary to lung consolidation   --hemodynamically stable  --Continue IV cefepime
--Secondary to lung consolidation   --hemodynamically stable  --Continue IV vanco/cefepime
--Secondary to lung mass which possibly represents complicated infection.  --Lactate WNL  --IVF resuscitated with 2L NS; pt eating and drinking so will hold of on further IVF  --follow up cultures  --hemodynamic monitoring
Continue current therapy  Psychiatrist has seen patient and recommend to current current therapy patient has been stable on the current regimen as stated
F/up Hem/onc consultation
cont Luvox and Zyprexa   Psych f/up as outpatient   no current issues
-cont current regimen
Pt has received Xarxio on 6/27/17   F/UP with Hematology team within one week of D/C
-cont current regimen
-cont current regimen

## 2017-06-29 NOTE — PROGRESS NOTE ADULT - PROBLEM SELECTOR PLAN 4
--VTE PPX with HSQ x 1, then hold further doses in case of IR biopsy tomorrow.
--H/H stable  --No active signs or symptoms of bleeding   --Continue to monitor
--H/H stable  --No active signs or symptoms of bleeding   --Continue to monitor  --Follow up iron studies, folate, b12
--H/H stable  --No active signs or symptoms of bleeding   --Continue to monitor  --Follow up iron studies, folate, b12
--c/w home medications klonopin, fluvoxamine, olanzapine
--c/w home medications klonopin, fluvoxamine, olanzapine
--not present on labwork 2 years ago, new since May  --no signs of bleeding based on history  --possibly secondary to inflammatory process vs malignancy in the setting of lung mass
H/H stable   continue to monitor
cont Dysphasic diet   F/UP with PCP as outpatient
-monitor neutrophil count  -broad spectrum antibiotics
COntinue Dysphasic 3 diet / Pt will get her OTC supply from a pharmacy
-monitor neutrophil count  -broad spectrum antibiotics
-monitor neutrophil count  -broad spectrum antibiotics

## 2017-06-29 NOTE — PROGRESS NOTE ADULT - PROBLEM SELECTOR PROBLEM 1
Neutropenic sepsis
Lung mass
Lung abscess
Lung consolidation
Lung mass

## 2017-06-29 NOTE — PROGRESS NOTE ADULT - PROBLEM SELECTOR PROBLEM 4
Prophylactic measure
Normocytic anemia
Schizoaffective disorder
Schizoaffective disorder
Aspiration into airway, subsequent encounter
Aspiration into airway, subsequent encounter
Normocytic anemia
Congenital neutropenia

## 2017-06-29 NOTE — PROGRESS NOTE ADULT - ASSESSMENT
51F congenital cyclic neutropenia, schizoaffective disorder presented to the hospital with cough and fevers and was found to have  RLL mass with culture growing alpha strep .  Patient was on IV  Cefepime and ID has recommended transition to Augmenting upon D/C.   Patient was seen by Hem/ onc team and has received one dose of Xarxio on 6/27/17 with resolution of Neutropenia

## 2017-06-29 NOTE — PROGRESS NOTE ADULT - ATTENDING COMMENTS
Samir Napier  Pager: [phone]. If no response or past 5 pm call [phone].
Samir Napier  Pager: [phone]. If no response or past 5 pm call [phone].  Please call ID service for questions over weekend at [phone].
Samir Napier  Pager: [phone]. If no response or past 5 pm call [phone].
Samir Napier  Pager: [phone]. If no response or past 5 pm call [phone].
Samir Napier  Pager: [phone]. If no response or past 5 pm call [phone].
Samir Napier  Pager: [phone]. If no response or past 5 pm call [phone].
Samir Napier  Pager: [phone]. If no response or past 5 pm call [phone].
Annamarie Herrera MD  [phone]  Nights and weekends please call: [phone]
Annamarie Herrera MD  [phone]  Nights and weekends please call: [phone]
Annamarie Herrera MD  [phone]  Nights and weekends please call: [phone]
Tyrone Uribe MD   [phone]
Tyrone Uribe MD   [phone]
Tyrone Uribe MD   [phone]
Tyrone Uribe MD   [phone]
Tyrone Uribe MD   [phone]
Tyrone Uribe MD   [phone]

## 2017-06-29 NOTE — PROGRESS NOTE ADULT - ASSESSMENT
51 F with cogenital cyclical neutropenia, schizoaffective disorder, admitted with cough, fever and a RLL opacity, found to have a lung abscess, s/p IR guided aspiration with cultures growing alpha hemolytic strept     1. Lung abscess:  - On IV cefepime per ID recs  - FNA cxs showing alpha hemolytic strept, cytopath negative for malignant cells  - Remaining infectious work up including HIV, fungal studies and quant gold are negative.   - Clinically improving on current antibiotics  - Plan for a total of 4 weeks of antibiotics (oral Augmentin upon discharge) per ID  - Will require a follow up CT chest in 6-8 weeks to ensure resolution  - Cont flonase for post nasal drip and upper airway cough syndrome  - Chest PT, incentive spirometry, OBC/ ambulate as tolerated    2. Cyclical neutropenia:  - Neutropenia initially resolved post Zarxio, WBC/ANC now trending down again  - Cont management per heme    3. Schizoaffective disorder:   - Cont Luvox and Zyprexa    4. Dysphagia/ Aspiration:  - Barium study showing silent aspiration with thin liquids. Now on a dysphagia  diet per speech and swallow recs  - CT head unremarkable, evaluated by GI  - Cont careful monitoring of resp status, aspiration precautions    5. Prophylaxis:  - DVT px with Hep SQ  - PT/OT, OBC as tolerated

## 2017-07-07 ENCOUNTER — APPOINTMENT (OUTPATIENT)
Dept: INTERNAL MEDICINE | Facility: CLINIC | Age: 52
End: 2017-07-07

## 2017-07-07 VITALS
BODY MASS INDEX: 20.25 KG/M2 | HEIGHT: 66 IN | TEMPERATURE: 98.2 F | OXYGEN SATURATION: 99 % | HEART RATE: 76 BPM | SYSTOLIC BLOOD PRESSURE: 100 MMHG | WEIGHT: 126 LBS | DIASTOLIC BLOOD PRESSURE: 70 MMHG

## 2017-07-07 DIAGNOSIS — R14.3 FLATULENCE: ICD-10-CM

## 2017-07-07 DIAGNOSIS — Z87.898 PERSONAL HISTORY OF OTHER SPECIFIED CONDITIONS: ICD-10-CM

## 2017-07-11 ENCOUNTER — OUTPATIENT (OUTPATIENT)
Dept: OUTPATIENT SERVICES | Facility: HOSPITAL | Age: 52
LOS: 1 days | Discharge: HOME | End: 2017-07-11

## 2017-07-11 DIAGNOSIS — D70.9 NEUTROPENIA, UNSPECIFIED: ICD-10-CM

## 2017-07-12 ENCOUNTER — APPOINTMENT (OUTPATIENT)
Dept: HEMATOLOGY ONCOLOGY | Facility: CLINIC | Age: 52
End: 2017-07-12

## 2017-07-12 VITALS
HEART RATE: 97 BPM | OXYGEN SATURATION: 100 % | SYSTOLIC BLOOD PRESSURE: 114 MMHG | WEIGHT: 126.55 LBS | BODY MASS INDEX: 20.34 KG/M2 | TEMPERATURE: 100.1 F | HEIGHT: 65.98 IN | RESPIRATION RATE: 16 BRPM | DIASTOLIC BLOOD PRESSURE: 79 MMHG

## 2017-07-12 LAB
ALBUMIN SERPL ELPH-MCNC: 4 G/DL
ALP BLD-CCNC: 76 U/L
ALT SERPL-CCNC: 11 U/L
ANION GAP SERPL CALC-SCNC: 20 MMOL/L
AST SERPL-CCNC: 12 U/L
BILIRUB SERPL-MCNC: 0.2 MG/DL
BUN SERPL-MCNC: 8 MG/DL
CALCIUM SERPL-MCNC: 9.4 MG/DL
CHLORIDE SERPL-SCNC: 98 MMOL/L
CO2 SERPL-SCNC: 24 MMOL/L
CREAT SERPL-MCNC: 0.84 MG/DL
GLUCOSE SERPL-MCNC: 102 MG/DL
LDH SERPL-CCNC: 172 U/L
POTASSIUM SERPL-SCNC: 4.7 MMOL/L
PROT SERPL-MCNC: 7.7 G/DL
SODIUM SERPL-SCNC: 142 MMOL/L

## 2017-07-19 LAB
CULTURE RESULTS: SIGNIFICANT CHANGE UP
SPECIMEN SOURCE: SIGNIFICANT CHANGE UP

## 2017-08-02 LAB
CULTURE RESULTS: SIGNIFICANT CHANGE UP
SPECIMEN SOURCE: SIGNIFICANT CHANGE UP

## 2017-08-03 ENCOUNTER — APPOINTMENT (OUTPATIENT)
Dept: PULMONOLOGY | Facility: CLINIC | Age: 52
End: 2017-08-03
Payer: MEDICARE

## 2017-08-03 VITALS
HEIGHT: 65.98 IN | OXYGEN SATURATION: 99 % | SYSTOLIC BLOOD PRESSURE: 110 MMHG | HEART RATE: 68 BPM | WEIGHT: 125 LBS | RESPIRATION RATE: 16 BRPM | BODY MASS INDEX: 20.09 KG/M2 | TEMPERATURE: 98 F | DIASTOLIC BLOOD PRESSURE: 70 MMHG

## 2017-08-03 PROCEDURE — 99214 OFFICE O/P EST MOD 30 MIN: CPT

## 2017-08-04 ENCOUNTER — APPOINTMENT (OUTPATIENT)
Dept: INFECTIOUS DISEASE | Facility: CLINIC | Age: 52
End: 2017-08-04

## 2017-09-18 ENCOUNTER — APPOINTMENT (OUTPATIENT)
Dept: OBGYN | Facility: CLINIC | Age: 52
End: 2017-09-18

## 2017-10-04 ENCOUNTER — OUTPATIENT (OUTPATIENT)
Dept: OUTPATIENT SERVICES | Facility: HOSPITAL | Age: 52
LOS: 1 days | Discharge: HOME | End: 2017-10-04

## 2017-10-04 DIAGNOSIS — D70.9 NEUTROPENIA, UNSPECIFIED: ICD-10-CM

## 2017-10-09 ENCOUNTER — RESULT REVIEW (OUTPATIENT)
Age: 52
End: 2017-10-09

## 2017-10-09 ENCOUNTER — APPOINTMENT (OUTPATIENT)
Dept: HEMATOLOGY ONCOLOGY | Facility: CLINIC | Age: 52
End: 2017-10-09
Payer: MEDICARE

## 2017-10-09 VITALS
WEIGHT: 132.28 LBS | DIASTOLIC BLOOD PRESSURE: 70 MMHG | OXYGEN SATURATION: 99 % | BODY MASS INDEX: 21.36 KG/M2 | HEART RATE: 66 BPM | SYSTOLIC BLOOD PRESSURE: 110 MMHG | RESPIRATION RATE: 16 BRPM | TEMPERATURE: 98.7 F

## 2017-10-09 VITALS — BODY MASS INDEX: 22.86 KG/M2 | HEIGHT: 63.78 IN

## 2017-10-09 DIAGNOSIS — Z87.891 PERSONAL HISTORY OF NICOTINE DEPENDENCE: ICD-10-CM

## 2017-10-09 LAB
ALBUMIN SERPL ELPH-MCNC: 3.7 G/DL
ALP BLD-CCNC: 74 U/L
ALT SERPL-CCNC: 20 U/L
ANION GAP SERPL CALC-SCNC: 13 MMOL/L
AST SERPL-CCNC: 15 U/L
BASOPHILS # BLD AUTO: 0.1 K/UL — SIGNIFICANT CHANGE UP (ref 0–0.2)
BASOPHILS NFR BLD AUTO: 1 % — SIGNIFICANT CHANGE UP (ref 0–2)
BILIRUB SERPL-MCNC: <0.2 MG/DL
BUN SERPL-MCNC: 11 MG/DL
CALCIUM SERPL-MCNC: 8.9 MG/DL
CHLORIDE SERPL-SCNC: 101 MMOL/L
CO2 SERPL-SCNC: 24 MMOL/L
CREAT SERPL-MCNC: 0.76 MG/DL
EOSINOPHIL # BLD AUTO: 0.1 K/UL — SIGNIFICANT CHANGE UP (ref 0–0.5)
EOSINOPHIL NFR BLD AUTO: 2 % — SIGNIFICANT CHANGE UP (ref 0–6)
GLUCOSE SERPL-MCNC: 91 MG/DL
HCT VFR BLD CALC: 32.1 % — LOW (ref 34.5–45)
HGB BLD-MCNC: 11 G/DL — LOW (ref 11.5–15.5)
LDH SERPL-CCNC: 109 U/L
LYMPHOCYTES # BLD AUTO: 1.8 K/UL — SIGNIFICANT CHANGE UP (ref 1–3.3)
LYMPHOCYTES # BLD AUTO: 68 % — HIGH (ref 13–44)
MCHC RBC-ENTMCNC: 30.5 PG — SIGNIFICANT CHANGE UP (ref 27–34)
MCHC RBC-ENTMCNC: 34.2 G/DL — SIGNIFICANT CHANGE UP (ref 32–36)
MCV RBC AUTO: 89 FL — SIGNIFICANT CHANGE UP (ref 80–100)
MONOCYTES # BLD AUTO: 0.4 K/UL — SIGNIFICANT CHANGE UP (ref 0–0.9)
MONOCYTES NFR BLD AUTO: 13 % — SIGNIFICANT CHANGE UP (ref 2–14)
NEUTROPHILS # BLD AUTO: 0.4 K/UL — LOW (ref 1.8–7.4)
NEUTROPHILS NFR BLD AUTO: 16 % — LOW (ref 43–77)
PLAT MORPH BLD: NORMAL — SIGNIFICANT CHANGE UP
PLATELET # BLD AUTO: 184 K/UL — SIGNIFICANT CHANGE UP (ref 150–400)
POTASSIUM SERPL-SCNC: 4 MMOL/L
PROT SERPL-MCNC: 6.3 G/DL
RBC # BLD: 3.61 M/UL — LOW (ref 3.8–5.2)
RBC # FLD: 14 % — SIGNIFICANT CHANGE UP (ref 10.3–14.5)
RBC BLD AUTO: SIGNIFICANT CHANGE UP
SODIUM SERPL-SCNC: 138 MMOL/L
WBC # BLD: 2.7 K/UL — LOW (ref 3.8–10.5)
WBC # FLD AUTO: 2.7 K/UL — LOW (ref 3.8–10.5)

## 2017-10-09 PROCEDURE — 99215 OFFICE O/P EST HI 40 MIN: CPT

## 2017-10-10 ENCOUNTER — TRANSCRIPTION ENCOUNTER (OUTPATIENT)
Age: 52
End: 2017-10-10

## 2017-10-11 ENCOUNTER — TRANSCRIPTION ENCOUNTER (OUTPATIENT)
Age: 52
End: 2017-10-11

## 2017-10-12 ENCOUNTER — APPOINTMENT (OUTPATIENT)
Dept: INTERNAL MEDICINE | Facility: CLINIC | Age: 52
End: 2017-10-12

## 2017-11-03 ENCOUNTER — APPOINTMENT (OUTPATIENT)
Dept: INTERNAL MEDICINE | Facility: CLINIC | Age: 52
End: 2017-11-03

## 2017-12-18 ENCOUNTER — APPOINTMENT (OUTPATIENT)
Dept: OBGYN | Facility: CLINIC | Age: 52
End: 2017-12-18

## 2017-12-21 NOTE — DISCHARGE NOTE ADULT - BECAUSE OF A PHYSICAL, MENTAL OR EMOTIONAL CONDITION, DO YOU HAVE DIFFICULTY DOING  ERRANDS ALONE LIKE VISITING A DOCTOR'S OFFICE OR SHOPPING (15 YEARS AND OLDER)
No Consent (Near Eyelid Margin)/Introductory Paragraph: The rationale for Mohs was explained to the patient and consent was obtained. The risks, benefits and alternatives to therapy were discussed in detail. Specifically, the risks of ectropion or eyelid deformity, infection, scarring, bleeding, prolonged wound healing, incomplete removal, allergy to anesthesia, nerve injury and recurrence were addressed. Prior to the procedure, the treatment site was clearly identified and confirmed by the patient. All components of Universal Protocol/PAUSE Rule completed.

## 2018-01-16 ENCOUNTER — APPOINTMENT (OUTPATIENT)
Dept: OBGYN | Facility: CLINIC | Age: 53
End: 2018-01-16

## 2018-01-18 ENCOUNTER — OUTPATIENT (OUTPATIENT)
Dept: OUTPATIENT SERVICES | Facility: HOSPITAL | Age: 53
LOS: 1 days | Discharge: HOME | End: 2018-01-18

## 2018-01-18 DIAGNOSIS — D70.9 NEUTROPENIA, UNSPECIFIED: ICD-10-CM

## 2018-01-19 ENCOUNTER — RESULT REVIEW (OUTPATIENT)
Age: 53
End: 2018-01-19

## 2018-01-19 ENCOUNTER — APPOINTMENT (OUTPATIENT)
Dept: HEMATOLOGY ONCOLOGY | Facility: CLINIC | Age: 53
End: 2018-01-19
Payer: MEDICARE

## 2018-01-19 ENCOUNTER — APPOINTMENT (OUTPATIENT)
Dept: INFUSION THERAPY | Facility: HOSPITAL | Age: 53
End: 2018-01-19
Payer: MEDICARE

## 2018-01-19 VITALS
OXYGEN SATURATION: 100 % | BODY MASS INDEX: 22.44 KG/M2 | DIASTOLIC BLOOD PRESSURE: 78 MMHG | RESPIRATION RATE: 16 BRPM | HEART RATE: 70 BPM | SYSTOLIC BLOOD PRESSURE: 117 MMHG | WEIGHT: 129.85 LBS | TEMPERATURE: 98.2 F

## 2018-01-19 LAB
BASOPHILS NFR BLD AUTO: 3 % — HIGH (ref 0–2)
EOSINOPHIL NFR BLD AUTO: 2 % — SIGNIFICANT CHANGE UP (ref 0–6)
HCT VFR BLD CALC: 38.4 % — SIGNIFICANT CHANGE UP (ref 34.5–45)
HGB BLD-MCNC: 12.6 G/DL — SIGNIFICANT CHANGE UP (ref 11.5–15.5)
LYMPHOCYTES # BLD AUTO: 61 % — HIGH (ref 13–44)
LYMPHOCYTES # BLD AUTO: SIGNIFICANT CHANGE UP (ref 1–3.3)
MCHC RBC-ENTMCNC: 29.5 PG — SIGNIFICANT CHANGE UP (ref 27–34)
MCHC RBC-ENTMCNC: 32.7 G/DL — SIGNIFICANT CHANGE UP (ref 32–36)
MCV RBC AUTO: 90.2 FL — SIGNIFICANT CHANGE UP (ref 80–100)
MONOCYTES NFR BLD AUTO: 14 % — SIGNIFICANT CHANGE UP (ref 2–14)
NEUTROPHILS # BLD AUTO: 0.8 K/UL — LOW (ref 1.8–7.4)
NEUTROPHILS NFR BLD AUTO: 20 % — LOW (ref 43–77)
PLAT MORPH BLD: NORMAL — SIGNIFICANT CHANGE UP
PLATELET # BLD AUTO: 185 K/UL — SIGNIFICANT CHANGE UP (ref 150–400)
RBC # BLD: 4.26 M/UL — SIGNIFICANT CHANGE UP (ref 3.8–5.2)
RBC # FLD: 11.3 % — SIGNIFICANT CHANGE UP (ref 10.3–14.5)
RBC BLD AUTO: SIGNIFICANT CHANGE UP
WBC # BLD: 2.6 K/UL — LOW (ref 3.8–10.5)
WBC # FLD AUTO: 2.6 K/UL — LOW (ref 3.8–10.5)

## 2018-01-19 PROCEDURE — 99215 OFFICE O/P EST HI 40 MIN: CPT | Mod: 25

## 2018-01-19 PROCEDURE — G0008: CPT

## 2018-01-19 RX ORDER — ALBUTEROL SULFATE 90 UG/1
108 (90 BASE) AEROSOL, METERED RESPIRATORY (INHALATION)
Qty: 1 | Refills: 5 | Status: DISCONTINUED | COMMUNITY
Start: 2017-04-21 | End: 2018-01-19

## 2018-01-19 RX ORDER — FLUTICASONE PROPIONATE 50 UG/1
50 SPRAY, METERED NASAL TWICE DAILY
Qty: 1 | Refills: 5 | Status: DISCONTINUED | COMMUNITY
Start: 2017-05-18 | End: 2018-01-19

## 2018-01-19 RX ORDER — BUSPIRONE HYDROCHLORIDE 30 MG/1
30 TABLET ORAL
Qty: 60 | Refills: 0 | Status: DISCONTINUED | COMMUNITY
Start: 2016-10-05 | End: 2018-01-19

## 2018-01-19 RX ORDER — ALBUTEROL SULFATE 108 UG/1
108 (90 BASE) AEROSOL, METERED RESPIRATORY (INHALATION) EVERY 6 HOURS
Qty: 1 | Refills: 4 | Status: DISCONTINUED | COMMUNITY
Start: 2017-06-13 | End: 2018-01-19

## 2018-01-19 RX ORDER — PANTOPRAZOLE 40 MG/1
40 TABLET, DELAYED RELEASE ORAL
Qty: 30 | Refills: 0 | Status: DISCONTINUED | COMMUNITY
Start: 2017-06-29 | End: 2018-01-19

## 2018-01-19 RX ORDER — BENZONATATE 100 MG/1
100 CAPSULE ORAL 3 TIMES DAILY
Qty: 90 | Refills: 0 | Status: DISCONTINUED | COMMUNITY
Start: 2017-06-13 | End: 2018-01-19

## 2018-02-12 ENCOUNTER — APPOINTMENT (OUTPATIENT)
Dept: OBGYN | Facility: CLINIC | Age: 53
End: 2018-02-12
Payer: MEDICARE

## 2018-02-12 VITALS — DIASTOLIC BLOOD PRESSURE: 70 MMHG | BODY MASS INDEX: 22.81 KG/M2 | WEIGHT: 132 LBS | SYSTOLIC BLOOD PRESSURE: 118 MMHG

## 2018-02-12 DIAGNOSIS — N90.89 OTHER SPECIFIED NONINFLAMMATORY DISORDERS OF VULVA AND PERINEUM: ICD-10-CM

## 2018-02-12 PROCEDURE — 99396 PREV VISIT EST AGE 40-64: CPT

## 2018-02-14 LAB — HPV HIGH+LOW RISK DNA PNL CVX: NOT DETECTED

## 2018-02-17 ENCOUNTER — OUTPATIENT (OUTPATIENT)
Dept: OUTPATIENT SERVICES | Facility: HOSPITAL | Age: 53
LOS: 1 days | End: 2018-02-17

## 2018-02-17 ENCOUNTER — APPOINTMENT (OUTPATIENT)
Dept: MAMMOGRAPHY | Facility: IMAGING CENTER | Age: 53
End: 2018-02-17

## 2018-02-17 DIAGNOSIS — Z00.8 ENCOUNTER FOR OTHER GENERAL EXAMINATION: ICD-10-CM

## 2018-02-21 LAB — CYTOLOGY CVX/VAG DOC THIN PREP: NORMAL

## 2018-02-26 ENCOUNTER — APPOINTMENT (OUTPATIENT)
Dept: PULMONOLOGY | Facility: CLINIC | Age: 53
End: 2018-02-26

## 2018-03-16 ENCOUNTER — LABORATORY RESULT (OUTPATIENT)
Age: 53
End: 2018-03-16

## 2018-03-16 ENCOUNTER — APPOINTMENT (OUTPATIENT)
Dept: INTERNAL MEDICINE | Facility: CLINIC | Age: 53
End: 2018-03-16
Payer: MEDICARE

## 2018-03-16 VITALS
BODY MASS INDEX: 22.29 KG/M2 | OXYGEN SATURATION: 98 % | DIASTOLIC BLOOD PRESSURE: 70 MMHG | TEMPERATURE: 99.1 F | SYSTOLIC BLOOD PRESSURE: 120 MMHG | HEART RATE: 72 BPM | WEIGHT: 129 LBS | HEIGHT: 63.78 IN

## 2018-03-16 DIAGNOSIS — Z87.09 PERSONAL HISTORY OF OTHER DISEASES OF THE RESPIRATORY SYSTEM: ICD-10-CM

## 2018-03-16 DIAGNOSIS — R13.12 DYSPHAGIA, OROPHARYNGEAL PHASE: ICD-10-CM

## 2018-03-16 DIAGNOSIS — Z87.891 PERSONAL HISTORY OF NICOTINE DEPENDENCE: ICD-10-CM

## 2018-03-16 DIAGNOSIS — Z87.39 PERSONAL HISTORY OF OTHER DISEASES OF THE MUSCULOSKELETAL SYSTEM AND CONNECTIVE TISSUE: ICD-10-CM

## 2018-03-16 DIAGNOSIS — R93.8 ABNORMAL FINDINGS ON DIAGNOSTIC IMAGING OF OTHER SPECIFIED BODY STRUCTURES: ICD-10-CM

## 2018-03-16 DIAGNOSIS — R69 ILLNESS, UNSPECIFIED: ICD-10-CM

## 2018-03-16 DIAGNOSIS — R13.14 DYSPHAGIA, PHARYNGOESOPHAGEAL PHASE: ICD-10-CM

## 2018-03-16 PROCEDURE — 99213 OFFICE O/P EST LOW 20 MIN: CPT

## 2018-03-20 LAB
ALBUMIN SERPL ELPH-MCNC: 3.8 G/DL
ALP BLD-CCNC: 71 U/L
ALT SERPL-CCNC: 13 U/L
ANION GAP SERPL CALC-SCNC: 15 MMOL/L
AST SERPL-CCNC: 18 U/L
BASOPHILS NFR BLD AUTO: 0.9 %
BILIRUB SERPL-MCNC: 0.3 MG/DL
BUN SERPL-MCNC: 11 MG/DL
CALCIUM SERPL-MCNC: 9.4 MG/DL
CHLORIDE SERPL-SCNC: 99 MMOL/L
CO2 SERPL-SCNC: 24 MMOL/L
CREAT SERPL-MCNC: 0.92 MG/DL
EOSINOPHIL NFR BLD AUTO: 0.9 %
GLUCOSE SERPL-MCNC: 97 MG/DL
HCT VFR BLD CALC: 36.3 %
HGB BLD-MCNC: 12.1 G/DL
LYMPHOCYTES # BLD AUTO: 1.04 K/UL
LYMPHOCYTES NFR BLD AUTO: 35 %
MAN DIFF?: NORMAL
MCHC RBC-ENTMCNC: 29.9 PG
MCHC RBC-ENTMCNC: 33.3 GM/DL
MCV RBC AUTO: 89.6 FL
MONOCYTES NFR BLD AUTO: 33.3 %
NEUTROPHILS # BLD AUTO: 0.63 K/UL
NEUTROPHILS NFR BLD AUTO: 11.1 %
PLATELET # BLD AUTO: 229 K/UL
POTASSIUM SERPL-SCNC: 4.2 MMOL/L
PROT SERPL-MCNC: 7.4 G/DL
RBC # BLD: 4.05 M/UL
RBC # FLD: 13.7 %
SODIUM SERPL-SCNC: 138 MMOL/L
WBC # FLD AUTO: 2.96 K/UL

## 2018-03-21 ENCOUNTER — APPOINTMENT (OUTPATIENT)
Dept: PULMONOLOGY | Facility: CLINIC | Age: 53
End: 2018-03-21

## 2018-04-24 ENCOUNTER — MOBILE ON CALL (OUTPATIENT)
Age: 53
End: 2018-04-24

## 2018-04-27 ENCOUNTER — OTHER (OUTPATIENT)
Age: 53
End: 2018-04-27

## 2018-05-04 ENCOUNTER — FORM ENCOUNTER (OUTPATIENT)
Age: 53
End: 2018-05-04

## 2018-05-05 ENCOUNTER — OUTPATIENT (OUTPATIENT)
Dept: OUTPATIENT SERVICES | Facility: HOSPITAL | Age: 53
LOS: 1 days | End: 2018-05-05
Payer: COMMERCIAL

## 2018-05-05 ENCOUNTER — APPOINTMENT (OUTPATIENT)
Dept: MAMMOGRAPHY | Facility: IMAGING CENTER | Age: 53
End: 2018-05-05
Payer: MEDICARE

## 2018-05-05 ENCOUNTER — APPOINTMENT (OUTPATIENT)
Dept: ULTRASOUND IMAGING | Facility: IMAGING CENTER | Age: 53
End: 2018-05-05
Payer: MEDICARE

## 2018-05-05 DIAGNOSIS — Z00.8 ENCOUNTER FOR OTHER GENERAL EXAMINATION: ICD-10-CM

## 2018-05-05 PROCEDURE — 76641 ULTRASOUND BREAST COMPLETE: CPT | Mod: 26,50

## 2018-05-05 PROCEDURE — 76641 ULTRASOUND BREAST COMPLETE: CPT

## 2018-05-05 PROCEDURE — 77063 BREAST TOMOSYNTHESIS BI: CPT | Mod: 26

## 2018-05-05 PROCEDURE — 77067 SCR MAMMO BI INCL CAD: CPT | Mod: 26

## 2018-05-05 PROCEDURE — 77067 SCR MAMMO BI INCL CAD: CPT

## 2018-05-05 PROCEDURE — 77063 BREAST TOMOSYNTHESIS BI: CPT

## 2018-05-25 ENCOUNTER — MOBILE ON CALL (OUTPATIENT)
Age: 53
End: 2018-05-25

## 2018-06-01 ENCOUNTER — FORM ENCOUNTER (OUTPATIENT)
Age: 53
End: 2018-06-01

## 2018-06-02 ENCOUNTER — APPOINTMENT (OUTPATIENT)
Dept: ULTRASOUND IMAGING | Facility: IMAGING CENTER | Age: 53
End: 2018-06-02
Payer: MEDICARE

## 2018-06-02 ENCOUNTER — OUTPATIENT (OUTPATIENT)
Dept: OUTPATIENT SERVICES | Facility: HOSPITAL | Age: 53
LOS: 1 days | End: 2018-06-02
Payer: COMMERCIAL

## 2018-06-02 DIAGNOSIS — Z00.8 ENCOUNTER FOR OTHER GENERAL EXAMINATION: ICD-10-CM

## 2018-06-02 PROCEDURE — 76642 ULTRASOUND BREAST LIMITED: CPT | Mod: 26,RT

## 2018-06-02 PROCEDURE — 76642 ULTRASOUND BREAST LIMITED: CPT

## 2018-06-20 ENCOUNTER — APPOINTMENT (OUTPATIENT)
Dept: PULMONOLOGY | Facility: CLINIC | Age: 53
End: 2018-06-20

## 2018-06-21 ENCOUNTER — RESULT REVIEW (OUTPATIENT)
Age: 53
End: 2018-06-21

## 2018-06-21 ENCOUNTER — APPOINTMENT (OUTPATIENT)
Dept: PULMONOLOGY | Facility: CLINIC | Age: 53
End: 2018-06-21
Payer: MEDICARE

## 2018-06-21 ENCOUNTER — APPOINTMENT (OUTPATIENT)
Dept: HEMATOLOGY ONCOLOGY | Facility: CLINIC | Age: 53
End: 2018-06-21
Payer: MEDICARE

## 2018-06-21 ENCOUNTER — OUTPATIENT (OUTPATIENT)
Dept: OUTPATIENT SERVICES | Facility: HOSPITAL | Age: 53
LOS: 1 days | Discharge: HOME | End: 2018-06-21

## 2018-06-21 VITALS
HEIGHT: 65 IN | HEART RATE: 84 BPM | OXYGEN SATURATION: 98 % | WEIGHT: 130.07 LBS | RESPIRATION RATE: 16 BRPM | DIASTOLIC BLOOD PRESSURE: 83 MMHG | SYSTOLIC BLOOD PRESSURE: 130 MMHG | TEMPERATURE: 98.8 F | BODY MASS INDEX: 21.67 KG/M2

## 2018-06-21 VITALS
BODY MASS INDEX: 22.48 KG/M2 | SYSTOLIC BLOOD PRESSURE: 130 MMHG | WEIGHT: 130.07 LBS | TEMPERATURE: 98.8 F | OXYGEN SATURATION: 98 % | HEART RATE: 84 BPM | RESPIRATION RATE: 16 BRPM | DIASTOLIC BLOOD PRESSURE: 83 MMHG

## 2018-06-21 VITALS
BODY MASS INDEX: 22.47 KG/M2 | DIASTOLIC BLOOD PRESSURE: 80 MMHG | SYSTOLIC BLOOD PRESSURE: 110 MMHG | HEART RATE: 79 BPM | RESPIRATION RATE: 16 BRPM | WEIGHT: 130 LBS | TEMPERATURE: 98.4 F | HEIGHT: 63.78 IN

## 2018-06-21 DIAGNOSIS — F41.9 ANXIETY DISORDER, UNSPECIFIED: ICD-10-CM

## 2018-06-21 DIAGNOSIS — D70.9 NEUTROPENIA, UNSPECIFIED: ICD-10-CM

## 2018-06-21 LAB
BASOPHILS # BLD AUTO: 0 K/UL — SIGNIFICANT CHANGE UP (ref 0–0.2)
BASOPHILS NFR BLD AUTO: 1.1 % — SIGNIFICANT CHANGE UP (ref 0–2)
EOSINOPHIL # BLD AUTO: 0.2 K/UL — SIGNIFICANT CHANGE UP (ref 0–0.5)
EOSINOPHIL NFR BLD AUTO: 4 % — SIGNIFICANT CHANGE UP (ref 0–6)
HCT VFR BLD CALC: 37.9 % — SIGNIFICANT CHANGE UP (ref 34.5–45)
HGB BLD-MCNC: 12.7 G/DL — SIGNIFICANT CHANGE UP (ref 11.5–15.5)
LYMPHOCYTES # BLD AUTO: 1.9 K/UL — SIGNIFICANT CHANGE UP (ref 1–3.3)
LYMPHOCYTES # BLD AUTO: 51.5 % — HIGH (ref 13–44)
MCHC RBC-ENTMCNC: 30.5 PG — SIGNIFICANT CHANGE UP (ref 27–34)
MCHC RBC-ENTMCNC: 33.6 G/DL — SIGNIFICANT CHANGE UP (ref 32–36)
MCV RBC AUTO: 90.9 FL — SIGNIFICANT CHANGE UP (ref 80–100)
MONOCYTES # BLD AUTO: 1 K/UL — HIGH (ref 0–0.9)
MONOCYTES NFR BLD AUTO: 25.6 % — HIGH (ref 2–14)
NEUTROPHILS # BLD AUTO: 0.7 K/UL — LOW (ref 1.8–7.4)
NEUTROPHILS NFR BLD AUTO: 17.9 % — LOW (ref 43–77)
PLATELET # BLD AUTO: 238 K/UL — SIGNIFICANT CHANGE UP (ref 150–400)
RBC # BLD: 4.17 M/UL — SIGNIFICANT CHANGE UP (ref 3.8–5.2)
RBC # FLD: 12.2 % — SIGNIFICANT CHANGE UP (ref 10.3–14.5)
WBC # BLD: 3.8 K/UL — SIGNIFICANT CHANGE UP (ref 3.8–10.5)
WBC # FLD AUTO: 3.8 K/UL — SIGNIFICANT CHANGE UP (ref 3.8–10.5)

## 2018-06-21 PROCEDURE — 99215 OFFICE O/P EST HI 40 MIN: CPT

## 2018-06-21 PROCEDURE — 99214 OFFICE O/P EST MOD 30 MIN: CPT

## 2018-06-21 RX ORDER — BUSPIRONE HYDROCHLORIDE 15 MG/1
15 TABLET ORAL
Qty: 30 | Refills: 0 | Status: ACTIVE | COMMUNITY
Start: 2018-06-19

## 2018-06-21 RX ORDER — VORTIOXETINE 20 MG/1
20 TABLET, FILM COATED ORAL DAILY
Qty: 30 | Refills: 0 | Status: DISCONTINUED | COMMUNITY
Start: 2018-01-19 | End: 2018-06-21

## 2018-08-31 NOTE — PATIENT PROFILE ADULT. - HEALTHCARE INFORMATION NEEDED, PROFILE
[facility]  Gastroenterology  Consult Note    Patient Name: Ivy Salgado  MRN: [id number]  Admission Date: 8/30/2018  Hospital Length of Stay: 0 days  Code Status: Full Code   Attending Provider: Candice Chamberlain MD   Consulting Provider: Paul Loya MD  Primary Care Physician: Kalia Astorga MD  Principal Problem:Palpitations    Inpatient consult to Gastroenterology  Consult performed by: Paul Loya MD  Consult ordered by: Manish Leung PA-C        Subjective:     HPI:  Ms Salgado is a 36 year old woman with history of chron's disease s/p total colectomy with end ileostomy, HTN, anxiety, who is presentign to the hospital with chest pain and palpitations; GI consulted due to concern for IBD flare.    She was last seen in GI clinic with Dr. Ross (1/2018) at which point she was doing well and due to complications of stelera she was discontinued and planned for repeat MRI and ileoscopy in ~6 months. She was discontinued and placed on prednisone. [facility] for [facility].    She notes that she has been doing unwell for the past 1 month which have significantly worsened over the preceeding 2 weeks. Her symptoms are primarily abdominal pain (she endorses chronic abdominal discomfort which is well managed on intermittent percocet and localized to near stoma) and endorses new pain in the LUQ. Due to cramping and poor appetite she has decreased her PO intake and is currently taking ~no intake - has been presenting ~weekly to [facility] for IV hydration.    On further review she endorses 1 week of night sweats and joint pains. Since hydrated her palpitations and chest pain have resolved (some palpitation with ambulation). Otherwise review of systems negative.      Regarding her IBD history, Ms Salgado follows with Dr. Ross in the clinic.    Prior IBD medications: [not chronological]  - imuran: discontinued due to  pancreatitis  - Methotrexate: discontinued due to leukopenia  - Remicade (years): lost effect  - Humira: SLE-like development  - Cimzia (10 yr duration): stopped due to active disease while on therapy  - Stelera (most recently): no response. stopped 1/2018 due to concern for complications and no active CD (facial rash, singles, recurrent UTI including ESBL, pyelonephritis and nephrolithiasis).    Prior IBD Surgeries:  - total colectomy and end-ileostomy (2012)          Past Medical History:   Diagnosis Date    Abnormal Pap smear 2007    Abnormal Pap smear 5/26/2011    Anemia     Anxiety     Arthritis     C. difficile diarrhea     Crohn's disease     Depression 8/5/2017    Encounter for blood transfusion     Genital HSV     History of colposcopy with cervical biopsy 2007 and 7/2011 2007-LYLA I  and 7/2011- LYLA I    Hypertension     Kidney stone     Kidney stone     Recurrent UTI 4/3/2013    S/P ileostomy 7/9/2012    Sterilization 6/23/2012       Past Surgical History:   Procedure Laterality Date    ABDOMINAL SURGERY      APPENDECTOMY      BLADDER SURGERY      partial cystectomy due to fistula    CKC      COLON SURGERY      COLONOSCOPY      HYSTERECTOMY  04/16/2015    SHELLIE    ILEOSTOMY      OOPHORECTOMY Right 04/16/2015    PORTACATH PLACEMENT      SKIN BIOPSY      SMALL INTESTINE SURGERY      TOTAL COLECTOMY      TUBAL LIGATION  06 06 2012    UPPER GASTROINTESTINAL ENDOSCOPY         Review of patient's allergies indicates:   Allergen Reactions    Vancomycin analogues Hives    Azathioprine sodium      Other reaction(s): pancreatitis  Other reaction(s): pancreatitis    Methotrexate analogues      Family History     Problem Relation (Age of Onset)    Cancer Father, Maternal Grandfather    Colon cancer Father    Crohn's disease Brother    Endometrial cancer Maternal Aunt    Hypertension Mother    Skin cancer Maternal Grandfather        Tobacco Use    Smoking status: Never Smoker     Smokeless tobacco: Never Used   Substance and Sexual Activity    Alcohol use: Yes     Alcohol/week: 0.0 oz     Comment: Patient only drinks wine not regular basis.    Drug use: No    Sexual activity: Yes     Partners: Male     Birth control/protection: Pill, Surgical, Condom     Comment: HYST     Review of Systems   Constitutional: Positive for appetite change, diaphoresis (night sweats), fatigue and unexpected weight change (10 lb/1 week). Negative for activity change, chills and fever.   HENT: Negative for sore throat.    Eyes: Negative for visual disturbance.   Respiratory: Negative for cough and shortness of breath.    Cardiovascular: Negative for chest pain.   Gastrointestinal: Positive for abdominal pain, diarrhea and nausea (  ). Negative for abdominal distention, anal bleeding, constipation and vomiting.   Genitourinary: Negative for dysuria.   Musculoskeletal: Positive for arthralgias. Negative for myalgias.   Skin: Negative for rash.   Neurological: Negative for dizziness and headaches.   Psychiatric/Behavioral: Negative for agitation and confusion.     Objective:     Vital Signs (Most Recent):  Temp: 99 °F (37.2 °C) (08/31/18 1216)  Pulse: 102 (08/31/18 1222)  Resp: 16 (08/31/18 1216)  BP: (!) 136/96 (08/31/18 1222)  SpO2: 100 % (08/31/18 1216) Vital Signs (24h Range):  Temp:  [97.6 °F (36.4 °C)-99 °F (37.2 °C)] 99 °F (37.2 °C)  Pulse:  [] 102  Resp:  [16-18] 16  SpO2:  [94 %-100 %] 100 %  BP: (108-143)/(71-96) 136/96     Weight: 48.1 kg (106 lb 1.6 oz) (08/30/18 1602)  Body mass index is 20.05 kg/m².      Intake/Output Summary (Last 24 hours) at 8/31/2018 1238  Last data filed at 8/31/2018 0656  Gross per 24 hour   Intake 1050 ml   Output 600 ml   Net 450 ml       Lines/Drains/Airways     Central Venous Catheter Line                 Port A Cath Single Lumen 02/16/18 0853 left subclavian 196 days          Drain                 Ileostomy 06/16/12 0000 RLQ 2267 days                Physical Exam    Constitutional: She is oriented to person, place, and time. She appears well-developed and well-nourished. No distress.   Young female, pleasant, no distress. Moving around bed without issue.   HENT:   Head: Normocephalic and atraumatic.   Eyes: No scleral icterus.   Cardiovascular: Normal rate, regular rhythm and normal heart sounds.   Pulmonary/Chest: Effort normal and breath sounds normal. No respiratory distress.   Abdominal: Soft. Bowel sounds are normal. She exhibits no distension. There is tenderness (mild tenderness mid-eigastric). There is no rebound and no guarding.   Ostomy to RLQ   Musculoskeletal: She exhibits no tenderness.   Lymphadenopathy:     She has no cervical adenopathy.   Neurological: She is alert and oriented to person, place, and time.   Skin: Skin is warm.   Psychiatric: She has a normal mood and affect. Her behavior is normal. Judgment and thought content normal.   Nursing note and vitals reviewed.      Significant Labs:  CBC:   Recent Labs   Lab  08/30/18   0918  08/31/18   0400   WBC  6.53  4.03   HGB  12.3  11.0*   HCT  37.7  33.8*   PLT  355*  266     CMP:   Recent Labs   Lab  08/31/18   0400   GLU  96   CALCIUM  8.7   ALBUMIN  3.1*   PROT  6.4   NA  141   K  3.5   CO2  23   CL  112*   BUN  6   CREATININE  0.7   ALKPHOS  74   ALT  6*   AST  14   BILITOT  0.5     Coagulation: No results for input(s): PT, INR, APTT in the last 48 hours.    Significant Imaging:  Imaging results within the past 24 hours have been reviewed.    Assessment/Plan:     Crohn's disease    Ms Salgado is a 36 year old woman with history of chron's disease s/p total colectomy with end ileostomy, HTN, anxiety, who is presentign to the hospital with chest pain and palpitations; GI consulted due to concern for IBD flare.    She is presenting with 1 month of worsening abdominal pain, nausea, no vomitus, increased ileostomy output similar to prior flares.    CRP 2.8 (3 days ago), negative shiga toxin, negative c.diff,  blood cultures NGTD, UA negative. CMP with hypokalemia of 2.9 likely secondary to increased ileostomy output.    Imp: concern for IBD flare    Plan  - start budesonide 9mg daily. Recommend breaking capsule and mixing into apple sauce to ensure adequate small bowel absorption  - please obtain MR enterography  - will plan to start entyvio as outpatient  - plan discussed with primary team            Thank you for your consult. I will follow-up with patient. Please contact us if you have any additional questions.    Paul Loya MD  Gastroenterology  [facility]   none

## 2018-09-04 ENCOUNTER — OUTPATIENT (OUTPATIENT)
Dept: OUTPATIENT SERVICES | Facility: HOSPITAL | Age: 53
LOS: 1 days | Discharge: HOME | End: 2018-09-04

## 2018-09-05 PROBLEM — D70.0: Chronic | Status: ACTIVE | Noted: 2017-05-14

## 2018-09-05 PROBLEM — R91.8 OTHER NONSPECIFIC ABNORMAL FINDING OF LUNG FIELD: Chronic | Status: ACTIVE | Noted: 2017-06-18

## 2018-09-13 DIAGNOSIS — F25.0 SCHIZOAFFECTIVE DISORDER, BIPOLAR TYPE: ICD-10-CM

## 2018-09-21 ENCOUNTER — APPOINTMENT (OUTPATIENT)
Dept: HEMATOLOGY ONCOLOGY | Facility: CLINIC | Age: 53
End: 2018-09-21

## 2018-10-08 ENCOUNTER — APPOINTMENT (OUTPATIENT)
Dept: HEMATOLOGY ONCOLOGY | Facility: CLINIC | Age: 53
End: 2018-10-08

## 2018-11-12 ENCOUNTER — NON-APPOINTMENT (OUTPATIENT)
Age: 53
End: 2018-11-12

## 2018-11-12 ENCOUNTER — APPOINTMENT (OUTPATIENT)
Dept: INTERNAL MEDICINE | Facility: CLINIC | Age: 53
End: 2018-11-12
Payer: COMMERCIAL

## 2018-11-12 VITALS
DIASTOLIC BLOOD PRESSURE: 80 MMHG | SYSTOLIC BLOOD PRESSURE: 120 MMHG | WEIGHT: 130 LBS | OXYGEN SATURATION: 98 % | BODY MASS INDEX: 21.66 KG/M2 | HEIGHT: 65 IN | HEART RATE: 78 BPM

## 2018-11-12 DIAGNOSIS — Z23 ENCOUNTER FOR IMMUNIZATION: ICD-10-CM

## 2018-11-12 DIAGNOSIS — H91.90 UNSPECIFIED HEARING LOSS, UNSPECIFIED EAR: ICD-10-CM

## 2018-11-12 PROCEDURE — 90686 IIV4 VACC NO PRSV 0.5 ML IM: CPT

## 2018-11-12 PROCEDURE — 90472 IMMUNIZATION ADMIN EACH ADD: CPT

## 2018-11-12 PROCEDURE — 99396 PREV VISIT EST AGE 40-64: CPT | Mod: 25

## 2018-11-12 PROCEDURE — 36415 COLL VENOUS BLD VENIPUNCTURE: CPT

## 2018-11-12 PROCEDURE — 90471 IMMUNIZATION ADMIN: CPT

## 2018-11-12 PROCEDURE — 90732 PPSV23 VACC 2 YRS+ SUBQ/IM: CPT

## 2018-11-12 PROCEDURE — 93000 ELECTROCARDIOGRAM COMPLETE: CPT

## 2018-11-12 RX ORDER — LEVOFLOXACIN 500 MG/1
500 TABLET, FILM COATED ORAL DAILY
Qty: 7 | Refills: 0 | Status: COMPLETED | COMMUNITY
Start: 2018-06-21 | End: 2018-11-12

## 2018-11-12 NOTE — REVIEW OF SYSTEMS
[Hearing Loss] : hearing loss [Constipation] : constipation [Depression] : depression [Negative] : Neurological [Earache] : no earache [Nosebleed] : no nosebleeds [Hoarseness] : no hoarseness [Nasal Discharge] : no nasal discharge [Sore Throat] : no sore throat [Postnasal Drip] : no postnasal drip [Abdominal Pain] : no abdominal pain [Nausea] : no nausea [Diarrhea] : diarrhea [Vomiting] : no vomiting [Heartburn] : no heartburn [Melena] : no melena [Suicidal] : not suicidal [Insomnia] : no insomnia [Anxiety] : no anxiety

## 2018-11-12 NOTE — HISTORY OF PRESENT ILLNESS
[FreeTextEntry1] : Comprehensive annual physical examination [de-identified] : Is a 53-year-old female with history of congenital cyclic neutropenia, depression/anxiety, constipation, former smoker, mitral valve prolapse, scoliosis who presents for comprehensive annual physical examination patient feels well complains of depression seen psychiatry denies chest pain, shortness of breath, dyspnea exertion, palpitations, lightheadedness patient also complains of decreased hearing and itchy ears

## 2018-11-12 NOTE — ASSESSMENT
[FreeTextEntry1] : Patient is a 53-year-old female with history of congenital's cyclic neutropenia, depression, anxiety, chronic constipation, pulmonary nodule, right upper lobe, history of mitral valve prolapse former smoker who presents for comprehensive annual physical exam and complains of hearing loss itchy ears\par \par 1.  History of pulmonary nodule right upper lobe\par Suggested follow-up CT in 6 months\par Schedule CT for December\par \par 2.  Cyclic neutropenia\par Check CBC follow-up with hematology\par \par 3.  Hearing loss/pruritus of ears\par Referral to ENT\par \par 4.  Depression/schizoaffective disorder\par Continue current medication follow-up with psych\par \par 5.   mitral valve prolapse\par We will check EKG next visit\par \par #6 former smoker\par Quit several years ago\par Most half a pack per day for 30 years\par \par #7 health maintenance\par Flu shot today\par Pneumococcal today\par Check routine labs\par Follow-up in 2 months

## 2018-11-12 NOTE — PHYSICAL EXAM
[No Acute Distress] : no acute distress [Well Nourished] : well nourished [Well Developed] : well developed [Well-Appearing] : well-appearing [Normal Voice/Communication] : normal voice/communication [Normal Sclera/Conjunctiva] : normal sclera/conjunctiva [PERRL] : pupils equal round and reactive to light [Normal Outer Ear/Nose] : the outer ears and nose were normal in appearance [Normal Oropharynx] : the oropharynx was normal [Normal TMs] : both tympanic membranes were normal [No JVD] : no jugular venous distention [Supple] : supple [No Lymphadenopathy] : no lymphadenopathy [Thyroid Normal, No Nodules] : the thyroid was normal and there were no nodules present [No Respiratory Distress] : no respiratory distress  [Clear to Auscultation] : lungs were clear to auscultation bilaterally [No Accessory Muscle Use] : no accessory muscle use [Normal Rate] : normal rate  [Regular Rhythm] : with a regular rhythm [Normal S1, S2] : normal S1 and S2 [No Murmur] : no murmur heard [Normal Supraclavicular Nodes] : no supraclavicular lymphadenopathy [Normal Axillary Nodes] : no axillary lymphadenopathy [Normal Anterior Cervical Nodes] : no anterior cervical lymphadenopathy [No CVA Tenderness] : no CVA  tenderness [No Spinal Tenderness] : no spinal tenderness [No Joint Swelling] : no joint swelling [Grossly Normal Strength/Tone] : grossly normal strength/tone [No Skin Lesions] : no skin lesions [Normal Gait] : normal gait [Coordination Grossly Intact] : coordination grossly intact [No Focal Deficits] : no focal deficits [Deep Tendon Reflexes (DTR)] : deep tendon reflexes were 2+ and symmetric [Speech Grossly Normal] : speech grossly normal [Memory Grossly Normal] : memory grossly normal [Normal Affect] : the affect was normal [Alert and Oriented x3] : oriented to person, place, and time [Normal Mood] : the mood was normal [Normal Insight/Judgement] : insight and judgment were intact

## 2018-11-12 NOTE — HEALTH RISK ASSESSMENT
[No falls in past year] : Patient reported no falls in the past year [Patient reported mammogram was normal] : Patient reported mammogram was normal [Patient reported PAP Smear was normal] : Patient reported PAP Smear was normal [Alone] : lives alone [Employed] : employed [College] : College [Single] : single [Feels Safe at Home] : Feels safe at home [Fully functional (bathing, dressing, toileting, transferring, walking, feeding)] : Fully functional (bathing, dressing, toileting, transferring, walking, feeding) [Fully functional (using the telephone, shopping, preparing meals, housekeeping, doing laundry, using] : Fully functional and needs no help or supervision to perform IADLs (using the telephone, shopping, preparing meals, housekeeping, doing laundry, using transportation, managing medications and managing finances) [Smoke Detector] : smoke detector [Carbon Monoxide Detector] : carbon monoxide detector [Safety elements used in home] : safety elements used in home [Seat Belt] :  uses seat belt [Sunscreen] : uses sunscreen [] : No [de-identified] : former smoker quit 7 YEARS AGPO [[id number]] : 8 [Change in mental status noted] : No change in mental status noted [Language] : denies difficulty with language [Behavior] : denies difficulty with behavior [Learning/Retaining New Information] : denies difficulty learning/retaining new information [Handling Complex Tasks] : denies difficulty handling complex tasks [Reasoning] : denies difficulty with reasoning [Spatial Ability and Orientation] : denies difficulty with spatial ability and orientation [Sexually Active] : not sexually active [High Risk Behavior] : no high risk behavior [Reports changes in hearing] : Reports no changes in hearing [Reports changes in vision] : Reports no changes in vision [Reports changes in dental health] : Reports no changes in dental health [Guns at Home] : no guns at home [Travel to Developing Areas] : does not  travel to developing areas [TB Exposure] : is not being exposed to tuberculosis [Caregiver Concerns] : does not have caregiver concerns [MammogramDate] : 05/18 [PapSmearDate] : 2018

## 2018-11-20 ENCOUNTER — MED ADMIN CHARGE (OUTPATIENT)
Age: 53
End: 2018-11-20

## 2018-11-24 ENCOUNTER — APPOINTMENT (OUTPATIENT)
Dept: CT IMAGING | Facility: CLINIC | Age: 53
End: 2018-11-24

## 2018-11-24 ENCOUNTER — OUTPATIENT (OUTPATIENT)
Dept: OUTPATIENT SERVICES | Facility: HOSPITAL | Age: 53
LOS: 1 days | End: 2018-11-24

## 2018-11-24 DIAGNOSIS — R50.9 FEVER, UNSPECIFIED: ICD-10-CM

## 2018-11-29 LAB
ALBUMIN SERPL ELPH-MCNC: 3.9 G/DL
ALP BLD-CCNC: 66 U/L
ALT SERPL-CCNC: 13 U/L
ANION GAP SERPL CALC-SCNC: 14 MMOL/L
AST SERPL-CCNC: 14 U/L
BASOPHILS # BLD AUTO: 0.03 K/UL
BASOPHILS NFR BLD AUTO: 1.2 %
BILIRUB SERPL-MCNC: 0.2 MG/DL
BUN SERPL-MCNC: 9 MG/DL
CALCIUM SERPL-MCNC: 9.4 MG/DL
CHLORIDE SERPL-SCNC: 103 MMOL/L
CHOLEST SERPL-MCNC: 209 MG/DL
CHOLEST/HDLC SERPL: 3 RATIO
CO2 SERPL-SCNC: 24 MMOL/L
CREAT SERPL-MCNC: 0.76 MG/DL
EOSINOPHIL # BLD AUTO: 0.04 K/UL
EOSINOPHIL NFR BLD AUTO: 1.7 %
GLUCOSE SERPL-MCNC: 61 MG/DL
HCT VFR BLD CALC: 36.3 %
HDLC SERPL-MCNC: 69 MG/DL
HGB BLD-MCNC: 11.8 G/DL
IMM GRANULOCYTES NFR BLD AUTO: 0 %
LDLC SERPL CALC-MCNC: 130 MG/DL
LYMPHOCYTES # BLD AUTO: 1.42 K/UL
LYMPHOCYTES NFR BLD AUTO: 58.7 %
MAN DIFF?: NORMAL
MCHC RBC-ENTMCNC: 30.1 PG
MCHC RBC-ENTMCNC: 32.5 GM/DL
MCV RBC AUTO: 92.6 FL
MONOCYTES # BLD AUTO: 0.41 K/UL
MONOCYTES NFR BLD AUTO: 16.9 %
NEUTROPHILS # BLD AUTO: 0.52 K/UL
NEUTROPHILS NFR BLD AUTO: 21.5 %
PLATELET # BLD AUTO: 240 K/UL
POTASSIUM SERPL-SCNC: 3.6 MMOL/L
PROT SERPL-MCNC: 7.1 G/DL
RBC # BLD: 3.92 M/UL
RBC # FLD: 14 %
SODIUM SERPL-SCNC: 141 MMOL/L
TRIGL SERPL-MCNC: 52 MG/DL
WBC # FLD AUTO: 2.42 K/UL

## 2018-11-30 ENCOUNTER — FORM ENCOUNTER (OUTPATIENT)
Age: 53
End: 2018-11-30

## 2018-12-01 ENCOUNTER — APPOINTMENT (OUTPATIENT)
Dept: CT IMAGING | Facility: CLINIC | Age: 53
End: 2018-12-01
Payer: COMMERCIAL

## 2018-12-01 ENCOUNTER — OUTPATIENT (OUTPATIENT)
Dept: OUTPATIENT SERVICES | Facility: HOSPITAL | Age: 53
LOS: 1 days | End: 2018-12-01
Payer: COMMERCIAL

## 2018-12-01 DIAGNOSIS — R50.9 FEVER, UNSPECIFIED: ICD-10-CM

## 2018-12-01 PROCEDURE — 71250 CT THORAX DX C-: CPT

## 2018-12-01 PROCEDURE — 71250 CT THORAX DX C-: CPT | Mod: 26

## 2018-12-10 ENCOUNTER — APPOINTMENT (OUTPATIENT)
Dept: OTOLARYNGOLOGY | Facility: CLINIC | Age: 53
End: 2018-12-10
Payer: COMMERCIAL

## 2018-12-10 VITALS
SYSTOLIC BLOOD PRESSURE: 129 MMHG | DIASTOLIC BLOOD PRESSURE: 85 MMHG | TEMPERATURE: 97.8 F | OXYGEN SATURATION: 100 % | RESPIRATION RATE: 17 BRPM | BODY MASS INDEX: 21.83 KG/M2 | HEART RATE: 57 BPM | HEIGHT: 65 IN | WEIGHT: 131 LBS

## 2018-12-10 PROCEDURE — 92550 TYMPANOMETRY & REFLEX THRESH: CPT

## 2018-12-10 PROCEDURE — 99204 OFFICE O/P NEW MOD 45 MIN: CPT

## 2018-12-10 PROCEDURE — 92557 COMPREHENSIVE HEARING TEST: CPT

## 2019-01-04 ENCOUNTER — APPOINTMENT (OUTPATIENT)
Dept: INTERNAL MEDICINE | Facility: CLINIC | Age: 54
End: 2019-01-04
Payer: COMMERCIAL

## 2019-01-04 VITALS
HEIGHT: 65 IN | SYSTOLIC BLOOD PRESSURE: 104 MMHG | BODY MASS INDEX: 21.83 KG/M2 | HEART RATE: 96 BPM | WEIGHT: 131 LBS | OXYGEN SATURATION: 98 % | TEMPERATURE: 98.8 F | DIASTOLIC BLOOD PRESSURE: 74 MMHG

## 2019-01-04 DIAGNOSIS — Z87.898 PERSONAL HISTORY OF OTHER SPECIFIED CONDITIONS: ICD-10-CM

## 2019-01-04 PROCEDURE — 99214 OFFICE O/P EST MOD 30 MIN: CPT

## 2019-01-04 NOTE — HISTORY OF PRESENT ILLNESS
[FreeTextEntry1] : Follow-up for pulmonary nodule/lung abscess and congenital neutropenia [de-identified] : \par \par Patient is a 53-year-old female with history of/2 affective disorder anxiety/depression mitral valve prolapse who presents for follow-up.  Patient feels well had CAT scan she denies fever, chills, cough, shortness of breath.  Notes recent sneezing

## 2019-01-04 NOTE — PHYSICAL EXAM
[No Acute Distress] : no acute distress [Well Nourished] : well nourished [Well Developed] : well developed [Well-Appearing] : well-appearing [Normal Voice/Communication] : normal voice/communication [Normal Outer Ear/Nose] : the outer ears and nose were normal in appearance [Normal Oropharynx] : the oropharynx was normal [Normal TMs] : both tympanic membranes were normal [No JVD] : no jugular venous distention [Supple] : supple [No Lymphadenopathy] : no lymphadenopathy [No Respiratory Distress] : no respiratory distress  [Clear to Auscultation] : lungs were clear to auscultation bilaterally [No Accessory Muscle Use] : no accessory muscle use [Normal Rate] : normal rate  [Regular Rhythm] : with a regular rhythm [Normal S1, S2] : normal S1 and S2 [Soft] : abdomen soft [Non Tender] : non-tender [No HSM] : no HSM [Normal Bowel Sounds] : normal bowel sounds [Normal Supraclavicular Nodes] : no supraclavicular lymphadenopathy [Normal Axillary Nodes] : no axillary lymphadenopathy [Normal Anterior Cervical Nodes] : no anterior cervical lymphadenopathy [Normal Inguinal Nodes] : no inguinal lymphadenopathy [de-identified] : Maxatawny turbinates slightly erythematous

## 2019-01-04 NOTE — ASSESSMENT
[FreeTextEntry1] : \par \par Patient is a 65-year-old female with history of congenital neutropenia, chronic infections recent pneumonia post CT negative who presents for follow-up\par \par #1Congenitall neutropenia\par Observe 1 status post pneumococcal and flu vaccine \par follow-up with hematology\par Up in 3-4 months\par \par 2 history of lung abscess/pneumonia/pulmonary nodule\par Repeat CT negative\par \par 3 sneezing\par Observe for signs of affection\par Nasal saline spray

## 2019-01-08 ENCOUNTER — APPOINTMENT (OUTPATIENT)
Dept: OTOLARYNGOLOGY | Facility: CLINIC | Age: 54
End: 2019-01-08
Payer: COMMERCIAL

## 2019-01-08 DIAGNOSIS — H60.8X3 OTHER OTITIS EXTERNA, BILATERAL: ICD-10-CM

## 2019-01-08 DIAGNOSIS — H90.3 SENSORINEURAL HEARING LOSS, BILATERAL: ICD-10-CM

## 2019-01-08 PROCEDURE — 99213 OFFICE O/P EST LOW 20 MIN: CPT

## 2019-01-08 PROCEDURE — 92620 AUDITORY FUNCTION 60 MIN: CPT

## 2019-01-09 PROBLEM — H90.3 CENTRAL HEARING LOSS, BILATERAL: Status: ACTIVE | Noted: 2018-12-10

## 2019-01-09 PROBLEM — H60.8X3 CHRONIC ACTINIC OTITIS EXTERNA OF BOTH EARS: Status: ACTIVE | Noted: 2018-12-10

## 2019-01-09 NOTE — HISTORY OF PRESENT ILLNESS
[de-identified] : followup 52 yo woman with psych history c/o b hl but  normal. she was scheduled for CAP today and is here to review the results. In addition, she had chronic o.e. which responded well to elocon and no longer has itching of her ears. Unfortunately she told me she took a strong tranquilizer today before her CAP. In addition she takes meds she thinks would affect the results.

## 2019-01-14 ENCOUNTER — APPOINTMENT (OUTPATIENT)
Dept: OTOLARYNGOLOGY | Facility: CLINIC | Age: 54
End: 2019-01-14

## 2019-01-31 ENCOUNTER — APPOINTMENT (OUTPATIENT)
Dept: OTOLARYNGOLOGY | Facility: CLINIC | Age: 54
End: 2019-01-31

## 2019-07-08 ENCOUNTER — APPOINTMENT (OUTPATIENT)
Dept: OBGYN | Facility: CLINIC | Age: 54
End: 2019-07-08
Payer: COMMERCIAL

## 2019-07-08 VITALS
HEIGHT: 65 IN | DIASTOLIC BLOOD PRESSURE: 60 MMHG | BODY MASS INDEX: 21.72 KG/M2 | WEIGHT: 130.38 LBS | SYSTOLIC BLOOD PRESSURE: 122 MMHG

## 2019-07-08 PROCEDURE — 99396 PREV VISIT EST AGE 40-64: CPT

## 2019-07-08 NOTE — PHYSICAL EXAM
[Awake] : awake [Acute Distress] : no acute distress [Alert] : alert [Mass] : no breast mass [Nipple Discharge] : no nipple discharge [Axillary LAD] : no axillary lymphadenopathy [Soft] : soft [Tender] : non tender [Oriented x3] : oriented to person, place, and time [Normal] : uterus [No Bleeding] : there was no active vaginal bleeding [Uterine Adnexae] : were not tender and not enlarged

## 2019-09-20 ENCOUNTER — APPOINTMENT (OUTPATIENT)
Dept: INTERNAL MEDICINE | Facility: CLINIC | Age: 54
End: 2019-09-20

## 2019-10-24 ENCOUNTER — FORM ENCOUNTER (OUTPATIENT)
Age: 54
End: 2019-10-24

## 2019-10-25 ENCOUNTER — OUTPATIENT (OUTPATIENT)
Dept: OUTPATIENT SERVICES | Facility: HOSPITAL | Age: 54
LOS: 1 days | End: 2019-10-25
Payer: COMMERCIAL

## 2019-10-25 ENCOUNTER — APPOINTMENT (OUTPATIENT)
Dept: MAMMOGRAPHY | Facility: IMAGING CENTER | Age: 54
End: 2019-10-25
Payer: COMMERCIAL

## 2019-10-25 ENCOUNTER — APPOINTMENT (OUTPATIENT)
Dept: ULTRASOUND IMAGING | Facility: IMAGING CENTER | Age: 54
End: 2019-10-25
Payer: COMMERCIAL

## 2019-10-25 DIAGNOSIS — Z01.419 ENCOUNTER FOR GYNECOLOGICAL EXAMINATION (GENERAL) (ROUTINE) WITHOUT ABNORMAL FINDINGS: ICD-10-CM

## 2019-10-25 PROCEDURE — 77067 SCR MAMMO BI INCL CAD: CPT | Mod: 26

## 2019-10-25 PROCEDURE — 76641 ULTRASOUND BREAST COMPLETE: CPT

## 2019-10-25 PROCEDURE — 77063 BREAST TOMOSYNTHESIS BI: CPT

## 2019-10-25 PROCEDURE — 77063 BREAST TOMOSYNTHESIS BI: CPT | Mod: 26

## 2019-10-25 PROCEDURE — 77067 SCR MAMMO BI INCL CAD: CPT

## 2019-10-25 PROCEDURE — 76641 ULTRASOUND BREAST COMPLETE: CPT | Mod: 26,50

## 2019-11-25 NOTE — SWALLOW BEDSIDE ASSESSMENT ADULT - MODE OF PRESENTATION
Patient calls stating she was suppose to get a prescription for Nuvaring back in October and tried to get it filled and pharmacy said they did not have any on file. Script was mistakenly sent to [facility], patient will  and have script transferred to [facility].   
self fed

## 2019-12-11 ENCOUNTER — APPOINTMENT (OUTPATIENT)
Dept: ORTHOPEDIC SURGERY | Facility: CLINIC | Age: 54
End: 2019-12-11
Payer: COMMERCIAL

## 2019-12-11 VITALS
SYSTOLIC BLOOD PRESSURE: 105 MMHG | DIASTOLIC BLOOD PRESSURE: 60 MMHG | HEART RATE: 61 BPM | OXYGEN SATURATION: 98 % | HEIGHT: 65 IN | WEIGHT: 128 LBS | BODY MASS INDEX: 21.33 KG/M2

## 2019-12-11 PROCEDURE — 99204 OFFICE O/P NEW MOD 45 MIN: CPT

## 2019-12-11 RX ORDER — BREXPIPRAZOLE 4 MG/1
4 TABLET ORAL
Qty: 30 | Refills: 0 | Status: ACTIVE | COMMUNITY
Start: 2019-10-10

## 2019-12-11 RX ORDER — MOMETASONE FUROATE 1 MG/G
0.1 CREAM TOPICAL
Qty: 1 | Refills: 3 | Status: DISCONTINUED | COMMUNITY
Start: 2018-12-10 | End: 2019-12-11

## 2019-12-11 NOTE — HISTORY OF PRESENT ILLNESS
[de-identified] : Ms. SMITH is a very pleasant 54 year old female who complains of low back pain, initially onset several months ago at the gym, resolved. Pain returned about 6 weeks ago while playing tennis, improved but patient has not returned to normal activities. Pain localized to low back, occasionally to midback. Denies lower extremity radicular pain, denies extremity numbness/weakness/paresthesias.\par \par The patient reports no loss of hand dexterity.\par The patient states there no loss of balance when walking.\par There no sensory loss in the arms or legs\par The patent no difficulty with urination.\par \par The patient no history of previous spine surgery.\par \par The patient has no history of unexpected weight loss, no history of active cancer, no history bladder or bowel dysfunction, no night pain, no fevers or chills.\par \par The past medical history, surgical history, family history, allergies, medications, 10+ point review of systems, family history and social history were reviewed and non contributory.\par \par

## 2019-12-11 NOTE — DISCUSSION/SUMMARY
[de-identified] : Discussed findings with the patient. Ms. Mendez reports several episodes of low back pain over the past several months. Symptoms improved. Neurologically intact. No further work up is necessary at this time. I am recommending a course of physical therapy, referral given. The patient will follow up as needed. All questions answered.

## 2019-12-11 NOTE — PHYSICAL EXAM
[de-identified] : General: patient is well developed, well nourished, in no acute \par distress, alert and oriented x 3. \par \par Mood and affect: normal\par \par Respiratory: no respiratory distress noted\par \par Skin: no scars over spine, skin intact, no erythema, increased warmth\par \par Alignment:The spine is well compensated in the coronal and sagittal plane.  There is no asymmetry on the humphrey forward bend test\par \par Gait: The patient is able to toe walk and heel walk without difficulty. The patient is able to tandem gait without difficulty.\par \par Palpation: no tenderness to palpation spine or paraspinal region\par \par Range of motion: Lumbar spine ROM is full\par \par Neurologic Exam:\par Motor: Manual Muscle testing in the lower extremities is 5 out of 5 in all muscle groups. There is no evidence of muscular atrophy in the lower extremities. Sensory: Sensation to light touch is grossly intact in the lower extremities\par \par Reflexes: DTR are present and symmetric throughout, negative craven bilaterally, negative inverted radial reflex bilaterally, no clonus, plantar responses are flexor\par \par Hip Exam: No pain with internal or external rotation of hips bilaterally\par \par Special tests: Straight leg raise test negative.  Cross straight leg test negative.  LOUIE test negative\par \par Vascular: Examination of the peripheral vascular system demonstrates no evidence of congestion or edema. no evidence of lymphedema bilateral lower extremities, pulses are present and symmetric in both lower extremities.\par \par  [de-identified] : none

## 2019-12-13 ENCOUNTER — LABORATORY RESULT (OUTPATIENT)
Age: 54
End: 2019-12-13

## 2019-12-13 ENCOUNTER — APPOINTMENT (OUTPATIENT)
Dept: INTERNAL MEDICINE | Facility: CLINIC | Age: 54
End: 2019-12-13
Payer: COMMERCIAL

## 2019-12-13 VITALS
SYSTOLIC BLOOD PRESSURE: 102 MMHG | BODY MASS INDEX: 20.91 KG/M2 | OXYGEN SATURATION: 98 % | TEMPERATURE: 97.8 F | DIASTOLIC BLOOD PRESSURE: 64 MMHG | HEIGHT: 65 IN | WEIGHT: 125.5 LBS | HEART RATE: 65 BPM

## 2019-12-13 PROCEDURE — 93000 ELECTROCARDIOGRAM COMPLETE: CPT

## 2019-12-13 PROCEDURE — 99396 PREV VISIT EST AGE 40-64: CPT | Mod: 25

## 2019-12-13 PROCEDURE — G0008: CPT

## 2019-12-13 PROCEDURE — 90686 IIV4 VACC NO PRSV 0.5 ML IM: CPT

## 2019-12-13 PROCEDURE — 36415 COLL VENOUS BLD VENIPUNCTURE: CPT

## 2019-12-13 NOTE — PHYSICAL EXAM
[No Acute Distress] : no acute distress [Well Developed] : well developed [Well-Appearing] : well-appearing [Well Nourished] : well nourished [Normal Sclera/Conjunctiva] : normal sclera/conjunctiva [EOMI] : extraocular movements intact [PERRL] : pupils equal round and reactive to light [Normal Outer Ear/Nose] : the outer ears and nose were normal in appearance [Normal Oropharynx] : the oropharynx was normal [No JVD] : no jugular venous distention [No Lymphadenopathy] : no lymphadenopathy [Supple] : supple [Thyroid Normal, No Nodules] : the thyroid was normal and there were no nodules present [No Respiratory Distress] : no respiratory distress  [No Accessory Muscle Use] : no accessory muscle use [Normal Rate] : normal rate  [Clear to Auscultation] : lungs were clear to auscultation bilaterally [Regular Rhythm] : with a regular rhythm [No Murmur] : no murmur heard [Normal S1, S2] : normal S1 and S2 [No Carotid Bruits] : no carotid bruits [No Abdominal Bruit] : a ~M bruit was not heard ~T in the abdomen [Pedal Pulses Present] : the pedal pulses are present [No Varicosities] : no varicosities [No Extremity Clubbing/Cyanosis] : no extremity clubbing/cyanosis [No Palpable Aorta] : no palpable aorta [No Edema] : there was no peripheral edema [Soft] : abdomen soft [Non Tender] : non-tender [No HSM] : no HSM [No Masses] : no abdominal mass palpated [Non-distended] : non-distended [Normal Bowel Sounds] : normal bowel sounds [Normal Posterior Cervical Nodes] : no posterior cervical lymphadenopathy [Normal Anterior Cervical Nodes] : no anterior cervical lymphadenopathy [No CVA Tenderness] : no CVA  tenderness [No Joint Swelling] : no joint swelling [No Spinal Tenderness] : no spinal tenderness [No Rash] : no rash [Coordination Grossly Intact] : coordination grossly intact [Grossly Normal Strength/Tone] : grossly normal strength/tone [Deep Tendon Reflexes (DTR)] : deep tendon reflexes were 2+ and symmetric [No Focal Deficits] : no focal deficits [Normal Gait] : normal gait [Normal Affect] : the affect was normal [Normal Insight/Judgement] : insight and judgment were intact

## 2019-12-13 NOTE — HISTORY OF PRESENT ILLNESS
[FreeTextEntry1] : Comprehensive annual physical examination [de-identified] : Patient is a healthy 54-year-old female with history of depression/anxiety, schizoaffective disorder, history of neutropenia, scoliosis, former smoker, hearing loss and history of mitral valve prolapse who presents for comprehensive annual physical examination patient feels well denies chest pain, shortness of breath palpitation lightheadedness dizziness occasional constipation

## 2019-12-13 NOTE — ASSESSMENT
[FreeTextEntry1] : Patient is a 54-year-old female with history of mitral valve prolapse, depression, schizophrenia affective disorder, constipation, mitral valve prolapse who presents for comprehensive annual physical examination\par \par 1 history of mitral valve prolapse\par EKG shows sinus seferino in the 50s will check TSH\par echocardiogram rule out mitral valve prolapse\par \par 2 neutropenia\par check CBC presently afebrile\par \par 3 chronic constipation\par continue MiraLAX\par \par 4 psych\par continue current medications\par \par 5 lower back pain\par improved continue physical therapy\par \par 6 health maintenance\par colonoscopy age 50\par mammogram done Pap smear done\par flu shot given\par follow-up in 6 to 12 months

## 2019-12-13 NOTE — HEALTH RISK ASSESSMENT
[Very Good] : ~his/her~ current health as very good [No] : No [No falls in past year] : Patient reported no falls in the past year [0] : 2) Feeling down, depressed, or hopeless: Not at all (0) [HIV test declined] : HIV test declined [Hepatitis C test declined] : Hepatitis C test declined [None] : None [Employed] : employed [Alone] : lives alone [Single] : single [College] : College [# Of Children ___] : has [unfilled] children [Fully functional (bathing, dressing, toileting, transferring, walking, feeding)] : Fully functional (bathing, dressing, toileting, transferring, walking, feeding) [Feels Safe at Home] : Feels safe at home [Fully functional (using the telephone, shopping, preparing meals, housekeeping, doing laundry, using] : Fully functional and needs no help or supervision to perform IADLs (using the telephone, shopping, preparing meals, housekeeping, doing laundry, using transportation, managing medications and managing finances) [Reports normal functional visual acuity (ie: able to read med bottle)] : Reports normal functional visual acuity [Smoke Detector] : smoke detector [Carbon Monoxide Detector] : carbon monoxide detector [Safety elements used in home] : safety elements used in home [Seat Belt] :  uses seat belt [Sunscreen] : uses sunscreen [] : No [de-identified] : Not recently [de-identified] : Healthy [[id number]] : 0 [Change in mental status noted] : No change in mental status noted [Language] : denies difficulty with language [Behavior] : denies difficulty with behavior [Learning/Retaining New Information] : denies difficulty learning/retaining new information [Handling Complex Tasks] : denies difficulty handling complex tasks [Reasoning] : denies difficulty with reasoning [Spatial Ability and Orientation] : denies difficulty with spatial ability and orientation [Sexually Active] : not sexually active [High Risk Behavior] : no high risk behavior [Reports changes in hearing] : Reports no changes in hearing [Reports changes in vision] : Reports no changes in vision [Reports changes in dental health] : Reports no changes in dental health [Guns at Home] : no guns at home [Travel to Developing Areas] : does not  travel to developing areas [TB Exposure] : is not being exposed to tuberculosis [Caregiver Concerns] : does not have caregiver concerns

## 2019-12-27 ENCOUNTER — APPOINTMENT (OUTPATIENT)
Dept: CARDIOLOGY | Facility: CLINIC | Age: 54
End: 2019-12-27
Payer: COMMERCIAL

## 2019-12-27 LAB
ALBUMIN SERPL ELPH-MCNC: 4.3 G/DL
ALP BLD-CCNC: 87 U/L
ALT SERPL-CCNC: 14 U/L
ANION GAP SERPL CALC-SCNC: 13 MMOL/L
AST SERPL-CCNC: 18 U/L
BASOPHILS # BLD AUTO: 0.06 K/UL
BASOPHILS NFR BLD AUTO: 1.7 %
BILIRUB SERPL-MCNC: 0.2 MG/DL
BUN SERPL-MCNC: 7 MG/DL
CALCIUM SERPL-MCNC: 9.5 MG/DL
CHLORIDE SERPL-SCNC: 98 MMOL/L
CHOLEST SERPL-MCNC: 192 MG/DL
CHOLEST/HDLC SERPL: 2.8 RATIO
CO2 SERPL-SCNC: 23 MMOL/L
CREAT SERPL-MCNC: 0.85 MG/DL
EOSINOPHIL # BLD AUTO: 0.03 K/UL
EOSINOPHIL NFR BLD AUTO: 0.9 %
ESTIMATED AVERAGE GLUCOSE: 103 MG/DL
GLUCOSE SERPL-MCNC: 87 MG/DL
HBA1C MFR BLD HPLC: 5.2 %
HCT VFR BLD CALC: 38.5 %
HDLC SERPL-MCNC: 69 MG/DL
HGB BLD-MCNC: 12.1 G/DL
LDLC SERPL CALC-MCNC: 113 MG/DL
LYMPHOCYTES # BLD AUTO: 2.05 K/UL
LYMPHOCYTES NFR BLD AUTO: 60.2 %
MAN DIFF?: NORMAL
MCHC RBC-ENTMCNC: 29.8 PG
MCHC RBC-ENTMCNC: 31.4 GM/DL
MCV RBC AUTO: 94.8 FL
MONOCYTES # BLD AUTO: 0.45 K/UL
MONOCYTES NFR BLD AUTO: 13.3 %
NEUTROPHILS # BLD AUTO: 0.81 K/UL
NEUTROPHILS NFR BLD AUTO: 23.9 %
PLATELET # BLD AUTO: 203 K/UL
POTASSIUM SERPL-SCNC: 4.1 MMOL/L
PROT SERPL-MCNC: 6.9 G/DL
RBC # BLD: 4.06 M/UL
RBC # FLD: 13 %
SODIUM SERPL-SCNC: 134 MMOL/L
TRIGL SERPL-MCNC: 49 MG/DL
TSH SERPL-ACNC: 2.51 UIU/ML
WBC # FLD AUTO: 3.41 K/UL

## 2019-12-27 PROCEDURE — 93306 TTE W/DOPPLER COMPLETE: CPT

## 2020-01-31 NOTE — H&P ADULT. - CENTRAL VENOUS CATHETER
Patient is calling wanting a call back from ALEXEY James. Patient states that she is unsure as to what is going on. She said that when she calls back to speak with ALEXEY James she is always being told that he will call her back and sometimes this does not happen. Patient said that she spoke with ALEXEY James and she was told to give him a call back. Patient can be reached at [phone].    no

## 2020-02-07 ENCOUNTER — APPOINTMENT (OUTPATIENT)
Dept: INTERNAL MEDICINE | Facility: CLINIC | Age: 55
End: 2020-02-07

## 2020-03-13 ENCOUNTER — APPOINTMENT (OUTPATIENT)
Dept: INTERNAL MEDICINE | Facility: CLINIC | Age: 55
End: 2020-03-13
Payer: COMMERCIAL

## 2020-03-13 ENCOUNTER — LABORATORY RESULT (OUTPATIENT)
Age: 55
End: 2020-03-13

## 2020-03-13 VITALS
OXYGEN SATURATION: 98 % | TEMPERATURE: 98.5 F | SYSTOLIC BLOOD PRESSURE: 110 MMHG | HEART RATE: 82 BPM | WEIGHT: 129 LBS | BODY MASS INDEX: 21.49 KG/M2 | HEIGHT: 65 IN | DIASTOLIC BLOOD PRESSURE: 68 MMHG

## 2020-03-13 PROCEDURE — 36415 COLL VENOUS BLD VENIPUNCTURE: CPT

## 2020-03-13 PROCEDURE — 99214 OFFICE O/P EST MOD 30 MIN: CPT | Mod: 25

## 2020-03-13 RX ORDER — IPRATROPIUM BROMIDE 42 UG/1
0.06 SPRAY NASAL 4 TIMES DAILY
Qty: 1 | Refills: 0 | Status: ACTIVE | COMMUNITY
Start: 2020-03-13 | End: 1900-01-01

## 2020-03-13 NOTE — PHYSICAL EXAM
[Normal Outer Ear/Nose] : the outer ears and nose were normal in appearance [Normal Oropharynx] : the oropharynx was normal [No JVD] : no jugular venous distention [No Lymphadenopathy] : no lymphadenopathy [No Respiratory Distress] : no respiratory distress  [No Accessory Muscle Use] : no accessory muscle use [Clear to Auscultation] : lungs were clear to auscultation bilaterally [Normal] : no posterior cervical lymphadenopathy and no anterior cervical lymphadenopathy [de-identified] : Clear mucus mild erythema

## 2020-03-13 NOTE — HISTORY OF PRESENT ILLNESS
[FreeTextEntry8] : \par \par CC: follow-up for neutropenia and concern regarding coronavirus\par \par HPI patient is a 54-year-old female with history of congenital neutropenia, schizoaffective disorder who presents with fear of coronavirus. Try to reassure patient seems to calm down. She denies fever chills nausea vomiting but notes slight runny nose

## 2020-03-13 NOTE — ASSESSMENT
[FreeTextEntry1] : Patient is a 54-year-old female with history of neutropenia congenital, history of mitral valve prolapse, schizoaffective disorder, scoliosis, congenital hearing loss who presents for complaint of neutropenia and in fear of coronavirus with runny nose\par \par 1. Congenital neutropenia\par check CBC reassured patient\par \par 2 rhinitis\par most likely seasonal allergies versus viral\par Atrovent nasal spray and observe\par \par 3. Psych\par try to reassure patient she does not have coronavirus and will be safe

## 2020-03-13 NOTE — REVIEW OF SYSTEMS
[Nasal Discharge] : nasal discharge [Negative] : Genitourinary [Earache] : no earache [Hearing Loss] : no hearing loss [Nosebleed] : no nosebleeds [Sore Throat] : no sore throat [Postnasal Drip] : no postnasal drip

## 2020-03-17 LAB
BASOPHILS # BLD AUTO: 0.07 K/UL
BASOPHILS NFR BLD AUTO: 2.7 %
EOSINOPHIL # BLD AUTO: 0.02 K/UL
EOSINOPHIL NFR BLD AUTO: 0.9 %
HCT VFR BLD CALC: 38.5 %
HGB BLD-MCNC: 12.2 G/DL
LYMPHOCYTES # BLD AUTO: 1.7 K/UL
LYMPHOCYTES NFR BLD AUTO: 63.7 %
MAN DIFF?: NORMAL
MCHC RBC-ENTMCNC: 30 PG
MCHC RBC-ENTMCNC: 31.7 GM/DL
MCV RBC AUTO: 94.6 FL
MONOCYTES # BLD AUTO: 0.4 K/UL
MONOCYTES NFR BLD AUTO: 15 %
NEUTROPHILS # BLD AUTO: 0.47 K/UL
NEUTROPHILS NFR BLD AUTO: 17.7 %
PLATELET # BLD AUTO: 224 K/UL
RBC # BLD: 4.07 M/UL
RBC # FLD: 13.2 %
WBC # FLD AUTO: 2.67 K/UL

## 2020-05-23 NOTE — BEHAVIORAL HEALTH ASSESSMENT NOTE - PRIOR MEDICATION SIDE EFFECTS OR ADVERSE REACTIONS
[de-identified] : \par Constitutional: Well-nourished, well-developed, No acute distress\par Respiratory: Good respiratory effort, no SOB\par Lymphatic: No regional lymphadenopathy, no lymphedema\par Psychiatric: Pleasant and normal affect, alert and oriented x3\par Skin: Clean dry and intact B/L UE/LE\par Musculoskeletal: normal except where as noted in regional exam\par \par left[  Shoulder:\par APPEARANCE: no marked deformities, no swelling or malalignment\par POSITIVE TENDERNES LH biceps, \par NON TENDERNESS: S: supraspinatus, infraspinatus, teres minor, anterior and posterior capsule, \par ROM: limited in all directions due to pain\par SPECIAL TESTS: + charity's; neg empty can;  + hawkin's/heather; \par Vasc: 2+ radial pulse\par Neuro: AIN, PIN, Ulnar nerve intact to motor, DTRs 2+/4 biceps, triceps, brachioradialis\par Sensation: Intact to light touch throughout\par B/L Elbows: No asymmetry, malalignment, or swelling, Full ROM, 5/5 strength in flexion/ext, pronation/supination, Joints stable\par B/L Wrist and Hand: No asymmetry, malalignment, or swelling, Full ROM, 5/5 strength in wrist and long finger flexion/ext, radial/ulnar deviation, Joints stable\par \par \par  [de-identified] : \par The following radiographs were ordered and read by me during this patient's visit. I reviewed each radiograph in detail with the patient and discussed the findings as highlighted below. \par \par 3 views of the left shoulder were obtained today that show demonstrate a large radiopaque density anterior to the humeral head, consistent with calcific tendiniopathy Yes

## 2020-07-22 ENCOUNTER — APPOINTMENT (OUTPATIENT)
Dept: ORTHOPEDIC SURGERY | Facility: CLINIC | Age: 55
End: 2020-07-22

## 2020-07-24 ENCOUNTER — OUTPATIENT (OUTPATIENT)
Dept: OUTPATIENT SERVICES | Facility: HOSPITAL | Age: 55
LOS: 1 days | End: 2020-07-24
Payer: COMMERCIAL

## 2020-07-24 ENCOUNTER — APPOINTMENT (OUTPATIENT)
Dept: MRI IMAGING | Facility: CLINIC | Age: 55
End: 2020-07-24
Payer: COMMERCIAL

## 2020-07-24 DIAGNOSIS — M54.5 LOW BACK PAIN: ICD-10-CM

## 2020-07-24 PROCEDURE — 72148 MRI LUMBAR SPINE W/O DYE: CPT | Mod: 26

## 2020-07-24 PROCEDURE — 72148 MRI LUMBAR SPINE W/O DYE: CPT

## 2020-07-29 ENCOUNTER — APPOINTMENT (OUTPATIENT)
Dept: ORTHOPEDIC SURGERY | Facility: CLINIC | Age: 55
End: 2020-07-29
Payer: COMMERCIAL

## 2020-07-29 DIAGNOSIS — G89.29 LOW BACK PAIN: ICD-10-CM

## 2020-07-29 DIAGNOSIS — M54.5 LOW BACK PAIN: ICD-10-CM

## 2020-07-29 PROCEDURE — 99213 OFFICE O/P EST LOW 20 MIN: CPT | Mod: 95

## 2020-07-29 RX ORDER — ETODOLAC 400 MG/1
400 TABLET, FILM COATED ORAL TWICE DAILY
Qty: 60 | Refills: 0 | Status: ACTIVE | COMMUNITY
Start: 2020-07-29 | End: 1900-01-01

## 2020-07-29 NOTE — PHYSICAL EXAM
[de-identified] : GEN: NAD, A and O x 3 [de-identified] : MR Lumbar 7/2020: mild degenerative disc disease L3-5.  no neurocompressive lesion.  facets appear normal.

## 2020-07-29 NOTE — REASON FOR VISIT
[Home] : at home, [unfilled] , at the time of the visit. [Medical Office: ([facility])___] : at the medical office located in

## 2020-07-29 NOTE — HISTORY OF PRESENT ILLNESS
[de-identified] : three weeks low back pain, pain severe.  non radiating, no numbness/tingling/weakness.  no urologic symptoms.  worse with activity.  went swimming and next day with even worse pain.   had mri lumbar spine.

## 2020-07-29 NOTE — DISCUSSION/SUMMARY
[de-identified] : 3 weeks lower back pain, structurally appears relatively normal on MRI lumbar\par -Etodolac 1 month course, Rx given\par -PT Rx given\par -follow up with physiatry, Dr. Isidro Ferguson, if continues to have pain in 2-3 months\par -no spine surgery indicated\par -all questions answered

## 2020-11-30 RX ORDER — ZOSTER VACCINE RECOMBINANT, ADJUVANTED 50 MCG/0.5
50 KIT INTRAMUSCULAR
Qty: 1 | Refills: 0 | Status: ACTIVE | COMMUNITY
Start: 2020-11-30 | End: 1900-01-01

## 2021-01-29 ENCOUNTER — NON-APPOINTMENT (OUTPATIENT)
Age: 56
End: 2021-01-29

## 2021-02-08 ENCOUNTER — APPOINTMENT (OUTPATIENT)
Dept: INTERNAL MEDICINE | Facility: CLINIC | Age: 56
End: 2021-02-08
Payer: COMMERCIAL

## 2021-02-08 ENCOUNTER — LABORATORY RESULT (OUTPATIENT)
Age: 56
End: 2021-02-08

## 2021-02-08 VITALS
HEIGHT: 65 IN | WEIGHT: 130 LBS | DIASTOLIC BLOOD PRESSURE: 74 MMHG | OXYGEN SATURATION: 97 % | SYSTOLIC BLOOD PRESSURE: 120 MMHG | HEART RATE: 68 BPM | BODY MASS INDEX: 21.66 KG/M2

## 2021-02-08 DIAGNOSIS — Z87.898 PERSONAL HISTORY OF OTHER SPECIFIED CONDITIONS: ICD-10-CM

## 2021-02-08 PROCEDURE — 99072 ADDL SUPL MATRL&STAF TM PHE: CPT

## 2021-02-08 PROCEDURE — 36415 COLL VENOUS BLD VENIPUNCTURE: CPT

## 2021-02-08 PROCEDURE — 99396 PREV VISIT EST AGE 40-64: CPT | Mod: 25

## 2021-02-08 NOTE — REVIEW OF SYSTEMS
[Abdominal Pain] : no abdominal pain [Nausea] : no nausea [Constipation] : constipation [Diarrhea] : diarrhea [Vomiting] : no vomiting [Melena] : no melena [Insomnia] : insomnia [Suicidal] : not suicidal [Anxiety] : anxiety [Depression] : depression [Negative] : Heme/Lymph

## 2021-02-08 NOTE — HISTORY OF PRESENT ILLNESS
[FreeTextEntry1] : CPE and f/u for neutropenia [de-identified] : THe patient is a 54 yo female with history of depression/ schizoaffective disorder, congenital neutropenia, scoliosis, former smoker quit 10 years ago nad h/o MVP who present for CPE and f/u . Patient feels well denies fever, chills SOB, chest pain, palpitation c/o of worsen depression due to pandemic and constipation

## 2021-02-08 NOTE — ASSESSMENT
[FreeTextEntry1] : The patient is a 56 yo female with history of schizoaffective disorder , depression, MVP constipation, former smoker, hearing loss who presents for CPE and followup.\par \par \par 1. Neutropenia\par check CBC\par candidate for covid vaccine\par \par 2. Psych\par stable on meds followup with psychiatrist\par \par 3. Constipation\par probiotics check colonoscopy and tfts\par \par 4. HM\par schedule mammogram GYN followup \par request copy of colonoscopy\par f/u in six months\par check labs including CBC covid chem and thyroids\par \par \par \par \par \par

## 2021-02-08 NOTE — PHYSICAL EXAM
[Normal Sclera/Conjunctiva] : normal sclera/conjunctiva [Normal Outer Ear/Nose] : the outer ears and nose were normal in appearance [Declined Breast Exam] : declined breast exam  [Normal] : affect was normal and insight and judgment were intact

## 2021-02-08 NOTE — HEALTH RISK ASSESSMENT
[Good] : ~his/her~  mood as  good [] : No [No] : No [No falls in past year] : Patient reported no falls in the past year [0] : 2) Feeling down, depressed, or hopeless: Not at all (0) [de-identified] : 1/2 ppd for 34 years quit 10 years ago [de-identified] : some [de-identified] : [id number] [[id number]] : 0 [HIV test declined] : HIV test declined [Hepatitis C test declined] : Hepatitis C test declined [MammogramDate] : yohan [PapSmearDate] : pending

## 2021-02-19 LAB
ALBUMIN SERPL ELPH-MCNC: 4.3 G/DL
ALP BLD-CCNC: 82 U/L
ALT SERPL-CCNC: 13 U/L
ANION GAP SERPL CALC-SCNC: 13 MMOL/L
AST SERPL-CCNC: 15 U/L
BASOPHILS # BLD AUTO: 0.04 K/UL
BASOPHILS NFR BLD AUTO: 1.7 %
BILIRUB SERPL-MCNC: 0.4 MG/DL
BUN SERPL-MCNC: 7 MG/DL
CALCIUM SERPL-MCNC: 9.5 MG/DL
CHLORIDE SERPL-SCNC: 102 MMOL/L
CHOLEST SERPL-MCNC: 236 MG/DL
CO2 SERPL-SCNC: 24 MMOL/L
CREAT SERPL-MCNC: 0.98 MG/DL
EOSINOPHIL # BLD AUTO: 0.11 K/UL
EOSINOPHIL NFR BLD AUTO: 4.3 %
GLUCOSE SERPL-MCNC: 88 MG/DL
HCT VFR BLD CALC: 41.9 %
HDLC SERPL-MCNC: 67 MG/DL
HGB BLD-MCNC: 12.9 G/DL
LDLC SERPL CALC-MCNC: 148 MG/DL
LYMPHOCYTES # BLD AUTO: 1.5 K/UL
LYMPHOCYTES NFR BLD AUTO: 56.9 %
MAN DIFF?: NORMAL
MCHC RBC-ENTMCNC: 30.3 PG
MCHC RBC-ENTMCNC: 30.8 GM/DL
MCV RBC AUTO: 98.4 FL
MONOCYTES # BLD AUTO: 0.48 K/UL
MONOCYTES NFR BLD AUTO: 18.1 %
NEUTROPHILS # BLD AUTO: 0.39 K/UL
NEUTROPHILS NFR BLD AUTO: 13 %
NONHDLC SERPL-MCNC: 168 MG/DL
PLATELET # BLD AUTO: 226 K/UL
POTASSIUM SERPL-SCNC: 4.3 MMOL/L
PROT SERPL-MCNC: 7.1 G/DL
RBC # BLD: 4.26 M/UL
RBC # FLD: 13.4 %
SODIUM SERPL-SCNC: 139 MMOL/L
TRIGL SERPL-MCNC: 102 MG/DL
TSH SERPL-ACNC: 2.49 UIU/ML
WBC # FLD AUTO: 2.64 K/UL

## 2021-02-22 ENCOUNTER — NON-APPOINTMENT (OUTPATIENT)
Age: 56
End: 2021-02-22

## 2021-03-12 NOTE — PROGRESS NOTE ADULT - SUBJECTIVE AND OBJECTIVE BOX
Lens Treatment: INDIRA SMITH  51y  Female      Patient is a 51y old  Female who presents with a chief complaint of neutropenic sepsis (20 Jun 2017 12:26)      INTERVAL HPI/OVERNIGHT EVENTS:  NO current fever , patient states that cough has improved , no diarrhea, no SOB     REVIEW OF SYSTEMS:  CONSTITUTIONAL: No fever  NECK: No pain or stiffness  RESPIRATORY: No cough, wheezing, chills or hemoptysis; No shortness of breath  CARDIOVASCULAR: No chest pain, palpitations, dizziness, or leg swelling  GASTROINTESTINAL: No abdominal or epigastric pain. No nausea, vomiting, or hematemesis; No diarrhea   NEUROLOGICAL: No headaches  PSYCHIATRIC: no report of hallucinations    T(C): 36.8, Max: 37.4 (06-25 @ 17:22)  HR: 76 (72 - 80)  BP: 98/66 (98/66 - 135/87)  RR: 18 (18 - 18)  SpO2: 97% (97% - 100%)  Wt(kg): --Vital Signs Last 24 Hrs  T(C): 36.8, Max: 37.4 (06-25 @ 17:22)  T(F): 98.2, Max: 99.4 (06-25 @ 17:22)  HR: 76 (72 - 80)  BP: 98/66 (98/66 - 135/87)  BP(mean): --  RR: 18 (18 - 18)  SpO2: 97% (97% - 100%)    PHYSICAL EXAM:  GENERAL: NAD, well-groomed, well-developed  HEAD:  Atraumatic, Normocephalic  NECK: Supple, No JVD, Normal thyroid  NERVOUS SYSTEM:  Alert & Oriented X3  CHEST/LUNG: Clear to percussion bilaterally; No rales, rhonchi, wheezing, or rubs  HEART: Regular rate and rhythm; No murmurs, rubs, or gallops  ABDOMEN: Soft, Nontender, Nondistended; Bowel sounds present  EXTREMITIES: No  edema    Consultant(s) Notes Reviewed:  [x ] YES / Pulmonary / Psychiatrist   Care Discussed with Consultants/Other Providers [ x] YES  [ ] NO    LABS:                        9.9    2.78  )-----------( 322      ( 26 Jun 2017 07:15 )             31.5           RADIOLOGY & ADDITIONAL TESTS:    Imaging Personally Reviewed:  [ ] YES  [ ] NO    HEALTH ISSUES - PROBLEM Dx:  Oropharyngeal dysphagia: Oropharyngeal dysphagia  Esophageal dilatation: Esophageal dilatation  Upper airway cough syndrome: Upper airway cough syndrome  Neutropenic sepsis: Neutropenic sepsis  Aspiration into airway, initial encounter: Aspiration into airway, initial encounter  Other schizoaffective disorders: Other schizoaffective disorders  Lung consolidation: Lung consolidation  Normocytic anemia: Normocytic anemia  Schizoaffective disorder: Schizoaffective disorder  Sepsis: Sepsis  Bronchopneumonia: Bronchopneumonia  Anxiety: Anxiety  Prophylactic measure: Prophylactic measure  Congenital neutropenia: Congenital neutropenia  Lung mass: Lung mass

## 2021-03-22 ENCOUNTER — APPOINTMENT (OUTPATIENT)
Dept: OBGYN | Facility: CLINIC | Age: 56
End: 2021-03-22
Payer: COMMERCIAL

## 2021-03-22 VITALS
SYSTOLIC BLOOD PRESSURE: 119 MMHG | HEIGHT: 65 IN | WEIGHT: 134 LBS | BODY MASS INDEX: 22.33 KG/M2 | TEMPERATURE: 97 F | DIASTOLIC BLOOD PRESSURE: 80 MMHG

## 2021-03-22 DIAGNOSIS — J00 ACUTE NASOPHARYNGITIS [COMMON COLD]: ICD-10-CM

## 2021-03-22 PROCEDURE — 99072 ADDL SUPL MATRL&STAF TM PHE: CPT

## 2021-03-22 PROCEDURE — 99396 PREV VISIT EST AGE 40-64: CPT

## 2021-03-22 NOTE — HISTORY OF PRESENT ILLNESS
[FreeTextEntry1] : check up  [Patient reported mammogram was normal] : Patient reported mammogram was normal [Patient reported PAP Smear was normal] : Patient reported PAP Smear was normal [Patient reported colonoscopy was normal] : Patient reported colonoscopy was normal [Mammogramdate] : 2019  [PapSmeardate] : 2019  [ColonoscopyDate] : 2016

## 2021-03-24 LAB
CYTOLOGY CVX/VAG DOC THIN PREP: ABNORMAL
HPV HIGH+LOW RISK DNA PNL CVX: NOT DETECTED

## 2021-04-19 DIAGNOSIS — R92.2 INCONCLUSIVE MAMMOGRAM: ICD-10-CM

## 2021-05-21 ENCOUNTER — APPOINTMENT (OUTPATIENT)
Dept: MAMMOGRAPHY | Facility: IMAGING CENTER | Age: 56
End: 2021-05-21

## 2021-06-29 NOTE — PROGRESS NOTE ADULT - ASSESSMENT
51F congenital cyclic neutropenia, schizoaffective disorder presenting with cough and fevers and now with a persistent RLL mass and currently on Cefepime for possible lung abscess. Ftsg Text: The defect edges were debeveled with a #15 scalpel blade.  Given the location of the defect, shape of the defect and the proximity to free margins a full thickness skin graft was deemed most appropriate.  Using a sterile surgical marker, the primary defect shape was transferred to the donor site. The area thus outlined was incised deep to adipose tissue with a #15 scalpel blade.  The harvested graft was then trimmed of adipose tissue until only dermis and epidermis was left.  The skin margins of the secondary defect were undermined to an appropriate distance in all directions utilizing iris scissors.  The secondary defect was closed with interrupted buried subcutaneous sutures.  The skin edges were then re-apposed with running  sutures.  The skin graft was then placed in the primary defect and oriented appropriately.

## 2021-07-14 NOTE — ED PROVIDER NOTE - CROS ED [facility] ALL NEG
Spoke with patient. She will be traveling on 7/16 and return on 8/3. Needs Rx to be release early with new Rx. Sent.    negative...

## 2021-09-16 ENCOUNTER — APPOINTMENT (OUTPATIENT)
Dept: INTERNAL MEDICINE | Facility: CLINIC | Age: 56
End: 2021-09-16
Payer: COMMERCIAL

## 2021-09-16 VITALS
DIASTOLIC BLOOD PRESSURE: 57 MMHG | HEIGHT: 65 IN | WEIGHT: 120 LBS | OXYGEN SATURATION: 98 % | TEMPERATURE: 97.3 F | SYSTOLIC BLOOD PRESSURE: 91 MMHG | HEART RATE: 62 BPM | BODY MASS INDEX: 19.99 KG/M2

## 2021-09-16 PROCEDURE — G0008: CPT

## 2021-09-16 PROCEDURE — 99214 OFFICE O/P EST MOD 30 MIN: CPT | Mod: 25

## 2021-09-16 PROCEDURE — 90686 IIV4 VACC NO PRSV 0.5 ML IM: CPT

## 2021-09-16 RX ORDER — HYDROGEN PEROXIDE 1.5 %
1.5-6 SOLUTION, NON-ORAL MUCOUS MEMBRANE
Qty: 1 | Refills: 10 | Status: ACTIVE | COMMUNITY
Start: 2021-09-16 | End: 1900-01-01

## 2021-09-16 NOTE — PHYSICAL EXAM
[No Acute Distress] : no acute distress [Well Nourished] : well nourished [Well Developed] : well developed [Well-Appearing] : well-appearing [Normal Sclera/Conjunctiva] : normal sclera/conjunctiva [PERRL] : pupils equal round and reactive to light [EOMI] : extraocular movements intact [Normal Outer Ear/Nose] : the outer ears and nose were normal in appearance [Normal Oropharynx] : the oropharynx was normal [No JVD] : no jugular venous distention [No Lymphadenopathy] : no lymphadenopathy [Supple] : supple [Thyroid Normal, No Nodules] : the thyroid was normal and there were no nodules present [No Respiratory Distress] : no respiratory distress  [No Accessory Muscle Use] : no accessory muscle use [Clear to Auscultation] : lungs were clear to auscultation bilaterally [Normal Rate] : normal rate  [Regular Rhythm] : with a regular rhythm [Normal S1, S2] : normal S1 and S2 [No Murmur] : no murmur heard [No Carotid Bruits] : no carotid bruits [No Abdominal Bruit] : a ~M bruit was not heard ~T in the abdomen [No Varicosities] : no varicosities [Pedal Pulses Present] : the pedal pulses are present [No Edema] : there was no peripheral edema [No Palpable Aorta] : no palpable aorta [No Extremity Clubbing/Cyanosis] : no extremity clubbing/cyanosis [Soft] : abdomen soft [Non Tender] : non-tender [Non-distended] : non-distended [No Masses] : no abdominal mass palpated [No HSM] : no HSM [Normal Bowel Sounds] : normal bowel sounds [Normal Posterior Cervical Nodes] : no posterior cervical lymphadenopathy [Normal Anterior Cervical Nodes] : no anterior cervical lymphadenopathy [No CVA Tenderness] : no CVA  tenderness [No Spinal Tenderness] : no spinal tenderness [No Joint Swelling] : no joint swelling [Grossly Normal Strength/Tone] : grossly normal strength/tone [No Rash] : no rash [Coordination Grossly Intact] : coordination grossly intact [No Focal Deficits] : no focal deficits [Normal Gait] : normal gait [Deep Tendon Reflexes (DTR)] : deep tendon reflexes were 2+ and symmetric [Normal Affect] : the affect was normal [Normal Insight/Judgement] : insight and judgment were intact [de-identified] : tiny area tip of tongue pappilla sl swollen  sensitive NO erythema  no angioedema  throat and tonsils uvula NOT SWOLLEN

## 2021-09-16 NOTE — HISTORY OF PRESENT ILLNESS
[FreeTextEntry8] : Three week ago tip of tongue swollen and red. Thought would go away. Started using Crystal Light so stopped it last week and her symptoms improved.\par \par Pscyh Dr Mateo Smalls  decreased Rexulti 1.5mg  trentelex 20mg daily  melatonin and  incr Belsomra 20mg\par \par No seafood no NUTS  No wheeze No SOB  Thinks infection from not cleaning her pitcher Mid-Level Procedure Text (E): After obtaining clear surgical margins the patient was sent to a mid-level provider for surgical repair.  The patient understands they will receive post-surgical care and follow-up from the mid-level provider.

## 2021-09-16 NOTE — ASSESSMENT
[FreeTextEntry1] : Likely reaction to new melatonin vs generalized allergy\par ENT evaluation  Resolving\par Avoid nuts and shellfish  Seeing Dermatology\par Peroxyl rinse  stop melatonin (refuses)  Advised SUNLIGHT w sunblock to assist with insomnia 60 min a day

## 2021-09-27 NOTE — BEHAVIORAL HEALTH ASSESSMENT NOTE - NSBHLEGAL_PSY_A_CORE
LOV 9/10/2021    Future Appointments   Date Time Provider [place]   10/20/2021  3:00 PM AVIS Burleson - CNP [facility] [address] No

## 2021-09-30 ENCOUNTER — APPOINTMENT (OUTPATIENT)
Dept: OTOLARYNGOLOGY | Facility: CLINIC | Age: 56
End: 2021-09-30
Payer: COMMERCIAL

## 2021-09-30 VITALS
HEIGHT: 65.5 IN | WEIGHT: 119 LBS | HEART RATE: 54 BPM | BODY MASS INDEX: 19.59 KG/M2 | DIASTOLIC BLOOD PRESSURE: 70 MMHG | TEMPERATURE: 97.7 F | SYSTOLIC BLOOD PRESSURE: 102 MMHG

## 2021-09-30 PROCEDURE — 99213 OFFICE O/P EST LOW 20 MIN: CPT

## 2021-09-30 NOTE — END OF VISIT
[FreeTextEntry3] : I saw and examined this patient in person. I have discussed with Tana Hood, Physician Assistant, in detail the above note and agree with the above assessment and plan of care.\par

## 2021-09-30 NOTE — HISTORY OF PRESENT ILLNESS
[de-identified] : Patient has noted a swelling, redness with small  bumps at the tip of the tongue. SHe is not having any issues eating, drinking or swallowing but admits that it gets in the way with speaking because the tongue gets stiff.  She does not have any issues with nasal congsetion or runny nose and she has never had this issue before.

## 2021-09-30 NOTE — ASSESSMENT
[FreeTextEntry1] : Patient with a psychiatric history on quite a few psychiatric medications recently has been complaining of some discomfort of her anterior tongue with some vesicles at the time that seems to finally be improving and resolving. On examination there is still some tenderness on examination however steroids are contraindicated in the event of her underlying psychiatric condition. Since she seems to be improving and heading in the right direction I think wait for observation and being patient will will result in this resolving on its own. I will have her follow-up and see us in 1 month just to ensure total resolution she is okay with that and is in agreement.

## 2021-10-15 ENCOUNTER — NON-APPOINTMENT (OUTPATIENT)
Age: 56
End: 2021-10-15

## 2021-11-04 ENCOUNTER — APPOINTMENT (OUTPATIENT)
Dept: OTOLARYNGOLOGY | Facility: CLINIC | Age: 56
End: 2021-11-04
Payer: COMMERCIAL

## 2021-11-04 VITALS
HEIGHT: 65.5 IN | TEMPERATURE: 97.8 F | DIASTOLIC BLOOD PRESSURE: 73 MMHG | WEIGHT: 119 LBS | BODY MASS INDEX: 19.59 KG/M2 | SYSTOLIC BLOOD PRESSURE: 118 MMHG | HEART RATE: 62 BPM

## 2021-11-04 PROCEDURE — 31231 NASAL ENDOSCOPY DX: CPT

## 2021-11-04 PROCEDURE — 99214 OFFICE O/P EST MOD 30 MIN: CPT | Mod: 25

## 2021-11-04 RX ORDER — MOMETASONE FUROATE 1 MG/G
0.1 CREAM TOPICAL DAILY
Qty: 1 | Refills: 0 | Status: ACTIVE | COMMUNITY
Start: 2021-11-04 | End: 1900-01-01

## 2021-11-04 NOTE — HISTORY OF PRESENT ILLNESS
[de-identified] : patient is now on medication that is a tranquilizer and makes her tongue numb, so she has not had any discomfort. SHe does still see the bumps at the tip of the tongue, however. She is able to eat and drink without issue. \par SHe also had a recent nasal infection and went to her dermatologist who did a nasal swab. Based on the first swab results she was given a cream. SHe repeated the swab and it showed "ciprobacter feundii sensitive to ciprofloxacin" but she wasn’t started on this yet and advised to bring this to dr starks. \par She has had minor itching in both ears that comes and goes as well

## 2021-11-04 NOTE — ASSESSMENT
[FreeTextEntry1] : Patient's tongue lesions have resolved was cultured nasally treated with mupirocin totally asymptomatic last culture grew Citrobacter from the which is sensitive to mupirocin certainly no need to do any additional intervention should be recultured in the future on examination there is no evidence of any acute infection that I am concerned about patient was reassured will continue mupirocin no further acute ENT interventions indicated.

## 2021-11-04 NOTE — DATA REVIEWED
[de-identified] : nasal culture: citrobacter freundii sensitivities performed (gram negative enterobacteriae)

## 2021-11-09 NOTE — CONSULT NOTE ADULT - PROVIDER SPECIALTY LIST ADULT
Pulmonology [FreeTextEntry1] : 62yo F with stage IIIA endometriaal cancer, getting sandwhich chemo RT chemo. \par \par 11/2: She is on first OTV. Doing well, no concerns. Today is her first fx\par \par Last CBC reasonable. Normal Hb, mild low HCT, improved from prior. \par \par 11/9/21 - fx 6/28 no bowel or bladder issues, some mild fatigue

## 2022-01-29 NOTE — PROGRESS NOTE ADULT - [facility] PE MLT D E PC
regular rate and rhythm
No

## 2022-03-28 ENCOUNTER — APPOINTMENT (OUTPATIENT)
Dept: OBGYN | Facility: CLINIC | Age: 57
End: 2022-03-28
Payer: COMMERCIAL

## 2022-03-28 VITALS
BODY MASS INDEX: 20.25 KG/M2 | DIASTOLIC BLOOD PRESSURE: 70 MMHG | HEIGHT: 65.5 IN | SYSTOLIC BLOOD PRESSURE: 110 MMHG | WEIGHT: 123 LBS

## 2022-03-28 DIAGNOSIS — K14.6 GLOSSODYNIA: ICD-10-CM

## 2022-03-28 DIAGNOSIS — R22.0 LOCALIZED SWELLING, MASS AND LUMP, HEAD: ICD-10-CM

## 2022-03-28 DIAGNOSIS — K14.8 OTHER DISEASES OF TONGUE: ICD-10-CM

## 2022-03-28 DIAGNOSIS — A49.8 OTHER BACTERIAL INFECTIONS OF UNSPECIFIED SITE: ICD-10-CM

## 2022-03-28 DIAGNOSIS — L29.9 PRURITUS, UNSPECIFIED: ICD-10-CM

## 2022-03-28 PROCEDURE — 99396 PREV VISIT EST AGE 40-64: CPT

## 2022-03-28 NOTE — PHYSICAL EXAM
[Chaperone Declined] : Patient declined chaperone [Appropriately responsive] : appropriately responsive [Alert] : alert [No Acute Distress] : no acute distress [No Lymphadenopathy] : no lymphadenopathy [Regular Rate Rhythm] : regular rate rhythm [No Murmurs] : no murmurs [Clear to Auscultation B/L] : clear to auscultation bilaterally [Soft] : soft [Non-tender] : non-tender [Non-distended] : non-distended [No HSM] : No HSM [No Lesions] : no lesions [No Mass] : no mass [Oriented x3] : oriented x3 [Examination Of The Breasts] : a normal appearance [No Masses] : no breast masses were palpable [Labia Majora] : normal [Labia Minora] : normal [Atrophy] : atrophy [Uterine Adnexae] : normal [Normal] : normal

## 2022-03-28 NOTE — HISTORY OF PRESENT ILLNESS
[Patient reported mammogram was normal] : Patient reported mammogram was normal [Patient reported PAP Smear was normal] : Patient reported PAP Smear was normal [Patient reported colonoscopy was normal] : Patient reported colonoscopy was normal [FreeTextEntry1] : Patient with some urinary leakage but it is infrequent\par She feels it is related to anxiety  [Mammogramdate] : 2021 [PapSmeardate] : 2021 [ColonoscopyDate] : 2016

## 2022-03-31 ENCOUNTER — LABORATORY RESULT (OUTPATIENT)
Age: 57
End: 2022-03-31

## 2022-03-31 ENCOUNTER — APPOINTMENT (OUTPATIENT)
Dept: INTERNAL MEDICINE | Facility: CLINIC | Age: 57
End: 2022-03-31
Payer: COMMERCIAL

## 2022-03-31 VITALS
HEART RATE: 72 BPM | OXYGEN SATURATION: 96 % | SYSTOLIC BLOOD PRESSURE: 115 MMHG | DIASTOLIC BLOOD PRESSURE: 72 MMHG | TEMPERATURE: 97.8 F | BODY MASS INDEX: 21 KG/M2 | HEIGHT: 64 IN | WEIGHT: 123 LBS

## 2022-03-31 DIAGNOSIS — Z86.79 PERSONAL HISTORY OF OTHER DISEASES OF THE CIRCULATORY SYSTEM: ICD-10-CM

## 2022-03-31 DIAGNOSIS — G89.29 LOW BACK PAIN, UNSPECIFIED: ICD-10-CM

## 2022-03-31 DIAGNOSIS — M77.12 LATERAL EPICONDYLITIS, LEFT ELBOW: ICD-10-CM

## 2022-03-31 DIAGNOSIS — M54.50 LOW BACK PAIN, UNSPECIFIED: ICD-10-CM

## 2022-03-31 DIAGNOSIS — F25.9 SCHIZOAFFECTIVE DISORDER, UNSPECIFIED: ICD-10-CM

## 2022-03-31 PROCEDURE — 36415 COLL VENOUS BLD VENIPUNCTURE: CPT

## 2022-03-31 PROCEDURE — 99396 PREV VISIT EST AGE 40-64: CPT | Mod: 25

## 2022-03-31 NOTE — HEALTH RISK ASSESSMENT
[Very Good] : ~his/her~  mood as very good [Former] : Former [15-19] : 15-19 [No] : No [Never (0 pts)] : Never (0 points) [Patient reported mammogram was normal] : Patient reported mammogram was normal [Patient reported PAP Smear was normal] : Patient reported PAP Smear was normal [HIV test declined] : HIV test declined [Hepatitis C test declined] : Hepatitis C test declined [None] : None [Alone] : lives alone [College] : College [Single] : single [# Of Children ___] : has [unfilled] children [Sexually Active] : sexually active [Fully functional (bathing, dressing, toileting, transferring, walking, feeding)] : Fully functional (bathing, dressing, toileting, transferring, walking, feeding) [Fully functional (using the telephone, shopping, preparing meals, housekeeping, doing laundry, using] : Fully functional and needs no help or supervision to perform IADLs (using the telephone, shopping, preparing meals, housekeeping, doing laundry, using transportation, managing medications and managing finances) [Reports normal functional visual acuity (ie: able to read med bottle)] : Reports normal functional visual acuity [Smoke Detector] : smoke detector [Carbon Monoxide Detector] : carbon monoxide detector [Safety elements used in home] : safety elements used in home [Seat Belt] :  uses seat belt [Sunscreen] : uses sunscreen [de-identified] : Quit 11 years ago [Audit-CScore] : 0 [de-identified] : Started playing tennis [de-identified] : Healthy [Change in mental status noted] : No change in mental status noted [Language] : denies difficulty with language [Behavior] : denies difficulty with behavior [Learning/Retaining New Information] : denies difficulty learning/retaining new information [Handling Complex Tasks] : denies difficulty handling complex tasks [Reasoning] : denies difficulty with reasoning [Spatial Ability and Orientation] : denies difficulty with spatial ability and orientation [High Risk Behavior] : no high risk behavior [Reports changes in hearing] : Reports no changes in hearing [Reports changes in vision] : Reports no changes in vision [Reports changes in dental health] : Reports no changes in dental health [Guns at Home] : no guns at home [Travel to Developing Areas] : does not  travel to developing areas [TB Exposure] : is not being exposed to tuberculosis [Caregiver Concerns] : does not have caregiver concerns [MammogramDate] : Pending [PapSmearDate] : 2021 [ColonoscopyDate] : 2009

## 2022-03-31 NOTE — PHYSICAL EXAM
[Normal Sclera/Conjunctiva] : normal sclera/conjunctiva [Normal Outer Ear/Nose] : the outer ears and nose were normal in appearance [Declined Breast Exam] : declined breast exam  [Normal] : no joint swelling and grossly normal strength and tone [de-identified] : Bilateral wax right greater than left [de-identified] : Small bunion [de-identified] : Recent breast exam by GYN [de-identified] : Left lateral tenderness

## 2022-03-31 NOTE — REVIEW OF SYSTEMS
Call Back DOS 01/27/2020- Patient states she is still not doing better, but she is doing what she can for the flu.   [Negative] : Heme/Lymph

## 2022-03-31 NOTE — ASSESSMENT
[FreeTextEntry1] : Patient is a 56-year-old female with history of depression/schizoaffective disorder, MVP, congenital neutropenia, history of scoliosis, history of former smoker quit 11 years ago who presents for comprehensive annual physical examination complains of left elbow pain\par \par 1. Left elbow point\par tennis elbow\par I splint Advil\par and rest observe\par \par 2 neutropenia congenital\par check CBC stable immunizations up-to-date\par \par 3 schizoaffective disorder/depression\par on multiple medications\par observe follow-up with psychiatry\par \par 4. Ear wax\par over-the-counter removal kits observe\par \par 5 health maintenance\par mammogram pending\par colonoscopy done age 50\par immunizations up-to-date\par GYN done 2021\par check CBC and lipids\par \par 6 subclinical hypothyroidism\par psych started levothyroxine 12.5 µg with improvement\par \par 7 history of mitral valve prolapse\par echo 2019 no MVP but minimal MR

## 2022-03-31 NOTE — HISTORY OF PRESENT ILLNESS
[FreeTextEntry1] : \par \par Comprehensive annual physical examination [de-identified] : The patient is a healthy 56-year-old female with history of depression/schizoaffective disorder, congenital neutropenia, scoliosis, former smoker, history of mitral valve prolapse who presents for comprehensive annual physical examination patient feels well no depression after starting Synthroid 12.5 Lexx's denies cold or heat intolerance chest pain palpitation fever chills depression anhedonia feels well.\par Patient complains of left elbow pain lateral plays tennis lots of typing

## 2022-04-08 LAB
BASOPHILS # BLD AUTO: 0.02 K/UL
BASOPHILS NFR BLD AUTO: 0.7 %
CHOLEST SERPL-MCNC: 187 MG/DL
EOSINOPHIL # BLD AUTO: 0.06 K/UL
EOSINOPHIL NFR BLD AUTO: 2.1 %
HCT VFR BLD CALC: 35.7 %
HDLC SERPL-MCNC: 71 MG/DL
HGB BLD-MCNC: 11.6 G/DL
IMM GRANULOCYTES NFR BLD AUTO: 0 %
LDLC SERPL CALC-MCNC: 106 MG/DL
LYMPHOCYTES # BLD AUTO: 1.75 K/UL
LYMPHOCYTES NFR BLD AUTO: 60.1 %
MAN DIFF?: NORMAL
MCHC RBC-ENTMCNC: 30.4 PG
MCHC RBC-ENTMCNC: 32.5 GM/DL
MCV RBC AUTO: 93.5 FL
MONOCYTES # BLD AUTO: 0.68 K/UL
MONOCYTES NFR BLD AUTO: 23.4 %
NEUTROPHILS # BLD AUTO: 0.4 K/UL
NEUTROPHILS NFR BLD AUTO: 13.7 %
NONHDLC SERPL-MCNC: 116 MG/DL
PLATELET # BLD AUTO: 203 K/UL
RBC # BLD: 3.82 M/UL
RBC # FLD: 13.1 %
TRIGL SERPL-MCNC: 52 MG/DL
WBC # FLD AUTO: 2.91 K/UL

## 2022-04-11 NOTE — PATIENT PROFILE ADULT. - NSTOBACCONEVERSMOKERY/N_GEN_A
FAMILY HISTORY:  Grandparent  Still living? Unknown  Family history of stroke, Age at diagnosis: Age Unknown    
Yes, Non-Core measure site...

## 2022-04-13 ENCOUNTER — NON-APPOINTMENT (OUTPATIENT)
Age: 57
End: 2022-04-13

## 2022-06-02 ENCOUNTER — APPOINTMENT (OUTPATIENT)
Dept: INTERNAL MEDICINE | Facility: CLINIC | Age: 57
End: 2022-06-02
Payer: COMMERCIAL

## 2022-06-02 ENCOUNTER — NON-APPOINTMENT (OUTPATIENT)
Age: 57
End: 2022-06-02

## 2022-06-02 ENCOUNTER — TRANSCRIPTION ENCOUNTER (OUTPATIENT)
Age: 57
End: 2022-06-02

## 2022-06-02 DIAGNOSIS — Z00.00 ENCOUNTER FOR GENERAL ADULT MEDICAL EXAMINATION W/OUT ABNORMAL FINDINGS: ICD-10-CM

## 2022-06-02 DIAGNOSIS — Z87.891 PERSONAL HISTORY OF NICOTINE DEPENDENCE: ICD-10-CM

## 2022-06-02 PROCEDURE — 99214 OFFICE O/P EST MOD 30 MIN: CPT | Mod: 95

## 2022-06-02 NOTE — HISTORY OF PRESENT ILLNESS
[Home] : at home, [unfilled] , at the time of the visit. [Medical Office: ([facility])___] : at the medical office located in  [Verbal consent obtained from patient] : the patient, [unfilled] [FreeTextEntry8] : 57 yo woman w Covid 19, dx this am.\par h/o depresison/schizoaffective ds, neutropenia, former smoker quit 12 yrs ago, 9/11 expos.? No known COPD/asthma.\par Covid vax x 4\par \par Onset yesterday, felt malaise, then started coughing this am, covid rapid test pos. Now temp is 101. No SOB no CP. \par Hasn't taken any advil or tylenol so far. Doesn't have pulse ox at home.\par On several meds incl: Rexulti, Buspar, Belsomra, Clonopin, Gabapentin, vits, folate.\par \par Lives w 90 yo mother, also vax, large apartmt., can isolate.\par Works remotely  for [employer], needs note to stay off work.\par

## 2022-06-02 NOTE — ASSESSMENT
[FreeTextEntry1] : Pt w Covid 19, has neutropenia and former smoker/possible 9-11 expos.\par \par Given she's on  several psych meds, would favor MAB at this time, pt is amenable.\par \par Isolate from elderly mom in apartmt.\par Wear mask if they need to interact.\par \par Call for any concerns, ER for any worrisome sxs.

## 2022-06-02 NOTE — REVIEW OF SYSTEMS
[Fever] : fever [Cough] : cough [Negative] : Heme/Lymph [Chills] : no chills [Shortness Of Breath] : no shortness of breath [Wheezing] : no wheezing

## 2022-06-04 ENCOUNTER — APPOINTMENT (OUTPATIENT)
Dept: DISASTER EMERGENCY | Facility: HOSPITAL | Age: 57
End: 2022-06-04

## 2022-06-04 ENCOUNTER — OUTPATIENT (OUTPATIENT)
Dept: OUTPATIENT SERVICES | Facility: HOSPITAL | Age: 57
LOS: 1 days | End: 2022-06-04

## 2022-06-04 VITALS
TEMPERATURE: 98 F | OXYGEN SATURATION: 97 % | DIASTOLIC BLOOD PRESSURE: 78 MMHG | HEART RATE: 67 BPM | RESPIRATION RATE: 17 BRPM | HEIGHT: 65 IN | SYSTOLIC BLOOD PRESSURE: 120 MMHG | WEIGHT: 126.99 LBS

## 2022-06-04 VITALS
RESPIRATION RATE: 17 BRPM | SYSTOLIC BLOOD PRESSURE: 120 MMHG | DIASTOLIC BLOOD PRESSURE: 72 MMHG | TEMPERATURE: 100 F | HEART RATE: 62 BPM | OXYGEN SATURATION: 99 %

## 2022-06-04 DIAGNOSIS — U07.1 COVID-19: ICD-10-CM

## 2022-06-04 RX ORDER — BEBTELOVIMAB 87.5 MG/ML
175 INJECTION, SOLUTION INTRAVENOUS ONCE
Refills: 0 | Status: COMPLETED | OUTPATIENT
Start: 2022-06-04 | End: 2022-06-04

## 2022-06-04 RX ADMIN — BEBTELOVIMAB 175 MILLIGRAM(S): 87.5 INJECTION, SOLUTION INTRAVENOUS at 16:38

## 2022-06-04 NOTE — MONOCLONAL ANTIBODY INFUSION - EXAM
Exam/findings:  T(C): 36.7 (06-04-22 @ 16:33), Max: 36.7 (06-04-22 @ 16:33)  HR: 67 (06-04-22 @ 16:33) (67 - 67)  BP: 120/78 (06-04-22 @ 16:33) (120/78 - 120/78)  RR: 17 (06-04-22 @ 16:33) (17 - 17)  SpO2: 97% (06-04-22 @ 16:33) (97% - 97%)    PE:   Appearance: NAD	  HEENT:  NC/AT  Cardiovascular:  No edema  Respiratory: no use of accessory muscles  Gastrointestinal:  non-distended   Skin: warm and dry  Neurologic: Non-focal  Extremities: Normal range of motion

## 2022-06-04 NOTE — MONOCLONAL ANTIBODY INFUSION - HOME MEDICATIONS
amoxicillin-clavulanate 875 mg-125 mg oral tablet , 1 tab(s) orally every 12 hours  until 7/17/17  ketoconazole 2% topical cream , 1 application topically 2 times a day  to toe nails  senna oral tablet , 2 tab(s) orally once a day (at bedtime)  polyethylene glycol 3350 oral powder for reconstitution , 17 gram(s) orally once a day  pantoprazole 40 mg oral delayed release tablet , 1 tab(s) orally once a day (before a meal)  fluticasone 50 mcg/inh nasal spray , 1 spray(s) nasal 2 times a day  cholecalciferol oral tablet , 1000 unit(s) orally once a day  benzonatate 100 mg oral capsule , 1 cap(s) orally every 8 hours, As needed, Cough  OLANZapine 20 mg oral tablet , 1 tab(s) orally once a day (at bedtime)  OLANZapine 5 mg oral tablet , 1 tab(s) orally once a day  at 6 AM  fluvoxaMINE 50 mg oral tablet , 3 tab(s) orally 2 times a day  biotin 1000 mcg oral tablet , 1 tab(s) orally once a day

## 2022-06-04 NOTE — MONOCLONAL ANTIBODY INFUSION - ASSESSMENT AND PLAN
CC: Monoclonal Antibody Infusion/COVID 19 Positive  56y Female with hx of Congenital neutropenia, anxiety/depression, mitral valve prolapse, and recent dx of COVID 19+ who presents today for elective Bebtelovimab. Patient has been screened and was deemed to be a candidate.    Symptoms/ Criteria  Date of Symptom Onset: 6/2/22  Symptoms: fatigue, sore throat, congestion, runny nose, sneezing   Date of Positive COVID PCR:   Risk Profile includes:   cardiovascular condition     Vaccination Status: Moderna boosters x 2     PMHx:  Infection due to severe acute respiratory syndrome coronavirus 2 (SARS-CoV-2)    ASSESSMENT:  Pt is COVID positive and symptomatic who was referred for Bebtelovimab monoclonal antibody treatment.    PLAN:  - MAB treatment explained to patient. I have reviewed the Bebtelovimab Emergency Use Authorization (EUA).   - Consent for MAB obtained.   - Risk & benefits discussed. Patient verbalized understanding of plan and agrees to treatment. All questions answered.  - 175mg of Bebtelovimab administered as a single intravenous injection over at least 30 seconds.   - Observe patient for one hour post medication administration and then if stable, discharge home with outpatient follow up as planned by PCP.    POST ASSESSMENT:   Patient completed MAB, and monitored x 1 hour post-infusion with no adverse reactions noted, remained hemodynamically stable.  - Patient tolerated injection well; denies complaints of chest pain/SOB/dizziness/palpitations.   - VSS for discharge home.  - D/C instructions given/ fact sheet included.  - Patient was instructed to self-isolate and use infection control measures (e.g wear mask, isolate, social distance, avoid sharing personal items, clean and disinfect "high touch" surfaces, and frequent handwashing according to the CDC guidelines.   - The patient was informed on what symptoms to be aware of for the next couple of days, and if there are any issues to call the 24/7 clinical call center. Patient was instructed to follow up with primary care provider as needed.    DISCHARGE at 5:45pm.

## 2022-06-13 ENCOUNTER — NON-APPOINTMENT (OUTPATIENT)
Age: 57
End: 2022-06-13

## 2022-06-22 ENCOUNTER — INPATIENT (INPATIENT)
Facility: HOSPITAL | Age: 57
LOS: 28 days | Discharge: HOME | End: 2022-07-21
Attending: STUDENT IN AN ORGANIZED HEALTH CARE EDUCATION/TRAINING PROGRAM | Admitting: PSYCHIATRY & NEUROLOGY

## 2022-06-22 VITALS — RESPIRATION RATE: 16 BRPM | HEIGHT: 65 IN

## 2022-06-22 DIAGNOSIS — F25.9 SCHIZOAFFECTIVE DISORDER, UNSPECIFIED: ICD-10-CM

## 2022-06-22 LAB
ALBUMIN SERPL ELPH-MCNC: 4.6 G/DL — SIGNIFICANT CHANGE UP (ref 3.3–5)
ALP SERPL-CCNC: 78 U/L — SIGNIFICANT CHANGE UP (ref 40–120)
ALT FLD-CCNC: 19 U/L — SIGNIFICANT CHANGE UP (ref 4–33)
AMPHET UR-MCNC: NEGATIVE — SIGNIFICANT CHANGE UP
ANION GAP SERPL CALC-SCNC: 12 MMOL/L — SIGNIFICANT CHANGE UP (ref 7–14)
APAP SERPL-MCNC: <10 UG/ML — LOW (ref 15–25)
APPEARANCE UR: ABNORMAL
AST SERPL-CCNC: 18 U/L — SIGNIFICANT CHANGE UP (ref 4–32)
BACTERIA # UR AUTO: NEGATIVE — SIGNIFICANT CHANGE UP
BARBITURATES UR SCN-MCNC: NEGATIVE — SIGNIFICANT CHANGE UP
BASOPHILS # BLD AUTO: 0.07 K/UL — SIGNIFICANT CHANGE UP (ref 0–0.2)
BASOPHILS NFR BLD AUTO: 3.5 % — HIGH (ref 0–2)
BENZODIAZ UR-MCNC: NEGATIVE — SIGNIFICANT CHANGE UP
BILIRUB SERPL-MCNC: 0.2 MG/DL — SIGNIFICANT CHANGE UP (ref 0.2–1.2)
BILIRUB UR-MCNC: NEGATIVE — SIGNIFICANT CHANGE UP
BUN SERPL-MCNC: 11 MG/DL — SIGNIFICANT CHANGE UP (ref 7–23)
CALCIUM SERPL-MCNC: 10 MG/DL — SIGNIFICANT CHANGE UP (ref 8.4–10.5)
CHLORIDE SERPL-SCNC: 105 MMOL/L — SIGNIFICANT CHANGE UP (ref 98–107)
CO2 SERPL-SCNC: 26 MMOL/L — SIGNIFICANT CHANGE UP (ref 22–31)
COCAINE METAB.OTHER UR-MCNC: NEGATIVE — SIGNIFICANT CHANGE UP
COLOR SPEC: YELLOW — SIGNIFICANT CHANGE UP
COVID-19 SPIKE DOMAIN AB INTERP: POSITIVE
COVID-19 SPIKE DOMAIN ANTIBODY RESULT: >250 U/ML — HIGH
CREAT SERPL-MCNC: 0.83 MG/DL — SIGNIFICANT CHANGE UP (ref 0.5–1.3)
CREATININE URINE RESULT, DAU: 266 MG/DL — SIGNIFICANT CHANGE UP
DIFF PNL FLD: NEGATIVE — SIGNIFICANT CHANGE UP
EGFR: 83 ML/MIN/1.73M2 — SIGNIFICANT CHANGE UP
EOSINOPHIL # BLD AUTO: 0 K/UL — SIGNIFICANT CHANGE UP (ref 0–0.5)
EOSINOPHIL NFR BLD AUTO: 0 % — SIGNIFICANT CHANGE UP (ref 0–6)
EPI CELLS # UR: 4 /HPF — SIGNIFICANT CHANGE UP (ref 0–5)
ETHANOL SERPL-MCNC: <10 MG/DL — SIGNIFICANT CHANGE UP
FLUAV AG NPH QL: SIGNIFICANT CHANGE UP
FLUBV AG NPH QL: SIGNIFICANT CHANGE UP
GIANT PLATELETS BLD QL SMEAR: PRESENT — SIGNIFICANT CHANGE UP
GLUCOSE SERPL-MCNC: 104 MG/DL — HIGH (ref 70–99)
GLUCOSE UR QL: NEGATIVE — SIGNIFICANT CHANGE UP
HCT VFR BLD CALC: 40.3 % — SIGNIFICANT CHANGE UP (ref 34.5–45)
HGB BLD-MCNC: 13.4 G/DL — SIGNIFICANT CHANGE UP (ref 11.5–15.5)
HYALINE CASTS # UR AUTO: 0 /LPF — SIGNIFICANT CHANGE UP (ref 0–7)
IANC: 0.22 K/UL — LOW (ref 1.8–7.4)
KETONES UR-MCNC: ABNORMAL
LEUKOCYTE ESTERASE UR-ACNC: NEGATIVE — SIGNIFICANT CHANGE UP
LYMPHOCYTES # BLD AUTO: 1.38 K/UL — SIGNIFICANT CHANGE UP (ref 1–3.3)
LYMPHOCYTES # BLD AUTO: 68.7 % — HIGH (ref 13–44)
MANUAL SMEAR VERIFICATION: SIGNIFICANT CHANGE UP
MCHC RBC-ENTMCNC: 31.3 PG — SIGNIFICANT CHANGE UP (ref 27–34)
MCHC RBC-ENTMCNC: 33.3 GM/DL — SIGNIFICANT CHANGE UP (ref 32–36)
MCV RBC AUTO: 94.2 FL — SIGNIFICANT CHANGE UP (ref 80–100)
METHADONE UR-MCNC: POSITIVE
MONOCYTES # BLD AUTO: 0.26 K/UL — SIGNIFICANT CHANGE UP (ref 0–0.9)
MONOCYTES NFR BLD AUTO: 13 % — SIGNIFICANT CHANGE UP (ref 2–14)
NEUTROPHILS # BLD AUTO: 0.16 K/UL — LOW (ref 1.8–7.4)
NEUTROPHILS NFR BLD AUTO: 7.8 % — LOW (ref 43–77)
NITRITE UR-MCNC: NEGATIVE — SIGNIFICANT CHANGE UP
OPIATES UR-MCNC: NEGATIVE — SIGNIFICANT CHANGE UP
OXYCODONE UR-MCNC: NEGATIVE — SIGNIFICANT CHANGE UP
PCP SPEC-MCNC: SIGNIFICANT CHANGE UP
PCP UR-MCNC: NEGATIVE — SIGNIFICANT CHANGE UP
PH UR: 6 — SIGNIFICANT CHANGE UP (ref 5–8)
PLAT MORPH BLD: NORMAL — SIGNIFICANT CHANGE UP
PLATELET # BLD AUTO: 238 K/UL — SIGNIFICANT CHANGE UP (ref 150–400)
PLATELET COUNT - ESTIMATE: NORMAL — SIGNIFICANT CHANGE UP
POTASSIUM SERPL-MCNC: 4 MMOL/L — SIGNIFICANT CHANGE UP (ref 3.5–5.3)
POTASSIUM SERPL-SCNC: 4 MMOL/L — SIGNIFICANT CHANGE UP (ref 3.5–5.3)
PROT SERPL-MCNC: 8.5 G/DL — HIGH (ref 6–8.3)
PROT UR-MCNC: ABNORMAL
RBC # BLD: 4.28 M/UL — SIGNIFICANT CHANGE UP (ref 3.8–5.2)
RBC # FLD: 12.9 % — SIGNIFICANT CHANGE UP (ref 10.3–14.5)
RBC BLD AUTO: NORMAL — SIGNIFICANT CHANGE UP
RBC CASTS # UR COMP ASSIST: 4 /HPF — SIGNIFICANT CHANGE UP (ref 0–4)
RSV RNA NPH QL NAA+NON-PROBE: SIGNIFICANT CHANGE UP
SALICYLATES SERPL-MCNC: <0.3 MG/DL — LOW (ref 15–30)
SARS-COV-2 IGG+IGM SERPL QL IA: >250 U/ML — HIGH
SARS-COV-2 IGG+IGM SERPL QL IA: POSITIVE
SARS-COV-2 RNA SPEC QL NAA+PROBE: SIGNIFICANT CHANGE UP
SODIUM SERPL-SCNC: 143 MMOL/L — SIGNIFICANT CHANGE UP (ref 135–145)
SP GR SPEC: 1.03 — SIGNIFICANT CHANGE UP (ref 1–1.05)
THC UR QL: NEGATIVE — SIGNIFICANT CHANGE UP
TOXICOLOGY SCREEN, DRUGS OF ABUSE, SERUM RESULT: SIGNIFICANT CHANGE UP
TSH SERPL-MCNC: 1.78 UIU/ML — SIGNIFICANT CHANGE UP (ref 0.27–4.2)
UROBILINOGEN FLD QL: SIGNIFICANT CHANGE UP
VARIANT LYMPHS # BLD: 7 % — HIGH (ref 0–6)
WBC # BLD: 2.01 K/UL — LOW (ref 3.8–10.5)
WBC # FLD AUTO: 2.01 K/UL — LOW (ref 3.8–10.5)
WBC UR QL: 1 /HPF — SIGNIFICANT CHANGE UP (ref 0–5)

## 2022-06-22 PROCEDURE — 93010 ELECTROCARDIOGRAM REPORT: CPT

## 2022-06-22 PROCEDURE — 99285 EMERGENCY DEPT VISIT HI MDM: CPT | Mod: 25

## 2022-06-22 RX ORDER — LEVOTHYROXINE SODIUM 125 MCG
12.5 TABLET ORAL DAILY
Refills: 0 | Status: DISCONTINUED | OUTPATIENT
Start: 2022-06-22 | End: 2022-07-21

## 2022-06-22 RX ORDER — LEVOTHYROXINE SODIUM 125 MCG
12.5 TABLET ORAL DAILY
Refills: 0 | Status: DISCONTINUED | OUTPATIENT
Start: 2022-06-22 | End: 2022-06-22

## 2022-06-22 RX ORDER — OLANZAPINE 15 MG/1
5 TABLET, FILM COATED ORAL EVERY 6 HOURS
Refills: 0 | Status: DISCONTINUED | OUTPATIENT
Start: 2022-06-22 | End: 2022-06-22

## 2022-06-22 RX ORDER — OLANZAPINE 15 MG/1
5 TABLET, FILM COATED ORAL ONCE
Refills: 0 | Status: DISCONTINUED | OUTPATIENT
Start: 2022-06-22 | End: 2022-07-21

## 2022-06-22 RX ORDER — VORTIOXETINE 5 MG/1
20 TABLET, FILM COATED ORAL DAILY
Refills: 0 | Status: DISCONTINUED | OUTPATIENT
Start: 2022-06-22 | End: 2022-07-21

## 2022-06-22 RX ORDER — GABAPENTIN 400 MG/1
600 CAPSULE ORAL THREE TIMES A DAY
Refills: 0 | Status: DISCONTINUED | OUTPATIENT
Start: 2022-06-22 | End: 2022-07-21

## 2022-06-22 RX ORDER — OLANZAPINE 15 MG/1
5 TABLET, FILM COATED ORAL ONCE
Refills: 0 | Status: DISCONTINUED | OUTPATIENT
Start: 2022-06-22 | End: 2022-06-22

## 2022-06-22 RX ORDER — PANTOPRAZOLE SODIUM 20 MG/1
40 TABLET, DELAYED RELEASE ORAL
Refills: 0 | Status: DISCONTINUED | OUTPATIENT
Start: 2022-06-22 | End: 2022-06-22

## 2022-06-22 RX ORDER — BUPROPION HYDROCHLORIDE 150 MG/1
150 TABLET, EXTENDED RELEASE ORAL DAILY
Refills: 0 | Status: DISCONTINUED | OUTPATIENT
Start: 2022-06-22 | End: 2022-06-22

## 2022-06-22 RX ORDER — BREXPIPRAZOLE 0.25 MG/1
4 TABLET ORAL AT BEDTIME
Refills: 0 | Status: DISCONTINUED | OUTPATIENT
Start: 2022-06-22 | End: 2022-07-06

## 2022-06-22 RX ORDER — GABAPENTIN 400 MG/1
600 CAPSULE ORAL THREE TIMES A DAY
Refills: 0 | Status: DISCONTINUED | OUTPATIENT
Start: 2022-06-22 | End: 2022-06-22

## 2022-06-22 RX ORDER — PANTOPRAZOLE SODIUM 20 MG/1
40 TABLET, DELAYED RELEASE ORAL
Refills: 0 | Status: DISCONTINUED | OUTPATIENT
Start: 2022-06-22 | End: 2022-07-21

## 2022-06-22 RX ORDER — OLANZAPINE 15 MG/1
5 TABLET, FILM COATED ORAL EVERY 6 HOURS
Refills: 0 | Status: DISCONTINUED | OUTPATIENT
Start: 2022-06-22 | End: 2022-07-21

## 2022-06-22 RX ORDER — CLONAZEPAM 1 MG
1 TABLET ORAL
Refills: 0 | Status: DISCONTINUED | OUTPATIENT
Start: 2022-06-22 | End: 2022-06-29

## 2022-06-22 RX ORDER — BUPROPION HYDROCHLORIDE 150 MG/1
150 TABLET, EXTENDED RELEASE ORAL DAILY
Refills: 0 | Status: DISCONTINUED | OUTPATIENT
Start: 2022-06-22 | End: 2022-06-24

## 2022-06-22 RX ORDER — CLONAZEPAM 1 MG
1 TABLET ORAL
Refills: 0 | Status: DISCONTINUED | OUTPATIENT
Start: 2022-06-22 | End: 2022-06-22

## 2022-06-22 RX ADMIN — BREXPIPRAZOLE 4 MILLIGRAM(S): 0.25 TABLET ORAL at 22:29

## 2022-06-22 NOTE — ED BEHAVIORAL HEALTH ASSESSMENT NOTE - THOUGHT CONTENT
Delusions/Preoccupations/Ruminations/Hopelessness/Other Delusions/Ideas of reference/Preoccupations/Ruminations/Hopelessness/Other

## 2022-06-22 NOTE — ED BEHAVIORAL HEALTH ASSESSMENT NOTE - CURRENT MEDICATION
Synthroid 12.5mcg  Rexulti 4mg po qhs  Buspar 30mg po bid  Wellbutrin XL 150mg po daily  Klonopin 1mg po bid PRN  Trintellex 20mg po daily PRN   Gabapentin 1200mg po tid Anxiety

## 2022-06-22 NOTE — ED BEHAVIORAL HEALTH ASSESSMENT NOTE - HPI (INCLUDE ILLNESS QUALITY, SEVERITY, DURATION, TIMING, CONTEXT, MODIFYING FACTORS, ASSOCIATED SIGNS AND SYMPTOMS)
Patient is a 55 yo F currently domiciled with mother, employed, currently single w/ PPHx of schizoaffective disorder (diagnosed 2009), multiple prior hospitalizations, last hospitalization at [facility] in 2014, hx of TMS currently on maintenance treatment w/ last treatment 3 weeks ago brought in BIB brother (Nilesh Mendez) d/t 8 day history of decompensation. Patient states that she has been feeling depressed for the past two weeks. She says that her symptoms started when the "hazing" began , but patient did not elaborate. She says that she is being "tormented and sabotaged" but unable to specify by whom. She says that there are microphones in her bedroom that tell her to "drop dead".  Patient has been unable to sleep for the past 48H because there is "caffeine in the vents". She has also not been able to shower, as she fears that there is something being sprayed on her. Patient has also not been eating over the past few days, as she is unsure as to what she is being feed. Last meal was yesterday. Patient denied SI to writer, but told attending that she was experiencing passive SI but denies intent and plan. Patient denies HI. Patient denies AH/VH. She says that she has remained adherent to medications, but refers to her medications as drugs and says that psychiatrists "take your money".     Patient currently lives at home with "Margaret Mendez." When asked patient if this was her mother, patient reports that she was dropped off at Margaret's house and was adopted. She says that Margaret has been confusing her for her entire life with double talk, "hating & cursing her." She says that this has been ongoing since childhood. Patient states that she has not been able to work because of her mother. She worries that she will become a prostitute/hooker, however she says that Gibson Guardado thinks that she is "deluded" and says that she is an "ugly old hag". Patient did not elaborate on her relation to Carmen.       Per brother, he says that his sister began decompensating 8 days ago. He says that she has been delusional, saying that people are "laughing at her". She has also been paranoid about him, referring to him only as "jackson." He says that her symptoms have been similar to previous presentations in the past. Last hospitalization was 6-7 years at [facility]. He says that patient has been cared for by mother who fell today and is currently being evaluated at [facility].     Per outpatient psychiatrist, patient has been followed for the past 12-13 years.  Psychiatrist states that when patient has had chronic history of paranoia, "psychotic body dysphoria", social isolation, and lack of companionship. He says that her symptoms have been exacerbated by the social isolation of the pandemic. When her paranoid symptoms worsen, she can become more delusional, perseverative, etc. Patient was initially stabilized on zyprexa , however patient developed possible intolerance. Psychiatry states that patient was amenable to switching to Rexulti and had been on a stable dose for the past 3 years until sept of 2021, as the patient began to experience more paranoid symptoms. At this time, psychiatrist increased dose to 3mg qd. He says that he last saw the patient on 6/20/22 due to concerns from family. At this visit, psychiatrist increased dose to 4mg qd. Psychiatrist also notes a longstanding history of difficulty sleeping, stating that patient has tried klonopin, ambien, and belsomra. Patient tries to limit klonopin use due to SE of sleepiness and impairment to remote memory. Patient is a 55 yo F currently domiciled with mother, employed, currently single w/ PPHx of schizoaffective disorder (diagnosed 2009), multiple prior hospitalizations, last hospitalization at [facility] in 2014, hx of TMS currently on maintenance treatment w/ last treatment 3 weeks ago brought in BIB brother (Nilesh Mendez) d/t 8 day history of decompensation. Patient states that she has been feeling depressed for the past two weeks. She says that her symptoms started when the "hazing" began , but patient did not elaborate. She says that she is being "tormented and sabotaged" but unable to specify by whom. She says that there are microphones in her bedroom that tell her to "drop dead" and want to hurt her, but do not tell her how.  Patient has been unable to sleep for the past 48H because there is "caffeine in the vents". She has also not been able to shower, as she fears that there is something being sprayed on her. Patient has also not been eating over the past few days, as she is unsure as to what she is being feed. Last meal was yesterday. Patient denied SI to writer, but told attending that she was experiencing passive SI but denies intent and plan. Patient denies HI. Patient denies VH. She says that she has remained adherent to medications, but refers to her medications as drugs and says that psychiatrists "take your money". Patient believes her symptoms started when she contracted COVID-19 3 weeks ago. Symptom onset: 6/2/22. Patient was treated with monoclonal Ab infusion on 6/4/22. Patient is boosted x2 with Moderna.     Patient currently lives at home with "Margaret Mendez." When asked patient if this was her mother, patient reports that she was dropped off at Margaret's house and was adopted. She says that Margaret has been confusing her for her entire life with double talk, "hating & cursing her." She says that this has been ongoing since childhood. Patient states that she has not been able to work because of her mother. She worries that she will become a prostitute/hooker, however she says that Gibson Guardado thinks that she is "deluded" and says that she is an "ugly old hag". Patient did not elaborate on her relation to Carmen.     Per brother, he says that his sister began decompensating 8 days ago. He says that she has been delusional, saying that people are "laughing at her". She has also been paranoid about him, referring to him only as "jackson." He says that her symptoms have been similar to previous presentations in the past. Last hospitalization was 6-7 years at [facility]. He says that patient has been cared for by mother who fell today and is currently being evaluated at [facility].     Per outpatient psychiatrist, patient has been followed for the past 12-13 years.  Psychiatrist states that when patient has had chronic history of paranoia, "psychotic body dysphoria", social isolation, and lack of companionship. He says that her symptoms have been exacerbated by the social isolation of the pandemic, however patient has maintained employment on [employer]. When her perseverative thoughts on her relationship status and physical appearance worsen, she can become more paranoid and delusional. Patient was initially stabilized on zyprexa after last hospitalization. Psychiatry states that patient was amenable to switching to Rexulti and had been on a stable dose for the past 3 years until sept of 2021, as the patient began to experience more paranoid symptoms. At this time, psychiatrist increased dose to 3mg qd. He says that he last saw the patient on 6/20/22 due to concerns from family. At this visit, psychiatrist increased dose to 4mg qd. Psychiatrist also notes a longstanding history of difficulty sleeping, stating that patient has tried klonopin, ambien, and belsomra. Patient tries to limit klonopin use due to SE of sleepiness and impairment to remote memory. Patient is a 55 yo F currently domiciled with mother, employed, currently single w/ PPHx of schizoaffective disorder (diagnosed 2009), multiple prior hospitalizations, last hospitalization at [facility] in 2014, hx of TMS currently on maintenance treatment w/ last treatment 3 weeks ago brought in BIB brother (Nilesh Mendez) d/t 8 day history of decompensation. Patient states that she has been feeling depressed for the past two weeks. She says that her symptoms started when the "hazing" began , but patient did not elaborate. She says that she is being "tormented and sabotaged" but unable to specify by whom. She says that there are microphones in her bedroom that tell her to "drop dead" and want to hurt her, but do not tell her how.  Patient has been unable to sleep for the past 48H because there is "caffeine in the vents". She has also not been able to shower, as she fears that there is something being sprayed on her. Patient has not eating over the past few days, as she is unsure what she is being fed. Last meal was yesterday. Patient denied SI to writer, but told attending that she was experiencing passive SI but denies intent and plan. Patient denies HI. Patient denies VH. She says that she has remained adherent to medications, but refers to her medications as drugs and says that psychiatrists "take your money". Patient believes her symptoms started when she contracted COVID-19 3 weeks ago. Symptom onset: 6/2/22. Patient was treated with monoclonal Ab infusion on 6/4/22. Patient is boosted x2 with Moderna.     Patient currently lives at home with "Margaret Mendez." When asked patient if this was her mother, patient reports that she was dropped off at Margaret's house as a child and was adopted. She says that Margaret has been confusing her for her entire life with "double talk, hating & cursing" at her. She says that this has been ongoing since childhood. Patient states that she has not been able to work because of her mother. She worries that she will become a prostitute/hooker, however she says that Gibson Guardado thinks that she is "deluded" and says that she is an "ugly old hag and wouldn't be a good prostitute". Patient did not elaborate on her relation to Gibson and Debby.     Per brother, he says that his sister began decompensating 8 days ago. He says that she has been delusional, saying that people are "laughing at her". She has also been paranoid about him, referring to him only as "jackson." He says that her symptoms have been similar to previous presentations in the past. Last hospitalization was 6-7 years at [facility]. He says that patient has been cared for by mother who fell today and is currently being evaluated at [facility].     Per outpatient psychiatrist, patient has been followed for the past 12-13 years. Psychiatrist states that when patient has had chronic history of paranoia, "psychotic body dysphoria", social isolation, and lack of companionship. He says that her symptoms have been exacerbated by the social isolation of the pandemic, however patient has maintained employment on [employer] and had obtained an [employer]. When her perseverative thoughts on her relationship status and physical appearance worsen, she can become more paranoid and delusional. Patient was initially stabilized on zyprexa after last hospitalization. Psychiatry states that patient was amenable to switching to Rexulti and had been on a stable dose for the past 3 years until sept of 2021, as the patient began to experience more paranoid symptoms. At this time, psychiatrist increased dose to 3mg qd. He says that he last saw the patient on 6/20/22 due to concerns from family. At this visit, psychiatrist increased dose to 4mg qd. Psychiatrist also notes a longstanding history of difficulty sleeping, stating that patient has tried klonopin, ambien, and belsomra. Patient tries to limit klonopin use due to SE of sleepiness and impairment to remote memory. Patient is a 57 yo F currently domiciled with mother, employed, currently single w/PMH of MVP, congenital neutropenia, hypothyroidism, PPHx of schizoaffective disorder (diagnosed 2009), multiple prior hospitalizations, last hospitalization at [facility] in 2014, hx of TMS currently on maintenance treatment w/ last treatment 3 weeks ago brought in BIB brother (Nilesh Mendez) d/t 8 day history of decompensation. Patient states that she has been feeling depressed for the past two weeks. She says that her symptoms started when the "hazing" began , but patient did not elaborate. She says that she is being "tormented and sabotaged" but unable to specify by whom. She says that there are microphones in her bedroom that tell her to "drop dead" and want to hurt her, but do not tell her how.  Patient has been unable to sleep for the past 48H because there is "caffeine in the vents". She has also not been able to shower, as she fears that there is something being sprayed on her. Patient has not eating over the past few days, as she is unsure what she is being fed. Last meal was yesterday. Patient denied SI to writer, but told attending that she was experiencing passive SI but denies intent and plan. Patient denies HI. Patient denies VH. She says that she has remained adherent to medications, but refers to her medications as drugs and says that psychiatrists "take your money". Patient believes her symptoms started when she contracted COVID-19 3 weeks ago. Symptom onset: 6/2/22. Patient was treated with monoclonal Ab infusion on 6/4/22. Patient is boosted x2 with Moderna.     Patient currently lives at home with "Margaret Mendez." When asked patient if this was her mother, patient reports that she was dropped off at Margaret's house as a child and was adopted. She says that Margaret has been confusing her for her entire life with "double talk, hating & cursing" at her. She says that this has been ongoing since childhood. Patient states that she has not been able to work because of her mother. She worries that she will become a prostitute/hooker, however she says that Gibson Guardado thinks that she is "deluded" and says that she is an "ugly old hag and wouldn't be a good prostitute". Patient did not elaborate on her relation to Gibson and Debby.     Per brother, he says that his sister began decompensating 8 days ago. He says that she has been delusional, saying that people are "laughing at her". She has also been paranoid about him, referring to him only as "jackson." He says that her symptoms have been similar to previous presentations in the past. Last hospitalization was 6-7 years at [facility]. He says that patient has been cared for by mother who fell today and is currently being evaluated at [facility].     Per outpatient psychiatrist, patient has been followed for the past 12-13 years. Psychiatrist states that when patient has had chronic history of paranoia, "psychotic body dysphoria", social isolation, and lack of companionship. He says that her symptoms have been exacerbated by the social isolation of the pandemic, however patient has maintained employment on [employer] and had obtained an [employer]. When her perseverative thoughts on her relationship status and physical appearance worsen, she can become more paranoid and delusional. Patient was initially stabilized on zyprexa after last hospitalization. Psychiatry states that patient was amenable to switching to Rexulti and had been on a stable dose for the past 3 years until sept of 2021, as the patient began to experience more paranoid symptoms. At this time, psychiatrist increased dose to 3mg qd. He says that he last saw the patient on 6/20/22 due to concerns from family. At this visit, psychiatrist increased dose to 4mg qd. Psychiatrist also notes a longstanding history of difficulty sleeping, stating that patient has tried klonopin, ambien, and belsomra. Patient tries to limit klonopin use due to SE of sleepiness and impairment to remote memory. Patient is a 55 yo F currently domiciled with mother, employed, currently single w/PMH of MVP, congenital neutropenia, hypothyroidism, PPHx of schizoaffective disorder (diagnosed 2009), multiple prior hospitalizations, last hospitalization at [facility] in 2014, hx of TMS currently on maintenance treatment w/ last treatment 3 weeks ago brought in BIB brother (Nilesh Mendez) d/t 8 day history of paranoia.    Patient reports that she was brought to the hospital by "the lady and the jackson" but she's not sure why. She's disorganized in TP and required to be redirected multiple times throughout the interview to get an understand of symptoms/timeline.      She admits that she has been feeling depressed for the past two weeks. She says that her symptoms started when the "hazing" began , but patient did not elaborate. She says that she is being "tormented and sabotaged" but unable to specify by whom. She says that there are microphones in her bedroom that tell her to "drop dead" and want to hurt her, but do not tell her how.  Patient has been unable to sleep for the past 48H because there is "caffeine in the vents". She has also not been able to shower, as she fears that there is something being sprayed on her. Patient has not been eating over the past few days, as she is unsure what she is being fed. Last meal was yesterday. Patient denied SI to writer, but told attending that she was experiencing passive SI but denies intent and plan. Patient denies VH. She says that she has remained adherent to medications, but refers to her medications as drugs and says that psychiatrists "take your money". Patient believes her symptoms started when she contracted COVID-19 3 weeks ago. Symptom onset: 6/2/22. Patient was treated for Covid with monoclonal Ab infusion on 6/4/22. Patient is boosted x2 with Moderna.     Patient currently lives at home with "Margaret Mendez." When asked patient if this was her mother, patient reports that she was dropped off at Margaret's house as a child and was adopted. She says that Margaret has been confusing her for her entire life with "double talk, hating & cursing" at her. She says that this has been ongoing since childhood. Patient states that she has not been able to work because of her mother. 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 Patient did not elaborate on her relation to Gibson and Debby or who they were.     Patient does not report nor exhibit any signs of kiran, including irritable or elevated mood, grandiosity, pressured speech, risk-taking behaviors, increase in productivity or agitation. Patient denies any HI, violent thoughts.    Collateral from Brother Nilesh Doe , he says that his sister began decompensating 8 days ago. He says that she has been delusional, saying that people are "laughing at her". She has also been paranoid about him, referring to him only as "jackson." He says that her symptoms have been similar to previous presentations in the past. Last hospitalization was 6-7 years at [facility]. He says that patient has been cared for by mother who fell today and is currently being evaluated at [facility].     Per outpatient psychiatrist Dr. Smalls, patient has been followed for the past 12-13 years. Psychiatrist states that when patient has had chronic history of paranoia, "psychotic body dysphoria", social isolation, and lack of companionship. He says that her symptoms have been exacerbated by the social isolation of the pandemic, however patient has maintained employment on [employer] and had obtained an [employer]. When her perseverative thoughts on her relationship status and physical appearance worsen, she can become more paranoid and delusional. Patient was initially stabilized on zyprexa after last hospitalization. Psychiatry states that patient was amenable to switching to Rexulti and had been on a stable dose for the past 3 years until sept of 2021, as the patient began to experience more paranoid symptoms. At this time, psychiatrist increased dose to 3mg qd. He says that he last saw the patient on 6/20/22 due to concerns from family. At this visit, psychiatrist increased dose to 4mg qd. Psychiatrist also notes a longstanding history of difficulty sleeping, stating that patient has tried klonopin, ambien, and belsomra. Patient tries to limit klonopin use due to SE of sleepiness and impairment to remote memory.

## 2022-06-22 NOTE — ED PROVIDER NOTE - CLINICAL SUMMARY MEDICAL DECISION MAKING FREE TEXT BOX
This is a 56 yr F, pmh congential neutropenia, lung mass, depression, anxiety with c/o paranoia, delusions. Pt upon arrival calm, blunt affect, does not want to tell her name, slow to respond with very poor eye contact, reports on melissa rodas   Nilesh ( [phone]) brother reports she is not herself for the last 8-10 days. He does not know what could be the triggers. He states she usually goes through this every 3-4 years, supposed to be on meds. She believes people are talking laughing about her, her brother not her brother.  labs- unremarkable   psych inpatient tx

## 2022-06-22 NOTE — ED BEHAVIORAL HEALTH NOTE - BEHAVIORAL HEALTH NOTE
COVID Exposure Screen- Patient  1.	*Have you had a COVID-19 test in the last 90 days?  (x  ) Yes   (  ) No   (  ) Unknown- Reason: _____  IF YES PROCEED TO QUESTION #2. IF NO OR UNKNOWN, PLEASE SKIP TO QUESTION #3.  2.	Date of test(s) and result(s): __06/2/2022 - Monoclonal AB tx on 06/04/2022  3.	*Have you tested positive for COVID-19 antibodies? (  ) Yes   (  ) No   (x  ) Unknown- Reason: _____  IF YES PROCEED TO QUESTION #4. IF NO or UNKNOWN, PLEASE SKIP TO QUESTION #5.  4.	Date of positive antibody test: ________  5.	*Have you received 2 doses of the COVID-19 vaccine? ( x ) Yes   (  ) No   (  ) Unknown- Reason: _____   IF YES PROCEED TO QUESTION #6. IF NO or UNKNOWN, PLEASE SKIP TO QUESTION #7.  6.	Date of second dose: ____moderna >6 months ago + booster____  7.	*In the past 10 days, have you been around anyone with a positive COVID-19 test?* (  ) Yes   ( x ) No   (  ) Unknown- Reason: ____  IF YES PROCEED TO QUESTION #8. IF NO or UNKNOWN, PLEASE SKIP TO QUESTION #13.  8.	Were you within 6 feet of them for at least 15 minutes? (  ) Yes   (  ) No   (  ) Unknown- Reason: _____  9.	Have you provided care for them? (  ) Yes   (  ) No   (  ) Unknown- Reason: ______  10.	Have you had direct physical contact with them (touched, hugged, or kissed them)? (  ) Yes   (  ) No    (  ) Unknown- Reason: _____  11.	Have you shared eating or drinking utensils with them? (  ) Yes   (  ) No    (  ) Unknown- Reason: ____  12.	Have they sneezed, coughed, or somehow gotten respiratory droplets on you? (  ) Yes   (  ) No    (  ) Unknown- Reason: ______  13.	*Have you been out of [place] within the past 10 days?* (  ) Yes   (  x) No   (  ) Unknown- Reason: _____  IF YES PLEASE ANSWER THE FOLLOWING QUESTIONS:  14.	Which state/country have you been to? ______  15.	Were you there over 24 hours? (  ) Yes   (  ) No    (  ) Unknown- Reason: ______  16.	Date of return to [facility]: ______

## 2022-06-22 NOTE — ED BEHAVIORAL HEALTH ASSESSMENT NOTE - OTHER
, Outpatient Psychiatrist Dr. Mateo Smalls paranoid delusions; perseverative thoughts on appearance, intelligence; preoccupied with thoughts of burdening writer tearful paranoid, persecutory delusions; perseverative thoughts on appearance, intelligence; preoccupied with thoughts of burdening writer intense Labile, appears fearful not caring for self secondary to psychosis

## 2022-06-22 NOTE — ED BEHAVIORAL HEALTH ASSESSMENT NOTE - PSYCHIATRIC ISSUES AND PLAN (INCLUDE STANDING AND PRN MEDICATION)
Wellbutrin XL 150mg po daily, Rexulti 4mg po qhs, Trintellex 20mg po daily, buspar 30mg po bid, decrease gabapentin to 600mg po tid, klonopin 1mg po bid PRN for anxiety PRNS: Zyprexa 5mg po/im q6hrs PRN

## 2022-06-22 NOTE — ED PROVIDER NOTE - OBJECTIVE STATEMENT
This is a 56 yr F, pmh congential neutropenia, lung mass, depression, anxiety with c/o paranoia, delusions. Pt upon arrival calm, blunt affect, does not want to tell her name, slow to respond with very poor eye contact, reports on melissa rodas   Nilesh ( [phone]) brother reports she is not herself for the last 8-10 days. He does not know what could be the triggers. He states she usually goes through this every 3-4 years, supposed to be on meds. She believes people are talking laughing about her, her brother not her brother.

## 2022-06-22 NOTE — ED BEHAVIORAL HEALTH ASSESSMENT NOTE - DESCRIPTION
Patient is calm and cooperative in the ED. Remains in good behavorial control. Zyprexa 5mg and Lorazepam congenital neutropenia, mitral valve prolapse, hypothyroidism 55 yo F currently employed on [employer], well-educated, domiciled with 88 yo mother Patient is calm and cooperative in the ED. Remains in good behavorial control. ____________________ administered at _____. Patient is calm and cooperative in the ED. She becomes tearful randomly and states "that people hate her" but she can't tell team who. Remains in good behavorial control.  Vital Signs Last 24 Hrs  T(C): 36.8 (22 Jun 2022 17:08), Max: 36.8 (22 Jun 2022 17:08)  T(F): 98.3 (22 Jun 2022 17:08), Max: 98.3 (22 Jun 2022 17:08)  HR: 65 (22 Jun 2022 17:08) (65 - 65)  BP: 150/90 (22 Jun 2022 17:08) (150/90 - 150/90)  BP(mean): --  RR: 16 (22 Jun 2022 17:08) (16 - 16)  SpO2: 99% (22 Jun 2022 17:08) (99% - 99%)

## 2022-06-22 NOTE — BH PATIENT PROFILE - STATED REASON FOR ADMISSION
"I was confused and thought this jackson and the lady I live with are trying to steal my money and kill me".

## 2022-06-22 NOTE — ED PROVIDER NOTE - CARE PLAN
1 Principal Discharge DX:	Schizo-affective type schizophrenia, subchronic state with acute exacerbation

## 2022-06-22 NOTE — ED BEHAVIORAL HEALTH ASSESSMENT NOTE - NSBHATTESTCOMMENTATTENDFT_PSY_A_CORE
I have personally seen and examined this patient.  I fully participated in the care of this patient.  I have made amendments to the documentation where appropriate and otherwise agree with the history, physical exam, and plan as documented by Student - Miryam Gonzales MS3.    Patient is a 57 yo F employed, currently domiciled with mother, single with PMHx of MVP, congenital neutropenia, and hypothyroidism, PPHx of schizoaffective, multiple prior hospitalizations (last hospitalization 2014), currently on maintenance TMS presenting with 8 days of acute decompensation.   Patient is presenting with symptoms of acute psychosis including paranoia, cap gras delusions despite being adherent with medication. She has not been eating secondary to paranoia, feels her family is not hers and not functioning at her baseline. She is affectively dysregulated, impulsive and poses an acute risk of harm to herself. Pt will require psych hospitalization for stabilization.
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
## 2022-06-22 NOTE — ED ADULT NURSE NOTE - OBJECTIVE STATEMENT
Pt BIB brother for c/o psychosis and paranoia behavior x 8 days.  Pt is guarded and only answering select questions. Pt denies SI/HI, AH/VH, ETOH, substance use.  PMHx schizophrenia.  As per brother, pt is taking meds as prescribed.

## 2022-06-22 NOTE — ED BEHAVIORAL HEALTH ASSESSMENT NOTE - OTHER PAST PSYCHIATRIC HISTORY (INCLUDE DETAILS REGARDING ONSET, COURSE OF ILLNESS, INPATIENT/OUTPATIENT TREATMENT)
Patient was hospitalized 4-5 times, last time in [facility] in 2014 followed by PHP.   No past SA  Currently in outpatient treatment with Dr. Smalls - [phone]  Therapy with Dr. Haile  Receiving maintenance TMS

## 2022-06-22 NOTE — ED BEHAVIORAL HEALTH ASSESSMENT NOTE - SUMMARY
Patient is a 57 yo F employed, currently domiciled with mother, single with PPHx of schizoaffective, multiple prior hospitalizations (last hospitalization 2014), currently on maintenance TMS presenting with 8 days of acute decompensation. Patient is usually high functioning and has been adequately responding to medication regimen. Currently, patient is not at baseline evidenced by decreased ADLs (showering, eating). Patient is also displaying increasing paranoid, persecutory, and Capgrass delusions and illogical reasoning as well as passive SI but no intent or plan. Patient has presented similarly in the past and has responded well to inpatient hospitalization. Patient is a 57 yo F employed, currently domiciled with mother, single with PPHx of schizoaffective, multiple prior hospitalizations (last hospitalization 2014), currently on maintenance TMS presenting with 8 days of acute decompensation. Patient is usually high functioning and had been adequately responding to medication regimen per psychiatrist. Currently, patient is not at baseline evidenced by decreased ADLs (showering, eating). Patient is also displaying increasing paranoid, persecutory, and Capgrass delusions and illogical reasoning as well as passive SI but no intent or plan. Patient has presented similarly in the past and has responded well to inpatient hospitalization. Patient declined to be voluntarily hospitalized, however given reasons stated above, pt would benefit from admission for safety, symptomatic stabilization and optimization of medication. Patient is a 57 yo F employed, currently domiciled with mother, single with PMHx of MVP, congenital neutropenia, and hypothyroidism, PPHx of schizoaffective, multiple prior hospitalizations (last hospitalization 2014), currently on maintenance TMS presenting with 8 days of acute decompensation. Patient is usually high functioning and had been adequately responding to medication regimen per psychiatrist. Currently, patient is not at baseline evidenced by decreased ADLs (showering, eating). Patient is also displaying increasing paranoid, persecutory, and Capgrass delusions and illogical reasoning as well as passive SI but no intent or plan. Patient has presented similarly in the past and has responded well to inpatient hospitalization. Patient declined to be voluntarily hospitalized, however given reasons stated above, pt would benefit from admission for safety, symptomatic stabilization and optimization of medication. Patient is a 55 yo F employed, currently domiciled with mother, single with PMHx of MVP, congenital neutropenia, and hypothyroidism, PPHx of schizoaffective, multiple prior hospitalizations (last hospitalization 2014), currently on maintenance TMS presenting with 8 days of acute decompensation.     Patient is usually high functioning and had been adequately responding to medication regimen per psychiatrist. Currently, patient is not at baseline evidenced by decreased ADLs (showering, not eating secondary to paranoia). Patient is also displaying increasing paranoid, persecutory, and Capgras delusions and illogical reasoning as well as passive SI but no intent or plan. Patient has presented similarly in the past and has responded well to inpatient hospitalization. Patient declined to be voluntarily hospitalized, however given reasons stated above, pt would benefit from admission for safety, symptomatic stabilization and optimization of medication.

## 2022-06-22 NOTE — ED BEHAVIORAL HEALTH ASSESSMENT NOTE - RISK ASSESSMENT
static factors -- past psychiatry history, prior hospitalizations, inadequate response to multiple antipsychotics (Geodon, Abilify, Haldol), COVID-19 infection  modifiable -- lack of ADLs, inability to care for self  protective factors -- employment, supportive family, residential stability, insured, lack of intent and plan static factors -- past psychiatry history, prior hospitalizations, inadequate response to multiple antipsychotics (Geodon, Abilify, Haldol), COVID-19 infection  modifiable -- declining ADLs, inability to care for self, insight and judgment into condition  protective factors -- employment, supportive family, residential stability, insured, lack of intent and plan Moderate Acute Suicide Risk

## 2022-06-22 NOTE — ED BEHAVIORAL HEALTH ASSESSMENT NOTE - VIOLENCE PROTECTIVE FACTORS:
Residential stability/Employment stability/Engagement in treatment/Good treatment response/compliance

## 2022-06-23 LAB
CHOLEST SERPL-MCNC: 177 MG/DL — SIGNIFICANT CHANGE UP
HDLC SERPL-MCNC: 60 MG/DL — SIGNIFICANT CHANGE UP
LIPID PNL WITH DIRECT LDL SERPL: 107 MG/DL — HIGH
NON HDL CHOLESTEROL: 117 MG/DL — SIGNIFICANT CHANGE UP
TRIGL SERPL-MCNC: 48 MG/DL — SIGNIFICANT CHANGE UP

## 2022-06-23 PROCEDURE — 99222 1ST HOSP IP/OBS MODERATE 55: CPT

## 2022-06-23 RX ADMIN — VORTIOXETINE 20 MILLIGRAM(S): 5 TABLET, FILM COATED ORAL at 08:53

## 2022-06-23 RX ADMIN — Medication 12.5 MICROGRAM(S): at 06:25

## 2022-06-23 RX ADMIN — BREXPIPRAZOLE 4 MILLIGRAM(S): 0.25 TABLET ORAL at 20:44

## 2022-06-23 RX ADMIN — GABAPENTIN 600 MILLIGRAM(S): 400 CAPSULE ORAL at 13:11

## 2022-06-23 RX ADMIN — PANTOPRAZOLE SODIUM 40 MILLIGRAM(S): 20 TABLET, DELAYED RELEASE ORAL at 06:44

## 2022-06-23 RX ADMIN — Medication 30 MILLIGRAM(S): at 20:45

## 2022-06-23 RX ADMIN — Medication 1 MILLIGRAM(S): at 00:03

## 2022-06-23 RX ADMIN — GABAPENTIN 600 MILLIGRAM(S): 400 CAPSULE ORAL at 09:00

## 2022-06-23 RX ADMIN — Medication 1 MILLIGRAM(S): at 08:53

## 2022-06-23 RX ADMIN — Medication 30 MILLIGRAM(S): at 08:53

## 2022-06-23 RX ADMIN — OLANZAPINE 5 MILLIGRAM(S): 15 TABLET, FILM COATED ORAL at 13:12

## 2022-06-23 RX ADMIN — GABAPENTIN 600 MILLIGRAM(S): 400 CAPSULE ORAL at 20:45

## 2022-06-23 RX ADMIN — OLANZAPINE 5 MILLIGRAM(S): 15 TABLET, FILM COATED ORAL at 00:03

## 2022-06-23 NOTE — BH INPATIENT PSYCHIATRY ASSESSMENT NOTE - RISK ASSESSMENT
static factors -- past psychiatry history, prior hospitalizations, inadequate response to multiple antipsychotics (Geodon, Abilify, Haldol), COVID-19 infection  modifiable -- declining ADLs, inability to care for self, insight and judgment into condition  protective factors -- employment, supportive family, residential stability, insured, lack of intent and plan

## 2022-06-23 NOTE — BH INPATIENT PSYCHIATRY ASSESSMENT NOTE - NSBHCHARTREVIEWVS_PSY_A_CORE FT
Vital Signs Last 24 Hrs  T(C): 36.7 (06-23-22 @ 06:23), Max: 36.8 (06-22-22 @ 17:08)  T(F): 98 (06-23-22 @ 06:23), Max: 98.3 (06-22-22 @ 17:08)  HR: 68 (06-22-22 @ 20:32) (65 - 68)  BP: 140/91 (06-22-22 @ 20:32) (140/91 - 150/90)  BP(mean): --  RR: 16 (06-22-22 @ 20:32) (16 - 17)  SpO2: 100% (06-22-22 @ 20:32) (99% - 100%)    Orthostatic VS  06-23-22 @ 08:29  Lying BP: --/-- HR: --  Sitting BP: 130/74 HR: 78  Standing BP: 115/76 HR: 88  Site: upper left arm  Mode: electronic  Orthostatic VS  06-23-22 @ 06:23  Lying BP: --/-- HR: --  Sitting BP: 133/77 HR: 70  Standing BP: 109/74 HR: 80  Site: upper left arm  Mode: electronic  Orthostatic VS  06-22-22 @ 19:07  Lying BP: --/-- HR: --  Sitting BP: 145/61 HR: 66  Standing BP: 132/85 HR: 73  Site: --  Mode: --

## 2022-06-23 NOTE — BH INPATIENT PSYCHIATRY ASSESSMENT NOTE - NSBHATTESTAPPBILLTIME_PSY_A_CORE
I attest my time as JOSSELYN is greater than 50% of the total combined time spent on qualifying patient care activities. I have reviewed and verified the documentation.
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
## 2022-06-23 NOTE — BH INPATIENT PSYCHIATRY ASSESSMENT NOTE - OTHER PAST PSYCHIATRIC HISTORY (INCLUDE DETAILS REGARDING ONSET, COURSE OF ILLNESS, INPATIENT/OUTPATIENT TREATMENT)
Patient was hospitalized 4-5 times, last time in [facility] in 2014 followed by PHP.   No past SA  Currently in outpatient treatment with Dr. Smalls - [phone]  Therapy with Dr. Haile  Receiving maintenance TMS
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
## 2022-06-23 NOTE — BH INPATIENT PSYCHIATRY ASSESSMENT NOTE - OTHER
pt reports AH saying "die" and "loser" denies these are command. +VH of seeing the devil sticking out his tongue in patterns on the wall  Labile, appears fearful, guarded, paranoid, superficial  paranoid, persecutory delusions; perseverative thoughts on appearance, intelligence; preoccupied with thoughts of burdening writer

## 2022-06-23 NOTE — BH INPATIENT PSYCHIATRY ASSESSMENT NOTE - NSBHMETABOLIC_PSY_ALL_CORE_FT
BMI: BMI (kg/m2): 18.9 (06-22-22 @ 19:07)  HbA1c:   Glucose:   BP: 140/91 (06-22-22 @ 20:32) (140/91 - 150/90)  Lipid Panel: Date/Time: 06-23-22 @ 08:34  Cholesterol, Serum: 177  Direct LDL: --  HDL Cholesterol, Serum: 60  Total Cholesterol/HDL Ration Measurement: --  Triglycerides, Serum: 48

## 2022-06-23 NOTE — BH INPATIENT PSYCHIATRY ASSESSMENT NOTE - CURRENT MEDICATION
MEDICATIONS  (STANDING):  brexpiprazole 4 milliGRAM(s) Oral at bedtime  buPROPion XL (24-Hour) . 150 milliGRAM(s) Oral daily  busPIRone 30 milliGRAM(s) Oral two times a day  gabapentin 600 milliGRAM(s) Oral three times a day  levothyroxine 12.5 MICROGram(s) Oral daily  pantoprazole    Tablet 40 milliGRAM(s) Oral before breakfast  vortioxetine 20 milliGRAM(s) Oral daily    MEDICATIONS  (PRN):  clonazePAM  Tablet 1 milliGRAM(s) Oral two times a day PRN anxiety  OLANZapine 5 milliGRAM(s) Oral every 6 hours PRN agitation  OLANZapine Injectable 5 milliGRAM(s) IntraMuscular Once PRN severe agitation
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
## 2022-06-23 NOTE — BH INPATIENT PSYCHIATRY ASSESSMENT NOTE - NSDCCRITERIA_PSY_ALL_CORE
CGI <=2, return to baseline functioning as evidenced by behavioral control, staff observation, pt self report

## 2022-06-23 NOTE — PSYCHIATRIC REHAB INITIAL EVALUATION - NSBHPRRECOMMEND_PSY_ALL_CORE
Writer attempted to meet with pt to orient her to psychiatric rehabilitation staff and services on [facility] today, however pt occupied by other members of the treatment team at that time. Therefore, writer will find a more suitable time to connect with the pt and introduce the pt to the unit and to psych rehab staff. The following information will be based off of the pt's chart, and collateral obtained by treatment team members. Pt is a 56 year old woman who is domiciled with her mother and brother whom she refers to as "the man and woman who live with me." Pt seems to be presenting to [facility] with bizarre appearance, bizarre behavior, illogical thought process, psychotic symptoms, and thought broadcasting as she is thinking other peers are saying/doing things that are false and out of touch with reality. Since arrival to unit, pt has already been accusatory of her peers and this morning accused a peer of "spitting" on her although they did not. Pt seems to require much redirection and reality testing at this time. Pt presents with paranoid symptoms as she reported that there were "microphones" in her room and appears with heightened anxiousness. Psychiatric rehabilitation staff will establish a collaborative treatment goal to work on over the next 7 days. Pt. Denies SI/SH/AH/VH/HI. Will continue to support the pt during the current stay towards goal progress.

## 2022-06-23 NOTE — BH INPATIENT PSYCHIATRY ASSESSMENT NOTE - NSCOMMENTSUICRISKFACT_PSY_ALL_CORE
lower L/lower R pt reports passive SI to not wake up, but denies any intent or plan to self harm. Pt able to safety plan and agrees to come to staff immediately with any safety concerns on the unit

## 2022-06-24 LAB
A1C WITH ESTIMATED AVERAGE GLUCOSE RESULT: 5.1 % — SIGNIFICANT CHANGE UP (ref 4–5.6)
ESTIMATED AVERAGE GLUCOSE: 100 — SIGNIFICANT CHANGE UP

## 2022-06-24 PROCEDURE — 99232 SBSQ HOSP IP/OBS MODERATE 35: CPT

## 2022-06-24 RX ORDER — OLANZAPINE 15 MG/1
5 TABLET, FILM COATED ORAL AT BEDTIME
Refills: 0 | Status: DISCONTINUED | OUTPATIENT
Start: 2022-06-24 | End: 2022-06-27

## 2022-06-24 RX ADMIN — Medication 1 MILLIGRAM(S): at 00:07

## 2022-06-24 RX ADMIN — Medication 12.5 MICROGRAM(S): at 06:05

## 2022-06-24 RX ADMIN — GABAPENTIN 600 MILLIGRAM(S): 400 CAPSULE ORAL at 08:04

## 2022-06-24 RX ADMIN — Medication 1 MILLIGRAM(S): at 23:34

## 2022-06-24 RX ADMIN — BUPROPION HYDROCHLORIDE 150 MILLIGRAM(S): 150 TABLET, EXTENDED RELEASE ORAL at 08:04

## 2022-06-24 RX ADMIN — BREXPIPRAZOLE 4 MILLIGRAM(S): 0.25 TABLET ORAL at 20:45

## 2022-06-24 RX ADMIN — GABAPENTIN 600 MILLIGRAM(S): 400 CAPSULE ORAL at 20:45

## 2022-06-24 RX ADMIN — VORTIOXETINE 20 MILLIGRAM(S): 5 TABLET, FILM COATED ORAL at 08:04

## 2022-06-24 RX ADMIN — Medication 30 MILLIGRAM(S): at 20:44

## 2022-06-24 RX ADMIN — OLANZAPINE 5 MILLIGRAM(S): 15 TABLET, FILM COATED ORAL at 20:45

## 2022-06-24 RX ADMIN — GABAPENTIN 600 MILLIGRAM(S): 400 CAPSULE ORAL at 13:14

## 2022-06-24 RX ADMIN — PANTOPRAZOLE SODIUM 40 MILLIGRAM(S): 20 TABLET, DELAYED RELEASE ORAL at 06:23

## 2022-06-24 RX ADMIN — OLANZAPINE 5 MILLIGRAM(S): 15 TABLET, FILM COATED ORAL at 00:07

## 2022-06-24 RX ADMIN — Medication 30 MILLIGRAM(S): at 08:04

## 2022-06-24 NOTE — BH INPATIENT PSYCHIATRY PROGRESS NOTE - NSBHFUPINTERVALHXFT_PSY_A_CORE
On approach, pt observed to  bathroom with the lights off, running the shower. Pt appears paranoid and anxious. Pt states that she was afraid of the water in her home because she thought "my mother, no, that woman" was trying to harm her. Pt states she does not feel the water or food on the unit will harm her and agrees to maintain adequate nutrition and hydration. Pt states that she does not believe in psychiatry, "it's a scam to get money." Pt suddenly asks if she may have a rhinoplasty. Pt unable to state why she feels she needs this surgery, but repeatedly asked permission to be able to have it done. Pt then stated that she does not believe her outpt provider cares what happens to her, "He gives me expensive medications." Pt noted to take zyprexa PRN overnight and pt stated she was able to sleep better with it. 2014 records indicate pt did well on zyprexa in the past. Pt agreed to start zyprexa 5 mg PO QHS tonight. Wellbutrin discontinued as pt states this did not help her depression and "it makes my hair fall out." Wellbutrin may contribute to psychotic presentation and discontinued. Per staff, overnight pt was disorganized, with sxs of thought broadcasting, poor sleep, and paranoia. Will continue to monitor

## 2022-06-24 NOTE — BH INPATIENT PSYCHIATRY PROGRESS NOTE - PRN MEDS
MEDICATIONS  (PRN):  clonazePAM  Tablet 1 milliGRAM(s) Oral two times a day PRN anxiety  OLANZapine 5 milliGRAM(s) Oral every 6 hours PRN agitation  OLANZapine Injectable 5 milliGRAM(s) IntraMuscular Once PRN severe agitation

## 2022-06-24 NOTE — BH SOCIAL WORK INITIAL PSYCHOSOCIAL EVALUATION - OTHER PAST PSYCHIATRIC HISTORY (INCLUDE DETAILS REGARDING ONSET, COURSE OF ILLNESS, INPATIENT/OUTPATIENT TREATMENT)
EMR reports "Patient is a 55 yo F currently domiciled with mother, employed, currently single w/PMH of MVP, congenital neutropenia, hypothyroidism, PPHx of schizoaffective disorder (diagnosed 2009), multiple prior hospitalizations, last hospitalization at [facility] in 2014, hx of TMS currently on maintenance treatment w/ last treatment 3 weeks ago brought in BIB brother (Nilesh Mendez) d/t 8 day history of paranoia."  Pt was hospitalized due to disorganized thoughts and behavior. Pt provided verbal consent to communicate with brother.

## 2022-06-24 NOTE — DIETITIAN INITIAL EVALUATION ADULT - PERTINENT LABORATORY DATA
06-22    143  |  105  |  11  ----------------------------<  104<H>  4.0   |  26  |  0.83    Ca    10.0      22 Jun 2022 15:45    TPro  8.5<H>  /  Alb  4.6  /  TBili  0.2  /  DBili  x   /  AST  18  /  ALT  19  /  AlkPhos  78  06-22  A1C with Estimated Average Glucose Result: 5.1 % (06-24-22 @ 07:55)

## 2022-06-24 NOTE — BH INPATIENT PSYCHIATRY PROGRESS NOTE - CURRENT MEDICATION
MEDICATIONS  (STANDING):  brexpiprazole 4 milliGRAM(s) Oral at bedtime  busPIRone 30 milliGRAM(s) Oral two times a day  gabapentin 600 milliGRAM(s) Oral three times a day  levothyroxine 12.5 MICROGram(s) Oral daily  OLANZapine 5 milliGRAM(s) Oral at bedtime  pantoprazole    Tablet 40 milliGRAM(s) Oral before breakfast  vortioxetine 20 milliGRAM(s) Oral daily    MEDICATIONS  (PRN):  clonazePAM  Tablet 1 milliGRAM(s) Oral two times a day PRN anxiety  OLANZapine 5 milliGRAM(s) Oral every 6 hours PRN agitation  OLANZapine Injectable 5 milliGRAM(s) IntraMuscular Once PRN severe agitation

## 2022-06-24 NOTE — BH INPATIENT PSYCHIATRY PROGRESS NOTE - NSBHMETABOLIC_PSY_ALL_CORE_FT
BMI: BMI (kg/m2): 18.9 (06-22-22 @ 19:07)  HbA1c: A1C with Estimated Average Glucose Result: 5.1 % (06-24-22 @ 07:55)    Glucose:   BP: 140/91 (06-22-22 @ 20:32) (140/91 - 150/90)  Lipid Panel: Date/Time: 06-23-22 @ 08:34  Cholesterol, Serum: 177  Direct LDL: --  HDL Cholesterol, Serum: 60  Total Cholesterol/HDL Ration Measurement: --  Triglycerides, Serum: 48

## 2022-06-24 NOTE — BH INPATIENT PSYCHIATRY PROGRESS NOTE - NSBHCHARTREVIEWVS_PSY_A_CORE FT
Vital Signs Last 24 Hrs  T(C): 36.6 (06-24-22 @ 06:51), Max: 36.7 (06-23-22 @ 20:23)  T(F): 97.9 (06-24-22 @ 06:51), Max: 98 (06-23-22 @ 20:23)  HR: --  BP: --  BP(mean): --  RR: --  SpO2: --    Orthostatic VS  06-24-22 @ 06:51  Lying BP: --/-- HR: --  Sitting BP: 108/75 HR: 64  Standing BP: 118/77 HR: 60  Site: upper left arm  Mode: electronic  Orthostatic VS  06-23-22 @ 20:23  Lying BP: --/-- HR: --  Sitting BP: 119/72 HR: 74  Standing BP: 113/77 HR: 67  Site: --  Mode: --  Orthostatic VS  06-23-22 @ 08:29  Lying BP: --/-- HR: --  Sitting BP: 130/74 HR: 78  Standing BP: 115/76 HR: 88  Site: upper left arm  Mode: electronic  Orthostatic VS  06-23-22 @ 06:23  Lying BP: --/-- HR: --  Sitting BP: 133/77 HR: 70  Standing BP: 109/74 HR: 80  Site: upper left arm  Mode: electronic  Orthostatic VS  06-22-22 @ 19:07  Lying BP: --/-- HR: --  Sitting BP: 145/61 HR: 66  Standing BP: 132/85 HR: 73  Site: --  Mode: --

## 2022-06-24 NOTE — DIETITIAN INITIAL EVALUATION ADULT - OTHER INFO
Patient admitted to [facility] d/t [facility]. As per medical record, patient was not eating for a few days PTA. Patient lying in bed at time of interview. States appetite is good now. Denies any GI distress. Reports she was not weighed on admission and reported she had weighed 110#. As per HIE, Weights: 6/4 119#, 3/28 123#. Had no specific food preferences.

## 2022-06-24 NOTE — BH INPATIENT PSYCHIATRY PROGRESS NOTE - OTHER
Labile, appears fearful, guarded, paranoid, superficial  pt reports AH saying "die" and "loser" denies these are command. +VH of seeing the devil sticking out his tongue in patterns on the wall  paranoid, persecutory delusions; perseverative thoughts on appearance, intelligence; preoccupied with thoughts of burdening writer

## 2022-06-24 NOTE — BH INPATIENT PSYCHIATRY PROGRESS NOTE - NSBHASSESSSUMMFT_PSY_ALL_CORE
Patient is a 57 yo F currently domiciled with mother, employed, currently single w/PMH of MVP, congenital neutropenia, hypothyroidism, PPHx of schizoaffective disorder (diagnosed 2009), multiple prior hospitalizations, last hospitalization at [facility] in 2014, hx of TMS currently on maintenance treatment w/ last treatment 3 weeks ago brought in BIB brother (Nilesh Mendez) d/t 8 day history of paranoia.    Today pt remains disorganized, paranoid, Capgras delusions, labile, tearful, denies active SIIP and agrees to come to staff immediately if safety concerns should arise on the unit, +AH saying derogatory things, denies command in nature. Agrees to re-start zyprexa     PLAN:  -involuntary admission (9.39) to L3   -re-start home meds: rexulti 4 mg PO QD (increased outpt on 6/20/22), buspar 30 mg PO BID, klonopin 1 mg PO PRN BID, gabapentin 600 mg PO TID, synthroid 12.5 mg PO QD, and trintellix 20 mg PO QD (per outpt provider, she is normally on 60 mg PO QD)  -consider alternative antidepressant to reduce polypharmacy  -will d/c wellbutrin   -start zyprexa 5 ng PO QHS as pt reportedly did well on this in the past ([facility] 2014) and to augment antidepressants  -podiatry consult for possible fungal infection  -nutrition consult (appreciated)  -encourage milieu and group therapy

## 2022-06-25 RX ADMIN — GABAPENTIN 600 MILLIGRAM(S): 400 CAPSULE ORAL at 09:18

## 2022-06-25 RX ADMIN — Medication 30 MILLIGRAM(S): at 20:05

## 2022-06-25 RX ADMIN — Medication 12.5 MICROGRAM(S): at 09:25

## 2022-06-25 RX ADMIN — Medication 30 MILLIGRAM(S): at 09:18

## 2022-06-25 RX ADMIN — GABAPENTIN 600 MILLIGRAM(S): 400 CAPSULE ORAL at 20:06

## 2022-06-25 RX ADMIN — OLANZAPINE 5 MILLIGRAM(S): 15 TABLET, FILM COATED ORAL at 20:06

## 2022-06-25 RX ADMIN — VORTIOXETINE 20 MILLIGRAM(S): 5 TABLET, FILM COATED ORAL at 09:18

## 2022-06-25 RX ADMIN — GABAPENTIN 600 MILLIGRAM(S): 400 CAPSULE ORAL at 12:38

## 2022-06-25 RX ADMIN — BREXPIPRAZOLE 4 MILLIGRAM(S): 0.25 TABLET ORAL at 20:06

## 2022-06-25 NOTE — BH INPATIENT PSYCHIATRY PROGRESS NOTE - NSBHCHARTREVIEWVS_PSY_A_CORE FT
Vital Signs Last 24 Hrs  T(C): 37.1 (06-25-22 @ 06:37), Max: 37.1 (06-25-22 @ 06:37)  T(F): 98.8 (06-25-22 @ 06:37), Max: 98.8 (06-25-22 @ 06:37)  HR: --  BP: --  BP(mean): --  RR: --  SpO2: --    Orthostatic VS  06-25-22 @ 06:37  Lying BP: --/-- HR: --  Sitting BP: 138/92 HR: 78  Standing BP: 152/88 HR: 67  Site: --  Mode: --  Orthostatic VS  06-24-22 @ 06:51  Lying BP: --/-- HR: --  Sitting BP: 108/75 HR: 64  Standing BP: 118/77 HR: 60  Site: upper left arm  Mode: electronic  Orthostatic VS  06-23-22 @ 20:23  Lying BP: --/-- HR: --  Sitting BP: 119/72 HR: 74  Standing BP: 113/77 HR: 67  Site: --  Mode: --   Vital Signs Last 24 Hrs  T(C): 36.9 (06-25-22 @ 08:50), Max: 37.1 (06-25-22 @ 06:37)  T(F): 98.5 (06-25-22 @ 08:50), Max: 98.8 (06-25-22 @ 06:37)  HR: --  BP: --  BP(mean): --  RR: --  SpO2: --    Orthostatic VS  06-25-22 @ 08:50  Lying BP: --/-- HR: --  Sitting BP: 138/92 HR: 78  Standing BP: 152/88 HR: 67  Site: --  Mode: --  Orthostatic VS  06-25-22 @ 06:37  Lying BP: --/-- HR: --  Sitting BP: 138/92 HR: 78  Standing BP: 152/88 HR: 67  Site: --  Mode: --  Orthostatic VS  06-24-22 @ 06:51  Lying BP: --/-- HR: --  Sitting BP: 108/75 HR: 64  Standing BP: 118/77 HR: 60  Site: upper left arm  Mode: electronic  Orthostatic VS  06-23-22 @ 20:23  Lying BP: --/-- HR: --  Sitting BP: 119/72 HR: 74  Standing BP: 113/77 HR: 67  Site: --  Mode: --

## 2022-06-25 NOTE — BH INPATIENT PSYCHIATRY PROGRESS NOTE - NSBHFUPINTERVALHXFT_PSY_A_CORE
Patient is followed up for psychosis/paranoia and depression. Chart, medications and labs reviewed.  Patient is discussed with nursing staff. No significant overnight issues.  Per nursing report patient remains compliant with all standing medications. Eating and sleeping well. Patient has been in behavioral control, no prn’s for aggression.   Patient is observed in her room.  Patient reports feeling “ok” Reports feeling depressed, passive SI.  However, she denies SIB/HI, no plan or intent.   Patient exhibiting prominent psychotic symptoms of disorganization, delusions and paranoia.  She remains disorganized, internally stimulated with ongoing perceptual disturbances. Reports being "tormented and sabotaged" but unable to specify by whom. She says that there are microphones in her bedroom.  Symptoms continues to impact pts functionality. Encouraged patient to take medications.  Self- care remains adequate.  No acute medical issues, VSS.  Continue to monitor and provide therapeutic support.

## 2022-06-25 NOTE — BH INPATIENT PSYCHIATRY PROGRESS NOTE - OTHER
pt reports AH saying "die" and "loser" denies these are command. +VH of seeing the devil sticking out his tongue in patterns on the wall  paranoid, persecutory delusions; perseverative thoughts on appearance, intelligence; preoccupied with thoughts of burdening writer Labile, appears fearful, guarded, paranoid, superficial

## 2022-06-25 NOTE — BH INPATIENT PSYCHIATRY PROGRESS NOTE - NSBHASSESSSUMMFT_PSY_ALL_CORE
Patient is a 57 yo F currently domiciled with mother, employed, currently single w/PMH of MVP, congenital neutropenia, hypothyroidism, PPHx of schizoaffective disorder (diagnosed 2009), multiple prior hospitalizations, last hospitalization at [facility] in 2014, hx of TMS currently on maintenance treatment w/ last treatment 3 weeks ago brought in BIB brother (Nilesh Mendez) d/t 8 day history of paranoia.    Today pt remains disorganized, paranoid, Capgras delusions, labile, tearful, denies active SIIP and agrees to come to staff immediately if safety concerns should arise on the unit, +AH saying derogatory things, denies command in nature. Agrees to re-start zyprexa     PLAN:  -involuntary admission (9.39) to L3   -re-start home meds: rexulti 4 mg PO QD (increased outpt on 6/20/22), buspar 30 mg PO BID, klonopin 1 mg PO PRN BID, gabapentin 600 mg PO TID, synthroid 12.5 mg PO QD, and trintellix 20 mg PO QD (per outpt provider, she is normally on 60 mg PO QD)  -consider alternative antidepressant to reduce polypharmacy  -will d/c wellbutrin   -start zyprexa 5 ng PO QHS as pt reportedly did well on this in the past ([facility] 2014) and to augment antidepressants  -podiatry consult for possible fungal infection  -nutrition consult (appreciated)  -encourage milieu and group therapy

## 2022-06-26 PROCEDURE — 99232 SBSQ HOSP IP/OBS MODERATE 35: CPT

## 2022-06-26 RX ADMIN — GABAPENTIN 600 MILLIGRAM(S): 400 CAPSULE ORAL at 08:23

## 2022-06-26 RX ADMIN — BREXPIPRAZOLE 4 MILLIGRAM(S): 0.25 TABLET ORAL at 20:41

## 2022-06-26 RX ADMIN — Medication 12.5 MICROGRAM(S): at 06:13

## 2022-06-26 RX ADMIN — GABAPENTIN 600 MILLIGRAM(S): 400 CAPSULE ORAL at 13:10

## 2022-06-26 RX ADMIN — GABAPENTIN 600 MILLIGRAM(S): 400 CAPSULE ORAL at 20:41

## 2022-06-26 RX ADMIN — Medication 30 MILLIGRAM(S): at 08:22

## 2022-06-26 RX ADMIN — Medication 30 MILLIGRAM(S): at 20:42

## 2022-06-26 RX ADMIN — OLANZAPINE 5 MILLIGRAM(S): 15 TABLET, FILM COATED ORAL at 20:41

## 2022-06-26 RX ADMIN — PANTOPRAZOLE SODIUM 40 MILLIGRAM(S): 20 TABLET, DELAYED RELEASE ORAL at 08:22

## 2022-06-26 RX ADMIN — VORTIOXETINE 20 MILLIGRAM(S): 5 TABLET, FILM COATED ORAL at 08:22

## 2022-06-26 NOTE — BH INPATIENT PSYCHIATRY PROGRESS NOTE - OTHER
paranoid, persecutory delusions; perseverative thoughts on appearance, intelligence; preoccupied with thoughts of burdening writer pt reports AH saying "die" and "loser" denies these are command. +VH of seeing the devil sticking out his tongue in patterns on the wall  Labile, appears fearful, guarded, paranoid, superficial

## 2022-06-26 NOTE — BH INPATIENT PSYCHIATRY PROGRESS NOTE - NSBHASSESSSUMMFT_PSY_ALL_CORE
Patient is a 57 yo F currently domiciled with mother, employed, currently single w/PMH of MVP, congenital neutropenia, hypothyroidism, PPHx of schizoaffective disorder (diagnosed 2009), multiple prior hospitalizations, last hospitalization at [facility] in 2014, hx of TMS currently on maintenance treatment w/ last treatment 3 weeks ago brought in BIB brother (Nilesh Mendez) d/t 8 day history of paranoia.    Today pt remains disorganized, paranoid, Capgras delusions, labile, tearful, denies active SIIP and agrees to come to staff immediately if safety concerns should arise on the unit, +AH saying derogatory things, denies command in nature. Agrees to re-start zyprexa     PLAN:  -involuntary admission (9.39) to L3   -re-start home meds: rexulti 4 mg PO QD (increased outpt on 6/20/22), buspar 30 mg PO BID, klonopin 1 mg PO PRN BID, gabapentin 600 mg PO TID, synthroid 12.5 mg PO QD, and trintellix 20 mg PO QD (per outpt provider, she is normally on 60 mg PO QD)  -consider alternative antidepressant to reduce polypharmacy  -will d/c wellbutrin   -start zyprexa 5 ng PO QHS as pt reportedly did well on this in the past ([facility] 2014) and to augment antidepressants  -podiatry consult for possible fungal infection  -nutrition consult (appreciated)  -encourage milieu and group therapy

## 2022-06-26 NOTE — BH INPATIENT PSYCHIATRY PROGRESS NOTE - NSBHFUPINTERVALHXFT_PSY_A_CORE
Patient is followed up for psychosis/paranoia and depression. Chart, medications and labs reviewed.  Patient is discussed with nursing staff. No significant overnight issues.  Per nursing report patient remains compliant with all standing medications. Eating and sleeping well. Patient has been in behavioral control, no prn’s for aggression.   Patient is observed in her room.  Patient reports feeling “ok” Reports feeling depressed, passive SI.  However, she denies SIB/HI, no plan or intent.   Patient exhibiting prominent psychotic symptoms of disorganization, delusions and paranoia.  She remains disorganized, internally stimulated symptoms continues to impact pts functionality. Encouraged patient to take medications.  Self- care remains adequate.  No acute medical issues, VSS.  Continue to monitor and provide therapeutic support.

## 2022-06-26 NOTE — BH INPATIENT PSYCHIATRY PROGRESS NOTE - NSBHCHARTREVIEWVS_PSY_A_CORE FT
Vital Signs Last 24 Hrs  T(C): 36.6 (06-26-22 @ 06:55), Max: 36.6 (06-26-22 @ 06:55)  T(F): 97.9 (06-26-22 @ 06:55), Max: 97.9 (06-26-22 @ 06:55)  HR: 57 (06-26-22 @ 06:55) (57 - 57)  BP: 146/91 (06-26-22 @ 06:55) (146/91 - 146/91)  BP(mean): --  RR: --  SpO2: --    Orthostatic VS  06-25-22 @ 08:50  Lying BP: --/-- HR: --  Sitting BP: 138/92 HR: 78  Standing BP: 152/88 HR: 67  Site: --  Mode: --  Orthostatic VS  06-25-22 @ 06:37  Lying BP: --/-- HR: --  Sitting BP: 138/92 HR: 78  Standing BP: 152/88 HR: 67  Site: --  Mode: --

## 2022-06-26 NOTE — BH INPATIENT PSYCHIATRY PROGRESS NOTE - NSBHMETABOLIC_PSY_ALL_CORE_FT
BMI: BMI (kg/m2): 18.9 (06-22-22 @ 19:07)  HbA1c: A1C with Estimated Average Glucose Result: 5.1 % (06-24-22 @ 07:55)    Glucose:   BP: 146/91 (06-26-22 @ 06:55) (146/91 - 146/91)  Lipid Panel: Date/Time: 06-23-22 @ 08:34  Cholesterol, Serum: 177  Direct LDL: --  HDL Cholesterol, Serum: 60  Total Cholesterol/HDL Ration Measurement: --  Triglycerides, Serum: 48

## 2022-06-27 RX ORDER — OLANZAPINE 15 MG/1
10 TABLET, FILM COATED ORAL AT BEDTIME
Refills: 0 | Status: DISCONTINUED | OUTPATIENT
Start: 2022-06-27 | End: 2022-06-29

## 2022-06-27 RX ADMIN — Medication 1 MILLIGRAM(S): at 01:02

## 2022-06-27 RX ADMIN — PANTOPRAZOLE SODIUM 40 MILLIGRAM(S): 20 TABLET, DELAYED RELEASE ORAL at 08:46

## 2022-06-27 RX ADMIN — OLANZAPINE 10 MILLIGRAM(S): 15 TABLET, FILM COATED ORAL at 20:42

## 2022-06-27 RX ADMIN — GABAPENTIN 600 MILLIGRAM(S): 400 CAPSULE ORAL at 08:46

## 2022-06-27 RX ADMIN — BREXPIPRAZOLE 4 MILLIGRAM(S): 0.25 TABLET ORAL at 20:42

## 2022-06-27 RX ADMIN — Medication 30 MILLIGRAM(S): at 20:41

## 2022-06-27 RX ADMIN — GABAPENTIN 600 MILLIGRAM(S): 400 CAPSULE ORAL at 20:42

## 2022-06-27 RX ADMIN — Medication 12.5 MICROGRAM(S): at 06:11

## 2022-06-27 RX ADMIN — VORTIOXETINE 20 MILLIGRAM(S): 5 TABLET, FILM COATED ORAL at 08:47

## 2022-06-27 RX ADMIN — GABAPENTIN 600 MILLIGRAM(S): 400 CAPSULE ORAL at 12:54

## 2022-06-27 RX ADMIN — Medication 30 MILLIGRAM(S): at 08:46

## 2022-06-27 NOTE — BH INPATIENT PSYCHIATRY PROGRESS NOTE - NSBHMSEPERCEPT_PSY_A_CORE
Auditory hallucinations/Visual hallucinations/Other

## 2022-06-27 NOTE — BH INPATIENT PSYCHIATRY PROGRESS NOTE - NSBHMSETHTPROC_PSY_A_CORE
Disorganized/Impaired reasoning

## 2022-06-27 NOTE — BH INPATIENT PSYCHIATRY PROGRESS NOTE - NSBHMSESPABN_PSY_A_CORE
Soft volume/Increased latency

## 2022-06-27 NOTE — BH INPATIENT PSYCHIATRY PROGRESS NOTE - MSE UNSTRUCTURED FT
Pt appears stated age, thin, dressed in hospital gowns, fair hygiene and grooming. No psychomotor abnormalities noted. Cooperative on interview with good eye contact. Mood is "anxious." Affect is anxious (but patient smiled after repeated reassurance), constricted, appropriate, and congruent to mood. Thought process is linear with normal reasoning. Thought content includes preoccupation about losing her belongings, but pt denies delusions, SIIP/HIIP. Denies AH/VH. Insight and judgment are fair. Intact impulse control.  Pt appears stated age, thin, dressed in hospital gowns, fair hygiene and grooming. No psychomotor abnormalities noted. Cooperative on interview with good eye contact. Mood is "anxious." Affect is anxious (but patient smiled after repeated reassurance), constricted, appropriate, and congruent to mood. Thought process is linear with normal reasoning. Thought content includes preoccupation about losing her belongings, some delusions persist, no SIIP/HIIP. Denies AH/VH. Insight and judgment are fair. Intact impulse control.

## 2022-06-27 NOTE — BH INPATIENT PSYCHIATRY PROGRESS NOTE - NSBHASSESSSUMMFT_PSY_ALL_CORE
Patient is a 57 yo F currently domiciled with mother, employed, currently single w/PMH of MVP, congenital neutropenia, hypothyroidism, PPHx of schizoaffective disorder (diagnosed 2009), multiple prior hospitalizations, last hospitalization at [facility] in 2014, hx of TMS currently on maintenance treatment w/ last treatment 3 weeks ago brought in BIB brother (Nilesh Mendez) d/t 8 day history of paranoia.    Today pt remains disorganized, paranoid, Capgras delusions, labile, tearful, denies active SIIP and agrees to come to staff immediately if safety concerns should arise on the unit, +AH saying derogatory things, denies command in nature. Agrees to re-start zyprexa     PLAN:  -involuntary admission (9.39) to L3   -re-start home meds: rexulti 4 mg PO QD (increased outpt on 6/20/22), buspar 30 mg PO BID, klonopin 1 mg PO PRN BID, gabapentin 600 mg PO TID, synthroid 12.5 mg PO QD, and trintellix 20 mg PO QD (per outpt provider, she is normally on 60 mg PO QD)  -consider alternative antidepressant to reduce polypharmacy  -will d/c wellbutrin   -start zyprexa 5 ng PO QHS as pt reportedly did well on this in the past ([facility] 2014) and to augment antidepressants  -podiatry consult for possible fungal infection  -nutrition consult (appreciated)  -encourage milieu and group therapy Patient is a 57 yo F currently domiciled with mother, employed, currently single w/PMH of MVP, congenital neutropenia, hypothyroidism, PPHx of schizoaffective disorder (diagnosed 2009), multiple prior hospitalizations, last hospitalization at [facility] in 2014, hx of TMS currently on maintenance treatment w/ last treatment 3 weeks ago brought in BIB brother (Nilesh Mendez) d/t 8 day history of paranoia.    **INCOMPLETE**    PLAN:  -involuntary admission (9.39) to L3   -re-start home meds: rexulti 4 mg PO QD (increased outpt on 6/20/22), buspar 30 mg PO BID, klonopin 1 mg PO PRN BID, gabapentin 600 mg PO TID, synthroid 12.5 mg PO QD, and trintellix 20 mg PO QD (per outpt provider, she is normally on 60 mg PO QD)  -consider alternative antidepressant to reduce polypharmacy  -will d/c wellbutrin   -start zyprexa 5 ng PO QHS as pt reportedly did well on this in the past ([facility] 2014) and to augment antidepressants  -podiatry consult for possible fungal infection  -nutrition consult (appreciated)  -encourage milieu and group therapy Patient is a 57 yo F currently domiciled with mother, employed, currently single w/PMH of MVP, congenital neutropenia, hypothyroidism, PPHx of schizoaffective disorder (diagnosed 2009), multiple prior hospitalizations, last hospitalization at [facility] in 2014, hx of TMS currently on maintenance treatment w/ last treatment 3 weeks ago brought in BIB brother (Nilesh Mendez) d/t 8 day history of paranoia.    Patient no longer has paranoid delusions, Capgras delusions, and AH. Anxious about various things (belongings, residence, outpatient tx) but can be comforted with assurance. Denies SIIP/HIIP. Wants to continue with Zyprexa despite concerns about her toe infection. Feels safe about returning home, even while her mother is at rehab, and will discuss with her brother whether to continue seeing her outpatient psychiatrist after discharge. As per brother who visited on sat, the patient seemed close to, or at, baseline.     PLAN:  -involuntary admission (9.39) to L3   -re-start home meds: rexulti 4 mg PO QD (increased outpt on 6/20/22), buspar 30 mg PO BID, klonopin 1 mg PO PRN BID, gabapentin 600 mg PO TID, synthroid 12.5 mg PO QD, and trintellix 20 mg PO QD (per outpt provider, she is normally on 60 mg PO QD)  -continue zyprexa 5 mg PO QHS as pt reportedly did well on this in the past ([facility] 2014) and to augment antidepressants  -podiatry consult for possible fungal infection  -encourage milieu and group therapy Patient is a 55 yo F currently domiciled with mother, employed, currently single w/PMH of MVP, congenital neutropenia, hypothyroidism, PPHx of schizoaffective disorder (diagnosed 2009), multiple prior hospitalizations, last hospitalization at [facility] in 2014, hx of TMS currently on maintenance treatment w/ last treatment 3 weeks ago brought in BIB brother (Nilesh Mendez) d/t 8 day history of paranoia.    Patient continues to have some delusional thinking, but less intense, no more Capgras delusions, and denies AH. Anxious about various things (belongings, residence, outpatient tx) but can be comforted with assurance. Denies SIIP/HIIP. Wants to continue with Zyprexa despite concerns about her toe infection. Feels safe about returning home, even while her mother is at rehab, and will discuss with her brother whether to continue seeing her outpatient psychiatrist after discharge. As per brother who visited on sat, the patient seemed close to, or at, baseline.     PLAN:  -involuntary admission (9.39) to L3   -re-start home meds: rexulti 4 mg PO QD (increased outpt on 6/20/22), buspar 30 mg PO BID, klonopin 1 mg PO PRN BID, gabapentin 600 mg PO TID, synthroid 12.5 mg PO QD, and trintellix 20 mg PO QD (per outpt provider, she is normally on 60 mg PO QD)  -increase zyprexa to 10 mg PO QHS as pt reportedly did well on this in the past ([facility] 2014) and to augment antidepressants  -podiatry consult for possible fungal infection  -encourage milieu and group therapy

## 2022-06-27 NOTE — BH INPATIENT PSYCHIATRY PROGRESS NOTE - NSBHCHARTREVIEWVS_PSY_A_CORE FT
Vital Signs Last 24 Hrs  T(C): 37.2 (06-27-22 @ 06:44), Max: 37.2 (06-27-22 @ 06:44)  T(F): 98.9 (06-27-22 @ 06:44), Max: 98.9 (06-27-22 @ 06:44)  HR: --  BP: --  BP(mean): --  RR: --  SpO2: --    Orthostatic VS  06-27-22 @ 06:44  Lying BP: --/-- HR: --  Sitting BP: 137/82 HR: 88  Standing BP: --/-- HR: --  Site: --  Mode: --  Orthostatic VS  06-25-22 @ 08:50  Lying BP: --/-- HR: --  Sitting BP: 138/92 HR: 78  Standing BP: 152/88 HR: 67  Site: --  Mode: --   Vital Signs Last 24 Hrs  T(C): 37.2 (06-27-22 @ 06:44), Max: 37.2 (06-27-22 @ 06:44)  T(F): 98.9 (06-27-22 @ 06:44), Max: 98.9 (06-27-22 @ 06:44)  HR: --  BP: --  BP(mean): --  RR: --  SpO2: --    Orthostatic VS  06-27-22 @ 06:44  Lying BP: --/-- HR: --  Sitting BP: 137/82 HR: 88  Standing BP: --/-- HR: --  Site: --  Mode: --

## 2022-06-27 NOTE — BH INPATIENT PSYCHIATRY PROGRESS NOTE - NSBHFUPINTERVALHXFT_PSY_A_CORE
AMARA O/N. Adherent with scheduled medications. Asked for klonopin at 1AM. Reports good sleep and appetite. **INCOMPLETE** AMARA O/N. Adherent with scheduled medications. Asked for klonopin at 1AM. Reports good appetite.    Reports feeling like she's going to have a heart attack because she's worried about her car, losing her job, and becoming homeless. Writer assured the pt that her brother has been willing to move the car so that it won't get a ticket/get towed and call her job. Pt now endorses living with her mother and reports Nilesh as her brother (i.e., no longer "jackson"). Reports feeling safe about living with her mother again. Denies feeling worried about microphones at home. Reports thinking that the water contained feces because it was brown, but the writer assured the pt that the tap water can sometimes become brown with local city plumbing work; pt subsequently willing to use water for shower and drink bottled water at home. Reports hearing a voice saying her name but no CAH or VH. Denies SIIP/HIIP.    Talked to the pt's brother Nilesh. When he visited her on Saturday, the pt gave him her car keys so that he can move the car (the pt refused to give him the car key on admission last Thurs thinking the "jackson" will steal it). Reports calling her job to let them know she's in the hospital. Reports the pt seems clear, and her facial expressions are normal now. Concerned that the pt is worried that she'll become homeless when she won't be, but ok with the plan for discharge this week. Plans to visit the pt today.  AMARA O/N. Adherent with scheduled medications. Asked for klonopin at 1AM. Reports good appetite.    Reports feeling like she's going to have a heart attack because she's worried about her car, losing her job, and becoming homeless. Writer assured the pt that her brother has moved the car so that it won't get a ticket/get towed and called her job and that she'll be returning to her home; pt reports feeling better after the assurance. Pt now endorses living with her mother and reports Nilesh as her brother (i.e., no longer "jackson"). Reports feeling safe about living with her mother again and comfortable living alone until her mother returns from rehab. Denies feeling worried about microphones at home. Reports thinking that the water contained feces because it was brown, but the writer assured the pt that the tap water can sometimes become brown with local city plumbing work; pt subsequently willing to use water for shower and drink bottled water at home. Reports hearing a voice saying her name over the weekend but now denies AH/VH. Denies SIIP/HIIP. Agrees that Zyprexa has been working well.    Talked to the pt's brother Nilesh. When he visited her on Saturday, the pt gave him her car keys so that he can move the car (the pt refused to give him the car key on admission last Thurs thinking the "jackson" will steal it). Reports calling her job to let them know she's in the hospital. Reports the pt seems clear, and her facial expressions are normal now. Concerned that the pt is worried that she'll become homeless when she won't be, but ok with the plan for discharge this week. Plans to visit the pt today.  AMARA O/N. Adherent with scheduled medications. Asked for klonopin at 1AM. Reports good appetite.    Reports feeling like she's going to have a heart attack because she's worried about her car, losing her job, and becoming homeless. Writer assured the pt that her brother has moved the car so that it won't get a ticket/get towed and called her job and that she'll be returning to her home; pt reports feeling better after the assurance. Pt now endorses living with her mother and reports Nilesh as her brother (i.e., no longer "jackson"). Reports feeling safe about living with her mother again and comfortable living alone until her mother returns from rehab. Denies feeling worried about microphones at home. Reports thinking that the water contained feces because it was brown, but the writer assured the pt that the tap water can sometimes become brown with local city plumbing work; pt subsequently willing to use water for shower and drink bottled water at home. Reports hearing a voice saying her name over the weekend but now denies AH/VH. Denies SIIP/HIIP. Agrees that Zyprexa has been working well but is concerned whether it'll affect her toenail infection.     Talked to the pt's brother Nilesh. When he visited her on Saturday, the pt gave him her car keys so that he can move the car (the pt refused to give him the car key on admission last Thurs thinking the "jackson" will steal it). Reports calling her job to let them know she's in the hospital. Reports the pt seems clear, and her facial expressions are normal now. Concerned that the pt is worried that she'll become homeless when she won't be, but ok with the plan for discharge this week. Plans to visit the pt today.

## 2022-06-28 RX ADMIN — OLANZAPINE 10 MILLIGRAM(S): 15 TABLET, FILM COATED ORAL at 20:19

## 2022-06-28 RX ADMIN — Medication 1 MILLIGRAM(S): at 11:58

## 2022-06-28 RX ADMIN — GABAPENTIN 600 MILLIGRAM(S): 400 CAPSULE ORAL at 20:19

## 2022-06-28 RX ADMIN — Medication 12.5 MICROGRAM(S): at 06:19

## 2022-06-28 RX ADMIN — GABAPENTIN 600 MILLIGRAM(S): 400 CAPSULE ORAL at 08:37

## 2022-06-28 RX ADMIN — VORTIOXETINE 20 MILLIGRAM(S): 5 TABLET, FILM COATED ORAL at 08:34

## 2022-06-28 RX ADMIN — BREXPIPRAZOLE 4 MILLIGRAM(S): 0.25 TABLET ORAL at 20:19

## 2022-06-28 RX ADMIN — Medication 30 MILLIGRAM(S): at 20:19

## 2022-06-28 RX ADMIN — GABAPENTIN 600 MILLIGRAM(S): 400 CAPSULE ORAL at 12:54

## 2022-06-28 RX ADMIN — PANTOPRAZOLE SODIUM 40 MILLIGRAM(S): 20 TABLET, DELAYED RELEASE ORAL at 08:34

## 2022-06-28 RX ADMIN — Medication 30 MILLIGRAM(S): at 08:34

## 2022-06-28 NOTE — BH INPATIENT PSYCHIATRY PROGRESS NOTE - NSBHASSESSSUMMFT_PSY_ALL_CORE
Patient is a 55 yo F currently domiciled with mother, employed, currently single w/PMH of MVP, congenital neutropenia, hypothyroidism, PPHx of schizoaffective disorder (diagnosed 2009), multiple prior hospitalizations, last hospitalization at [facility] in 2014, hx of TMS currently on maintenance treatment w/ last treatment 3 weeks ago brought in BIB brother (Nilesh Mendez) d/t 8 day history of paranoia.    Patient continues to have some delusional thinking, but less intense, no more Capgras delusions, and denies AH. Anxious about various things (belongings, residence, outpatient tx) but can be comforted with assurance. Denies SIIP/HIIP. Wants to continue with Zyprexa despite concerns about her toe infection. Feels safe about returning home, even while her mother is at rehab, and will discuss with her brother whether to continue seeing her outpatient psychiatrist after discharge. As per brother who visited on sat, the patient seemed close to, or at, baseline.     PLAN:  -involuntary admission (9.39) to L3   -re-start home meds: rexulti 4 mg PO QD (increased outpt on 6/20/22), buspar 30 mg PO BID, klonopin 1 mg PO PRN BID, gabapentin 600 mg PO TID, synthroid 12.5 mg PO QD, and trintellix 20 mg PO QD (per outpt provider, she is normally on 60 mg PO QD)  -increase zyprexa to 10 mg PO QHS as pt reportedly did well on this in the past ([facility] 2014) and to augment antidepressants  -podiatry consult for possible fungal infection  -encourage milieu and group therapy Patient is a 55 yo F currently domiciled with mother, employed, currently single w/PMH of MVP, congenital neutropenia, hypothyroidism, PPHx of schizoaffective disorder (diagnosed 2009), multiple prior hospitalizations, last hospitalization at [facility] in 2014, hx of TMS currently on maintenance treatment w/ last treatment 3 weeks ago brought in BIB brother (Nilesh Mendez) d/t 8 day history of paranoia.    Patient continues to have delusions, both chronic and new-onset (but less intense), and delusional thinking that can be challenged. Critical, over-interpretation of others' behavior might be her baseline. Denies AH. Denies SIIP/HIIP. Wants to continue with Zyprexa despite concerns about her toe infection. Feels safe about returning home, even while her mother is at rehab, but still unsure about seeing her outpatient psychiatrist after discharge. As per brother who visited on sat, the patient seemed close to, or at, baseline. Will monitor response to increased Zyprexa with possible discharge later this week.     PLAN:  -involuntary admission (9.39) to L3   -re-start home meds: rexulti 4 mg PO QD (increased outpt on 6/20/22), buspar 30 mg PO BID, klonopin 1 mg PO PRN BID, gabapentin 600 mg PO TID, synthroid 12.5 mg PO QD, and trintellix 20 mg PO QD (per outpt provider, she is normally on 60 mg PO QD)  -continue zyprexa to 10 mg PO QHS; plan to increase to 15 mg on Thursday  -podiatry consult for possible fungal infection  -encourage milieu and group therapy Patient is a 57 yo F currently domiciled with mother, employed, currently single w/PMH of MVP, congenital neutropenia, hypothyroidism, PPHx of schizoaffective disorder (diagnosed 2009), multiple prior hospitalizations, last hospitalization at [facility] in 2014, hx of TMS currently on maintenance treatment w/ last treatment 3 weeks ago brought in BIB brother (Nilesh Mendez) d/t 8 day history of paranoia.    Patient continues to have delusions, both chronic and new-onset (but less intense), and delusional thinking that can be challenged. Critical, over-interpretation of others' behavior might be her baseline, according to family. Denies AH. Denies SIIP/HIIP. Wants to continue with Zyprexa. Feels safe about returning home, even while her mother is at rehab, but still unsure about seeing her outpatient psychiatrist after discharge. As per brother who visited on sat, the patient seemed close to, or at, baseline. Will monitor response to increased Zyprexa with possible discharge later this week.     PLAN:  -involuntary admission (9.39) to L3   -re-start home meds: rexulti 4 mg PO QD (increased outpt on 6/20/22), buspar 30 mg PO BID, klonopin 1 mg PO PRN BID, gabapentin 600 mg PO TID, synthroid 12.5 mg PO QD, and trintellix 20 mg PO QD (per outpt provider, she is normally on 60 mg PO QD)  -continue zyprexa to 10 mg PO QHS; plan to increase to 15 mg on Thursday  -podiatry consult for possible fungal infection  -encourage milieu and group therapy

## 2022-06-28 NOTE — BH INPATIENT PSYCHIATRY PROGRESS NOTE - NSBHFUPINTERVALHXFT_PSY_A_CORE
AMARA O/N. Adherent with scheduled medications. Did not ask for PRNs. Reports good appetite. **INCOMPLETE**    Denies AH/VH. Denies SIIP/HIIP.      Talked to the pt's brother Nilesh ([phone]) who visited yesterday.  AMARA O/N. Adherent with scheduled medications. Did not ask for PRNs. Reports good appetite and sleep. Was heard by staff saying the TV is talking to her and muttering, "I'm a loser."     Was seen watching the TV in the day-room. Reports still has long-standing confusion about whether her mother is her real, biological mother. Also reports being confused about whether she's a prostitute, but the team assured her that she's not and that she can return to her job after discharge. Reports that her brother was nasty to her when they talked over the phone yesterday but cannot explain how he was being nasty. Asks the team why we are blinking our eyes and reports thinking that others are trying to impress her when they blink their eyes because she has brown eyes but they might have blue eyes. Denies AH/VH. Denies SIIP/HIIP. Looks forward to discharge and agrees with the plan to decide the discharge date after increasing the dose of Zyprexa.     Talked to the pt's brother Nilesh ([phone]) who visited yesterday.  AMARA O/N. Adherent with scheduled medications. Did not ask for PRNs. Reports good appetite and sleep. Was heard by staff saying the TV is talking to her and muttering, "I'm a loser."     Was seen watching the TV in the day-room. Reports still has long-standing confusion about whether her mother is her real, biological mother. Also reports being confused about whether she's a prostitute, but the team assured her that she's not and that she can return to her job after discharge. Reports that her brother was nasty to her when they talked over the phone yesterday but cannot explain how he was being nasty. Asks the team why we are blinking our eyes and reports thinking that others are trying to impress her when they blink their eyes because she has brown eyes but they might have blue eyes. Denies AH/VH. Denies SIIP/HIIP. Looks forward to discharge and agrees with the plan to decide the discharge date after increasing the dose of Zyprexa.     Talked to the pt's brother Nilesh ([phone]) who visited yesterday. Reports the patient thought he would visit on Sunday, and he head to clear up the confusion yesterday. But seemed surprised to learn that the patient shared with the team that he was nasty to her. Thinks it's similar to her baseline behavior. Plans to visit on Thursday.  AMARA O/N. Adherent with scheduled medications. Did not ask for PRNs. Reports good appetite and sleep. Was heard by staff saying the TV is talking to her and muttering, "I'm a loser."     Was seen watching the TV in the day-room. Reports still has long-standing confusion about whether her mother is her real, biological mother. Also reports being confused about whether she's a prostitute, but the team assured her that she's not and that she can return to her job after discharge. Reports that her brother was nasty to her when they talked over the phone yesterday but cannot explain how he was being nasty. Asks the team why we are blinking our eyes and reports thinking that others are trying to impress her when they blink their eyes because she has brown eyes but they might have blue eyes. Denies AH/VH. Denies SIIP/HIIP. Looks forward to discharge and agrees with the plan to decide the discharge date after increasing the dose of Zyprexa.     Talked to the pt's brother Nilesh ([phone]) who visited yesterday. Reports the patient thought he would visit on Sunday, and he had to clear up the confusion yesterday. But seemed surprised to learn that the patient shared with the team that he was nasty to her. Thinks it's similar to her baseline behavior. Plans to visit on Thursday.

## 2022-06-28 NOTE — BH INPATIENT PSYCHIATRY PROGRESS NOTE - NSBHMETABOLIC_PSY_ALL_CORE_FT
BMI: BMI (kg/m2): 18.9 (06-22-22 @ 19:07)  HbA1c: A1C with Estimated Average Glucose Result: 5.1 % (06-24-22 @ 07:55)    Glucose:   BP: 107/68 (06-27-22 @ 20:14) (107/68 - 146/91)  Lipid Panel: Date/Time: 06-23-22 @ 08:34  Cholesterol, Serum: 177  Direct LDL: --  HDL Cholesterol, Serum: 60  Total Cholesterol/HDL Ration Measurement: --  Triglycerides, Serum: 48   BMI: BMI (kg/m2): 18.9 (06-22-22 @ 19:07)  HbA1c: A1C with Estimated Average Glucose Result: 5.1 % (06-24-22 @ 07:55)    Glucose:   BP: 161/84 (06-28-22 @ 08:43) (107/68 - 161/84)  Lipid Panel: Date/Time: 06-23-22 @ 08:34  Cholesterol, Serum: 177  Direct LDL: --  HDL Cholesterol, Serum: 60  Total Cholesterol/HDL Ration Measurement: --  Triglycerides, Serum: 48

## 2022-06-28 NOTE — BH INPATIENT PSYCHIATRY PROGRESS NOTE - CURRENT MEDICATION
MEDICATIONS  (STANDING):  brexpiprazole 4 milliGRAM(s) Oral at bedtime  busPIRone 30 milliGRAM(s) Oral two times a day  gabapentin 600 milliGRAM(s) Oral three times a day  levothyroxine 12.5 MICROGram(s) Oral daily  OLANZapine 10 milliGRAM(s) Oral at bedtime  pantoprazole    Tablet 40 milliGRAM(s) Oral before breakfast  vortioxetine 20 milliGRAM(s) Oral daily    MEDICATIONS  (PRN):  clonazePAM  Tablet 1 milliGRAM(s) Oral two times a day PRN anxiety  OLANZapine 5 milliGRAM(s) Oral every 6 hours PRN agitation  OLANZapine Injectable 5 milliGRAM(s) IntraMuscular Once PRN severe agitation

## 2022-06-28 NOTE — BH INPATIENT PSYCHIATRY PROGRESS NOTE - MSE UNSTRUCTURED FT
Pt appears stated age, thin, dressed in hospital gowns, fair hygiene and grooming. No psychomotor abnormalities noted. Cooperative on interview with good eye contact. Mood is "anxious." Affect is anxious (but patient smiled after repeated reassurance), constricted, appropriate, and congruent to mood. Thought process is linear with normal reasoning. Thought content includes preoccupation about losing her belongings, some delusions persist, no SIIP/HIIP. Denies AH/VH. Insight and judgment are fair. Intact impulse control.  Pt appears stated age, thin, dressed in hospital gowns, fair hygiene and grooming (with make-up). No psychomotor abnormalities noted. Cooperative on interview with good eye contact. Mood is "fine." Affect is constricted, appropriate, and congruent to mood. Thought process is linear with normal reasoning. Thought content includes delusions of reference, chronic delusions, and delusional thinking that can be cleared upon assurance. Deneis SIIP/HIIP. Denies AH/VH. Insight and judgment are fair. Intact impulse control.  Pt appears stated age, thin, dressed in hospital gowns, fair hygiene and grooming (with make-up). No psychomotor abnormalities noted. Cooperative on interview with good eye contact. Mood is "fine." Affect is constricted, appropriate, and congruent to mood. Thought process is linear with normal reasoning. Thought content includes delusions of reference, chronic delusions, and delusional thinking that can be reality tested to some degree. Deneis SIIP/HIIP. Denies AH/VH. Insight and judgment are fair. Intact impulse control.

## 2022-06-28 NOTE — BH INPATIENT PSYCHIATRY PROGRESS NOTE - NSBHCHARTREVIEWVS_PSY_A_CORE FT
Vital Signs Last 24 Hrs  T(C): 36.9 (06-27-22 @ 20:14), Max: 36.9 (06-27-22 @ 20:14)  T(F): 98.4 (06-27-22 @ 20:14), Max: 98.4 (06-27-22 @ 20:14)  HR: 77 (06-27-22 @ 20:14) (77 - 77)  BP: 107/68 (06-27-22 @ 20:14) (107/68 - 107/68)  BP(mean): --  RR: --  SpO2: --    Orthostatic VS  06-27-22 @ 06:44  Lying BP: --/-- HR: --  Sitting BP: 137/82 HR: 88  Standing BP: --/-- HR: --  Site: --  Mode: --   Vital Signs Last 24 Hrs  T(C): 36.8 (06-28-22 @ 08:43), Max: 36.9 (06-27-22 @ 20:14)  T(F): 98.2 (06-28-22 @ 08:43), Max: 98.4 (06-27-22 @ 20:14)  HR: 91 (06-28-22 @ 08:43) (77 - 91)  BP: 161/84 (06-28-22 @ 08:43) (107/68 - 161/84)  BP(mean): --  RR: --  SpO2: --    Orthostatic VS  06-27-22 @ 06:44  Lying BP: --/-- HR: --  Sitting BP: 137/82 HR: 88  Standing BP: --/-- HR: --  Site: --  Mode: --

## 2022-06-29 PROCEDURE — 90832 PSYTX W PT 30 MINUTES: CPT

## 2022-06-29 RX ORDER — CLONAZEPAM 1 MG
1 TABLET ORAL
Refills: 0 | Status: DISCONTINUED | OUTPATIENT
Start: 2022-06-29 | End: 2022-07-05

## 2022-06-29 RX ORDER — OLANZAPINE 15 MG/1
15 TABLET, FILM COATED ORAL AT BEDTIME
Refills: 0 | Status: DISCONTINUED | OUTPATIENT
Start: 2022-06-29 | End: 2022-07-06

## 2022-06-29 RX ADMIN — BREXPIPRAZOLE 4 MILLIGRAM(S): 0.25 TABLET ORAL at 20:45

## 2022-06-29 RX ADMIN — GABAPENTIN 600 MILLIGRAM(S): 400 CAPSULE ORAL at 20:45

## 2022-06-29 RX ADMIN — GABAPENTIN 600 MILLIGRAM(S): 400 CAPSULE ORAL at 08:10

## 2022-06-29 RX ADMIN — Medication 30 MILLIGRAM(S): at 08:10

## 2022-06-29 RX ADMIN — GABAPENTIN 600 MILLIGRAM(S): 400 CAPSULE ORAL at 12:11

## 2022-06-29 RX ADMIN — Medication 1 MILLIGRAM(S): at 00:07

## 2022-06-29 RX ADMIN — PANTOPRAZOLE SODIUM 40 MILLIGRAM(S): 20 TABLET, DELAYED RELEASE ORAL at 08:10

## 2022-06-29 RX ADMIN — Medication 30 MILLIGRAM(S): at 20:45

## 2022-06-29 RX ADMIN — OLANZAPINE 15 MILLIGRAM(S): 15 TABLET, FILM COATED ORAL at 20:46

## 2022-06-29 RX ADMIN — VORTIOXETINE 20 MILLIGRAM(S): 5 TABLET, FILM COATED ORAL at 08:10

## 2022-06-29 RX ADMIN — Medication 12.5 MICROGRAM(S): at 08:09

## 2022-06-29 NOTE — BH INPATIENT PSYCHIATRY PROGRESS NOTE - NSBHASSESSSUMMFT_PSY_ALL_CORE
Patient is a 55 yo F currently domiciled with mother, employed, currently single w/PMH of MVP, congenital neutropenia, hypothyroidism, PPHx of schizoaffective disorder (diagnosed 2009), multiple prior hospitalizations, last hospitalization at [facility] in 2014, hx of TMS currently on maintenance treatment w/ last treatment 3 weeks ago brought in BIB brother (Nilesh Mendez) d/t 8 day history of paranoia.    Patient continues to have delusions, both chronic and new-onset (but less intense), and delusional thinking that can be challenged. Critical, over-interpretation of others' behavior might be her baseline, according to family. Denies AH. Denies SIIP/HIIP. Wants to continue with Zyprexa. Feels safe about returning home, even while her mother is at rehab, but still unsure about seeing her outpatient psychiatrist after discharge. As per brother who visited on sat, the patient seemed close to, or at, baseline. Will monitor response to increased Zyprexa with possible discharge later this week.     PLAN:  -involuntary admission (9.39) to L3   -re-start home meds: rexulti 4 mg PO QD (increased outpt on 6/20/22), buspar 30 mg PO BID, klonopin 1 mg PO PRN BID, gabapentin 600 mg PO TID, synthroid 12.5 mg PO QD, and trintellix 20 mg PO QD (per outpt provider, she is normally on 60 mg PO QD)  -continue zyprexa to 10 mg PO QHS; plan to increase to 15 mg on Thursday  -podiatry consult for possible fungal infection  -encourage milieu and group therapy Patient is a 55 yo F currently domiciled with mother, employed, currently single w/PMH of MVP, congenital neutropenia, hypothyroidism, PPHx of schizoaffective disorder (diagnosed 2009), multiple prior hospitalizations, last hospitalization at [facility] in 2014, hx of TMS currently on maintenance treatment w/ last treatment 3 weeks ago brought in BIB brother (Nilesh Mendez) d/t 8 day history of paranoia.    Patient's delusions, both chronic and new-onset, are more similar to presentation last week than the past 2 days, but delusional thinking can still be challenged. Critical, over-interpretation of others' behavior might be her baseline, according to family. Denies AH. Denies SIIP/HIIP. Wants to continue with Zyprexa, although the pt is preoccupied about being overmedicated and cost-effective. Doesn't want to see her outpatient psychiatrist after discharge and wants either a second-opinion or meds prescribed by her PCP. Will monitor response to increased Zyprexa through this week and weekend.     PLAN:  -involuntary admission (9.39) to L3   -re-start home meds: rexulti 4 mg PO QD (increased outpt on 6/20/22), buspar 30 mg PO BID, klonopin 1 mg PO PRN BID, gabapentin 600 mg PO TID, synthroid 12.5 mg PO QD, and trintellix 20 mg PO QD (per outpt provider, she is normally on 60 mg PO QD)  -continue zyprexa to 10 mg PO QHS; plan to increase to 15 mg on Thursday  -podiatry consult for possible fungal infection  -encourage milieu and group therapy Patient is a 55 yo F currently domiciled with mother, employed, currently single w/PMH of MVP, congenital neutropenia, hypothyroidism, PPHx of schizoaffective disorder (diagnosed 2009), multiple prior hospitalizations, last hospitalization at [facility] in 2014, hx of TMS currently on maintenance treatment w/ last treatment 3 weeks ago brought in BIB brother (Nilesh Mendez) d/t 8 day history of paranoia.    Patient's delusions, both chronic and new-onset, are more similar to presentation last week than the past 2 days, but delusional thinking can still be challenged. Critical, over-interpretation of others' behavior might be her baseline, according to family. Denies AH. Denies SIIP/HIIP. Wants to continue with Zyprexa, although the pt is preoccupied about being overmedicated and cost-effective. Doesn't want to see her outpatient psychiatrist after discharge and wants either a second-opinion or meds prescribed by her PCP. Will monitor response to increased Zyprexa through this week and weekend.     PLAN:  -involuntary admission (9.39) to L3   -re-start home meds: rexulti 4 mg PO QD (increased outpt on 6/20/22), buspar 30 mg PO BID, klonopin 1 mg PO PRN BID, gabapentin 600 mg PO TID, synthroid 12.5 mg PO QD, and trintellix 20 mg PO QD (per outpt provider, she is normally on 60 mg PO QD)  -increase zyprexa to 15 mg PO QHS  -podiatry consult for possible fungal infection  -encourage milieu and group therapy

## 2022-06-29 NOTE — BH PSYCHOLOGY - CLINICIAN PSYCHOTHERAPY NOTE - TOKEN PULL-DIAGNOSIS
Primary Diagnosis:  Schizoaffective disorder-chronic with exacerbation [F25.9]        Problem Dx:   Schizoaffective disorder-chronic with exacerbation [F25.9]

## 2022-06-29 NOTE — BH INPATIENT PSYCHIATRY PROGRESS NOTE - NSBHMETABOLIC_PSY_ALL_CORE_FT
BMI: BMI (kg/m2): 18.9 (06-22-22 @ 19:07)  HbA1c: A1C with Estimated Average Glucose Result: 5.1 % (06-24-22 @ 07:55)    Glucose:   BP: 161/84 (06-28-22 @ 08:43) (107/68 - 161/84)  Lipid Panel: Date/Time: 06-23-22 @ 08:34  Cholesterol, Serum: 177  Direct LDL: --  HDL Cholesterol, Serum: 60  Total Cholesterol/HDL Ration Measurement: --  Triglycerides, Serum: 48

## 2022-06-29 NOTE — BH INPATIENT PSYCHIATRY PROGRESS NOTE - MSE UNSTRUCTURED FT
Pt appears stated age, thin, dressed in hospital gowns, fair hygiene and grooming (with make-up). No psychomotor abnormalities noted. Cooperative on interview with good eye contact. Mood is "fine." Affect is constricted, appropriate, and congruent to mood. Thought process is linear with normal reasoning. Thought content includes delusions of reference, chronic delusions, and delusional thinking that can be reality tested to some degree. Deneis SIIP/HIIP. Denies AH/VH. Insight and judgment are fair. Intact impulse control.  Pt appears stated age, thin, dressed in street clothes and hospital gowns, fair hygiene and grooming. No psychomotor abnormalities noted. Cooperative on interview with good eye contact. Speech is fluent with increased latency and slowed rate. Mood is "ok." Affect is constricted, appropriate, and congruent to mood. Thought process is linear with normal reasoning. Thought content includes delusions of reference, chronic delusions, delusional thinking that can be reality tested to some degree, and increased preoccupations about medications. Denies SIIP/HIIP. Denies AH/VH. Insight and judgment are fair. Intact impulse control.

## 2022-06-29 NOTE — BH INPATIENT PSYCHIATRY PROGRESS NOTE - CURRENT MEDICATION
MEDICATIONS  (STANDING):  brexpiprazole 4 milliGRAM(s) Oral at bedtime  busPIRone 30 milliGRAM(s) Oral two times a day  gabapentin 600 milliGRAM(s) Oral three times a day  levothyroxine 12.5 MICROGram(s) Oral daily  OLANZapine 10 milliGRAM(s) Oral at bedtime  pantoprazole    Tablet 40 milliGRAM(s) Oral before breakfast  vortioxetine 20 milliGRAM(s) Oral daily    MEDICATIONS  (PRN):  clonazePAM  Tablet 1 milliGRAM(s) Oral two times a day PRN anxiety  OLANZapine 5 milliGRAM(s) Oral every 6 hours PRN agitation  OLANZapine Injectable 5 milliGRAM(s) IntraMuscular Once PRN severe agitation   MEDICATIONS  (STANDING):  brexpiprazole 4 milliGRAM(s) Oral at bedtime  busPIRone 30 milliGRAM(s) Oral two times a day  gabapentin 600 milliGRAM(s) Oral three times a day  levothyroxine 12.5 MICROGram(s) Oral daily  OLANZapine 15 milliGRAM(s) Oral at bedtime  pantoprazole    Tablet 40 milliGRAM(s) Oral before breakfast  vortioxetine 20 milliGRAM(s) Oral daily    MEDICATIONS  (PRN):  clonazePAM  Tablet 1 milliGRAM(s) Oral two times a day PRN anxiety  OLANZapine 5 milliGRAM(s) Oral every 6 hours PRN agitation  OLANZapine Injectable 5 milliGRAM(s) IntraMuscular Once PRN severe agitation

## 2022-06-29 NOTE — BH PSYCHOLOGY - CLINICIAN PSYCHOTHERAPY NOTE - NSBHPSYCHOLNARRATIVE_PSY_A_CORE FT
Patient approached the Supervising Psychologist after group. Patient stated, "you're making me take meds to kill me," even though the writer had not spoken a word. Writer alerted the nurses in the nurses station. While the team nurse was drawing up the oral prn medications, nurse manager spoke with the patient. Writer and nurse manager were able to convince the patient that her statement was not true and encouraged her to take the medications offered; she accepted. Patient continued to endorse her belief that the staff was not helping her but continued talking with writer until she excused herself to go to her room.

## 2022-06-29 NOTE — BH INPATIENT PSYCHIATRY PROGRESS NOTE - NSBHFUPINTERVALHXFT_PSY_A_CORE
AMARA O/N. Adherent with scheduled medications. Received klonopin yesterday at noon and again at midnight. Reports good appetite and sleep. Participates in group therapies.     **INCOMPLETE**       AMARA O/N. Adherent with scheduled medications. Received klonopin yesterday at noon and again at midnight. Reports good appetite and sleep. Participates in group therapies. In the AM, ran away from the RN after receiving her meds but took them after being told that she has to be observed; also reported to follow one of the therapists on unit after group therapy and telling him, "you're making me take meds to kill me."     Reports concerns about losing her life savings and her car because of the woman she lives with and the "jackson." Continues to deny that the mother is her biological mother and refuses to acknowledge she can still be her mother after raising her. Reports that other people on unit are invading into her space by leaning their face toward her. Reports not wanting to see her outpatient psychiatrist and wanting a second opinion. Asks the team whether her PCP can prescribe her the meds instead. Reports concerns about being overmedicated, but after a reminder that the team d/c Wellbutrin, amenable to increasing the dose of Zyprexa (later expresses she wants to take the generic version).        AMARA O/N. Adherent with scheduled medications. Received klonopin yesterday at noon and again at midnight. Reports good appetite and sleep. Participates in group therapies. In the AM, ran away from the RN after receiving her meds but took them after being told that she has to be observed; also reported to follow one of the therapists on unit after group therapy and telling him, "you're making me take meds to kill me."     Reports concerns about losing her life savings and her car because of the woman she lives with and the "jackson." Continues to deny that the mother is her biological mother and refuses to acknowledge she can still be her mother after raising her. Reports that other people on unit are invading into her space by leaning their face toward her. Reports not wanting to see her outpatient psychiatrist and wanting a second opinion. Asks the team whether her PCP can prescribe her the meds instead. Reports concerns about being overmedicated, but after a reminder that the team d/c Wellbutrin, amenable to increasing the dose of Zyprexa (later expresses she wants to take the generic version).

## 2022-06-30 RX ADMIN — VORTIOXETINE 20 MILLIGRAM(S): 5 TABLET, FILM COATED ORAL at 08:26

## 2022-06-30 RX ADMIN — Medication 12.5 MICROGRAM(S): at 08:25

## 2022-06-30 RX ADMIN — GABAPENTIN 600 MILLIGRAM(S): 400 CAPSULE ORAL at 12:31

## 2022-06-30 RX ADMIN — OLANZAPINE 15 MILLIGRAM(S): 15 TABLET, FILM COATED ORAL at 20:29

## 2022-06-30 RX ADMIN — Medication 1 MILLIGRAM(S): at 23:29

## 2022-06-30 RX ADMIN — GABAPENTIN 600 MILLIGRAM(S): 400 CAPSULE ORAL at 20:29

## 2022-06-30 RX ADMIN — PANTOPRAZOLE SODIUM 40 MILLIGRAM(S): 20 TABLET, DELAYED RELEASE ORAL at 08:27

## 2022-06-30 RX ADMIN — BREXPIPRAZOLE 4 MILLIGRAM(S): 0.25 TABLET ORAL at 20:29

## 2022-06-30 RX ADMIN — GABAPENTIN 600 MILLIGRAM(S): 400 CAPSULE ORAL at 08:26

## 2022-06-30 RX ADMIN — Medication 30 MILLIGRAM(S): at 20:30

## 2022-06-30 RX ADMIN — Medication 30 MILLIGRAM(S): at 08:26

## 2022-06-30 NOTE — BH INPATIENT PSYCHIATRY PROGRESS NOTE - NSBHASSESSSUMMFT_PSY_ALL_CORE
Patient is a 57 yo F currently domiciled with mother, employed, currently single w/PMH of MVP, congenital neutropenia, hypothyroidism, PPHx of schizoaffective disorder (diagnosed 2009), multiple prior hospitalizations, last hospitalization at [facility] in 2014, hx of TMS currently on maintenance treatment w/ last treatment 3 weeks ago brought in BIB brother (Nilesh Mendez) d/t 8 day history of paranoia.    Patient's delusions, both chronic and new-onset, are more similar to presentation last week than the past 2 days, but delusional thinking can still be challenged. Critical, over-interpretation of others' behavior might be her baseline, according to family. Denies AH. Denies SIIP/HIIP. Wants to continue with Zyprexa, although the pt is preoccupied about being overmedicated and cost-effective. Doesn't want to see her outpatient psychiatrist after discharge and wants either a second-opinion or meds prescribed by her PCP. Will monitor response to increased Zyprexa through this week and weekend.     PLAN:  -involuntary admission (9.39) to L3   -re-start home meds: rexulti 4 mg PO QD (increased outpt on 6/20/22), buspar 30 mg PO BID, klonopin 1 mg PO PRN BID, gabapentin 600 mg PO TID, synthroid 12.5 mg PO QD, and trintellix 20 mg PO QD (per outpt provider, she is normally on 60 mg PO QD)  -increase zyprexa to 15 mg PO QHS  -podiatry consult for possible fungal infection  -encourage milieu and group therapy Patient is a 57 yo F currently domiciled with mother, employed, currently single w/PMH of MVP, congenital neutropenia, hypothyroidism, PPHx of schizoaffective disorder (diagnosed 2009), multiple prior hospitalizations, last hospitalization at [facility] in 2014, hx of TMS currently on maintenance treatment w/ last treatment 3 weeks ago brought in BIB brother (Nilesh Mendez) d/t 8 day history of paranoia.    Preoccupied about safety of her possessions in the past 2 days and less about medications today. More confrontational today, possibly after her brother's urging. Patient's delusions, both chronic and new-onset, are more similar to presentation last week than the past 2 days, but delusional thinking can still be challenged. Critical, over-interpretation of others' behavior might be her baseline, according to family. Denies AH. Denies SIIP/HIIP. Wants to continue with Zyprexa. Will monitor response to increased Zyprexa through this week and weekend.     PLAN:  -involuntary admission (9.39) to L3   -re-start home meds: rexulti 4 mg PO QD (increased outpt on 6/20/22), buspar 30 mg PO BID, klonopin 1 mg PO PRN BID, gabapentin 600 mg PO TID, synthroid 12.5 mg PO QD, and trintellix 20 mg PO QD (per outpt provider, she is normally on 60 mg PO QD)  -continue zyprexa to 15 mg PO QHS  -podiatry consult for possible fungal infection  -encourage milieu and group therapy Patient is a 55 yo F currently domiciled with mother, employed, currently single w/PMH of MVP, congenital neutropenia, hypothyroidism, PPHx of schizoaffective disorder (diagnosed 2009), multiple prior hospitalizations, last hospitalization at [facility] in 2014, hx of TMS currently on maintenance treatment w/ last treatment 3 weeks ago brought in BIB brother (Nilesh Mendez) d/t 8 day history of paranoia.    Preoccupied about safety of her possessions in the past 2 days and less about medications today. More confrontational today, possibly sharing thoughts she was holding back. Patient's delusions, both chronic and new-onset, are more similar to presentation last week than the past 2 days, but delusional thinking can still be challenged. Critical, over-interpretation of others' behavior might be her baseline, according to family. Denies AH. Denies SIIP/HIIP. Wants to continue with Zyprexa. Will monitor response to increased Zyprexa through this week and weekend.     PLAN:  -involuntary admission (9.39) to L3   -re-start home meds: rexulti 4 mg PO QD (increased outpt on 6/20/22), buspar 30 mg PO BID, klonopin 1 mg PO PRN BID, gabapentin 600 mg PO TID, synthroid 12.5 mg PO QD, and trintellix 20 mg PO QD (per outpt provider, she is normally on 60 mg PO QD)  -continue zyprexa 15 mg PO QHS  -podiatry consult for possible fungal infection  -encourage milieu and group therapy

## 2022-06-30 NOTE — BH INPATIENT PSYCHIATRY PROGRESS NOTE - PRN MEDS
Therapy Activity Session  Performed by Rehab Aide staff     TEP: AcuteTherapy Extention Program, activity plan was established by a licensed therapist and performed under the guidance of a licensed therapist.      Pt seen on 9T nursing unit                                                                                         PT Frequency Frequency Comments: M-F (Navya PT)                                                                                  OT Frequency Frequency Comments: TEP plus: OT: M-F CVA/LVAD (Jan or Anna Marie in AM).   WEDNESDAY 8/2 AKASH and ANALIA- --SPLINT    SLP Frequency Frequency: BID 0/2 on 8/4- puree/nectar-thick liquids, trial solid upgrades, see plan; 2/3 on 8/4 com/cog (8/1)    Availability:  Attempted to work with patient this date, unable to due to  with another medical provider.     Tolerance/Participation  Attempted, Not seen.     SESSION    Activities/Exercises/Mobility completed this session:  Physical Therapy Exercises    TEP Follow Up Needed: Yes  Therapy Extender Program Discipline: OT        PT Frequency: ICU daily, PT M-F, please page Monse PERSAUD [phone]    PT Task 1: supine bilateral ankle pumps  PT Reps for Task 1: 15  PT Sets for Task 1: 2   PT Resistance for Task 1: AA/PROM       PT Task 2: supine heel slides bilateral  PT Reps for Task 2: 10  PT Sets for Task 2: 1  PT Resistance for Task 2: AA/PROM       PT Task 3: supine hip abduction/adduction bilateral  PT Reps for Task 3: 15  PT Sets for Task 3: 1  PT Resistance for Task 3: AA/PROM                        Occupational Therapy Exercises    TEP Follow Up Needed: Yes  Therapy Extender Program Discipline: OT        OT Frequency: TEP exercises 7x/week (OT M-F Ino Ortega, -[id number])    OT Task 1: RUE PROM shoulder flexion/extension (chair position in bed or in recliner0  OT Reps for Task 1: 10  OT Sets for Task 1: 1  OT Resistance for Task 1: none        OT Task 2: RUE PROM shoulder horizontal abduction/adduction  (chair position in bed or in recliner)  OT Reps for Task 2: 10  OT Sets for Task 2: 1  OT Resistance for Task 2: none        OT Task 3: RUE PROM elbow flexion/extension   OT Reps for Task 3: 10  OT Sets for Task 3: 1  OT Resistance for Task 3: none   OT Task 3 Completion: Completed       Speech Therapy Exercises    TEP Follow Up Needed: Yes                                                                         MEDICATIONS  (PRN):  clonazePAM  Tablet 1 milliGRAM(s) Oral two times a day PRN anxiety  OLANZapine 5 milliGRAM(s) Oral every 6 hours PRN agitation  OLANZapine Injectable 5 milliGRAM(s) IntraMuscular Once PRN severe agitation

## 2022-06-30 NOTE — BH INPATIENT PSYCHIATRY PROGRESS NOTE - NSBHMETABOLIC_PSY_ALL_CORE_FT
BMI: BMI (kg/m2): 18.9 (06-22-22 @ 19:07)  HbA1c: A1C with Estimated Average Glucose Result: 5.1 % (06-24-22 @ 07:55)    Glucose:   BP: 161/84 (06-28-22 @ 08:43) (107/68 - 161/84)  Lipid Panel: Date/Time: 06-23-22 @ 08:34  Cholesterol, Serum: 177  Direct LDL: --  HDL Cholesterol, Serum: 60  Total Cholesterol/HDL Ration Measurement: --  Triglycerides, Serum: 48   BMI: BMI (kg/m2): 18.9 (06-22-22 @ 19:07)  HbA1c: A1C with Estimated Average Glucose Result: 5.1 % (06-24-22 @ 07:55)    Glucose:   BP: 98/58 (06-30-22 @ 08:31) (98/58 - 161/84)  Lipid Panel: Date/Time: 06-23-22 @ 08:34  Cholesterol, Serum: 177  Direct LDL: --  HDL Cholesterol, Serum: 60  Total Cholesterol/HDL Ration Measurement: --  Triglycerides, Serum: 48

## 2022-06-30 NOTE — BH INPATIENT PSYCHIATRY PROGRESS NOTE - CURRENT MEDICATION
MEDICATIONS  (STANDING):  brexpiprazole 4 milliGRAM(s) Oral at bedtime  busPIRone 30 milliGRAM(s) Oral two times a day  gabapentin 600 milliGRAM(s) Oral three times a day  levothyroxine 12.5 MICROGram(s) Oral daily  OLANZapine 15 milliGRAM(s) Oral at bedtime  pantoprazole    Tablet 40 milliGRAM(s) Oral before breakfast  vortioxetine 20 milliGRAM(s) Oral daily    MEDICATIONS  (PRN):  clonazePAM  Tablet 1 milliGRAM(s) Oral two times a day PRN anxiety  OLANZapine 5 milliGRAM(s) Oral every 6 hours PRN agitation  OLANZapine Injectable 5 milliGRAM(s) IntraMuscular Once PRN severe agitation

## 2022-06-30 NOTE — BH PSYCHOLOGY - GROUP THERAPY NOTE - NSPSYCHOLGRPGENINT_PSY_A_CORE
cognitive/behavioral therapy/problem solving techniques discussed/stress management/supported coping skills/supportive therapy/treatment compliance encouraged/social skills training
cognitive/behavioral therapy/problem solving techniques discussed/stress management/supported coping skills/supportive therapy/treatment compliance encouraged/social skills training

## 2022-06-30 NOTE — BH PSYCHOLOGY - GROUP THERAPY NOTE - NSPSYCHOLGRPGENGOAL_PSY_A_CORE
assessment/decrease symptoms/improve level of independent functioning/improve social/vocational/coping skills/psychoeducation/treatment compliance
assessment/decrease symptoms/improve level of independent functioning/improve social/vocational/coping skills/psychoeducation/treatment compliance

## 2022-06-30 NOTE — BH PSYCHOLOGY - GROUP THERAPY NOTE - TOKEN PULL-DIAGNOSIS
Primary Diagnosis:  Schizoaffective disorder-chronic with exacerbation [F25.9]        Problem Dx:   Schizoaffective disorder-chronic with exacerbation [F25.9]      
Primary Diagnosis:  Schizoaffective disorder-chronic with exacerbation [F25.9]        Problem Dx:   Schizoaffective disorder-chronic with exacerbation [F25.9]

## 2022-06-30 NOTE — BH PSYCHOLOGY - GROUP THERAPY NOTE - NSPSYCHOLGRPGENPT_PSY_A_CORE FT
Patient attended the psychology cognitive-behavior therapy group. The discussion was focused on the topic of Memory Training. Group expectations, guidelines, confidentiality and its limitations were reviewed. The group began with a description of memory difficulties as well as the definitions of attention and concentration. Group members then learned about external and internal strategies to improve both attention and concentration. Also discussed were other factors that impact memory performance (such as nutrition, exercise, and mental stimulation).  Group members demonstrated their understanding through active participation, discussion, and by asking clarifying questions. Discussion stemmed from the group's focus on the connection between thoughts, feelings and behaviors. Group members were provided with a handout describing the concepts and strategies discussed during group.
Patient attended the cognitive behavior therapy group session. Group session focused on the topic of anxiety.  Discussion revolved around the definitions of anxiety, fear, and panic attacks as well as the exploration of strategies to reduce its negative effects.  Group expectations and guidelines, as well as confidentiality and its limitations, were addressed. Principles of cognitive behavior therapy related to today's group topic were reviewed and discussed. Group members illustrated understanding of these concepts by giving personal examples based on their current experiences. Discussions stemmed from the group topics to the causal connection between thoughts, feelings, and behaviors.

## 2022-06-30 NOTE — BH INPATIENT PSYCHIATRY PROGRESS NOTE - NSBHFUPINTERVALHXFT_PSY_A_CORE
AMARA O/N. Adherent with scheduled medications. Reports good appetite and sleep. Participates in group therapies.     **INCOMPLETE** AMARA O/N. Adherent with scheduled medications. Reports good appetite and sleep. Participates in group therapies. Reported by staff to asking another patient if they are a lesbian after the patient touched their eyebrow.     Pointed at the writer and said, "you want me to take the meds to kill me," and pointed at the supervising psychiatrist and made a similar accusation. Reports anxiety about her life savings, cell phone, and car and acknowledges she gets anxious easily. Reports hearing her name while she was watching TV. Encouraged by the team to challenge her thoughts and reminded about her successes in doing so on unit.      Talked to the patient's brother Nilesh ([phone]). Reports the patient saying she is holding back what she thinks so that she can be discharged. Encouraged the patient to disclose her thoughts to the patient.  AMARA O/N. Adherent with scheduled medications. Reports good appetite and sleep. Participates in group therapies. Reported by staff to asking another patient if they are a lesbian after the patient touched their eyebrow.     Pointed at the writer and said, "you want me to take the meds to kill me," and pointed at the supervising psychiatrist and made a similar accusation. Reports anxiety about her life savings, cell phone, and car and acknowledges she gets anxious easily. Reports hearing her name while she was watching TV. Encouraged by the team to challenge her thoughts and reminded about her successes in doing so on unit.      Talked to the patient's brother Nilesh ([phone]). Reports the patient saying she is holding back what she thinks so that she can be discharged. Encouraged the patient to disclose her thoughts to the team.

## 2022-06-30 NOTE — BH INPATIENT PSYCHIATRY PROGRESS NOTE - MSE UNSTRUCTURED FT
Pt appears stated age, thin, dressed in street clothes and hospital gowns, fair hygiene and grooming. No psychomotor abnormalities noted. Cooperative on interview with good eye contact. Speech is fluent with increased latency and slowed rate. Mood is "ok." Affect is constricted, appropriate, and congruent to mood. Thought process is linear with normal reasoning. Thought content includes delusions of reference, chronic delusions, delusional thinking that can be reality tested to some degree, and increased preoccupations about medications. Denies SIIP/HIIP. Denies AH/VH. Insight and judgment are fair. Intact impulse control.  Pt appears stated age, thin, dressed in street clothes and hospital gowns, fair hygiene and grooming. No psychomotor abnormalities noted. Cooperative on interview with good eye contact. Speech is fluent with increased latency and slowed rate. Mood is "anxious." Affect is constricted, appropriate, and congruent to mood. Thought process is linear with normal reasoning. Thought content includes delusions of reference, chronic delusions, delusional thinking that can be reality tested to some degree, and preoccupations about safety of her possessions. Denies SIIP/HIIP. Denies AH/VH. Insight and judgment are fair. Intact impulse control.

## 2022-06-30 NOTE — BH INPATIENT PSYCHIATRY PROGRESS NOTE - NSBHCHARTREVIEWVS_PSY_A_CORE FT
Vital Signs Last 24 Hrs  T(C): 36.6 (06-29-22 @ 19:48), Max: 36.6 (06-29-22 @ 19:09)  T(F): 97.9 (06-29-22 @ 19:48), Max: 97.9 (06-29-22 @ 19:48)  HR: --  BP: --  BP(mean): --  RR: --  SpO2: --    Orthostatic VS  06-29-22 @ 06:41  Lying BP: --/-- HR: --  Sitting BP: 127/81 HR: 78  Standing BP: --/-- HR: --  Site: --  Mode: --   Vital Signs Last 24 Hrs  T(C): 36.7 (06-30-22 @ 08:31), Max: 36.7 (06-30-22 @ 08:31)  T(F): 98 (06-30-22 @ 08:31), Max: 98 (06-30-22 @ 08:31)  HR: 113 (06-30-22 @ 08:31) (113 - 113)  BP: 98/58 (06-30-22 @ 08:31) (98/58 - 98/58)  BP(mean): --  RR: --  SpO2: --    Orthostatic VS  06-29-22 @ 06:41  Lying BP: --/-- HR: --  Sitting BP: 127/81 HR: 78  Standing BP: --/-- HR: --  Site: --  Mode: --

## 2022-06-30 NOTE — BH PSYCHOLOGY - GROUP THERAPY NOTE - NSBHPSYCHOLPARTICIPCOMMENT_PSY_A_CORE FT
Patient appeared attentive and interested in the group discussion.  Although the patient did not contribute spontaneously or volunteer to read during the group session, patient was able to follow along with the group discussion. Patient was able to engage with the  appropriately.
Patient appeared attentive and interested in the group discussion.  Although the patient did not contribute spontaneously during the group session, patient was able to read from the worksheet when prompted. Patient was able to engage with the  and other members appropriately.

## 2022-06-30 NOTE — BH PSYCHOLOGY - GROUP THERAPY NOTE - NSPSYCHOLGRPGENPROB_PSY_A_CORE
academic/vocational/social dysfunction/anxiety/depression
academic/vocational/social dysfunction/anxiety/depression

## 2022-07-01 PROCEDURE — 99231 SBSQ HOSP IP/OBS SF/LOW 25: CPT

## 2022-07-01 RX ADMIN — Medication 30 MILLIGRAM(S): at 07:39

## 2022-07-01 RX ADMIN — BREXPIPRAZOLE 4 MILLIGRAM(S): 0.25 TABLET ORAL at 20:12

## 2022-07-01 RX ADMIN — GABAPENTIN 600 MILLIGRAM(S): 400 CAPSULE ORAL at 07:39

## 2022-07-01 RX ADMIN — Medication 30 MILLIGRAM(S): at 20:11

## 2022-07-01 RX ADMIN — OLANZAPINE 15 MILLIGRAM(S): 15 TABLET, FILM COATED ORAL at 20:12

## 2022-07-01 RX ADMIN — VORTIOXETINE 20 MILLIGRAM(S): 5 TABLET, FILM COATED ORAL at 07:39

## 2022-07-01 RX ADMIN — PANTOPRAZOLE SODIUM 40 MILLIGRAM(S): 20 TABLET, DELAYED RELEASE ORAL at 07:38

## 2022-07-01 RX ADMIN — GABAPENTIN 600 MILLIGRAM(S): 400 CAPSULE ORAL at 12:14

## 2022-07-01 RX ADMIN — Medication 12.5 MICROGRAM(S): at 06:20

## 2022-07-01 RX ADMIN — GABAPENTIN 600 MILLIGRAM(S): 400 CAPSULE ORAL at 20:11

## 2022-07-01 NOTE — BH INPATIENT PSYCHIATRY PROGRESS NOTE - NSBHASSESSSUMMFT_PSY_ALL_CORE
Patient is a 57 yo F currently domiciled with mother, employed, currently single w/PMH of MVP, congenital neutropenia, hypothyroidism, PPHx of schizoaffective disorder (diagnosed 2009), multiple prior hospitalizations, last hospitalization at [facility] in 2014, hx of TMS currently on maintenance treatment w/ last treatment 3 weeks ago brought in BIB brother (Nilesh Mendez) d/t 8 day history of paranoia.    Preoccupied about safety of her possessions in the past 2 days and less about medications today. More confrontational today, possibly sharing thoughts she was holding back. Patient's delusions, both chronic and new-onset, are more similar to presentation last week than the past 2 days, but delusional thinking can still be challenged. Critical, over-interpretation of others' behavior might be her baseline, according to family. Denies AH. Denies SIIP/HIIP. Wants to continue with Zyprexa. Will monitor response to increased Zyprexa through this week and weekend.     PLAN:  -involuntary admission (9.39) to L3   -re-start home meds: rexulti 4 mg PO QD (increased outpt on 6/20/22), buspar 30 mg PO BID, klonopin 1 mg PO PRN BID, gabapentin 600 mg PO TID, synthroid 12.5 mg PO QD, and trintellix 20 mg PO QD (per outpt provider, she is normally on 60 mg PO QD)  -continue zyprexa 15 mg PO QHS  -podiatry consult for possible fungal infection  -encourage milieu and group therapy Patient is a 55 yo F currently domiciled with mother, employed, currently single w/PMH of MVP, congenital neutropenia, hypothyroidism, PPHx of schizoaffective disorder (diagnosed 2009), multiple prior hospitalizations, last hospitalization at [facility] in 2014, hx of TMS currently on maintenance treatment w/ last treatment 3 weeks ago brought in BIB brother (Nilesh Mendez) d/t 8 day history of paranoia.    Preoccupied about safety of her possessions in the past 2 days and less about medications today. More confrontational today, possibly sharing thoughts she was holding back. Patient's delusions, both chronic and new-onset, are more similar to presentation last week than the past 2 days, but delusional thinking can still be challenged. Critical, over-interpretation of others' behavior might be her baseline, according to family. Denies AH. Denies SIIP/HIIP. Wants to continue with Zyprexa. Will monitor response to increased Zyprexa today through this week and weekend.     PLAN:  -involuntary admission (9.39) to L3   -increase zyprexa to 20 mg PO QHS  -re-start home meds: rexulti 4 mg PO QD (increased outpt on 6/20/22), buspar 30 mg PO BID, klonopin 1 mg PO PRN BID, gabapentin 600 mg PO TID, synthroid 12.5 mg PO QD, and trintellix 20 mg PO QD (per outpt provider, she is normally on 60 mg PO QD)  -podiatry consult for possible fungal infection  -encourage milieu and group therapy Patient is a 57 yo F currently domiciled with mother, employed, currently single w/PMH of MVP, congenital neutropenia, hypothyroidism, PPHx of schizoaffective disorder (diagnosed 2009), multiple prior hospitalizations, last hospitalization at [facility] in 2014, hx of TMS currently on maintenance treatment w/ last treatment 3 weeks ago brought in BIB brother (Nilesh Mendez) d/t 8 day history of paranoia.    Preoccupied about medications and worries that her brother wants to institutionalize her. Patient's delusions, both chronic and new-onset, are more similar to presentation last week than the past 2 days, but delusional thinking can still be challenged. Critical, over-interpretation of others' behavior might be her baseline, according to family. Denies AH. Denies SIIP/HIIP. Wants to continue with Zyprexa. Will monitor response to increased Zyprexa through this week and weekend.     PLAN:  -involuntary admission (9.39) to L3   -continue zyprexa 15 mg PO QHS  -re-start home meds: rexulti 4 mg PO QD (increased outpt on 6/20/22), buspar 30 mg PO BID, klonopin 1 mg PO PRN BID, gabapentin 600 mg PO TID, synthroid 12.5 mg PO QD, and trintellix 20 mg PO QD (per outpt provider, she is normally on 60 mg PO QD)  -podiatry consult for possible fungal infection  -encourage milieu and group therapy Patient is a 55 yo F currently domiciled with mother, employed, currently single w/PMH of MVP, congenital neutropenia, hypothyroidism, PPHx of schizoaffective disorder (diagnosed 2009), multiple prior hospitalizations, last hospitalization at [facility] in 2014, hx of TMS currently on maintenance treatment w/ last treatment 3 weeks ago brought in BIB brother (Nilesh Mendez) d/t 8 day history of paranoia.    Preoccupied about medications and worries that her brother wants to institutionalize her. Mental state seems to fluctuate throughout the day. Patient's delusions, both chronic and new-onset, are more similar to presentation last week than the past 2 days, but delusional thinking can still be challenged. Critical, over-interpretation of others' behavior might be her baseline, according to family. Denies AH. Denies SIIP/HIIP. Wants to continue with Zyprexa. Will monitor response to increased Zyprexa through this week and weekend.     PLAN:  -involuntary admission (9.39) to L3   -continue zyprexa 15 mg PO QHS  -re-start home meds: rexulti 4 mg PO QD (increased outpt on 6/20/22), buspar 30 mg PO BID, klonopin 1 mg PO PRN BID, gabapentin 600 mg PO TID, synthroid 12.5 mg PO QD, and trintellix 20 mg PO QD (per outpt provider, she is normally on 60 mg PO QD)  -podiatry consult for possible fungal infection  -encourage milieu and group therapy

## 2022-07-01 NOTE — BH INPATIENT PSYCHIATRY PROGRESS NOTE - MSE UNSTRUCTURED FT
Pt appears stated age, thin, dressed in street clothes and hospital gowns, fair hygiene and grooming. No psychomotor abnormalities noted. Cooperative on interview with good eye contact. Speech is fluent with increased latency and slowed rate. Mood is "anxious." Affect is constricted, appropriate, and congruent to mood. Thought process is linear with normal reasoning. Thought content includes delusions of reference, chronic delusions, delusional thinking that can be reality tested to some degree, and preoccupations about safety of her possessions. Denies SIIP/HIIP. Denies AH/VH. Insight and judgment are fair. Intact impulse control.  Pt appears stated age, thin, dressed in street clothes and hospital gowns, fair hygiene and grooming. No psychomotor abnormalities noted. Cooperative on interview with good eye contact. Speech is fluent with increased latency and slowed rate. Mood is "anxious." Affect is constricted, appropriate, and congruent to mood. Thought process is linear with normal reasoning. Thought content includes delusions of reference, chronic delusions, delusional thinking that can be reality tested to some degree, and preoccupations about safety of her possessions, medications, and hospitalization. Denies SIIP/HIIP. Denies AH/VH but reports hearing her name. Insight and judgment are poor. Intact impulse control.

## 2022-07-01 NOTE — BH INPATIENT PSYCHIATRY PROGRESS NOTE - NSBHFUPINTERVALHXFT_PSY_A_CORE
AMARA O/N. Adherent with scheduled medications. Received klonopin PRN last night. Reports good appetite and sleep. Participates in group therapies.     **INCOMPLETE**   AMARA O/N. Adherent with scheduled medications. Received klonopin PRN last night. Reports good appetite and sleep. Participates in group therapies. Was seen crying by staff yesterday.     Refers to her brother as "that man who talks to me" and reports that he wants to institutionalize her. Asks if the team heard someone say her name. Reports not wanting to take drugs like other people but agrees to continue taking Rixulti and Zyprexa. Denies AH/VH. Denies SIIP/HIIP.    Talked to Nilesh ([phone]). Reports the patient was upbeat, logical and clear and asked about how her mother was doing. Reports that she seemed too happy. Agrees that the patient should stay at the hospital until she's stable.

## 2022-07-01 NOTE — BH INPATIENT PSYCHIATRY PROGRESS NOTE - NSBHCHARTREVIEWVS_PSY_A_CORE FT
Vital Signs Last 24 Hrs  T(C): 36.1 (07-01-22 @ 06:42), Max: 36.7 (06-30-22 @ 08:31)  T(F): 97 (07-01-22 @ 06:42), Max: 98 (06-30-22 @ 08:31)  HR: 113 (06-30-22 @ 08:31) (113 - 113)  BP: 98/58 (06-30-22 @ 08:31) (98/58 - 98/58)  BP(mean): --  RR: --  SpO2: --    Orthostatic VS  07-01-22 @ 06:42  Lying BP: --/-- HR: --  Sitting BP: 138/83 HR: 96  Standing BP: --/-- HR: --  Site: --  Mode: --  Orthostatic VS  06-30-22 @ 21:37  Lying BP: --/-- HR: --  Sitting BP: 123/71 HR: 67  Standing BP: --/-- HR: --  Site: --  Mode: --   Vital Signs Last 24 Hrs  T(C): 36.1 (07-01-22 @ 06:42), Max: 36.6 (06-30-22 @ 21:37)  T(F): 97 (07-01-22 @ 06:42), Max: 97.8 (06-30-22 @ 21:37)  HR: --  BP: --  BP(mean): --  RR: --  SpO2: --    Orthostatic VS  07-01-22 @ 06:42  Lying BP: --/-- HR: --  Sitting BP: 138/83 HR: 96  Standing BP: --/-- HR: --  Site: --  Mode: --  Orthostatic VS  06-30-22 @ 21:37  Lying BP: --/-- HR: --  Sitting BP: 123/71 HR: 67  Standing BP: --/-- HR: --  Site: --  Mode: --

## 2022-07-01 NOTE — BH INPATIENT PSYCHIATRY PROGRESS NOTE - NSBHMETABOLIC_PSY_ALL_CORE_FT
BMI: BMI (kg/m2): 18.9 (06-22-22 @ 19:07)  HbA1c: A1C with Estimated Average Glucose Result: 5.1 % (06-24-22 @ 07:55)    Glucose:   BP: 98/58 (06-30-22 @ 08:31) (98/58 - 161/84)  Lipid Panel: Date/Time: 06-23-22 @ 08:34  Cholesterol, Serum: 177  Direct LDL: --  HDL Cholesterol, Serum: 60  Total Cholesterol/HDL Ration Measurement: --  Triglycerides, Serum: 48   BMI: BMI (kg/m2): 18.9 (06-22-22 @ 19:07)  HbA1c: A1C with Estimated Average Glucose Result: 5.1 % (06-24-22 @ 07:55)    Glucose:   BP: 98/58 (06-30-22 @ 08:31) (98/58 - 98/58)  Lipid Panel: Date/Time: 06-23-22 @ 08:34  Cholesterol, Serum: 177  Direct LDL: --  HDL Cholesterol, Serum: 60  Total Cholesterol/HDL Ration Measurement: --  Triglycerides, Serum: 48

## 2022-07-02 RX ADMIN — VORTIOXETINE 20 MILLIGRAM(S): 5 TABLET, FILM COATED ORAL at 08:02

## 2022-07-02 RX ADMIN — PANTOPRAZOLE SODIUM 40 MILLIGRAM(S): 20 TABLET, DELAYED RELEASE ORAL at 08:02

## 2022-07-02 RX ADMIN — Medication 1 MILLIGRAM(S): at 23:28

## 2022-07-02 RX ADMIN — OLANZAPINE 15 MILLIGRAM(S): 15 TABLET, FILM COATED ORAL at 20:22

## 2022-07-02 RX ADMIN — GABAPENTIN 600 MILLIGRAM(S): 400 CAPSULE ORAL at 08:01

## 2022-07-02 RX ADMIN — GABAPENTIN 600 MILLIGRAM(S): 400 CAPSULE ORAL at 20:22

## 2022-07-02 RX ADMIN — Medication 1 MILLIGRAM(S): at 08:01

## 2022-07-02 RX ADMIN — BREXPIPRAZOLE 4 MILLIGRAM(S): 0.25 TABLET ORAL at 20:22

## 2022-07-02 RX ADMIN — Medication 12.5 MICROGRAM(S): at 06:15

## 2022-07-02 RX ADMIN — Medication 30 MILLIGRAM(S): at 08:02

## 2022-07-02 RX ADMIN — GABAPENTIN 600 MILLIGRAM(S): 400 CAPSULE ORAL at 12:25

## 2022-07-02 RX ADMIN — Medication 30 MILLIGRAM(S): at 20:22

## 2022-07-03 RX ADMIN — Medication 12.5 MICROGRAM(S): at 06:00

## 2022-07-03 RX ADMIN — Medication 30 MILLIGRAM(S): at 08:07

## 2022-07-03 RX ADMIN — BREXPIPRAZOLE 4 MILLIGRAM(S): 0.25 TABLET ORAL at 20:29

## 2022-07-03 RX ADMIN — GABAPENTIN 600 MILLIGRAM(S): 400 CAPSULE ORAL at 08:07

## 2022-07-03 RX ADMIN — GABAPENTIN 600 MILLIGRAM(S): 400 CAPSULE ORAL at 12:16

## 2022-07-03 RX ADMIN — PANTOPRAZOLE SODIUM 40 MILLIGRAM(S): 20 TABLET, DELAYED RELEASE ORAL at 08:07

## 2022-07-03 RX ADMIN — VORTIOXETINE 20 MILLIGRAM(S): 5 TABLET, FILM COATED ORAL at 08:07

## 2022-07-03 RX ADMIN — GABAPENTIN 600 MILLIGRAM(S): 400 CAPSULE ORAL at 20:29

## 2022-07-03 RX ADMIN — Medication 1 MILLIGRAM(S): at 08:07

## 2022-07-03 RX ADMIN — Medication 30 MILLIGRAM(S): at 20:29

## 2022-07-03 RX ADMIN — OLANZAPINE 15 MILLIGRAM(S): 15 TABLET, FILM COATED ORAL at 20:29

## 2022-07-04 RX ADMIN — GABAPENTIN 600 MILLIGRAM(S): 400 CAPSULE ORAL at 20:40

## 2022-07-04 RX ADMIN — GABAPENTIN 600 MILLIGRAM(S): 400 CAPSULE ORAL at 12:22

## 2022-07-04 RX ADMIN — Medication 30 MILLIGRAM(S): at 20:40

## 2022-07-04 RX ADMIN — BREXPIPRAZOLE 4 MILLIGRAM(S): 0.25 TABLET ORAL at 20:40

## 2022-07-04 RX ADMIN — GABAPENTIN 600 MILLIGRAM(S): 400 CAPSULE ORAL at 08:08

## 2022-07-04 RX ADMIN — OLANZAPINE 15 MILLIGRAM(S): 15 TABLET, FILM COATED ORAL at 20:40

## 2022-07-04 RX ADMIN — Medication 30 MILLIGRAM(S): at 08:08

## 2022-07-04 RX ADMIN — Medication 12.5 MICROGRAM(S): at 05:35

## 2022-07-04 RX ADMIN — PANTOPRAZOLE SODIUM 40 MILLIGRAM(S): 20 TABLET, DELAYED RELEASE ORAL at 08:08

## 2022-07-04 RX ADMIN — VORTIOXETINE 20 MILLIGRAM(S): 5 TABLET, FILM COATED ORAL at 08:09

## 2022-07-05 LAB — SARS-COV-2 RNA SPEC QL NAA+PROBE: SIGNIFICANT CHANGE UP

## 2022-07-05 PROCEDURE — 99232 SBSQ HOSP IP/OBS MODERATE 35: CPT | Mod: GC

## 2022-07-05 RX ORDER — CLONAZEPAM 1 MG
1 TABLET ORAL
Refills: 0 | Status: DISCONTINUED | OUTPATIENT
Start: 2022-07-05 | End: 2022-07-08

## 2022-07-05 RX ADMIN — OLANZAPINE 15 MILLIGRAM(S): 15 TABLET, FILM COATED ORAL at 21:37

## 2022-07-05 RX ADMIN — GABAPENTIN 600 MILLIGRAM(S): 400 CAPSULE ORAL at 21:37

## 2022-07-05 RX ADMIN — PANTOPRAZOLE SODIUM 40 MILLIGRAM(S): 20 TABLET, DELAYED RELEASE ORAL at 08:10

## 2022-07-05 RX ADMIN — GABAPENTIN 600 MILLIGRAM(S): 400 CAPSULE ORAL at 12:31

## 2022-07-05 RX ADMIN — BREXPIPRAZOLE 4 MILLIGRAM(S): 0.25 TABLET ORAL at 21:37

## 2022-07-05 RX ADMIN — VORTIOXETINE 20 MILLIGRAM(S): 5 TABLET, FILM COATED ORAL at 08:10

## 2022-07-05 RX ADMIN — GABAPENTIN 600 MILLIGRAM(S): 400 CAPSULE ORAL at 08:10

## 2022-07-05 RX ADMIN — Medication 30 MILLIGRAM(S): at 08:10

## 2022-07-05 RX ADMIN — Medication 12.5 MICROGRAM(S): at 06:10

## 2022-07-05 RX ADMIN — Medication 30 MILLIGRAM(S): at 21:37

## 2022-07-05 NOTE — BH INPATIENT PSYCHIATRY PROGRESS NOTE - NSBHCHARTREVIEWVS_PSY_A_CORE FT
Vital Signs Last 24 Hrs  T(C): 36.8 (07-05-22 @ 06:33), Max: 36.8 (07-05-22 @ 06:33)  T(F): 98.2 (07-05-22 @ 06:33), Max: 98.2 (07-05-22 @ 06:33)  HR: --  BP: --  BP(mean): --  RR: --  SpO2: --    Orthostatic VS  07-05-22 @ 07:30  Lying BP: --/-- HR: --  Sitting BP: 120/75 HR: 80  Standing BP: --/-- HR: --  Site: --  Mode: --  Orthostatic VS  07-05-22 @ 06:33  Lying BP: --/-- HR: --  Sitting BP: 128/95 HR: 79  Standing BP: --/-- HR: --  Site: --  Mode: --  Orthostatic VS  07-04-22 @ 09:00  Lying BP: --/-- HR: --  Sitting BP: 123/84 HR: 86  Standing BP: --/-- HR: --  Site: --  Mode: --   Vital Signs Last 24 Hrs  T(C): 36.7 (07-05-22 @ 19:11), Max: 36.8 (07-05-22 @ 06:33)  T(F): 98 (07-05-22 @ 19:11), Max: 98.2 (07-05-22 @ 06:33)  HR: --  BP: --  BP(mean): --  RR: --  SpO2: --    Orthostatic VS  07-05-22 @ 07:30  Lying BP: --/-- HR: --  Sitting BP: 120/75 HR: 80  Standing BP: --/-- HR: --  Site: --  Mode: --  Orthostatic VS  07-05-22 @ 06:33  Lying BP: --/-- HR: --  Sitting BP: 128/95 HR: 79  Standing BP: --/-- HR: --  Site: --  Mode: --  Orthostatic VS  07-04-22 @ 09:00  Lying BP: --/-- HR: --  Sitting BP: 123/84 HR: 86  Standing BP: --/-- HR: --  Site: --  Mode: --

## 2022-07-05 NOTE — BH INPATIENT PSYCHIATRY PROGRESS NOTE - NSBHASSESSSUMMFT_PSY_ALL_CORE
This is a middle-aged woman with a history schizoaffective disorder presenting with multiple psychotic delusions. She waxes and wanes with the intensity of the delusions but has been relatively clear in terms of thought content during the day. She is goal-oriented and amenable to continued treatment.     Plan:  - continue current medications with consideration for antipsychotic consolidation to only olanzapine  - consider clozapine if olanzapine does not diminish delusions Pt is a 57yo woman who lives with her mother, with dx schizoaffective disorder vs schizophrenia, several prior hospitalizations, in treatment with outpt psychiatrist Dr. Smalls, recently getting TMS treatments, presenting with acute psychotic decompensation in the context of non-adherence to meds. Pt initially presented with a variety of complex delusions including Capgras delusion, delusions of surveillance and poisoning, referential delusions and related intense AH, formal thought disorder, and related psychotic anxiety (often seeking reassurance from staff). Intensity of psychotic symptoms often varies throughout the day, though since starting Zyprexa delusions are overall less intense, thought process increasingly linear, AH less frequent, and anxiety has improved. Will monitor for continued and sustained improvement.     Plan:  - Psychosis: C/w Rexulti 4mg qhs (though will consider taper given limited efficacy and goal to minimize polypharmacy). C/w Zyprexa 15mg qhs (will consider further titration). Pt is not a candidate for Clozapine due to dx congential neutropenia with current .   - Mood: C/w Trintellix 20mg daily (lowered from 60mg). Home med Wellbutrin has been discontinued.  - Anxiety: C/w Buspar 30mg BID and Gabapentin 600mg TID. Klonopin 1mg BID prn continued anxiety.    - Dispo: back home to mother and outpt psychiatrist Dr. Smalls, pending sustained clinical improvement

## 2022-07-05 NOTE — BH INPATIENT PSYCHIATRY PROGRESS NOTE - NSBHFUPINTERVALHXFT_PSY_A_CORE
No acute events overnight. Stable and taking medications. Not taking PRNs.     Reports feeling stable and lucid. Says that her brother and mother are her brother and mother. States that she feels bad that her brother has to bounce between her and her mother (who is in rehab).  No acute events overnight. Stable and taking medications. Not requiring PRNs. Reports feeling stable and lucid. Says that her brother and mother are her brother and mother. States that she feels bad that her brother has to bounce between her and her mother (who is in rehab). 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 Asks if the presence of hair on her arm means she has a hormonal imbalance. Pt noted to interactive in unit milieu, social with peers. Remains anxious, though improved compared to last week, less reassurance-seeking. Still with adequate PO intake, no longer believes food is poisoned.

## 2022-07-05 NOTE — BH INPATIENT PSYCHIATRY PROGRESS NOTE - MSE UNSTRUCTURED FT
Pt appears stated age, thin, dressed in hospital gowns, fair hygiene and grooming. No psychomotor abnormalities noted. Cooperative on interview with good eye contact. Speech is fluent. Mood is "clear." Affect is euthymic, appropriate, and congruent to mood. Thought process is linear with normal reasoning. Thought content was clear of all delusions except for that of wiring money in her last job. Denies SIIP/HIIP. Denies AH/VH. Insight and judgment are fair at this time. Impulse control appropriate to setting. Pt appears stated age, thin, dressed in hospital gowns, fair hygiene and grooming. No psychomotor abnormalities noted. Cooperative on interview with good eye contact. Speech is fluent, normal in rate and volume. Mood is "clear." Affect is euthymic, somewhat anxious, appropriate, and congruent to mood. Thought process is mostly linear, overinclusive. Thought content was clear of all delusions except for that of wiring money in her last job. Denies SIIP/HIIP. Denies AH/VH. Insight and judgment are fair at this time, improved. Impulse control appropriate to setting.

## 2022-07-05 NOTE — BH INPATIENT PSYCHIATRY PROGRESS NOTE - NSBHMETABOLIC_PSY_ALL_CORE_FT
BMI: BMI (kg/m2): 18.9 (06-22-22 @ 19:07)  HbA1c: A1C with Estimated Average Glucose Result: 5.1 % (06-24-22 @ 07:55)    Glucose:   BP: 116/64 (07-03-22 @ 19:22) (116/64 - 116/64)  Lipid Panel: Date/Time: 06-23-22 @ 08:34  Cholesterol, Serum: 177  Direct LDL: --  HDL Cholesterol, Serum: 60  Total Cholesterol/HDL Ration Measurement: --  Triglycerides, Serum: 48

## 2022-07-06 RX ORDER — OLANZAPINE 15 MG/1
20 TABLET, FILM COATED ORAL DAILY
Refills: 0 | Status: DISCONTINUED | OUTPATIENT
Start: 2022-07-06 | End: 2022-07-06

## 2022-07-06 RX ORDER — OLANZAPINE 15 MG/1
20 TABLET, FILM COATED ORAL AT BEDTIME
Refills: 0 | Status: DISCONTINUED | OUTPATIENT
Start: 2022-07-06 | End: 2022-07-12

## 2022-07-06 RX ORDER — BREXPIPRAZOLE 0.25 MG/1
4 TABLET ORAL AT BEDTIME
Refills: 0 | Status: COMPLETED | OUTPATIENT
Start: 2022-07-06 | End: 2022-07-06

## 2022-07-06 RX ADMIN — GABAPENTIN 600 MILLIGRAM(S): 400 CAPSULE ORAL at 12:58

## 2022-07-06 RX ADMIN — Medication 30 MILLIGRAM(S): at 08:03

## 2022-07-06 RX ADMIN — OLANZAPINE 20 MILLIGRAM(S): 15 TABLET, FILM COATED ORAL at 20:36

## 2022-07-06 RX ADMIN — PANTOPRAZOLE SODIUM 40 MILLIGRAM(S): 20 TABLET, DELAYED RELEASE ORAL at 08:02

## 2022-07-06 RX ADMIN — GABAPENTIN 600 MILLIGRAM(S): 400 CAPSULE ORAL at 20:36

## 2022-07-06 RX ADMIN — GABAPENTIN 600 MILLIGRAM(S): 400 CAPSULE ORAL at 08:02

## 2022-07-06 RX ADMIN — Medication 12.5 MICROGRAM(S): at 06:22

## 2022-07-06 RX ADMIN — VORTIOXETINE 20 MILLIGRAM(S): 5 TABLET, FILM COATED ORAL at 08:03

## 2022-07-06 RX ADMIN — BREXPIPRAZOLE 4 MILLIGRAM(S): 0.25 TABLET ORAL at 20:36

## 2022-07-06 RX ADMIN — Medication 30 MILLIGRAM(S): at 20:36

## 2022-07-06 NOTE — BH INPATIENT PSYCHIATRY PROGRESS NOTE - CURRENT MEDICATION
MEDICATIONS  (STANDING):  busPIRone 30 milliGRAM(s) Oral two times a day  gabapentin 600 milliGRAM(s) Oral three times a day  levothyroxine 12.5 MICROGram(s) Oral daily  OLANZapine 20 milliGRAM(s) Oral daily  pantoprazole    Tablet 40 milliGRAM(s) Oral before breakfast  vortioxetine 20 milliGRAM(s) Oral daily    MEDICATIONS  (PRN):  clonazePAM  Tablet 1 milliGRAM(s) Oral two times a day PRN anxiety  OLANZapine 5 milliGRAM(s) Oral every 6 hours PRN agitation  OLANZapine Injectable 5 milliGRAM(s) IntraMuscular Once PRN severe agitation   MEDICATIONS  (STANDING):  busPIRone 30 milliGRAM(s) Oral two times a day  gabapentin 600 milliGRAM(s) Oral three times a day  levothyroxine 12.5 MICROGram(s) Oral daily  OLANZapine 20 milliGRAM(s) Oral at bedtime  pantoprazole    Tablet 40 milliGRAM(s) Oral before breakfast  vortioxetine 20 milliGRAM(s) Oral daily    MEDICATIONS  (PRN):  clonazePAM  Tablet 1 milliGRAM(s) Oral two times a day PRN anxiety  OLANZapine 5 milliGRAM(s) Oral every 6 hours PRN agitation  OLANZapine Injectable 5 milliGRAM(s) IntraMuscular Once PRN severe agitation

## 2022-07-06 NOTE — BH INPATIENT PSYCHIATRY PROGRESS NOTE - NSBHFUPINTERVALHXFT_PSY_A_CORE
No acute events overnight. Stable and taking medications. Not requiring PRNs. Reports feeling stable. Says that her brother is her brother but is now uncertain about her mother, although she is redirectable to think so. Concerned that her mother is a brothel "madame" at times and that she will make the patient a prostitute for money. Discussed her desire to return to her work for the [employer]. Mentioned again that she thinks she has a hormonal imbalance due to the presence of arm hair.     She is noted to be present and interactive in the milieu. She is overall still anxious, but, per brother, this is her baseline. She looks to the treatment team for validation and is highly impressionable, even with regards to delusions. Has adequate PO intake and no longer believes food to be poisoned.  No acute events overnight. Compliant with meds. Not requiring PRNs. Reports feeling stable. Says that her brother is her brother but is now uncertain about her mother, although she is able to reality test. Concerned that her mother is a brothel "madame" at times and that she will make the patient a prostitute for money. Wonders if she should discharge to a shelter rather than returning home to mother. Discusses how another patient on the unit related her name to prostitution and concerned that her first name includes the word/sound "petra/sin." Discusses fear that if she talks to man others will think this is sexually inappropriate. Speculates if another woman on the unit may be in the [employer]. Mentioned again that she thinks she has a hormonal imbalance due to the presence of arm hair. Discusses her desire to return to her work for the [employer].     She is noted to be present and interactive in the milieu. She is overall still anxious, but, per brother, this is her baseline. She looks to the treatment team for validation and is highly impressionable, even with regards to delusions. Has adequate PO intake and no longer believes food to be poisoned.

## 2022-07-06 NOTE — BH INPATIENT PSYCHIATRY PROGRESS NOTE - MSE UNSTRUCTURED FT
Pt appears stated age, thin, dressed in hospital gown and street clothes, fair hygiene and grooming. No psychomotor abnormalities noted. Cooperative on interview with good eye contact and slight reactive latency. Speech is fluent, normal in rate and volume. Mood is "stable." Affect is euthymic, somewhat anxious, appropriate, and congruent to mood. Thought process is mostly linear, overinclusive. Thought content included delusions of reference (name having sexual or bad connotations) and ideas that her mother will prostitute her out as a brothel "madame". Denies SIIP/HIIP. Denies AH/VH. Insight and judgment are fair at this time, improved. Impulse control appropriate to setting. Pt appears stated age, thin, dressed in hospital gown and street clothes, fair hygiene and grooming. No psychomotor abnormalities noted. Cooperative on interview with good eye contact. Speech is fluent, normal in rate and volume. Mood is "stable." Affect is euthymic though anxious, constricted. Thought process is mostly linear, overinclusive, at times perseverative. Thought content mainly with referential delusions and delusions involving fear of prostitution, related preoccupations and anxiety, denies SIIP and HIIP. Perception: Denies AH/VH. Insight is limited though pt able to reality test and amenable to reassurance from staff. Judgement is fair. Impulse control is intact.

## 2022-07-06 NOTE — BH INPATIENT PSYCHIATRY PROGRESS NOTE - NSBHASSESSSUMMFT_PSY_ALL_CORE
This is a 55y/o woman who lives with her mother with a dx of schizoaffective disorder/schizophrenia, several prior hospitalizations, and consistent outpatient psychiatric treatment including TMS presenting with acute psychotic decompensation in the context of medication non-adherence.     Patient initially presented with multiple complex delusions including Capgras, delusions of surveillance and poisoning, referential delusions with related AH, thought disorder, and psychotic anxiety. Intensity of symptoms varies throughout day but has been less intense since starting olanzapine with associated recovery of thought process linearity. Overall, AH have been less frequently observable and her anxiety has improved. Will monitor for continued and sustained improvement.     Plan:  Psychosis  - decrease brexpiprazole to 3mg  - increase olanzapine to 20mg  Mood  - continue vortioxetine 20mg (lowered from 60mg)  Anxiety  - continue buspirone 30mg BID  - continue gabapentin 600mg TID  - clonazepam 1mg BID PRN  Disposition  - return to outpatient care under Dr. Smalls  - return to home with mother and monitoring by brother      **patient is not a candidate for clozapine due to ANC <1000   This is a 57y/o woman who lives with her mother, with PMHx congential neutropenia (w/ baseline ANC < 500) with a dx of schizoaffective disorder vs schizophrenia, several prior hospitalizations, and consistent outpatient psychiatric treatment including TMS presenting with acute psychotic decompensation in the context of medication non-adherence.     Patient initially presented with multiple complex delusions including Capgras, delusions of surveillance and poisoning, referential delusions with related AH, thought disorder, and psychotic anxiety. Intensity of delusions varies throughout the day but has been less intense since starting olanzapine with associated recovery of thought process linearity. Overall, AH have been less frequently observable and her anxiety has improved. Though delusions remain (today pt with particular fixation on sexual delusions involving prostitution), at present they have little impact on pt's behavior and safety. Will monitor for continued and sustained improvement.     Plan:  Psychosis  - decrease brexpiprazole to 3mg  - increase olanzapine to 20mg  - patient is not a candidate for clozapine due to ANC <1000  Mood  - continue vortioxetine 20mg (lowered from 60mg)  Anxiety  - continue buspirone 30mg BID  - continue gabapentin 600mg TID  - clonazepam 1mg BID PRN anxiety  Disposition  - return to outpatient care under Dr. Smalls  - return to home with mother and monitoring by brother

## 2022-07-06 NOTE — BH INPATIENT PSYCHIATRY PROGRESS NOTE - NSBHMETABOLIC_PSY_ALL_CORE_FT
BMI: BMI (kg/m2): 18.9 (06-22-22 @ 19:07)  HbA1c: A1C with Estimated Average Glucose Result: 5.1 % (06-24-22 @ 07:55)    Glucose:   BP: 116/64 (07-03-22 @ 19:22) (116/64 - 116/64)  Lipid Panel: Date/Time: 06-23-22 @ 08:34  Cholesterol, Serum: 177  Direct LDL: --  HDL Cholesterol, Serum: 60  Total Cholesterol/HDL Ration Measurement: --  Triglycerides, Serum: 48   BMI: BMI (kg/m2): 18.9 (06-22-22 @ 19:07)  HbA1c: A1C with Estimated Average Glucose Result: 5.1 % (06-24-22 @ 07:55)    Glucose:   BP: --  Lipid Panel: Date/Time: 06-23-22 @ 08:34  Cholesterol, Serum: 177  Direct LDL: --  HDL Cholesterol, Serum: 60  Total Cholesterol/HDL Ration Measurement: --  Triglycerides, Serum: 48

## 2022-07-06 NOTE — BH INPATIENT PSYCHIATRY PROGRESS NOTE - NSBHCHARTREVIEWVS_PSY_A_CORE FT
Vital Signs Last 24 Hrs  T(C): 36.7 (07-05-22 @ 19:11), Max: 36.7 (07-05-22 @ 19:11)  T(F): 98 (07-05-22 @ 19:11), Max: 98 (07-05-22 @ 19:11)  HR: --  BP: --  BP(mean): --  RR: --  SpO2: --    Orthostatic VS  07-05-22 @ 07:30  Lying BP: --/-- HR: --  Sitting BP: 120/75 HR: 80  Standing BP: --/-- HR: --  Site: --  Mode: --  Orthostatic VS  07-05-22 @ 06:33  Lying BP: --/-- HR: --  Sitting BP: 128/95 HR: 79  Standing BP: --/-- HR: --  Site: --  Mode: --   Vital Signs Last 24 Hrs  T(C): 36.7 (07-06-22 @ 19:06), Max: 36.7 (07-06-22 @ 19:06)  T(F): 98.1 (07-06-22 @ 19:06), Max: 98.1 (07-06-22 @ 19:06)  HR: --  BP: --  BP(mean): --  RR: --  SpO2: --    Orthostatic VS  07-05-22 @ 07:30  Lying BP: --/-- HR: --  Sitting BP: 120/75 HR: 80  Standing BP: --/-- HR: --  Site: --  Mode: --  Orthostatic VS  07-05-22 @ 06:33  Lying BP: --/-- HR: --  Sitting BP: 128/95 HR: 79  Standing BP: --/-- HR: --  Site: --  Mode: --

## 2022-07-07 RX ORDER — BREXPIPRAZOLE 0.25 MG/1
3 TABLET ORAL DAILY
Refills: 0 | Status: DISCONTINUED | OUTPATIENT
Start: 2022-07-07 | End: 2022-07-13

## 2022-07-07 RX ADMIN — GABAPENTIN 600 MILLIGRAM(S): 400 CAPSULE ORAL at 20:40

## 2022-07-07 RX ADMIN — Medication 30 MILLIGRAM(S): at 20:42

## 2022-07-07 RX ADMIN — Medication 1 MILLIGRAM(S): at 03:49

## 2022-07-07 RX ADMIN — OLANZAPINE 20 MILLIGRAM(S): 15 TABLET, FILM COATED ORAL at 20:40

## 2022-07-07 RX ADMIN — GABAPENTIN 600 MILLIGRAM(S): 400 CAPSULE ORAL at 08:09

## 2022-07-07 RX ADMIN — VORTIOXETINE 20 MILLIGRAM(S): 5 TABLET, FILM COATED ORAL at 08:09

## 2022-07-07 RX ADMIN — Medication 12.5 MICROGRAM(S): at 06:16

## 2022-07-07 RX ADMIN — GABAPENTIN 600 MILLIGRAM(S): 400 CAPSULE ORAL at 12:27

## 2022-07-07 RX ADMIN — PANTOPRAZOLE SODIUM 40 MILLIGRAM(S): 20 TABLET, DELAYED RELEASE ORAL at 08:10

## 2022-07-07 RX ADMIN — Medication 30 MILLIGRAM(S): at 08:10

## 2022-07-07 NOTE — BH INPATIENT PSYCHIATRY PROGRESS NOTE - NSBHASSESSSUMMFT_PSY_ALL_CORE
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 schizophrenia, several prior hospitalizations, and consistent outpatient psychiatric treatment including TMS presenting with acute psychotic decompensation in the context of medication non-adherence.     Patient initially presented with multiple complex delusions including Capgras, delusions of surveillance and posing, referential delusions with related AH, thought disorder, and psychotic anxiety. Intensity of delusions varies day by day and throughout each day but has overall been less intense since starting olanzapine. Overall, AH have been less frequently observable. Though delusions remain (referential, surveillance, paranoia), they have little impact on patient’s behavior and safety. Will monitor for continued and sustained improvement.     Plan:  1) Psychosis   - Decrease brexpiprazole to 3mg  - Continue olanzapine 20mg  - *note: patient not candidtate for clozapine due to ANC <1000  2) Mood  - Continue vortioxetine 20mg (lowered from 60mg)  3) Anxiety  - Continue buspirone 30mg BID  - Continue gabapentin 600mg TID  - Clonazepam 1mg BID PRN anxiety  4) Disposition  - Return to outpatient care under Dr. Smalls  - Return to home with mother and monitoring by brother

## 2022-07-07 NOTE — BH INPATIENT PSYCHIATRY PROGRESS NOTE - NSBHFUPINTERVALHXFT_PSY_A_CORE
No acute events overnight. Reports sleeping well despite being awakened by relocation of roommate. Took PRN clonazepam at night to help sleep. Compliant with meds. Been eating well. She is noted ot be present and interactive in the milieu. She was distressed about being in the hospital today given her inability to "connect with the outside world" now that visitation is restricted on the unit due to COVID. Patient is COVID negative x2 by PCR.    Today showed regression to a more anxious, paranoid state. Fixated on the following thoughts and delusions: the treatment team "making her ugly," uncertainty about her mother's location (despite frequent validation from brother and treatment team that mother is in hospital), concern that she will be kicked out of her mother's house, belief that her roommate was relocated because she was snoring and wants tonsillectomy to correct (roommate relocated due to COVID), concern that her money has been stolen, belief that she cannot get water because it is on the men's corridor of the unit and she doesn't want to be making "sexual advances," the hair on her arm potentially being caused by a hormonal imbalance, need for bone treatments with [facility], concern that people are misinterpreting when she says "too" as being "two," and worry that the treatment team is attempting to kill her via medication.     Overall, she expresses a desire to leave the unit but does not show insight or understanding into the severity of her illness. Unable to be redirect as she had been previously regarding her delusions. Expressed a desire to go to a partial hospitalization.

## 2022-07-07 NOTE — BH INPATIENT PSYCHIATRY PROGRESS NOTE - PRN MEDS
MEDICATIONS  (PRN):  clonazePAM  Tablet 1 milliGRAM(s) Oral two times a day PRN anxiety  OLANZapine 5 milliGRAM(s) Oral every 6 hours PRN agitation  OLANZapine Injectable 5 milliGRAM(s) IntraMuscular Once PRN severe agitation   MEDICATIONS  (PRN):  clonazePAM  Tablet 0.5 milliGRAM(s) Oral once PRN anxiety  OLANZapine 5 milliGRAM(s) Oral every 6 hours PRN agitation  OLANZapine Injectable 5 milliGRAM(s) IntraMuscular Once PRN severe agitation

## 2022-07-07 NOTE — BH INPATIENT PSYCHIATRY PROGRESS NOTE - NSBHMETABOLIC_PSY_ALL_CORE_FT
BMI: BMI (kg/m2): 18.9 (06-22-22 @ 19:07)  HbA1c: A1C with Estimated Average Glucose Result: 5.1 % (06-24-22 @ 07:55)    Glucose:   BP: 152/89 (07-07-22 @ 07:47) (152/89 - 152/89)  Lipid Panel: Date/Time: 06-23-22 @ 08:34  Cholesterol, Serum: 177  Direct LDL: --  HDL Cholesterol, Serum: 60  Total Cholesterol/HDL Ration Measurement: --  Triglycerides, Serum: 48   BMI: BMI (kg/m2): 18.9 (06-22-22 @ 19:07)  HbA1c: A1C with Estimated Average Glucose Result: 5.1 % (06-24-22 @ 07:55)    Glucose:   BP: 138/89 (07-08-22 @ 07:50) (138/89 - 152/89)  Lipid Panel: Date/Time: 06-23-22 @ 08:34  Cholesterol, Serum: 177  Direct LDL: --  HDL Cholesterol, Serum: 60  Total Cholesterol/HDL Ration Measurement: --  Triglycerides, Serum: 48

## 2022-07-07 NOTE — BH INPATIENT PSYCHIATRY PROGRESS NOTE - MSE UNSTRUCTURED FT
Pt appears stated age, thin, dressed in hospital gown and street clothes, fair hygiene and grooming. No psychomotor abnormalities noted. Cooperative on interview with good eye contact, although paranoid and distrustful of treatment team. Speech is fluent, normal in rate and volume with some latency. Mood is "bad." Affect is constricted and anxious. Thought process is mostly linear, overinclusive, and perseverative. Thought content showed marked return of multiple delusions noted in the interval history. Denies SIIP and HIIP. Denies AH/VH. Insight is limited with limited ability to reality test today. Judgement is fair. Impulse control is appropriate to setting.

## 2022-07-07 NOTE — BH INPATIENT PSYCHIATRY PROGRESS NOTE - NSBHCHARTREVIEWVS_PSY_A_CORE FT
Vital Signs Last 24 Hrs  T(C): 36.6 (07-07-22 @ 07:47), Max: 36.7 (07-06-22 @ 19:06)  T(F): 97.8 (07-07-22 @ 07:47), Max: 98.1 (07-06-22 @ 19:06)  HR: 77 (07-07-22 @ 07:47) (77 - 77)  BP: 152/89 (07-07-22 @ 07:47) (152/89 - 152/89)  BP(mean): --  RR: --  SpO2: --     Vital Signs Last 24 Hrs  T(C): 37.6 (07-08-22 @ 07:50), Max: 37.6 (07-08-22 @ 07:50)  T(F): 99.6 (07-08-22 @ 07:50), Max: 99.6 (07-08-22 @ 07:50)  HR: 99 (07-08-22 @ 13:56) (99 - 99)  BP: 138/89 (07-08-22 @ 07:50) (138/89 - 138/89)  BP(mean): 79 (07-08-22 @ 07:50) (79 - 79)  RR: --  SpO2: 98% (07-08-22 @ 13:56) (98% - 98%)

## 2022-07-07 NOTE — BH INPATIENT PSYCHIATRY PROGRESS NOTE - CURRENT MEDICATION
MEDICATIONS  (STANDING):  busPIRone 30 milliGRAM(s) Oral two times a day  gabapentin 600 milliGRAM(s) Oral three times a day  levothyroxine 12.5 MICROGram(s) Oral daily  OLANZapine 20 milliGRAM(s) Oral at bedtime  pantoprazole    Tablet 40 milliGRAM(s) Oral before breakfast  vortioxetine 20 milliGRAM(s) Oral daily    MEDICATIONS  (PRN):  clonazePAM  Tablet 1 milliGRAM(s) Oral two times a day PRN anxiety  OLANZapine 5 milliGRAM(s) Oral every 6 hours PRN agitation  OLANZapine Injectable 5 milliGRAM(s) IntraMuscular Once PRN severe agitation   MEDICATIONS  (STANDING):  brexpiprazole 3 milliGRAM(s) Oral daily  busPIRone 30 milliGRAM(s) Oral two times a day  gabapentin 600 milliGRAM(s) Oral three times a day  levothyroxine 12.5 MICROGram(s) Oral daily  OLANZapine 20 milliGRAM(s) Oral at bedtime  pantoprazole    Tablet 40 milliGRAM(s) Oral before breakfast  vortioxetine 20 milliGRAM(s) Oral daily    MEDICATIONS  (PRN):  clonazePAM  Tablet 0.5 milliGRAM(s) Oral once PRN anxiety  OLANZapine 5 milliGRAM(s) Oral every 6 hours PRN agitation  OLANZapine Injectable 5 milliGRAM(s) IntraMuscular Once PRN severe agitation

## 2022-07-08 LAB — SARS-COV-2 RNA SPEC QL NAA+PROBE: DETECTED

## 2022-07-08 PROCEDURE — 99232 SBSQ HOSP IP/OBS MODERATE 35: CPT | Mod: GC

## 2022-07-08 RX ORDER — CLONAZEPAM 1 MG
1 TABLET ORAL
Refills: 0 | Status: DISCONTINUED | OUTPATIENT
Start: 2022-07-08 | End: 2022-07-12

## 2022-07-08 RX ORDER — CLONAZEPAM 1 MG
0.5 TABLET ORAL ONCE
Refills: 0 | Status: DISCONTINUED | OUTPATIENT
Start: 2022-07-08 | End: 2022-07-08

## 2022-07-08 RX ADMIN — PANTOPRAZOLE SODIUM 40 MILLIGRAM(S): 20 TABLET, DELAYED RELEASE ORAL at 08:24

## 2022-07-08 RX ADMIN — Medication 1 MILLIGRAM(S): at 19:49

## 2022-07-08 RX ADMIN — Medication 30 MILLIGRAM(S): at 08:24

## 2022-07-08 RX ADMIN — Medication 12.5 MICROGRAM(S): at 05:58

## 2022-07-08 RX ADMIN — GABAPENTIN 600 MILLIGRAM(S): 400 CAPSULE ORAL at 20:18

## 2022-07-08 RX ADMIN — OLANZAPINE 20 MILLIGRAM(S): 15 TABLET, FILM COATED ORAL at 20:19

## 2022-07-08 RX ADMIN — Medication 30 MILLIGRAM(S): at 20:18

## 2022-07-08 RX ADMIN — BREXPIPRAZOLE 3 MILLIGRAM(S): 0.25 TABLET ORAL at 08:24

## 2022-07-08 RX ADMIN — GABAPENTIN 600 MILLIGRAM(S): 400 CAPSULE ORAL at 08:25

## 2022-07-08 RX ADMIN — VORTIOXETINE 20 MILLIGRAM(S): 5 TABLET, FILM COATED ORAL at 08:25

## 2022-07-08 RX ADMIN — GABAPENTIN 600 MILLIGRAM(S): 400 CAPSULE ORAL at 12:32

## 2022-07-08 NOTE — BH INPATIENT PSYCHIATRY PROGRESS NOTE - NSBHASSESSSUMMFT_PSY_ALL_CORE
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 schizophrenia, several prior hospitalizations, and consistent outpatient psychiatric treatment including TMS presenting with acute psychotic decompensation in the context of medication non-adherence.     Patient initially presented with multiple complex delusions including Capgras, delusions of surveillance and posing, referential delusions with related AH, thought disorder, and psychotic anxiety. Intensity of delusions varies day by day and throughout each day but has overall been less intense since starting olanzapine.     Overall, AH have been less frequently observable. Though delusions remain (referential, surveillance, paranoia), they have little impact on patient’s behavior and safety. Will monitor for continued and sustained improvement. Now COVID positive but asymptomatic and transferred to .    Plan:  1) Psychosis   - continue brexpiprazole 3mg  - Continue olanzapine 20mg  - *note: patient not candidtate for clozapine due to ANC <1000  2) Mood  - Continue vortioxetine 20mg (lowered from 60mg)  3) Anxiety  - Continue buspirone 30mg BID  - Continue gabapentin 600mg TID  - decrease Clonazepam to 0.5mg BID PRN anxiety  4) Disposition  - transfer to  given positive COVID pcr  - Return to outpatient care under Dr. Smalls  - Return to home with mother and monitoring by brother   This is a 55y/o cis woman who lives with her mother, has a past medical history of congenital neutropenia (baseline ANC <500) and a past psychiatric history of schizoaffective disorder vs. schizophrenia, several prior hospitalizations, and consistent outpatient psychiatric treatment including TMS presenting with acute psychotic decompensation in the context of medication non-adherence.     Patient initially presented with multiple complex delusions including Capgras, delusions of surveillance and posing, referential delusions with related AH, thought disorder, and psychotic anxiety. Intensity of delusions varies day by day and throughout each day but has overall been less intense since starting olanzapine.     Overall, AH have been less frequently observable. Though delusions remain (referential, surveillance, paranoia), they have little impact on patient’s behavior and safety. Will monitor for continued and sustained improvement. Now COVID positive but asymptomatic and transferred to .    Plan:  1) Psychosis   - continue brexpiprazole 3mg  - Continue olanzapine 20mg  - *note: patient not candidtate for clozapine due to ANC <1000  2) Mood  - Continue vortioxetine 20mg (lowered from 60mg)  3) Anxiety  - Continue buspirone 30mg BID  - Continue gabapentin 600mg TID  - Continue Klonopin 1mg BID prn anxiety   4) Disposition  - transfer to  given positive COVID pcr  - Return to outpatient care under Dr. Smalls  - Return to home with mother and monitoring by brother

## 2022-07-08 NOTE — BH INPATIENT PSYCHIATRY PROGRESS NOTE - PRN MEDS
MEDICATIONS  (PRN):  clonazePAM  Tablet 0.5 milliGRAM(s) Oral once PRN anxiety  OLANZapine 5 milliGRAM(s) Oral every 6 hours PRN agitation  OLANZapine Injectable 5 milliGRAM(s) IntraMuscular Once PRN severe agitation

## 2022-07-08 NOTE — BH INPATIENT PSYCHIATRY PROGRESS NOTE - NSBHCHARTREVIEWVS_PSY_A_CORE FT
Vital Signs Last 24 Hrs  T(C): 37.6 (07-08-22 @ 07:50), Max: 37.6 (07-08-22 @ 07:50)  T(F): 99.6 (07-08-22 @ 07:50), Max: 99.6 (07-08-22 @ 07:50)  HR: 99 (07-08-22 @ 13:56) (99 - 99)  BP: 138/89 (07-08-22 @ 07:50) (138/89 - 138/89)  BP(mean): 79 (07-08-22 @ 07:50) (79 - 79)  RR: --  SpO2: 98% (07-08-22 @ 13:56) (98% - 98%)

## 2022-07-08 NOTE — BH INPATIENT PSYCHIATRY PROGRESS NOTE - MSE UNSTRUCTURED FT
Pt appears stated age, thin, dressed in hospital gown and street clothes, fair hygiene and grooming. No psychomotor abnormalities noted. Cooperative on interview with good eye contact. Speech is fluent, normal in rate and volume with some latency. Mood is "stable." Affect is constricted, anxious, and tearful. Thought process is mostly linear, overinclusive, and perseverative. Thought content focused on returning to y. Denies SIIP and HIIP. Denies AH/VH. Insight is limited with limited ability to reality test today. Judgement is fair. Impulse control is appropriate to setting. Pt appears stated age, thin, dressed in hospital gown and street clothes, fair hygiene and grooming. No psychomotor abnormalities noted. Cooperative on interview with good eye contact. Speech is fluent, normal in rate and volume with some latency. Mood is "stable." Affect is constricted, anxious, and tearful. Thought process is mostly linear, overinclusive, and perseverative. Thought content focused on returning home. Still with a variety of delusions. Denies SIIP and HIIP. Denies AH/VH. Insight is limited with limited ability to reality test today. Judgement is fair. Impulse control is appropriate to setting.

## 2022-07-08 NOTE — BH INPATIENT PSYCHIATRY PROGRESS NOTE - CURRENT MEDICATION
MEDICATIONS  (STANDING):  brexpiprazole 3 milliGRAM(s) Oral daily  busPIRone 30 milliGRAM(s) Oral two times a day  gabapentin 600 milliGRAM(s) Oral three times a day  levothyroxine 12.5 MICROGram(s) Oral daily  OLANZapine 20 milliGRAM(s) Oral at bedtime  pantoprazole    Tablet 40 milliGRAM(s) Oral before breakfast  vortioxetine 20 milliGRAM(s) Oral daily    MEDICATIONS  (PRN):  clonazePAM  Tablet 0.5 milliGRAM(s) Oral once PRN anxiety  OLANZapine 5 milliGRAM(s) Oral every 6 hours PRN agitation  OLANZapine Injectable 5 milliGRAM(s) IntraMuscular Once PRN severe agitation

## 2022-07-08 NOTE — BH INPATIENT PSYCHIATRY PROGRESS NOTE - NSBHMETABOLIC_PSY_ALL_CORE_FT
BMI: BMI (kg/m2): 18.9 (06-22-22 @ 19:07)  HbA1c: A1C with Estimated Average Glucose Result: 5.1 % (06-24-22 @ 07:55)    Glucose:   BP: 138/89 (07-08-22 @ 07:50) (138/89 - 152/89)  Lipid Panel: Date/Time: 06-23-22 @ 08:34  Cholesterol, Serum: 177  Direct LDL: --  HDL Cholesterol, Serum: 60  Total Cholesterol/HDL Ration Measurement: --  Triglycerides, Serum: 48

## 2022-07-08 NOTE — BH INPATIENT PSYCHIATRY PROGRESS NOTE - NSBHFUPINTERVALHXFT_PSY_A_CORE
No acute events overnight. Compliant with meds. Been eating well. Denies AH/VH. Visibly upset and anxious today when interviewed. Worried about losing her savings due to the expenses of hospitalization. Expressed desire for partial hospitalization and to speak with her brother over video call. She was able to describe her typical day outside the hospital in a reassuring manner regarding her ability to handle ADLs. Talked about being concerned about what her neighbors will think of her being on the unit and about the unit being loud while she talked with her brother, but could not fully express why this upset her.    Transferred to  after positive COVID PCR. Brother aware and given number to contact on new unit.

## 2022-07-09 RX ORDER — ONDANSETRON 8 MG/1
4 TABLET, FILM COATED ORAL ONCE
Refills: 0 | Status: COMPLETED | OUTPATIENT
Start: 2022-07-09 | End: 2022-07-09

## 2022-07-09 RX ORDER — ONDANSETRON 8 MG/1
8 TABLET, FILM COATED ORAL ONCE
Refills: 0 | Status: DISCONTINUED | OUTPATIENT
Start: 2022-07-09 | End: 2022-07-09

## 2022-07-09 RX ADMIN — Medication 1 MILLIGRAM(S): at 17:23

## 2022-07-09 RX ADMIN — Medication 12.5 MICROGRAM(S): at 06:47

## 2022-07-09 RX ADMIN — Medication 30 MILLIGRAM(S): at 09:21

## 2022-07-09 RX ADMIN — OLANZAPINE 20 MILLIGRAM(S): 15 TABLET, FILM COATED ORAL at 21:25

## 2022-07-09 RX ADMIN — GABAPENTIN 600 MILLIGRAM(S): 400 CAPSULE ORAL at 21:26

## 2022-07-09 RX ADMIN — Medication 30 MILLIGRAM(S): at 21:26

## 2022-07-09 RX ADMIN — VORTIOXETINE 20 MILLIGRAM(S): 5 TABLET, FILM COATED ORAL at 09:21

## 2022-07-09 RX ADMIN — GABAPENTIN 600 MILLIGRAM(S): 400 CAPSULE ORAL at 12:55

## 2022-07-09 RX ADMIN — ONDANSETRON 4 MILLIGRAM(S): 8 TABLET, FILM COATED ORAL at 23:10

## 2022-07-09 RX ADMIN — GABAPENTIN 600 MILLIGRAM(S): 400 CAPSULE ORAL at 09:22

## 2022-07-09 RX ADMIN — BREXPIPRAZOLE 2.5 MILLIGRAM(S): 0.25 TABLET ORAL at 09:22

## 2022-07-09 NOTE — CHART NOTE - NSCHARTNOTEFT_GEN_A_CORE
Patient asymptatic from covid and Potential Interaction  Nirmatrelvir/ritonavir (5 days) [Please read the interaction details as management of these interactions may be complex.] and Buspirone.  Will not offer paxlovid

## 2022-07-10 RX ADMIN — Medication 1 MILLIGRAM(S): at 08:34

## 2022-07-10 RX ADMIN — OLANZAPINE 20 MILLIGRAM(S): 15 TABLET, FILM COATED ORAL at 21:17

## 2022-07-10 RX ADMIN — VORTIOXETINE 20 MILLIGRAM(S): 5 TABLET, FILM COATED ORAL at 08:35

## 2022-07-10 RX ADMIN — GABAPENTIN 600 MILLIGRAM(S): 400 CAPSULE ORAL at 13:13

## 2022-07-10 RX ADMIN — Medication 30 MILLIGRAM(S): at 21:16

## 2022-07-10 RX ADMIN — GABAPENTIN 600 MILLIGRAM(S): 400 CAPSULE ORAL at 21:17

## 2022-07-10 RX ADMIN — Medication 12.5 MICROGRAM(S): at 06:40

## 2022-07-10 RX ADMIN — Medication 30 MILLIGRAM(S): at 08:35

## 2022-07-10 RX ADMIN — Medication 1 MILLIGRAM(S): at 00:04

## 2022-07-10 RX ADMIN — PANTOPRAZOLE SODIUM 40 MILLIGRAM(S): 20 TABLET, DELAYED RELEASE ORAL at 08:35

## 2022-07-10 RX ADMIN — BREXPIPRAZOLE 3 MILLIGRAM(S): 0.25 TABLET ORAL at 08:35

## 2022-07-10 RX ADMIN — GABAPENTIN 600 MILLIGRAM(S): 400 CAPSULE ORAL at 08:35

## 2022-07-11 PROCEDURE — 99231 SBSQ HOSP IP/OBS SF/LOW 25: CPT | Mod: GC

## 2022-07-11 RX ADMIN — PANTOPRAZOLE SODIUM 40 MILLIGRAM(S): 20 TABLET, DELAYED RELEASE ORAL at 06:47

## 2022-07-11 RX ADMIN — Medication 30 MILLIGRAM(S): at 21:47

## 2022-07-11 RX ADMIN — BREXPIPRAZOLE 3 MILLIGRAM(S): 0.25 TABLET ORAL at 09:13

## 2022-07-11 RX ADMIN — Medication 12.5 MICROGRAM(S): at 06:47

## 2022-07-11 RX ADMIN — GABAPENTIN 600 MILLIGRAM(S): 400 CAPSULE ORAL at 21:47

## 2022-07-11 RX ADMIN — GABAPENTIN 600 MILLIGRAM(S): 400 CAPSULE ORAL at 13:26

## 2022-07-11 RX ADMIN — GABAPENTIN 600 MILLIGRAM(S): 400 CAPSULE ORAL at 09:14

## 2022-07-11 RX ADMIN — Medication 30 MILLIGRAM(S): at 09:14

## 2022-07-11 RX ADMIN — OLANZAPINE 20 MILLIGRAM(S): 15 TABLET, FILM COATED ORAL at 21:47

## 2022-07-11 RX ADMIN — VORTIOXETINE 20 MILLIGRAM(S): 5 TABLET, FILM COATED ORAL at 09:14

## 2022-07-11 NOTE — BH INPATIENT PSYCHIATRY PROGRESS NOTE - NSBHMETABOLIC_PSY_ALL_CORE_FT
BMI: BMI (kg/m2): 18.9 (06-22-22 @ 19:07)  HbA1c: A1C with Estimated Average Glucose Result: 5.1 % (06-24-22 @ 07:55)    Glucose:   BP: 130/73 (07-10-22 @ 08:38) (130/73 - 132/80)  Lipid Panel: Date/Time: 06-23-22 @ 08:34  Cholesterol, Serum: 177  Direct LDL: --  HDL Cholesterol, Serum: 60  Total Cholesterol/HDL Ration Measurement: --  Triglycerides, Serum: 48

## 2022-07-11 NOTE — BH INPATIENT PSYCHIATRY PROGRESS NOTE - NSBHFUPINTERVALHXFT_PSY_A_CORE
Patient case reviewed with treatment team. No acute events over the weekend. Compliant with meds. Been eating well.    On assessment, patient is calm and cooperative. States she had a good weekend, and though transferring units was slightly anxiety-producing she is getting used to the unit and is doing better. Patient refers to her brother and mother and states she knows they are really them. Does state she would like to contact her mother as she believes she is in the hospital and is worried about her mother, and patient does make one remark stating though she wants to video call her she wants to keep a little bit of distance between her mother because she feels her mother has been through a lot and wants to be cautious but does not report that she believes her mother is not her mother. Denies any auditory/visual hallucinations and denies any paranoia or beliefs that TV is talking to her. Patient tolerating medications well, does state she has increased appetite but states she is just eating more because she is bored. Reviewed potential side effects of Zyprexa. Patient is agreeable to continuing Zyprexa and just monitoring what she eats.

## 2022-07-11 NOTE — BH INPATIENT PSYCHIATRY PROGRESS NOTE - MSE UNSTRUCTURED FT
Pt appears stated age, thin, dressed in hospital gown and street clothes, poor hygiene and grooming. No psychomotor abnormalities noted. Cooperative on interview with good eye contact. Speech is fluent, normal in rate and volume with some latency. Mood is "fine." Affect is constricted, euthymic, a little anxious when talking about medication side effects. Thought process is mostly linear, overinclusive, perseverative, and concrete (gen when talking about side effects/weight gain of zyprexa). Thought content focused on returning home. Denies delusions. Denies SIIP and HIIP. Denies AH/VH. Insight is limited with limited ability to reality test today. Judgement is fair. Impulse control is appropriate to setting.

## 2022-07-11 NOTE — BH INPATIENT PSYCHIATRY PROGRESS NOTE - CURRENT MEDICATION
MEDICATIONS  (STANDING):  brexpiprazole 3 milliGRAM(s) Oral daily  busPIRone 30 milliGRAM(s) Oral two times a day  gabapentin 600 milliGRAM(s) Oral three times a day  levothyroxine 12.5 MICROGram(s) Oral daily  OLANZapine 20 milliGRAM(s) Oral at bedtime  pantoprazole    Tablet 40 milliGRAM(s) Oral before breakfast  vortioxetine 20 milliGRAM(s) Oral daily    MEDICATIONS  (PRN):  clonazePAM  Tablet 1 milliGRAM(s) Oral two times a day PRN anxiety  OLANZapine 5 milliGRAM(s) Oral every 6 hours PRN agitation  OLANZapine Injectable 5 milliGRAM(s) IntraMuscular Once PRN severe agitation

## 2022-07-11 NOTE — BH INPATIENT PSYCHIATRY PROGRESS NOTE - NSBHCHARTREVIEWVS_PSY_A_CORE FT
Vital Signs Last 24 Hrs  T(C): 37 (07-10-22 @ 20:32), Max: 37 (07-10-22 @ 20:32)  T(F): 98.6 (07-10-22 @ 20:32), Max: 98.6 (07-10-22 @ 20:32)  HR: --  BP: --  BP(mean): --  RR: --  SpO2: 100% (07-10-22 @ 20:32) (100% - 100%)

## 2022-07-11 NOTE — BH INPATIENT PSYCHIATRY PROGRESS NOTE - NSBHASSESSSUMMFT_PSY_ALL_CORE
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 schizophrenia, several prior hospitalizations, and consistent outpatient psychiatric treatment including TMS presenting with acute psychotic decompensation in the context of medication non-adherence.     Patient initially presented with multiple complex delusions including Capgras, delusions of surveillance and posing, referential delusions with related AH, thought disorder, and psychotic anxiety. Intensity of delusions varies day by day and throughout each day but has overall been less intense since starting olanzapine.     7/11: Patient denying auditory/visual hallucinations and delusions. Patient appears     Plan:  1) Psychosis   - continue brexpiprazole 3mg  - Continue olanzapine 20mg  - get EKG in case plan to further increase Zyprexa  - *note: patient not candidtate for clozapine due to ANC <1000  2) Mood  - Continue vortioxetine 20mg (lowered from 60mg)  3) Anxiety  - Continue buspirone 30mg BID  - Continue gabapentin 600mg TID  - Continue Klonopin 1mg BID prn anxiety   4) Disposition  - transfer to  given positive COVID pcr  - Return to outpatient care under Dr. Smalls  - Return to home with mother and monitoring by brother

## 2022-07-12 PROCEDURE — 93010 ELECTROCARDIOGRAM REPORT: CPT

## 2022-07-12 RX ORDER — OLANZAPINE 15 MG/1
25 TABLET, FILM COATED ORAL AT BEDTIME
Refills: 0 | Status: DISCONTINUED | OUTPATIENT
Start: 2022-07-12 | End: 2022-07-13

## 2022-07-12 RX ORDER — CLONAZEPAM 1 MG
1 TABLET ORAL
Refills: 0 | Status: DISCONTINUED | OUTPATIENT
Start: 2022-07-12 | End: 2022-07-13

## 2022-07-12 RX ADMIN — VORTIOXETINE 20 MILLIGRAM(S): 5 TABLET, FILM COATED ORAL at 09:42

## 2022-07-12 RX ADMIN — Medication 12.5 MICROGRAM(S): at 06:14

## 2022-07-12 RX ADMIN — Medication 30 MILLIGRAM(S): at 21:19

## 2022-07-12 RX ADMIN — BREXPIPRAZOLE 3 MILLIGRAM(S): 0.25 TABLET ORAL at 09:42

## 2022-07-12 RX ADMIN — GABAPENTIN 600 MILLIGRAM(S): 400 CAPSULE ORAL at 21:18

## 2022-07-12 RX ADMIN — PANTOPRAZOLE SODIUM 40 MILLIGRAM(S): 20 TABLET, DELAYED RELEASE ORAL at 06:55

## 2022-07-12 RX ADMIN — Medication 30 MILLIGRAM(S): at 09:42

## 2022-07-12 RX ADMIN — OLANZAPINE 25 MILLIGRAM(S): 15 TABLET, FILM COATED ORAL at 21:00

## 2022-07-12 RX ADMIN — GABAPENTIN 600 MILLIGRAM(S): 400 CAPSULE ORAL at 09:41

## 2022-07-12 NOTE — BH INPATIENT PSYCHIATRY PROGRESS NOTE - NSBHFUPINTERVALHXFT_PSY_A_CORE
Patient case reviewed with treatment team. No acute events overnight. Compliant with meds.    On assessment, patient is appears more anxious today. Patient states that she had a bad day yesterday. Patient perseverates on wanting to have a meeting with her mother the confirm she is actually in the hospital. States that she believes her brother is lying to her and that her mother is not actually in the hospital and wants to confirm for herself. Patient is very guarded and paranoid, at times not answering team's questions stating it is her own business and does not want to share. States that she cannot trust her brother or mother because they had a falling out prior to admission, and that her mother stated she was going to "rip me off" and states she is worried and wants to let her mother to know that her money in the bank is not her mothers. Also very paranoid when talking with interviewer, randomly asking why interviewer is moving her hands when talking. Also patient states her face has changed which is really distressing her. initially refuses to share with interviewer what she thinks has changed, but later in interview states she feels her eyes have shrunk in size but does not report vision changes. States she just wants to look like what she previously looked like. Denies any auditory/visual hallucinations or belief that TV is talking to her.

## 2022-07-12 NOTE — BH INPATIENT PSYCHIATRY PROGRESS NOTE - MSE UNSTRUCTURED FT
Pt appears stated age, thin, dressed in hospital gown and street clothes, poor hygiene and grooming. No psychomotor abnormalities noted. Guarded durin interview with fluctuating poor than intense eye contact. Speech is fluent, normal in rate and volume with some latency. Mood is "fine." Affect is constricted, paranoid, anxious. Thought process is more disorganized this morning, overinclusive, perseverative, and concrete. Thought content focused on confirming if mother/brother are lying. +face changed/eyes shrunk. Denies SIIP and HIIP. Denies AH/VH. Insight is poor. Judgement is poor. Impulse control is appropriate to setting.

## 2022-07-12 NOTE — BH INPATIENT PSYCHIATRY PROGRESS NOTE - NSBHASSESSSUMMFT_PSY_ALL_CORE
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 schizophrenia, several prior hospitalizations, and consistent outpatient psychiatric treatment including TMS presenting with acute psychotic decompensation in the context of medication non-adherence.     Patient initially presented with multiple complex delusions including Capgras, delusions of surveillance and posing, referential delusions with related AH, thought disorder, and psychotic anxiety. Intensity of delusions varies day by day and throughout each day but has overall been less intense since starting olanzapine.     7/11: Patient appears worse than yesterday. Very paranoid and guarded. Reporting that her face has changed and eyes have shrunk. Perseverating on that confirming if mother or brother is lying to her.    Plan:  1) Psychosis   - continue brexpiprazole 3mg  - increase olanzapine 25mg  - EKG to check QTc  - *note: patient not candidtate for clozapine due to ANC <1000  2) Mood  - Continue vortioxetine 20mg (lowered from 60mg)  3) Anxiety  - Continue buspirone 30mg BID  - Continue gabapentin 600mg TID  - Continue Klonopin 1mg BID prn anxiety   4) Disposition  - transfer to  given positive COVID pcr  - Return to outpatient care under Dr. Smalls  - Return to home with mother and monitoring by brother

## 2022-07-12 NOTE — BH INPATIENT PSYCHIATRY PROGRESS NOTE - NSBHCHARTREVIEWVS_PSY_A_CORE FT
Vital Signs Last 24 Hrs  T(C): 36.7 (07-12-22 @ 08:18), Max: 37.2 (07-11-22 @ 21:11)  T(F): 98.1 (07-12-22 @ 08:18), Max: 99 (07-11-22 @ 21:11)  HR: 82 (07-12-22 @ 08:18) (82 - 82)  BP: 137/74 (07-12-22 @ 08:18) (137/74 - 137/74)  BP(mean): --  RR: --  SpO2: 100% (07-11-22 @ 21:11) (100% - 100%)

## 2022-07-12 NOTE — BH INPATIENT PSYCHIATRY PROGRESS NOTE - NSBHMETABOLIC_PSY_ALL_CORE_FT
BMI: BMI (kg/m2): 18.9 (06-22-22 @ 19:07)  HbA1c: A1C with Estimated Average Glucose Result: 5.1 % (06-24-22 @ 07:55)    Glucose:   BP: 137/74 (07-12-22 @ 08:18) (130/73 - 137/74)  Lipid Panel: Date/Time: 06-23-22 @ 08:34  Cholesterol, Serum: 177  Direct LDL: --  HDL Cholesterol, Serum: 60  Total Cholesterol/HDL Ration Measurement: --  Triglycerides, Serum: 48

## 2022-07-12 NOTE — BH INPATIENT PSYCHIATRY PROGRESS NOTE - CURRENT MEDICATION
MEDICATIONS  (STANDING):  brexpiprazole 3 milliGRAM(s) Oral daily  busPIRone 30 milliGRAM(s) Oral two times a day  gabapentin 600 milliGRAM(s) Oral three times a day  levothyroxine 12.5 MICROGram(s) Oral daily  OLANZapine 25 milliGRAM(s) Oral at bedtime  pantoprazole    Tablet 40 milliGRAM(s) Oral before breakfast  vortioxetine 20 milliGRAM(s) Oral daily    MEDICATIONS  (PRN):  clonazePAM  Tablet 1 milliGRAM(s) Oral two times a day PRN anxiety  OLANZapine 5 milliGRAM(s) Oral every 6 hours PRN agitation  OLANZapine Injectable 5 milliGRAM(s) IntraMuscular Once PRN severe agitation

## 2022-07-13 PROCEDURE — 99232 SBSQ HOSP IP/OBS MODERATE 35: CPT | Mod: GC

## 2022-07-13 RX ORDER — BREXPIPRAZOLE 0.25 MG/1
2 TABLET ORAL DAILY
Refills: 0 | Status: DISCONTINUED | OUTPATIENT
Start: 2022-07-14 | End: 2022-07-15

## 2022-07-13 RX ORDER — OLANZAPINE 15 MG/1
30 TABLET, FILM COATED ORAL AT BEDTIME
Refills: 0 | Status: DISCONTINUED | OUTPATIENT
Start: 2022-07-13 | End: 2022-07-21

## 2022-07-13 RX ORDER — CLONAZEPAM 1 MG
1 TABLET ORAL
Refills: 0 | Status: DISCONTINUED | OUTPATIENT
Start: 2022-07-13 | End: 2022-07-19

## 2022-07-13 RX ADMIN — Medication 30 MILLIGRAM(S): at 10:07

## 2022-07-13 RX ADMIN — GABAPENTIN 600 MILLIGRAM(S): 400 CAPSULE ORAL at 10:06

## 2022-07-13 RX ADMIN — Medication 12.5 MICROGRAM(S): at 06:12

## 2022-07-13 RX ADMIN — VORTIOXETINE 20 MILLIGRAM(S): 5 TABLET, FILM COATED ORAL at 10:07

## 2022-07-13 RX ADMIN — BREXPIPRAZOLE 3 MILLIGRAM(S): 0.25 TABLET ORAL at 10:06

## 2022-07-13 RX ADMIN — PANTOPRAZOLE SODIUM 40 MILLIGRAM(S): 20 TABLET, DELAYED RELEASE ORAL at 06:52

## 2022-07-13 RX ADMIN — GABAPENTIN 600 MILLIGRAM(S): 400 CAPSULE ORAL at 12:56

## 2022-07-13 RX ADMIN — OLANZAPINE 30 MILLIGRAM(S): 15 TABLET, FILM COATED ORAL at 21:02

## 2022-07-13 RX ADMIN — Medication 30 MILLIGRAM(S): at 21:02

## 2022-07-13 RX ADMIN — GABAPENTIN 600 MILLIGRAM(S): 400 CAPSULE ORAL at 21:01

## 2022-07-13 NOTE — BH INPATIENT PSYCHIATRY PROGRESS NOTE - NSBHFUPINTERVALHXFT_PSY_A_CORE
Patient case reviewed with treatment team. No acute events overnight. Compliant with meds.    On assessment, patient appears very anxious and guarded and is carrying a journal. States that she wrote what she wanted to ask to the treatment team in journal, but when asked to share is guarded and states she does not want to ask yet. Throughout interview, patient is very paranoid stating that she had a very difficult day yesterday because she thinks nothing is going her way and is growing inpatient and states she is older than the other patients on the unit and doesn't have time to waste. Patient then states she believes her brother has lied to her and no longer trusts brother or mother. States that her brother and her were scheduled to do a video meeting yesterday but it fell through and she believes it is because her brother was lying ot her and her mother is not actually in the hospital and that her mother is trying ot take her money in the bank. States she also does not trust her brother because she believes he is trying to commit her indefinitely to a psych hospital or state. Patient is very referential, guarded and paranoid. questions team when team does anythign, such as moving papers on the table or someone looks away. Patient also questions continuing titrating zyprexa but agrees with some psychoeducation. Patient also initially denies teams request ot speak with brother, but does agree by the end of the interview to help set up video meeting. Patient denies any auditory/visual hallucinations and states she does not believe the TV is talking/or sending her messages.

## 2022-07-13 NOTE — BH INPATIENT PSYCHIATRY PROGRESS NOTE - CURRENT MEDICATION
MEDICATIONS  (STANDING):  busPIRone 30 milliGRAM(s) Oral two times a day  gabapentin 600 milliGRAM(s) Oral three times a day  levothyroxine 12.5 MICROGram(s) Oral daily  OLANZapine 30 milliGRAM(s) Oral at bedtime  pantoprazole    Tablet 40 milliGRAM(s) Oral before breakfast  vortioxetine 20 milliGRAM(s) Oral daily    MEDICATIONS  (PRN):  clonazePAM  Tablet 1 milliGRAM(s) Oral two times a day PRN anxiety  OLANZapine 5 milliGRAM(s) Oral every 6 hours PRN agitation  OLANZapine Injectable 5 milliGRAM(s) IntraMuscular Once PRN severe agitation

## 2022-07-13 NOTE — BH INPATIENT PSYCHIATRY PROGRESS NOTE - MSE UNSTRUCTURED FT
Pt appears stated age, thin, dressed in hospital gown and street clothes, poor hygiene and grooming. No psychomotor abnormalities noted. extremely guarded and paranoid during interview with fluctuating poor than intense eye contact. Speech is fluent, normal in rate and volume with some latency. Mood is "fine other than the small irritations." Affect is constricted, paranoid, anxious. Thought process is disorganized, referential, overinclusive, perseverative, and concrete. Thought content focused on confirming if mother/brother are lying. Denies SIIP and HIIP. Denies AH/VH. Insight is poor. Judgement is poor. Impulse control is appropriate to setting.

## 2022-07-13 NOTE — BH INPATIENT PSYCHIATRY PROGRESS NOTE - NSBHCHARTREVIEWVS_PSY_A_CORE FT
Vital Signs Last 24 Hrs  T(C): 37.1 (07-13-22 @ 08:10), Max: 37.1 (07-13-22 @ 08:10)  T(F): 98.8 (07-13-22 @ 08:10), Max: 98.8 (07-13-22 @ 08:10)  HR: 84 (07-13-22 @ 08:10) (84 - 84)  BP: 124/76 (07-13-22 @ 08:10) (124/76 - 124/76)  BP(mean): --  RR: --  SpO2: 100% (07-12-22 @ 21:18) (100% - 100%)

## 2022-07-13 NOTE — BH INPATIENT PSYCHIATRY PROGRESS NOTE - NSBHASSESSSUMMFT_PSY_ALL_CORE
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 schizophrenia, several prior hospitalizations, and consistent outpatient psychiatric treatment including TMS presenting with acute psychotic decompensation in the context of medication non-adherence.     Patient initially presented with multiple complex delusions including Capgras, delusions of surveillance and posing, referential delusions with related AH, thought disorder, and psychotic anxiety. Intensity of delusions varies day by day and throughout each day but has overall been less intense since starting olanzapine.     7/12: Patient still displaying psychosis, paranoia, and disorganized thought process. Increasing zyprexa to 30mg and decreasing rexulti.    Plan:  1) Psychosis   - decrease brexpiprazole to 2mg  - increase olanzapine to 30mg  - EKG 7/12 completed  - *note: patient not candidtate for clozapine due to ANC <1000  2) Mood  - Continue vortioxetine 20mg (lowered from 60mg)  3) Anxiety  - Continue buspirone 30mg BID  - Continue gabapentin 600mg TID  - Continue Klonopin 1mg BID prn anxiety   4) Disposition  - transfer to  given positive COVID pcr  - Return to outpatient care under Dr. Smalls  - Return to home with mother and monitoring by brother

## 2022-07-14 PROCEDURE — 99231 SBSQ HOSP IP/OBS SF/LOW 25: CPT | Mod: GC

## 2022-07-14 RX ADMIN — OLANZAPINE 30 MILLIGRAM(S): 15 TABLET, FILM COATED ORAL at 22:03

## 2022-07-14 RX ADMIN — BREXPIPRAZOLE 2 MILLIGRAM(S): 0.25 TABLET ORAL at 09:24

## 2022-07-14 RX ADMIN — GABAPENTIN 600 MILLIGRAM(S): 400 CAPSULE ORAL at 09:24

## 2022-07-14 RX ADMIN — VORTIOXETINE 20 MILLIGRAM(S): 5 TABLET, FILM COATED ORAL at 09:24

## 2022-07-14 RX ADMIN — Medication 12.5 MICROGRAM(S): at 05:48

## 2022-07-14 RX ADMIN — PANTOPRAZOLE SODIUM 40 MILLIGRAM(S): 20 TABLET, DELAYED RELEASE ORAL at 06:54

## 2022-07-14 RX ADMIN — Medication 1 MILLIGRAM(S): at 23:48

## 2022-07-14 RX ADMIN — Medication 30 MILLIGRAM(S): at 22:03

## 2022-07-14 RX ADMIN — GABAPENTIN 600 MILLIGRAM(S): 400 CAPSULE ORAL at 22:03

## 2022-07-14 RX ADMIN — GABAPENTIN 600 MILLIGRAM(S): 400 CAPSULE ORAL at 12:45

## 2022-07-14 RX ADMIN — Medication 30 MILLIGRAM(S): at 09:24

## 2022-07-14 NOTE — BH TREATMENT PLAN - NSTXPSYCHOGOAL_PSY_ALL_CORE
Will verbalize 1 strategy to successfully cope with psychotic symptoms
Will verbalize 1 strategy to successfully cope with psychotic symptoms
Will identify 2 coping skills that assist with focus on reality

## 2022-07-14 NOTE — BH INPATIENT PSYCHIATRY PROGRESS NOTE - NSBHCHARTREVIEWVS_PSY_A_CORE FT
Vital Signs Last 24 Hrs  T(C): 36.3 (07-13-22 @ 21:04), Max: 36.3 (07-13-22 @ 21:04)  T(F): 97.4 (07-13-22 @ 21:04), Max: 97.4 (07-13-22 @ 21:04)  HR: --  BP: --  BP(mean): --  RR: --  SpO2: 97% (07-13-22 @ 21:04) (97% - 97%)

## 2022-07-14 NOTE — BH INPATIENT PSYCHIATRY PROGRESS NOTE - NSBHASSESSSUMMFT_PSY_ALL_CORE
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 schizophrenia, several prior hospitalizations, and consistent outpatient psychiatric treatment including TMS presenting with acute psychotic decompensation in the context of medication non-adherence.     Patient initially presented with multiple complex delusions including Capgras, delusions of surveillance and posing, referential delusions with related AH, thought disorder, and psychotic anxiety. Intensity of delusions varies day by day and throughout each day but has overall been less intense since starting olanzapine.     Patient still displaying psychosis, paranoia, and disorganized thought process. Patient is very hypervigilant and referential. Zyprexa was titrated to 30mg, continue to monitor for improvement.    Plan:  1) Psychosis   - c/w brexpiprazole to 2mg  - c/w olanzapine to 30mg  - EKG 7/12 completed  - *note: patient not candidtate for clozapine due to ANC <1000  2) Mood  - Continue vortioxetine 20mg (lowered from 60mg)  3) Anxiety  - Continue buspirone 30mg BID  - Continue gabapentin 600mg TID  - Continue Klonopin 1mg BID prn anxiety   4) Disposition  - transfer to  given positive COVID pcr  - Return to outpatient care under Dr. Smalls  - Return to home with mother and monitoring by brother

## 2022-07-14 NOTE — BH TREATMENT PLAN - NSBHPRIMARYDX_PSY_ALL_CORE
Schizoaffective disorder-chronic with exacerbation    

## 2022-07-14 NOTE — BH TREATMENT PLAN - NSTXPSYCHOINTERPR_PSY_ALL_CORE
Over the next 7 days, Psychiatric Rehabilitation staff will utilize group and individual psychotherapy, and psychoeducation to assist the patient in reaching established treatment goal.
Pt will identify 2 coping skills to manage symptoms and focus on reality.
Over the next 7 days, Psychiatric Rehabilitation staff will utilize group and individual psychotherapy, and psychoeducation to assist the patient in reaching established treatment goal.

## 2022-07-14 NOTE — BH TREATMENT PLAN - NSTXDISORGGOAL_PSY_ALL_CORE
Will demonstrate the ability to maintain reality orientation and communicate clearly with others during 2 conversations with staff daily
Will demonstrate purposeful and predictable thoughts/behaviors by making a request
Will make at least 3 goal and reality oriented statements during therapy

## 2022-07-14 NOTE — BH INPATIENT PSYCHIATRY PROGRESS NOTE - NSBHMETABOLIC_PSY_ALL_CORE_FT
BMI: BMI (kg/m2): 18.9 (06-22-22 @ 19:07)  HbA1c: A1C with Estimated Average Glucose Result: 5.1 % (06-24-22 @ 07:55)    Glucose:   BP: 124/76 (07-13-22 @ 08:10) (124/76 - 137/74)  Lipid Panel: Date/Time: 06-23-22 @ 08:34  Cholesterol, Serum: 177  Direct LDL: --  HDL Cholesterol, Serum: 60  Total Cholesterol/HDL Ration Measurement: --  Triglycerides, Serum: 48

## 2022-07-14 NOTE — BH TREATMENT PLAN - NSTXDEPRESPROGRES_PSY_ALL_CORE
Pt is scheduled for a b/l screening mammogram at [address] [place] [address] on 12/10/20 at 8:15 AM. She is to arrive at 8:00 AM. Pt notified and is agreeable. Order faxed to [phone].
Improving
Improving

## 2022-07-14 NOTE — BH INPATIENT PSYCHIATRY PROGRESS NOTE - MSE UNSTRUCTURED FT
Pt appears stated age, thin, dressed in street clothes, poor hygiene and grooming. No psychomotor abnormalities noted. extremely guarded and paranoid during interview with poor eye contact (begins with good eye contact with interviewer but is constantly analyzing other treatment team during interview and unable ot maintain eye contact). Speech is fluent, normal in rate and volume with some latency. Mood is "fine" Affect is constricted, paranoid, anxious. Thought process is disorganized, referential, overinclusive, perseverative, and concrete. Thought content focused on confirming if mother/brother are lying. Denies SIIP and HIIP. Denies AH/VH. Insight is poor. Judgement is poor. Impulse control is appropriate to setting.

## 2022-07-14 NOTE — BH TREATMENT PLAN - NSTXDCOPNOINTERSW_PSY_ALL_CORE
Pt would benefit from full compliance with a proposed wrap around services.
Pt would benefit from full compliance with a proposed wrap around services.

## 2022-07-14 NOTE — BH TREATMENT PLAN - NSTXPATIENTPARTICIPATE_PSY_ALL_CORE
Patient participated in identification of needs/problems/goals for treatment
Patient participated in identification of needs/problems/goals for treatment/Patient participated in defining interventions
Patient participated in identification of needs/problems/goals for treatment/Patient participated in defining interventions

## 2022-07-14 NOTE — LEGAL STATUS PROGRESS NOTE - NSLEGALSTATUS_PSY_ALL_CORE
9.27 Involuntary (2PC) Converted from Emergency
9.27 Involuntary (2PC) Converted from Emergency
9.39 Emergency Admission

## 2022-07-14 NOTE — BH TREATMENT PLAN - NSCMSPTSTRENGTHS_PSY_ALL_CORE
Intelligence/Physically healthy/Supportive family
Intelligence/Physically healthy/Supportive family
Unable to obtain (specify)
Complex Repair And Flap Additional Text (Will Appearing After The Standard Complex Repair Text): The complex repair was not sufficient to completely close the primary defect. The remaining additional defect was repaired with the flap mentioned below.

## 2022-07-14 NOTE — BH INPATIENT PSYCHIATRY PROGRESS NOTE - NSBHFUPINTERVALHXFT_PSY_A_CORE
Patient case reviewed with treatment team. No acute events overnight. Compliant with meds.    On assessment, patient still appears very anxious and guarded and requests team to speak with patient in her room. States that she has given up speaking with her brother. States she was unable to have a video call him with yesterday. States she is still very fearful and anxious that her brother and mother are stealing from her and have stolen all of her belongings. States she will only feel reassured when she can see with her own eyes that her things have not been stolen. States previously she would repeatedly check her bank account to confirm that her money was not stolen. Patient very hypervigilant and paranoid throughout interview, questioning treatment team if anyone moved or shifted their weight. States she is not suspicious of the team but that she is anxoius and nervous. Patient denies any side effects with Zyprexa and tolerating medication.

## 2022-07-14 NOTE — BH TREATMENT PLAN - NSTXPSYCHOINTERRN_PSY_ALL_CORE
Provide reality testing and redirection. Utilize prn meds for worsening agitation

## 2022-07-14 NOTE — BH TREATMENT PLAN - NSTXPLANTHERAPYSESSIONSFT_PSY_ALL_CORE
07-12-22  Type of therapy: Cognitive behavior therapy,Coping skills,Creative arts therapy,Leisure development,Medication management,Mindfulness,Music therapy,Psychoeducation,Symptom management  Type of session: Individual  Level of patient participation: Engaged  Duration of participation: 30 minutes  Therapy conducted by: Psych rehab  Therapy Summary: Patient has demonstrated minimal progress towards psychiatric rehabilitation goal of identifying and minimally coping skills to manage symptoms. Patient was partially able to identify coping skills with psychoeducation from writer for stress and symptom management. Psychiatric rehabilitation recommends patient continue to attend groups, engage in individual sessions, and participate in activities in the milieu to continue exploring coping skills to manage symptoms.    Writer met with patient for an individual session in order to review progress towards psychiatric rehabilitation goals. Pt has been on effective behavioral issue and follows redirection. Pt is paranoid about her brother lying about to her about the wellbeing of her mother. Pt is guarded and reticent. Pt is still disorganized in thought process and paranoid. Pt is paranoid with peers taking about her and giving her the dirty look. Pt is increasingly more visible and selectively social with peers. Pt state her face is changing. Pt is adherent with medication and is attending group therapy. Pt is unable to participate meaningfully in discussion groups. Pt is using  socialization, mindfulness, positive distractions skills, music, and fresh air break as coping skills. Pt denied SI/HI/AH/VH. Patient has attended some groups in the past seven days which is a turnaround from no groups.       Due to the COVID-19 pandemic, unit structure and activities are re-evaluated on a consistent basis in effort to maintain the safety of patients and staff.

## 2022-07-14 NOTE — BH TREATMENT PLAN - NSTXDEPRESGOAL_PSY_ALL_CORE
Exhibit improvements in self-grooming, hygiene, sleep and appetite
Report that he/she had enough energy and motivation to meet with a visitor daily
Report using a coping skill to overcome sadness and worry in order to socialize with peers daily

## 2022-07-14 NOTE — BH INPATIENT PSYCHIATRY PROGRESS NOTE - CURRENT MEDICATION
MEDICATIONS  (STANDING):  brexpiprazole 2 milliGRAM(s) Oral daily  busPIRone 30 milliGRAM(s) Oral two times a day  gabapentin 600 milliGRAM(s) Oral three times a day  levothyroxine 12.5 MICROGram(s) Oral daily  OLANZapine 30 milliGRAM(s) Oral at bedtime  pantoprazole    Tablet 40 milliGRAM(s) Oral before breakfast  vortioxetine 20 milliGRAM(s) Oral daily    MEDICATIONS  (PRN):  clonazePAM  Tablet 1 milliGRAM(s) Oral two times a day PRN anxiety  OLANZapine 5 milliGRAM(s) Oral every 6 hours PRN agitation  OLANZapine Injectable 5 milliGRAM(s) IntraMuscular Once PRN severe agitation

## 2022-07-15 LAB — SARS-COV-2 RNA SPEC QL NAA+PROBE: SIGNIFICANT CHANGE UP

## 2022-07-15 RX ORDER — BREXPIPRAZOLE 0.25 MG/1
1 TABLET ORAL DAILY
Refills: 0 | Status: DISCONTINUED | OUTPATIENT
Start: 2022-07-16 | End: 2022-07-18

## 2022-07-15 RX ADMIN — BREXPIPRAZOLE 2 MILLIGRAM(S): 0.25 TABLET ORAL at 08:47

## 2022-07-15 RX ADMIN — Medication 30 MILLIGRAM(S): at 08:48

## 2022-07-15 RX ADMIN — GABAPENTIN 600 MILLIGRAM(S): 400 CAPSULE ORAL at 20:50

## 2022-07-15 RX ADMIN — VORTIOXETINE 20 MILLIGRAM(S): 5 TABLET, FILM COATED ORAL at 08:48

## 2022-07-15 RX ADMIN — Medication 12.5 MICROGRAM(S): at 07:16

## 2022-07-15 RX ADMIN — OLANZAPINE 30 MILLIGRAM(S): 15 TABLET, FILM COATED ORAL at 20:49

## 2022-07-15 RX ADMIN — PANTOPRAZOLE SODIUM 40 MILLIGRAM(S): 20 TABLET, DELAYED RELEASE ORAL at 09:35

## 2022-07-15 RX ADMIN — GABAPENTIN 600 MILLIGRAM(S): 400 CAPSULE ORAL at 08:48

## 2022-07-15 RX ADMIN — Medication 30 MILLIGRAM(S): at 20:50

## 2022-07-15 RX ADMIN — GABAPENTIN 600 MILLIGRAM(S): 400 CAPSULE ORAL at 13:03

## 2022-07-15 NOTE — BH INPATIENT PSYCHIATRY PROGRESS NOTE - NSBHFUPINTERVALHXFT_PSY_A_CORE
Post-Birth Discharge Instructions    Most women who give birth recover without problems.  But any woman can have problems after the birth of a baby.  Learning to recognize these Post-Birth warning signs and knowing what to do can save your life.    Call 911    If you have: · Pain in your chest  · Trouble breathing or shortness of breath  · Seizures  · Thoughts of hurting yourself or your baby   Call your Doctor    If you have:    (If you cannot reach your Doctor, call 911 or go to an Emergency Room) · Bleeding, soaking through one pad per hour, or blood clots, the size of an egg or bigger  · Incision that is not healing  · Red or swollen leg that is painful or warm to the touch  · Temperature of 100.4 or higher  · Headache that does not get better, even after taking medicine, or bad headache with vision changes  · See additional warning signs in “A New Beginnings “ booklet     These Post-Birth warning signs can become life-threatening if you do not receive medical care right away. See Maternal Warning Signs in “A New Beginning” booklet provided during your stay.    Having a baby can be stressful, and can involve many changes to your mind and body. We care about your health and well-being as you adjust to this new journey. You may find that you are easily upset, impatient, irritable, or tearful. This is normal, and comes and goes quickly. “Baby Blues” may occur anywhere from a few days after delivery to two weeks postpartum.    Postpartum Depression goes beyond “baby blues”. Nobody deserves to experience feelings such as overwhelming anxiety, worry, and sadness, and these symptoms can be improved with proper care and support.    The following may be signs/symptoms of postpartum depression. Call your doctor or midwife immediately if you have the following thoughts/feelings:  • Thoughts of harming yourself or the baby  • A lack of interest in the baby, family, or friends  • Feeling guilty or worthless  • Feeling  hopeless  • Uncontrollable crying  • Feelings of being a bad mother  • Trouble sleeping or oversleeping  • Changes in appetite  • Strong feelings of irritability, anger, or nervousness    Below are other resources- remember, you are not alone:   Call:  · Your OB/GYN Provider  · Call [facility] at [phone] to arrange for outpatient counseling services (individual or group)  · Call [employer] ([employer]) at 1-191.249.8402 (#1 English, #2 Espanol)  Text:  · Text [employer] at 1-428.219.8240 (English) or [phone] (Espanol) (calls/texts are confidential); you can receive information, encouragement, and resources through [employer], including being connected to a local   Online/Facebook:  · [employer] has free weekly virtual support groups and an online Facebook support group; for more information, visit [facility]    Hospital:  · Come to the Emergency Room or call [phone] if you have thoughts of wanting to hurt yourself, others, or your bab    Please use the “A New Beginning” booklet for additional information about:     • Your physical changes after birth  • Your self-care  • Your emotional changes  • Caring for your family  • Mother’s warning signs  • Breast care    Help after you leave the hospital:  Do you have help at home?  Ideas for help at home: friends, family members, neighbors, and members of your bruce group are all there to help you.    [employer]:  To receive FREE messages three times per week, text BABY to [phone] (ISIDORO for [place]). The texts will explain growth and development milestones.    [facility] (Lactation Support):[phone]  For any questions our lactation specialists can help you manage a variety of aspects of breastfeeding including:   • Feeding difficulties  • Safety of taking medications during breastfeeding  • Managing breastfeeding when you return to work  • Nursing multiples  • Stimulating  lactation for an adopted infant  • Increasing milk supply  • Other breastfeeding difficulties    Additional Resources outside of [facility] Care:    [facility] Hotline: [phone]   The hotline provides access to trained telephone counselors, 24 hours a day, 7 days a week.     [place]:   Dial  from anywhere in [place] and you will reach an individual who will assess your needs and provide referrals to resources in your community. For additional questions regarding this program, contact [phone] to speak with a specialist. Resources may include:  [facility] [employer] Insurance Tax Assistance  Immigration Issues  Food Services  Housing and Shelter Transportation  Utility Assistance  Mental Health  Substance Use Disorder  Health Care   Employment/Education Aging and Disabilities  Children and Family  Veterans   Domestic Violence    Smoking Cessation:  QUIT NOW  For free assistance and a customized quit plan.  The Quit Line provides free one-on-one phone counseling and information, local cessation program referrals, and starter packs of quit smoking medications like nicotine gum, patches, and lozenges.    I have received all my belongings upon discharge__________________          (initials)    Association of Women’s Health and  Nurses. (2017).    [place] on Patient Safety in Women’s Healthcare. (2017).   Patient case reviewed with treatment team. No acute events overnight. Compliant with meds.    On approach patient is walking calmly in the hallway and agreeable to speaking with team. On assessment, in interview room, patient appears more calm today and not as hypervigilant, does occasionally look out the window but is more relaxed and at one point in interview leans back in chair. When treatment team states that patient appears more calm patient states she feels she is doing better. States she feels the medications are helping her. States that they help her stay calm and help with sleep. States that she was able to speak with her brother      States that she has given up speaking with her brother. States she was unable to have a video call him with yesterday. States she is still very fearful and anxious that her brother and mother are stealing from her and have stolen all of her belongings. States she will only feel reassured when she can see with her own eyes that her things have not been stolen. States previously she would repeatedly check her bank account to confirm that her money was not stolen. Patient very hypervigilant and paranoid throughout interview, questioning treatment team if anyone moved or shifted their weight. States she is not suspicious of the team but that she is anxious and nervous. Patient denies any side effects with Zyprexa and tolerating medication.  Patient case reviewed with treatment team. No acute events overnight. Compliant with meds.    On approach patient is walking calmly in the hallway and agreeable to speaking with team. On assessment, in interview room, patient appears more calm today and not as hypervigilant, does occasionally look out the window but is more relaxed and at one point in interview leans back in chair. When treatment team states that patient appears more calm patient states she feels she is doing better. States she feels the medications are helping her. States that they help her stay calm and help with sleep. States that she was able to speak with her brother and mother and felt comforted and is not worried that they are lying or stealing from her. States that she wishes she could be discharged so she can go and take care of her mother who is in the hospital after a fall. Patient does occasionally make tangential remarks during interview, however, generally is much more linear and less disorganized today. States that she prefers being interviewed 1:1 as she feels she can focus just on the interviewer and not on everyone in the room, recognizes that she doesn't need to do so, but states that is just her preference. Patient denies any auditory/visual hallucinations or ideas of reference from the TV.

## 2022-07-15 NOTE — BH INPATIENT PSYCHIATRY PROGRESS NOTE - NSBHMETABOLIC_PSY_ALL_CORE_FT
BMI: BMI (kg/m2): 18.9 (06-22-22 @ 19:07)  HbA1c: A1C with Estimated Average Glucose Result: 5.1 % (06-24-22 @ 07:55)    Glucose:   BP: 127/88 (07-14-22 @ 09:00) (124/76 - 127/88)  Lipid Panel: Date/Time: 06-23-22 @ 08:34  Cholesterol, Serum: 177  Direct LDL: --  HDL Cholesterol, Serum: 60  Total Cholesterol/HDL Ration Measurement: --  Triglycerides, Serum: 48

## 2022-07-15 NOTE — BH INPATIENT PSYCHIATRY PROGRESS NOTE - NSBHCHARTREVIEWVS_PSY_A_CORE FT
Vital Signs Last 24 Hrs  T(C): 36.7 (07-15-22 @ 08:11), Max: 37 (07-14-22 @ 21:11)  T(F): 98 (07-15-22 @ 08:11), Max: 98.6 (07-14-22 @ 21:11)  HR: --  BP: --  BP(mean): --  RR: --  SpO2: 98% (07-15-22 @ 08:11) (98% - 98%)    Orthostatic VS  07-15-22 @ 08:11  Lying BP: --/-- HR: --  Sitting BP: 137/81 HR: 82  Standing BP: --/-- HR: --  Site: --  Mode: --

## 2022-07-15 NOTE — BH INPATIENT PSYCHIATRY PROGRESS NOTE - MSE UNSTRUCTURED FT
Pt appears stated age, thin, dressed in street clothes, poor hygiene and grooming. No psychomotor abnormalities noted. extremely guarded and paranoid during interview with poor eye contact (begins with good eye contact with interviewer but is constantly analyzing other treatment team during interview and unable ot maintain eye contact). Speech is fluent, normal in rate and volume with some latency. Mood is "fine" Affect is constricted, paranoid, anxious. Thought process is disorganized, referential, overinclusive, perseverative, and concrete. Thought content focused on confirming if mother/brother are lying. Denies SIIP and HIIP. Denies AH/VH. Insight is poor. Judgement is poor. Impulse control is appropriate to setting. Pt appears stated age, thin, dressed in street clothes, fair hygiene and grooming. No psychomotor abnormalities noted. extremely guarded and paranoid during interview with poor eye contact (begins with good eye contact with interviewer but is constantly analyzing other treatment team during interview and unable ot maintain eye contact). Speech is fluent, normal in rate and volume with some latency. Mood is "fine" Affect is constricted, paranoid, anxious. Thought process is disorganized, referential, overinclusive, perseverative, and concrete. Thought content focused on confirming if mother/brother are lying. Denies SIIP and HIIP. Denies AH/VH. Insight is poor. Judgement is poor. Impulse control is appropriate to setting. Pt appears stated age, thin, dressed in street clothes, fair hygiene and grooming. No psychomotor abnormalities noted. More calm and cooperative today with improved eye contact (occasionally looks away to check the window, but generally maintains good eye contact and does not appear hypervigilant). Speech is fluent, normal in rate and volume with some latency. Mood is "good" Affect is constricted, paranoid, anxious but appears less anxious and more relaxed than yesterday. Thought process is overinclusive, concrete, but more organized (generally more linear and able to follow conversation with occasionally tangential statements). Thought content: denies delusions (not concerned that mother or brother are lying to her or trying to steal her money). Denies SIIP and HIIP. Denies AH/VH. Insight is poor. Judgement is poor. Impulse control is appropriate to setting.

## 2022-07-15 NOTE — BH INPATIENT PSYCHIATRY PROGRESS NOTE - NSBHASSESSSUMMFT_PSY_ALL_CORE
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 schizophrenia, several prior hospitalizations, and consistent outpatient psychiatric treatment including TMS presenting with acute psychotic decompensation in the context of medication non-adherence.     Patient initially presented with multiple complex delusions including Capgras, delusions of surveillance and posing, referential delusions with related AH, thought disorder, and psychotic anxiety. Intensity of delusions varies day by day and throughout each day but has overall been less intense since starting olanzapine.     Patient still displaying psychosis, paranoia, and disorganized thought process. Patient is very hypervigilant and referential. Zyprexa was titrated to 30mg, continue to monitor for improvement.    Plan:  1) Psychosis   - c/w brexpiprazole to 2mg  - c/w olanzapine to 30mg  - EKG 7/12 completed  - *note: patient not candidtate for clozapine due to ANC <1000  2) Mood  - Continue vortioxetine 20mg (lowered from 60mg)  3) Anxiety  - Continue buspirone 30mg BID  - Continue gabapentin 600mg TID  - Continue Klonopin 1mg BID prn anxiety   4) Disposition  - transfer to  given positive COVID pcr  - Return to outpatient care under Dr. Smalls  - Return to home with mother and monitoring by brother   This is a 57y/o cis woman who lives with her mother, has a past medical history of congenital neutropenia (baseline ANC <500) and a past psychiatric history of schizoaffective disorder vs. schizophrenia, several prior hospitalizations, and consistent outpatient psychiatric treatment including TMS presenting with acute psychotic decompensation in the context of medication non-adherence.     Patient initially presented with multiple complex delusions including Capgras, delusions of surveillance and posing, referential delusions with related AH, thought disorder, and psychotic anxiety. Intensity of delusions varies day by day and throughout each day but has overall been less intense since starting olanzapine.     Patient still displaying psychosis, paranoia, and disorganized thought process. Patient is very hypervigilant and referential. Zyprexa was titrated to 30mg, continue to monitor for improvement.    Plan:  1) Psychosis   - c/w brexpiprazole to 2mg  - c/w olanzapine to 30mg  - EKG 7/12 completed  - *note: patient not candidate for clozapine due to ANC <1000  2) Mood  - Continue vortioxetine 20mg (lowered from 60mg)  3) Anxiety  - Continue buspirone 30mg BID  - Continue gabapentin 600mg TID  - Continue Klonopin 1mg BID prn anxiety   4) Disposition  - transfer to  given positive COVID pcr  - Return to outpatient care under Dr. Smalls  - Return to home with mother and monitoring by brother   This is a 57y/o cis woman who lives with her mother, has a past medical history of congenital neutropenia (baseline ANC <500) and a past psychiatric history of schizoaffective disorder vs. schizophrenia, several prior hospitalizations, and consistent outpatient psychiatric treatment including TMS presenting with acute psychotic decompensation in the context of medication non-adherence.     Patient initially presented with multiple complex delusions including Capgras, delusions of surveillance and posing, referential delusions with related AH, thought disorder, and psychotic anxiety. Intensity of delusions varies day by day and throughout each day but has overall been less intense since starting olanzapine.     7/15: patient appears improved today. of note patient has been charted to have waxing and waning improvement. Today patient is more calm and relaxed with less paranoia and anxiety. Patient no longer concerned her mother and brother are lying to her. Tolerating medications.    Plan:  1) Psychosis   - decreasse brexpiprazole to 1mg  - c/w olanzapine to 30mg  - EKG 7/12 completed  - *note: patient not candidate for clozapine due to ANC <1000  2) Mood  - Continue vortioxetine 20mg (lowered from 60mg)  3) Anxiety  - Continue buspirone 30mg BID  - Continue gabapentin 600mg TID  - Continue Klonopin 1mg BID prn anxiety   4) Disposition  - transfer to  given positive COVID pcr  - Return to outpatient care under Dr. Smalls  - Return to home with mother and monitoring by brother

## 2022-07-16 LAB — SARS-COV-2 RNA SPEC QL NAA+PROBE: SIGNIFICANT CHANGE UP

## 2022-07-16 RX ADMIN — Medication 1 MILLIGRAM(S): at 18:36

## 2022-07-16 RX ADMIN — Medication 30 MILLIGRAM(S): at 22:15

## 2022-07-16 RX ADMIN — Medication 30 MILLIGRAM(S): at 09:30

## 2022-07-16 RX ADMIN — BREXPIPRAZOLE 1 MILLIGRAM(S): 0.25 TABLET ORAL at 09:29

## 2022-07-16 RX ADMIN — OLANZAPINE 30 MILLIGRAM(S): 15 TABLET, FILM COATED ORAL at 22:14

## 2022-07-16 RX ADMIN — Medication 12.5 MICROGRAM(S): at 06:25

## 2022-07-16 RX ADMIN — GABAPENTIN 600 MILLIGRAM(S): 400 CAPSULE ORAL at 09:31

## 2022-07-16 RX ADMIN — GABAPENTIN 600 MILLIGRAM(S): 400 CAPSULE ORAL at 13:40

## 2022-07-16 RX ADMIN — PANTOPRAZOLE SODIUM 40 MILLIGRAM(S): 20 TABLET, DELAYED RELEASE ORAL at 06:55

## 2022-07-16 RX ADMIN — GABAPENTIN 600 MILLIGRAM(S): 400 CAPSULE ORAL at 22:15

## 2022-07-16 RX ADMIN — VORTIOXETINE 20 MILLIGRAM(S): 5 TABLET, FILM COATED ORAL at 09:30

## 2022-07-17 RX ADMIN — GABAPENTIN 600 MILLIGRAM(S): 400 CAPSULE ORAL at 12:29

## 2022-07-17 RX ADMIN — Medication 30 MILLIGRAM(S): at 21:57

## 2022-07-17 RX ADMIN — GABAPENTIN 600 MILLIGRAM(S): 400 CAPSULE ORAL at 09:38

## 2022-07-17 RX ADMIN — Medication 12.5 MICROGRAM(S): at 06:20

## 2022-07-17 RX ADMIN — OLANZAPINE 30 MILLIGRAM(S): 15 TABLET, FILM COATED ORAL at 21:57

## 2022-07-17 RX ADMIN — Medication 1 MILLIGRAM(S): at 23:43

## 2022-07-17 RX ADMIN — GABAPENTIN 600 MILLIGRAM(S): 400 CAPSULE ORAL at 21:57

## 2022-07-17 RX ADMIN — VORTIOXETINE 20 MILLIGRAM(S): 5 TABLET, FILM COATED ORAL at 09:38

## 2022-07-17 RX ADMIN — BREXPIPRAZOLE 1 MILLIGRAM(S): 0.25 TABLET ORAL at 09:37

## 2022-07-17 RX ADMIN — PANTOPRAZOLE SODIUM 40 MILLIGRAM(S): 20 TABLET, DELAYED RELEASE ORAL at 06:36

## 2022-07-17 RX ADMIN — Medication 30 MILLIGRAM(S): at 09:38

## 2022-07-18 PROCEDURE — 99232 SBSQ HOSP IP/OBS MODERATE 35: CPT | Mod: GC

## 2022-07-18 RX ADMIN — GABAPENTIN 600 MILLIGRAM(S): 400 CAPSULE ORAL at 14:09

## 2022-07-18 RX ADMIN — BREXPIPRAZOLE 1 MILLIGRAM(S): 0.25 TABLET ORAL at 09:24

## 2022-07-18 RX ADMIN — VORTIOXETINE 20 MILLIGRAM(S): 5 TABLET, FILM COATED ORAL at 09:23

## 2022-07-18 RX ADMIN — Medication 12.5 MICROGRAM(S): at 06:41

## 2022-07-18 RX ADMIN — Medication 30 MILLIGRAM(S): at 21:12

## 2022-07-18 RX ADMIN — PANTOPRAZOLE SODIUM 40 MILLIGRAM(S): 20 TABLET, DELAYED RELEASE ORAL at 09:23

## 2022-07-18 RX ADMIN — OLANZAPINE 30 MILLIGRAM(S): 15 TABLET, FILM COATED ORAL at 21:11

## 2022-07-18 RX ADMIN — Medication 30 MILLIGRAM(S): at 09:24

## 2022-07-18 RX ADMIN — GABAPENTIN 600 MILLIGRAM(S): 400 CAPSULE ORAL at 09:24

## 2022-07-18 RX ADMIN — GABAPENTIN 600 MILLIGRAM(S): 400 CAPSULE ORAL at 21:12

## 2022-07-18 NOTE — BH INPATIENT PSYCHIATRY PROGRESS NOTE - NSBHCHARTREVIEWVS_PSY_A_CORE FT
Vital Signs Last 24 Hrs  T(C): 36.5 (07-18-22 @ 06:18), Max: 36.7 (07-17-22 @ 20:09)  T(F): 97.7 (07-18-22 @ 06:18), Max: 98.1 (07-17-22 @ 20:09)  HR: 60 (07-18-22 @ 06:18) (60 - 60)  BP: 165/88 (07-18-22 @ 06:18) (165/88 - 165/88)  BP(mean): --  RR: --  SpO2: 100% (07-17-22 @ 20:09) (100% - 100%)

## 2022-07-18 NOTE — BH INPATIENT PSYCHIATRY PROGRESS NOTE - MSE UNSTRUCTURED FT
Pt appears stated age, thin, dressed in street clothes, fair hygiene and grooming. No psychomotor abnormalities noted. More calm and cooperative today with initially fair eye contact but becomes hypervigilent  (occasionally looks away to check the window, but generally maintains good eye contact and does not appear hypervigilant). Speech is fluent, normal in rate and volume with some latency. Mood is "good" Affect is constricted, paranoid, anxious but appears less anxious and more relaxed than yesterday. Thought process is overinclusive, concrete, but more organized (generally more linear and able to follow conversation with occasionally tangential statements). Thought content: denies delusions (not concerned that mother or brother are lying to her or trying to steal her money). Denies SIIP and HIIP. Denies AH/VH. Insight is poor. Judgement is poor. Impulse control is appropriate to setting. Pt appears stated age, thin, dressed in street clothes, fair hygiene and grooming. No psychomotor abnormalities noted. More calm and cooperative today with initially fair eye contact but becomes hypervigilant at end of interview and staring at other team members. Speech is fluent, normal in rate and volume with some latency. Mood is "good" Affect is constricted, paranoid, anxious but appears less anxious and more relaxed than previously. Thought process is overinclusive, concrete, but more organized (generally more linear and able to follow conversation with occasionally tangential statements). Thought content: denies delusions (not concerned that mother or brother are lying to her or trying to steal her money). Denies SIIP and HIIP. Denies AH/VH. Insight is poor. Judgement is poor. Impulse control is appropriate to setting.

## 2022-07-18 NOTE — BH INPATIENT PSYCHIATRY PROGRESS NOTE - NSBHMETABOLIC_PSY_ALL_CORE_FT
BMI: BMI (kg/m2): 18.9 (06-22-22 @ 19:07)  HbA1c: A1C with Estimated Average Glucose Result: 5.1 % (06-24-22 @ 07:55)    Glucose:   BP: 165/88 (07-18-22 @ 06:18) (133/76 - 165/88)  Lipid Panel: Date/Time: 06-23-22 @ 08:34  Cholesterol, Serum: 177  Direct LDL: --  HDL Cholesterol, Serum: 60  Total Cholesterol/HDL Ration Measurement: --  Triglycerides, Serum: 48

## 2022-07-18 NOTE — BH INPATIENT PSYCHIATRY PROGRESS NOTE - NSBHFUPINTERVALHXFT_PSY_A_CORE
Patient case reviewed with treatment team. No acute events over the weekend. Compliant with meds.    On approach patient is in her room and agreeable to speaking with team. On assessment, with treatment team patient is initially calm but throughout interview does display hypervigilance towards team. Patient states she is feeling fine and had a good weekend. States that she watched Damian (again) but prefers watching the news. Denies any ideas of reference from the TV. Patient states she also has been speaking with her brother, and conversations have been going well and wants team to speak with her brother. Patient is tolerating medications, but does report 2x episodes of dysphagia (difficulty swallowing) over the weekend. Patient believes it is due to Zyprexa, and states it has happened in the past. Patient states she will try to chew more and slowly and will continue to monitor. Patient also inquires about discharge. By end of interview, patient appears more paranoid and hypervigilant towards team and questioning teams every behavior. And even after interview ended patient initially walks away backward very slowly while staring at the team, and then walks away.

## 2022-07-18 NOTE — BH INPATIENT PSYCHIATRY PROGRESS NOTE - NSBHASSESSSUMMFT_PSY_ALL_CORE
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 schizophrenia, several prior hospitalizations, and consistent outpatient psychiatric treatment including TMS presenting with acute psychotic decompensation in the context of medication non-adherence.     Patient initially presented with multiple complex delusions including Capgras, delusions of surveillance and posing, referential delusions with related AH, thought disorder, and psychotic anxiety. Intensity of delusions varies day by day and throughout each day but has overall been less intense since starting olanzapine.     7/18: patient still is generally paranoid and hypervigilant but improved from days she is very paranoid and suspicious of team and family. tolerating medication. gives consent and requests team speak with brother. speak with brother to determine if this is patient's baseline.    Plan:  1) Psychosis   - dc brexpiprazole  - c/w olanzapine to 30mg  - EKG 7/12 completed  - *note: patient not candidate for clozapine due to ANC <1000  2) Mood  - Continue vortioxetine 20mg (lowered from 60mg)  3) Anxiety  - Continue buspirone 30mg BID  - Continue gabapentin 600mg TID  - Continue Klonopin 1mg BID prn anxiety   4) Disposition  - transfer to  given positive COVID pcr  - Return to outpatient care under Dr. Smalls  - Return to home with mother and monitoring by brother   This is a 55y/o cis woman who lives with her mother, has a past medical history of congenital neutropenia (baseline ANC <500) and a past psychiatric history of schizoaffective disorder vs. schizophrenia, several prior hospitalizations, and consistent outpatient psychiatric treatment including TMS presenting with acute psychotic decompensation in the context of medication non-adherence.     Patient initially presented with multiple complex delusions including Capgras, delusions of surveillance and posing, referential delusions with related AH, thought disorder, and psychotic anxiety. Intensity of delusions varies day by day and throughout each day but has overall been less intense since starting olanzapine.     7/18: patient still is generally paranoid and hypervigilant but improved from days she is very paranoid and suspicious of team and family. tolerating medication. gives consent and requests team speak with brother. speak with brother to determine if this is patient's baseline.    Plan:  1) Psychosis   - dc brexpiprazole  - c/w olanzapine to 30mg  - EKG 7/12 completed  - *note: patient not candidate for clozapine due to ANC <1000  2) Mood  - Continue vortioxetine 20mg (lowered from 60mg)  3) Anxiety  - Continue buspirone 30mg BID  - Continue gabapentin 600mg TID  - Continue Klonopin 1mg BID prn anxiety   4) Disposition  - transfer to  given positive COVID pcr  - Return to outpatient care under Dr. Smalls  - Return to home with mother and monitoring by brother  - scheduled for court on 7/19 after requesting discharge   This is a 55y/o cis woman who lives with her mother, has a past medical history of congenital neutropenia (baseline ANC <500) and a past psychiatric history of schizoaffective disorder vs. schizophrenia, several prior hospitalizations, and consistent outpatient psychiatric treatment including TMS presenting with acute psychotic decompensation in the context of medication non-adherence.     Patient initially presented with multiple complex delusions including Capgras, delusions of surveillance and posing, referential delusions with related AH, thought disorder, and psychotic anxiety. Intensity of delusions varies day by day and throughout each day but has overall been less intense since starting olanzapine.     7/18: patient still is generally paranoid and hypervigilant but improved from days she is very paranoid and suspicious of team and family. tolerating medication. gives consent and requests team speak with brother. speak with brother to determine if this is patient's baseline.    Plan:  1) Psychosis   - dc brexpiprazole  - c/w olanzapine to 30mg  - EKG 7/12 completed  - *note: patient not candidate for clozapine due to ANC <1000  2) Mood  - Continue vortioxetine 20mg (lowered from 60mg)  3) Anxiety  - Continue buspirone 30mg BID  - Continue gabapentin 600mg TID  - Continue Klonopin 1mg BID prn anxiety   4) Disposition  - transfer to  given positive COVID pcr  - Return to outpatient care under Dr. Smalls  - Return to home with mother and monitoring by brother  - scheduled for court on 7/26 after requesting discharge

## 2022-07-19 PROCEDURE — 99231 SBSQ HOSP IP/OBS SF/LOW 25: CPT | Mod: GC

## 2022-07-19 RX ORDER — CLONAZEPAM 1 MG
1 TABLET ORAL
Refills: 0 | Status: DISCONTINUED | OUTPATIENT
Start: 2022-07-19 | End: 2022-07-21

## 2022-07-19 RX ORDER — DIPHENHYDRAMINE HCL 50 MG
25 CAPSULE ORAL ONCE
Refills: 0 | Status: COMPLETED | OUTPATIENT
Start: 2022-07-19 | End: 2022-07-19

## 2022-07-19 RX ADMIN — GABAPENTIN 600 MILLIGRAM(S): 400 CAPSULE ORAL at 20:53

## 2022-07-19 RX ADMIN — VORTIOXETINE 20 MILLIGRAM(S): 5 TABLET, FILM COATED ORAL at 08:26

## 2022-07-19 RX ADMIN — GABAPENTIN 600 MILLIGRAM(S): 400 CAPSULE ORAL at 08:27

## 2022-07-19 RX ADMIN — Medication 30 MILLIGRAM(S): at 08:27

## 2022-07-19 RX ADMIN — Medication 30 MILLIGRAM(S): at 20:52

## 2022-07-19 RX ADMIN — Medication 1 MILLIGRAM(S): at 22:28

## 2022-07-19 RX ADMIN — Medication 12.5 MICROGRAM(S): at 06:17

## 2022-07-19 RX ADMIN — PANTOPRAZOLE SODIUM 40 MILLIGRAM(S): 20 TABLET, DELAYED RELEASE ORAL at 06:53

## 2022-07-19 RX ADMIN — GABAPENTIN 600 MILLIGRAM(S): 400 CAPSULE ORAL at 12:46

## 2022-07-19 RX ADMIN — OLANZAPINE 30 MILLIGRAM(S): 15 TABLET, FILM COATED ORAL at 20:53

## 2022-07-19 NOTE — BH INPATIENT PSYCHIATRY PROGRESS NOTE - NSBHFUPINTERVALHXFT_PSY_A_CORE
Patient case reviewed with treatment team. No acute events over night. Compliant with meds.    On approach patient is agreeable to speaking with team, and on assessment, is calm and cooperative, but still displays some hypervigilance towards other members of the team during interview. States she is doing well, and her mood is "fine". Patient has been speaking with her brother and denies any paranoia towards him, stating she no longer is worried that he or her mother are lying to her or trying to steal her money. Denies any auditory/visual hallucinations or ideas of reference, but does still report some general anxiety and frustration from being on the unit. Reporting that it has been difficult because she doesn't have the control that she would like, but does not feel anyone is purposefully making her life difficult. Reports she has just been focusing on doing her ADLs (like laundry). States she is ready to go home, and is still agreeable to starting partial. Team provided psychoeducation on the importance of medication adherence, and patient relays understanding of the importance and role of medications in treatment. Patient also denies any dysphagia since yesterday.    Team spoke with brother yesterday, who also agrees that patient sounds greatly improved over the phone. States patient is not 100% back at baseline but feels comfortable sending patient home with partial hospitalization.

## 2022-07-19 NOTE — BH INPATIENT PSYCHIATRY PROGRESS NOTE - NSBHCHARTREVIEWVS_PSY_A_CORE FT
Vital Signs Last 24 Hrs  T(C): 36.4 (07-19-22 @ 06:51), Max: 36.9 (07-18-22 @ 20:02)  T(F): 97.6 (07-19-22 @ 06:51), Max: 98.4 (07-18-22 @ 20:02)  HR: 102 (07-19-22 @ 06:51) (102 - 102)  BP: 126/83 (07-19-22 @ 06:51) (126/83 - 126/83)  BP(mean): 102 (07-19-22 @ 06:51) (102 - 102)  RR: --  SpO2: 98% (07-19-22 @ 06:51) (98% - 100%)

## 2022-07-19 NOTE — BH INPATIENT PSYCHIATRY PROGRESS NOTE - NSBHMETABOLIC_PSY_ALL_CORE_FT
BMI: BMI (kg/m2): 18.9 (06-22-22 @ 19:07)  HbA1c: A1C with Estimated Average Glucose Result: 5.1 % (06-24-22 @ 07:55)    Glucose:   BP: 126/83 (07-19-22 @ 06:51) (126/83 - 165/88)  Lipid Panel: Date/Time: 06-23-22 @ 08:34  Cholesterol, Serum: 177  Direct LDL: --  HDL Cholesterol, Serum: 60  Total Cholesterol/HDL Ration Measurement: --  Triglycerides, Serum: 48

## 2022-07-19 NOTE — BH INPATIENT PSYCHIATRY PROGRESS NOTE - NSBHASSESSSUMMFT_PSY_ALL_CORE
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 schizophrenia, several prior hospitalizations, and consistent outpatient psychiatric treatment including TMS presenting with acute psychotic decompensation in the context of medication non-adherence.     Patient initially presented with multiple complex delusions including Capgras, delusions of surveillance and posing, referential delusions with related AH, thought disorder, and psychotic anxiety. Intensity of delusions varies day by day and throughout each day but has overall been less intense since starting olanzapine.     7/19: Patient has had several days showing improvement on medications and decreased paranoia/anxiety. No longer worried brother and mother are lying to her or stealing her money. Adherent with medications, and agreeable to DC to [facility].     Plan:  1) Psychosis   - dc brexpiprazole  - c/w olanzapine to 30mg  - EKG 7/12 completed  - *note: patient not candidate for clozapine due to ANC <1000  2) Mood  - Continue vortioxetine 20mg (lowered from 60mg)  3) Anxiety  - Continue buspirone 30mg BID  - Continue gabapentin 600mg TID  - Continue Klonopin 1mg BID prn anxiety   4) Disposition  - transfer to  given positive COVID pcr  - Return to outpatient care under Dr. Smalls  - Return to home with mother and monitoring by brother  - scheduled for court on 7/26 after requesting discharge

## 2022-07-19 NOTE — BH INPATIENT PSYCHIATRY PROGRESS NOTE - MSE UNSTRUCTURED FT
Pt appears stated age, thin, dressed in hospital gown, fair hygiene and grooming. No psychomotor abnormalities noted. Calm and cooperative today with generally good eye contact but intermittently stares at other team members (hypervigilant). Speech is fluent, normal in rate and volume with some latency. Mood is "fine" Affect is constricted, paranoid, anxious but appears less anxious and more relaxed than previously. Thought process is overinclusive, concrete, but more organized (generally more linear and able to follow conversation with occasionally tangential statements). Thought content: denies delusions (not concerned that mother or brother are lying to her or trying to steal her money). Denies SIIP and HIIP. Denies AH/VH. Insight is poor. Judgement is poor. Impulse control is appropriate to setting.

## 2022-07-19 NOTE — BH INPATIENT PSYCHIATRY PROGRESS NOTE - NSCGISEVERILLNESS_PSY_ALL_CORE
4 = Moderately ill – overt symptoms causing noticeable, but modest, functional impairment or distress; symptom level may warrant medication 3 = Mildly ill – clearly established symptoms with minimal, if any, distress or difficulty in social and occupational function

## 2022-07-20 PROCEDURE — 99231 SBSQ HOSP IP/OBS SF/LOW 25: CPT | Mod: GC

## 2022-07-20 RX ORDER — GABAPENTIN 400 MG/1
1 CAPSULE ORAL
Qty: 42 | Refills: 0
Start: 2022-07-20 | End: 2022-08-02

## 2022-07-20 RX ORDER — LEVOTHYROXINE SODIUM 125 MCG
0.5 TABLET ORAL
Qty: 7 | Refills: 0
Start: 2022-07-20 | End: 2022-08-02

## 2022-07-20 RX ORDER — VORTIOXETINE 5 MG/1
1 TABLET, FILM COATED ORAL
Qty: 14 | Refills: 0
Start: 2022-07-20 | End: 2022-08-02

## 2022-07-20 RX ORDER — PANTOPRAZOLE SODIUM 20 MG/1
1 TABLET, DELAYED RELEASE ORAL
Qty: 14 | Refills: 0
Start: 2022-07-20 | End: 2022-08-02

## 2022-07-20 RX ORDER — OLANZAPINE 15 MG/1
2 TABLET, FILM COATED ORAL
Qty: 28 | Refills: 0
Start: 2022-07-20 | End: 2022-08-02

## 2022-07-20 RX ADMIN — VORTIOXETINE 20 MILLIGRAM(S): 5 TABLET, FILM COATED ORAL at 08:40

## 2022-07-20 RX ADMIN — OLANZAPINE 30 MILLIGRAM(S): 15 TABLET, FILM COATED ORAL at 20:46

## 2022-07-20 RX ADMIN — GABAPENTIN 600 MILLIGRAM(S): 400 CAPSULE ORAL at 08:40

## 2022-07-20 RX ADMIN — GABAPENTIN 600 MILLIGRAM(S): 400 CAPSULE ORAL at 13:39

## 2022-07-20 RX ADMIN — Medication 12.5 MICROGRAM(S): at 06:21

## 2022-07-20 RX ADMIN — Medication 30 MILLIGRAM(S): at 08:40

## 2022-07-20 RX ADMIN — Medication 30 MILLIGRAM(S): at 20:47

## 2022-07-20 RX ADMIN — PANTOPRAZOLE SODIUM 40 MILLIGRAM(S): 20 TABLET, DELAYED RELEASE ORAL at 06:55

## 2022-07-20 RX ADMIN — GABAPENTIN 600 MILLIGRAM(S): 400 CAPSULE ORAL at 20:46

## 2022-07-20 NOTE — BH INPATIENT PSYCHIATRY DISCHARGE NOTE - HOSPITAL COURSE
Patient is a 55 yo F currently domiciled with mother, employed, currently single w/PMH of MVP, congenital neutropenia, hypothyroidism, PPHx of schizoaffective disorder (diagnosed 2009), multiple prior hospitalizations, last hospitalization at [facility] in 2014, hx of TMS currently on maintenance treatment w/ last treatment 3 weeks ago brought in BIB brother (Nilesh Mendez) d/t 8 day history of paranoia.    Dates of Hospitalization: 6/22-7/20/22    Upon admission, patient experienced    Patient was started on med X and titrated up to X with good effect and tolerability.. Symptoms gradually improved over the course of hospitalization. The patient was made aware of the risks and benefits of each medication and tolerated them well with no apparent side effects.    On the day of discharge, the patient’s mood and affect are normal/euthymic. Patient denies depressed mood and anxiety. Patient is not hopeless. Sleep and appetite are also normal (at baseline). Pt’s motor performance and productivity of speech are WNL. Pt denies symptoms of psychosis including AH/VH with no apparent delusions (or is at baseline/not preoccupied with delusions). Patient denies S/H/I/I/P.    There were no behavioral problems on the unit. Patient did not become agitated, did not require emergent intramuscular medications or seclusion/restraints, and did not self-harm on the unit. Patient remained actively engaged in treatment and participated in individual, group, and milieu therapy. Patient got along appropriately with staff and peers. A family meeting was held prior to discharge for safety planning and disposition planning. Family is supportive and available.    Patient was provided with extensive psychoeducation on treatment options and motivational counseling targeting healthy life-style. Patient was instructed on actions for crisis situations, understood and agreed to follow instructions for handling crisis, including coming to ER or calling 911 should the patient or [his/her] family feel that [he/she] is in danger of hurting self or others. Patient also was given Suicide Prevention Lifeline number 4-809-546-6365 and provided with instructions on its use.    Medically, during this hospitalization…    Suicidal and aggression risk assessments were performed on the day of discharge. The patient is at elevated chronic risk of self-harm due to an underlying [disorder]. [He/she] is not actively suicidal, manic, or inebriated, making [him/her] appropriate for less restrictive treatment as an outpatient without need for continued inpatient hospitalization. Risk factors include: access, insomnia, impulsivity, anhedonia, psychosocial stressors, poor social support, previous SA, chronic SI, non-compliance, disengagement with treatment, NSSIB. Protective factors include future orientation, social support, dependents, treatment engagement, med compliance, lack of access, lack of substance use, engagement in work/school. Immediate risk was minimized by inpatient admission to a safe environment with appropriate supervision and limited access to lethal means. Future risk was minimized before discharge by treatment of anxiety/depressive symptoms, maximizing outpatient support, providing relevant patient education, discussing emergency procedures, and ensuring close follow-up. Patient denies all suicidal and aggressive/homicidal ideation, intent and plan, and, in view of demonstrated clinical improvement, is not judged to be an acute danger to self or others at this time.    Although the patient remains at their chronic baseline risk of self-harm, such risk cannot be further ameliorated by continued inpatient treatment and the patient is therefore appropriate for discharge.    Patient has made clinically meaningful progress during his hospitalization and has clearly benefited from medications and psychotherapy. On day of discharge, the patient has improved significantly and no longer requires inpatient treatment and care. Pt will be discharged and follow-up with outpatient care.    Patient will be discharged with the following DSM5 Diagnoses:    Patient will be discharged on the following medications:    Verbal handoff was given to XXX ([id number]), including discussion of hospital course, treatment, and medications. The patient’s appointment is on XX/XX/XXXX at XX:XX Patient is a 57 yo F currently domiciled with mother, employed, currently single w/PMH of MVP, congenital neutropenia, hypothyroidism, PPHx of schizoaffective disorder (diagnosed 2009), multiple prior hospitalizations, last hospitalization at [facility] in 2014, hx of TMS currently on maintenance treatment w/ last treatment 3 weeks ago brought in BIB brother (Nilesh Mendez) d/t 8 day history of paranoia.    Dates of Hospitalization: 6/22-7/20/22    Upon admission, patient experienced    re-start home meds: rexulti 4 mg PO QD (increased outpt on 6/20/22), buspar 30 mg PO BID, klonopin 1 mg PO PRN BID, gabapentin 600 mg PO TID, synthroid 12.5 mg PO QD, and trintellix 20 mg PO QD    Was on trintellix 60 mg - decreased to FDA approved 20 mg PO QD. Wellbutrin was d/c'ed as it may exacerbate psychosis and pt stated it did not work for her. Rexulti 4 mg continued as outpt increased to 4 mg 2 days prior to admission. [facility] 2014 admission indicated pt did well on zyprexa. Outpt provider stated pt did well on combo of rexulti 4 mg and zyprexa 20 mg in the past. Zyprexa 5 mg PO QHS started    On the day of discharge, the patient’s mood and affect are normal/euthymic. Patient denies depressed mood and anxiety. Patient is not hopeless. Sleep and appetite are also normal (at baseline). Pt’s motor performance and productivity of speech are WNL. Pt denies symptoms of psychosis including AH/VH with no apparent delusions (or is at baseline/not preoccupied with delusions). Patient denies S/H/I/I/P.    There were no behavioral problems on the unit. Patient did not become agitated, did not require emergent intramuscular medications or seclusion/restraints, and did not self-harm on the unit. Patient remained actively engaged in treatment and participated in individual, group, and milieu therapy. Patient got along appropriately with staff and peers. A family meeting was held prior to discharge for safety planning and disposition planning. Family is supportive and available.    Patient was provided with extensive psychoeducation on treatment options and motivational counseling targeting healthy life-style. Patient was instructed on actions for crisis situations, understood and agreed to follow instructions for handling crisis, including coming to ER or calling 911 should the patient or [his/her] family feel that [he/she] is in danger of hurting self or others. Patient also was given Suicide Prevention Lifeline number 5-075-141-9106 and provided with instructions on its use.    Medically, during this hospitalization…    Suicidal and aggression risk assessments were performed on the day of discharge. The patient is at elevated chronic risk of self-harm due to an underlying [disorder]. [He/she] is not actively suicidal, manic, or inebriated, making [him/her] appropriate for less restrictive treatment as an outpatient without need for continued inpatient hospitalization. Risk factors include: access, insomnia, impulsivity, anhedonia, psychosocial stressors, poor social support, previous SA, chronic SI, non-compliance, disengagement with treatment, NSSIB. Protective factors include future orientation, social support, dependents, treatment engagement, med compliance, lack of access, lack of substance use, engagement in work/school. Immediate risk was minimized by inpatient admission to a safe environment with appropriate supervision and limited access to lethal means. Future risk was minimized before discharge by treatment of anxiety/depressive symptoms, maximizing outpatient support, providing relevant patient education, discussing emergency procedures, and ensuring close follow-up. Patient denies all suicidal and aggressive/homicidal ideation, intent and plan, and, in view of demonstrated clinical improvement, is not judged to be an acute danger to self or others at this time.    Although the patient remains at their chronic baseline risk of self-harm, such risk cannot be further ameliorated by continued inpatient treatment and the patient is therefore appropriate for discharge.    Patient has made clinically meaningful progress during his hospitalization and has clearly benefited from medications and psychotherapy. On day of discharge, the patient has improved significantly and no longer requires inpatient treatment and care. Pt will be discharged and follow-up with outpatient care.    Patient will be discharged with the following DSM5 Diagnoses:    Patient will be discharged on the following medications:    Verbal handoff was given to XXX ([id number]), including discussion of hospital course, treatment, and medications. The patient’s appointment is on XX/XX/XXXX at XX:XX Patient is a 55 yo F currently domiciled with mother, employed, currently single w/PMH of MVP, congenital neutropenia, hypothyroidism, PPHx of schizoaffective disorder (diagnosed 2009), multiple prior hospitalizations, last hospitalization at [facility] in 2014, hx of TMS currently on maintenance treatment w/ last treatment 3 weeks ago brought in BIB brother (Nilesh Mendez) d/t 8 day history of paranoia.    Dates of Hospitalization: 6/22-7/20/22    During admission patient was initially restarted on her home medications: Rexulti 4 mg PO QD (increased outpt on 6/20/22), buspar 30 mg PO BID, klonopin 1 mg PO PRN BID, gabapentin 600 mg PO TID, synthroid 12.5 mg PO QD, and Trintellix 20 mg PO QD. Patient was placed on 20 of Trintellix as patient was on Trintellix 60 mg, but was decreased to FDA approved 20 mg PO QD. Wellbutrin was d/c'ed as it may exacerbate psychosis and pt stated it did not work for her. Rexulti 4 mg continued as outpt increased to 4 mg 2 days prior to admission. Patient was also started on Zyprexa, as prior [facility] 2014 admission indicated pt did well on Zyprexa. Outpt provider additionally stated pt did well on combo of Rexulti 4 mg and Zyprexa 20 mg in the past and patient was initiated on Zyprexa 5 mg PO QHS. Zyprexa was titrated up to 30mg (EKG completed at 20mg to check QTc, which was normal). Patient was also tapered off Rexulti given limited efficacy and goal to minimize polypharmacy.    On the day of discharge, the patient’s mood and affect are normal/euthymic. Patient denies depressed mood and anxiety. Patient is not hopeless. Sleep and appetite are also normal (at baseline). Pt’s motor performance and productivity of speech are WNL. Pt denies symptoms of psychosis including AH/VH with no apparent delusions (or is at baseline/not preoccupied with delusions). Patient denies S/H/I/I/P.    There were no behavioral problems on the unit. Patient did not become agitated, did not require emergent intramuscular medications or seclusion/restraints, and did not self-harm on the unit. Patient remained actively engaged in treatment and participated in individual, group, and milieu therapy. Patient got along appropriately with staff and peers. A family meeting was held prior to discharge for safety planning and disposition planning. Family is supportive and available.    Patient was provided with extensive psychoeducation on treatment options and motivational counseling targeting healthy life-style. Patient was instructed on actions for crisis situations, understood and agreed to follow instructions for handling crisis, including coming to ER or calling 911 should the patient or [his/her] family feel that [he/she] is in danger of hurting self or others. Patient also was given Suicide Prevention Lifeline number 1-689-008-1088 and provided with instructions on its use.    Medically, during this hospitalization patient remained stable.    Suicidal and aggression risk assessments were performed on the day of discharge. The patient is at elevated chronic risk of self-harm due to an underlying Schizoaffective disorder. She is not actively suicidal, manic, or inebriated, making her appropriate for less restrictive treatment as an outpatient without need for continued inpatient hospitalization.   Risk factors: schizoaffective diagnosis  Protective factors: no prior SAs, seeking care, future oriented, responsibility to family/others, pets, limited access to lethal means, social support, engagement in treatment. Immediate risk was minimized by inpatient admission to a safe environment with appropriate supervision and limited access to lethal means. Future risk was minimized before discharge by treatment of anxiety/depressive symptoms, maximizing outpatient support, providing relevant patient education, discussing emergency procedures, and ensuring close follow-up. Patient denies all suicidal and aggressive/homicidal ideation, intent and plan, and, in view of demonstrated clinical improvement, is not judged to be an acute danger to self or others at this time.    Although the patient remains at their chronic baseline risk of self-harm, such risk cannot be further ameliorated by continued inpatient treatment and the patient is therefore appropriate for discharge.    Patient has made clinically meaningful progress during his hospitalization and has clearly benefited from medications. On day of discharge, the patient has improved significantly and no longer requires inpatient treatment and care. Pt will be discharged and follow-up with partial hospitalization.    Patient will be discharged with the following DSM5 Diagnoses: Schizoaffective disorder    Patient will be discharged on the following medications: Zyprexa 30mg qHS, Buspar 30mg BID, Levothyroxine 12.5mg, Vorioxetine 20mg, Gabapentin 600mg TID    Email handoff was given to Dr. Romero, including discussion of hospital course, treatment, and medications. The patient’s appointment is on 7/22/22 with PHP. Patient is a 55 yo F currently domiciled with mother, employed, currently single w/PMH of MVP, congenital neutropenia, hypothyroidism, PPHx of schizoaffective disorder (diagnosed 2009), multiple prior hospitalizations, last hospitalization at [facility] in 2014, hx of TMS currently on maintenance treatment w/ last treatment 3 weeks ago brought in BIB brother (Nilesh Mendez) d/t 8 day history of paranoia.    Dates of Hospitalization: 6/22-7/20/22    During admission patient was initially restarted on her home medications: Rexulti 4 mg PO QD (increased outpt on 6/20/22), buspar 30 mg PO BID, klonopin 1 mg PO PRN BID, gabapentin 600 mg PO TID, synthroid 12.5 mg PO QD, and Trintellix 20 mg PO QD. Patient was placed on 20 of Trintellix as patient was on Trintellix 60 mg, but was decreased to FDA approved 20 mg PO QD. Wellbutrin was d/c'ed as it may exacerbate psychosis and pt stated it did not work for her. Rexulti 4 mg continued as outpt increased to 4 mg 2 days prior to admission. Patient was also started on Zyprexa, as prior [facility] 2014 admission indicated pt did well on Zyprexa. Outpt provider additionally stated pt did well on combo of Rexulti 4 mg and Zyprexa 20 mg in the past and patient was initiated on Zyprexa 5 mg PO QHS. Zyprexa was titrated up to 30mg (EKG completed at 20mg to check QTc, which was normal). Patient was also tapered off Rexulti given limited efficacy and goal to minimize polypharmacy.    The patient initially was disorganized and paranoid, with delusions that her family had been replaced by imposters and that her belongings would be stolen. She expressed distrust of her mother and brother. With titration of Zyprexa, these psychotic symptoms improved significantly. She had fair insight into her illness by the end of hospitalization, recognizing that she was paranoid on admission. She intermittently expressed a desire not to be "dummied up" with high doses of antipsychotic medication, but ultimately she agreed to continue taking Zyprexa as an outpatient. She agreed to discuss medication concerns with her outpatient providers. Her brother felt that she had improved significantly, though not completely to baseline, and he agreed with the plan for discharge and follow-up in PHP.     On the day of discharge, the patient’s mood and affect are normal/euthymic. Patient denies depressed mood and anxiety. Patient is not hopeless. Sleep and appetite are also normal (at baseline). Pt’s motor performance and productivity of speech are WNL. Pt denies symptoms of psychosis including AH/VH with no apparent delusions (or is at baseline/not preoccupied with delusions). Patient denies S/H/I/I/P.    There were no behavioral problems on the unit. Patient did not become agitated, did not require emergent intramuscular medications or seclusion/restraints, and did not self-harm on the unit. Patient remained actively engaged in treatment and participated in individual, group, and milieu therapy. Patient got along appropriately with staff and peers. A family meeting was held prior to discharge for safety planning and disposition planning. Family is supportive and available.    Patient was provided with extensive psychoeducation on treatment options and motivational counseling targeting healthy life-style. Patient was instructed on actions for crisis situations, understood and agreed to follow instructions for handling crisis, including coming to ER or calling 911 should the patient or [his/her] family feel that [he/she] is in danger of hurting self or others. Patient also was given Suicide Prevention Lifeline number 0-983-009-0820 and provided with instructions on its use.    Medically, during this hospitalization patient remained stable.    Suicidal and aggression risk assessments were performed on the day of discharge. The patient is at elevated chronic risk of self-harm due to an underlying Schizoaffective disorder. She is not actively suicidal, manic, or inebriated, making her appropriate for less restrictive treatment as an outpatient without need for continued inpatient hospitalization.   Risk factors: schizoaffective diagnosis  Protective factors: no prior SAs, seeking care, future oriented, responsibility to family/others, pets, limited access to lethal means, social support, engagement in treatment. Immediate risk was minimized by inpatient admission to a safe environment with appropriate supervision and limited access to lethal means. Future risk was minimized before discharge by treatment of anxiety/depressive symptoms, maximizing outpatient support, providing relevant patient education, discussing emergency procedures, and ensuring close follow-up. Patient denies all suicidal and aggressive/homicidal ideation, intent and plan, and, in view of demonstrated clinical improvement, is not judged to be an acute danger to self or others at this time.    Although the patient remains at their chronic baseline risk of self-harm, such risk cannot be further ameliorated by continued inpatient treatment and the patient is therefore appropriate for discharge.    Patient has made clinically meaningful progress during his hospitalization and has clearly benefited from medications. On day of discharge, the patient has improved significantly and no longer requires inpatient treatment and care. Pt will be discharged and follow-up with partial hospitalization.    Patient will be discharged with the following DSM5 Diagnoses: Schizoaffective disorder    Patient will be discharged on the following medications: Zyprexa 30mg qHS, Buspar 30mg BID, Levothyroxine 12.5mg, Vorioxetine 20mg, Gabapentin 600mg TID    Email handoff was given to Dr. Romero, including discussion of hospital course, treatment, and medications. The patient’s appointment is on 7/22/22 with PHP.

## 2022-07-20 NOTE — BH DISCHARGE NOTE NURSING/SOCIAL WORK/PSYCH REHAB - PATIENT PORTAL LINK FT
You can access the FollowMyHealth Patient Portal offered by [facility] by registering at the following website: [email or web address]. By joining [facility]’s FollowMyHealth portal, you will also be able to view your health information using other applications (apps) compatible with our system.

## 2022-07-20 NOTE — BH INPATIENT PSYCHIATRY PROGRESS NOTE - NSBHCHARTREVIEWVS_PSY_A_CORE FT
Vital Signs Last 24 Hrs  T(C): 36.7 (07-20-22 @ 06:14), Max: 36.7 (07-20-22 @ 06:14)  T(F): 98 (07-20-22 @ 06:14), Max: 98 (07-20-22 @ 06:14)  HR: --  BP: --  BP(mean): --  RR: --  SpO2: 100% (07-20-22 @ 06:14) (100% - 100%)    Orthostatic VS  07-20-22 @ 06:14  Lying BP: --/-- HR: --  Sitting BP: 138/87 HR: 85  Standing BP: 134/89 HR: 91  Site: --  Mode: --   normal

## 2022-07-20 NOTE — BH INPATIENT PSYCHIATRY DISCHARGE NOTE - HPI (INCLUDE ILLNESS QUALITY, SEVERITY, DURATION, TIMING, CONTEXT, MODIFYING FACTORS, ASSOCIATED SIGNS AND SYMPTOMS)
Per ED assessment: "Patient is a 57 yo F currently domiciled with mother, employed, currently single w/PMH of MVP, congenital neutropenia, hypothyroidism, PPHx of schizoaffective disorder (diagnosed 2009), multiple prior hospitalizations, last hospitalization at [facility] in 2014, hx of TMS currently on maintenance treatment w/ last treatment 3 weeks ago brought in BIB brother (Nilesh Mendez) d/t 8 day history of paranoia.    Patient reports that she was brought to the hospital by "the lady and the jackson" but she's not sure why. She's disorganized in TP and required to be redirected multiple times throughout the interview to get an understand of symptoms/timeline.      She admits that she has been feeling depressed for the past two weeks. She says that her symptoms started when the "hazing" began , but patient did not elaborate. She says that she is being "tormented and sabotaged" but unable to specify by whom. She says that there are microphones in her bedroom that tell her to "drop dead" and want to hurt her, but do not tell her how.  Patient has been unable to sleep for the past 48H because there is "caffeine in the vents". She has also not been able to shower, as she fears that there is something being sprayed on her. Patient has not been eating over the past few days, as she is unsure what she is being fed. Last meal was yesterday. Patient denied SI to writer, but told attending that she was experiencing passive SI but denies intent and plan. Patient denies VH. She says that she has remained adherent to medications, but refers to her medications as drugs and says that psychiatrists "take your money". Patient believes her symptoms started when she contracted COVID-19 3 weeks ago. Symptom onset: 6/2/22. Patient was treated for Covid with monoclonal Ab infusion on 6/4/22. Patient is boosted x2 with Moderna.     Patient currently lives at home with "Margaret Mendez." When asked patient if this was her mother, patient reports that she was dropped off at Margaret's house as a child and was adopted. She says that Margaret has been confusing her for her entire life with "double talk, hating & cursing" at her. She says that this has been ongoing since childhood. Patient states that she has not been able to work because of her mother. 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 Patient did not elaborate on her relation to Gibson and Debby or who they were.     Patient does not report nor exhibit any signs of kiran, including irritable or elevated mood, grandiosity, pressured speech, risk-taking behaviors, increase in productivity or agitation. Patient denies any HI, violent thoughts.    Collateral from Brother Nilesh Doe , he says that his sister began decompensating 8 days ago. He says that she has been delusional, saying that people are "laughing at her". She has also been paranoid about him, referring to him only as "jackson." He says that her symptoms have been similar to previous presentations in the past. Last hospitalization was 6-7 years at [facility]. He says that patient has been cared for by mother who fell today and is currently being evaluated at [facility].     Per outpatient psychiatrist Dr. Smalls, patient has been followed for the past 12-13 years. Psychiatrist states that when patient has had chronic history of paranoia, "psychotic body dysphoria", social isolation, and lack of companionship. He says that her symptoms have been exacerbated by the social isolation of the pandemic, however patient has maintained employment on [address] and had obtained an [employer]. When her perseverative thoughts on her relationship status and physical appearance worsen, she can become more paranoid and delusional. Patient was initially stabilized on zyprexa after last hospitalization. Psychiatry states that patient was amenable to switching to Rexulti and had been on a stable dose for the past 3 years until sept of 2021, as the patient began to experience more paranoid symptoms. At this time, psychiatrist increased dose to 3mg qd. He says that he last saw the patient on 6/20/22 due to concerns from family. At this visit, psychiatrist increased dose to 4mg qd. Psychiatrist also notes a longstanding history of difficulty sleeping, stating that patient has tried klonopin, ambien, and belsomra. Patient tries to limit klonopin use due to SE of sleepiness and impairment to remote memory."

## 2022-07-20 NOTE — BH INPATIENT PSYCHIATRY DISCHARGE NOTE - ATTENDING DISCHARGE PHYSICAL EXAMINATION:
Pt appears stated age, thin, dressed in hospital gown, fair hygiene and grooming. No psychomotor abnormalities noted. Calm and cooperative today with good eye contact with some moments of hypervigilance but general improvement. Speech is fluent, normal in rate and volume with some latency. Mood is "fine" Affect is constricted, and anxious but improved from admission. Thought process is overinclusive, concrete, more organized. Thought content: denies delusions (not concerned that mother or brother are lying to her or trying to steal her money). Denies SIIP and HIIP. Denies AH/VH. Insight is poor. Judgement is fair. Impulse control is appropriate to setting.

## 2022-07-20 NOTE — BH INPATIENT PSYCHIATRY DISCHARGE NOTE - OTHER PAST PSYCHIATRIC HISTORY (INCLUDE DETAILS REGARDING ONSET, COURSE OF ILLNESS, INPATIENT/OUTPATIENT TREATMENT)
EMR reports "Patient is a 57 yo F currently domiciled with mother, employed, currently single w/PMH of MVP, congenital neutropenia, hypothyroidism, PPHx of schizoaffective disorder (diagnosed 2009), multiple prior hospitalizations, last hospitalization at [facility] in 2014, hx of TMS currently on maintenance treatment w/ last treatment 3 weeks ago brought in BIB brother (Nilesh Mendez) d/t 8 day history of paranoia."  Pt was hospitalized due to disorganized thoughts and behavior. Pt provided verbal consent to communicate with brother.

## 2022-07-20 NOTE — BH INPATIENT PSYCHIATRY PROGRESS NOTE - MSE UNSTRUCTURED FT
Pt appears stated age, thin, dressed in hospital gown, fair hygiene and grooming. No psychomotor abnormalities noted. Calm and cooperative today with generally good eye contact with some moments of hypervigilance. Speech is fluent, normal in rate and volume with some latency. Mood is "fine" Affect is constricted, paranoid, anxious but appears less anxious and more relaxed than previously. Thought process is overinclusive, concrete, but more organized (generally more linear and able to follow conversation with occasionally tangential statements). Thought content: denies delusions (not concerned that mother or brother are lying to her or trying to steal her money). Denies SIIP and HIIP. Denies AH/VH. Insight is poor. Judgement is poor. Impulse control is appropriate to setting.

## 2022-07-20 NOTE — BH DISCHARGE NOTE NURSING/SOCIAL WORK/PSYCH REHAB - MODE OF TRANSPORTATION
Pt will return home , brother will be taking her home, address returning to [address], [place] [address]/Ambulatory

## 2022-07-20 NOTE — BH INPATIENT PSYCHIATRY PROGRESS NOTE - NSBHASSESSSUMMFT_PSY_ALL_CORE
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 schizophrenia, several prior hospitalizations, and consistent outpatient psychiatric treatment including TMS presenting with acute psychotic decompensation in the context of medication non-adherence.     Patient initially presented with multiple complex delusions including Capgras, delusions of surveillance and posing, referential delusions with related AH, thought disorder, and psychotic anxiety. Intensity of delusions varies day by day and throughout each day but has overall been less intense since starting olanzapine.     7/20: Patient has had several days showing improvement on medications and decreased paranoia/anxiety. No longer worried brother and mother are lying to her or stealing her money. Adherent with medications, and agreeable to DC to [facility].     Plan:  1) Psychosis   - dc brexpiprazole  - c/w olanzapine to 30mg  - EKG 7/12 completed  - *note: patient not candidate for clozapine due to ANC <1000  2) Mood  - Continue vortioxetine 20mg (lowered from 60mg)  3) Anxiety  - Continue buspirone 30mg BID  - Continue gabapentin 600mg TID  - Continue Klonopin 1mg BID prn anxiety   4) Disposition  - transfer to  given positive COVID pcr  - Return to outpatient care under Dr. Smalls  - Return to home with mother and monitoring by brother  - scheduled for court on 7/26 after requesting discharge

## 2022-07-20 NOTE — BH DISCHARGE NOTE NURSING/SOCIAL WORK/PSYCH REHAB - DISCHARGE INSTRUCTIONS AFTERCARE APPOINTMENTS
In order to check the location, date, or time of your aftercare appointment, please refer to your Discharge Instructions Document given to you upon leaving the hospital.  If you have lost the instructions please call [phone]

## 2022-07-20 NOTE — BH DISCHARGE NOTE NURSING/SOCIAL WORK/PSYCH REHAB - NSCDUDCCRISIS_PSY_A_CORE
[facility]  1 (813) [facility]-WELL ([phone])  Text "WELL" to 53462  Website: www.[employer]/.[facility] 1 (235) 341-QYWF (5061) Website: www.safehorizon.org/.National Suicide Prevention Lifeline 7 (210) 373-1572/.  [facility]  1 (096) LIFENET (258-4571)/.  [facility]  [address] [place] [id number] [phone]   Hours:  Monday through Friday from 9 AM to 3 PM/.  [facility] [facility] - [facility] Crisis Line  2 [phone], Option 1

## 2022-07-20 NOTE — BH SCALES AND SCREENS - NSBPRSSUSPIC_PSY_ALL_CORE
6 = Severe – definite, delusion(s) of reference or persecution that is (are) not wholly pervasive (e.g., an encapsulated delusion)
4 = Moderate – more frequent suspiciousness, or transient ideas of reference

## 2022-07-20 NOTE — BH INPATIENT PSYCHIATRY PROGRESS NOTE - NSBHFUPINTERVALHXFT_PSY_A_CORE
Patient case reviewed with treatment team. No acute events over night. Compliant with meds.    On approach patient is agreeable to speaking with team, and on assessment, is calm and cooperative, but still displays some hypervigilance towards other members of the team during interview. States she is doing well, and her mood is "fine". Patient has been speaking with her brother and denies any paranoia towards him, and states he is very reliable and patient with her and she is thankful. Denies any auditory/visual hallucinations or ideas of reference. States she is ready to go home, and is still agreeable to starting partial. Team provided psychoeducation on the importance of medication adherence, and patient relays understanding of the importance and role of medications in treatment. Patient still make some delusional statements, stating concern that the medications have changed her face/eyes, but this may represent patient's new baseline as patient is functional (able to do ADLs) and generally less paranoid.     Spoke with patient's brother and confirmed discharge plans. Brother feels comfortable with patient coming home. Confirms no access to guns or weapons in the home.

## 2022-07-20 NOTE — BH DISCHARGE NOTE NURSING/SOCIAL WORK/PSYCH REHAB - NSDCPRGOAL_PSY_ALL_CORE
During the current hospitalization, patient has been addressing psychiatric rehabilitation goals pertaining to identifying coping skills that assist in decreasing low mood, paranoia and delusions. Patient has demonstrated progress towards psychiatric rehabilitation goals during the current hospitalization. Patient exhibited progress through participating in individual and group therapy and developing additional coping skills to assist with thought organization and reality testing. Pt was able to identify warning signs to prevent relapse. Pt has been managing negative emotions with radical acceptance, emotional regulation, positive distraction and self soothing skills. Pt utilized coping skills such as DBT check the facts, mindfulness, radical acceptance, singing, grounding, journaling, spirituality, self- soothing. Writer encouraged patient to continue to strengthen and practice effective skills. Patient met goal of identifying coping skills as evidenced by reporting these skills have helped her during current hospitalization. Patient was compliant with medication during current hospitalization. Patient was provided with a Press Ganey survey prior to discharge. Pt identified her community and michelle as protective factors. Denied SI/HI.

## 2022-07-20 NOTE — BH SCALES AND SCREENS - NSBPRSBLUNTAFFECT_PSY_ALL_CORE
2 = Very Mild – e.g., occasionally seems indifferent to material that is usually accompanied by some show of emotion
3 = Mild – e.g., somewhat diminished facial expression, or somewhat monotonous voice or somewhat restricted gestures

## 2022-07-20 NOTE — BH INPATIENT PSYCHIATRY DISCHARGE NOTE - NSDCCPCAREPLAN_GEN_ALL_CORE_FT
PRINCIPAL DISCHARGE DIAGNOSIS  Diagnosis: Schizo-affective type schizophrenia, subchronic state with acute exacerbation  Assessment and Plan of Treatment:

## 2022-07-20 NOTE — BH SCALES AND SCREENS - NSBPRSCONCDISORG_PSY_ALL_CORE
2 = Very Mild - e.g., somewhat vague, but of doubtful clinical significance
4 = Moderate - e.g., occasional irrelevant statements, infrequent use of neologisms, or moderate loosening of associations

## 2022-07-20 NOTE — BH DISCHARGE NOTE NURSING/SOCIAL WORK/PSYCH REHAB - NSDCADDINFO1FT_PSY_ALL_CORE
Please be informed that you have been provided with an appointment with Partial Program. You will receive a call by the team to provide you information to join Zoom login. FYI: You will be receiving a call from Patient Access Services to complete registration prior to intake. This may be a day or two before, or the morning of intake to complete consent for treatment and to verify demographic information.

## 2022-07-20 NOTE — BH CHART NOTE - NSEVENTNOTEFT_PSY_ALL_CORE
Collateral (spoke with brother):  Reports he feels sister has improved since hospitalization. States when speaks to sister over the phone she does sound more similar to baseline. States last week patient still sounded paranoid over the phone, but on Friday when he spoke with sister he states she sounded "normal", and since has been more stable. States he feels comfortable with patient coming home and agrees with plan to attend partial after discharge.
Phone call with Nilesh (brother; [phone]). Discussed treatment plan as well as CG's function at baseline. He stated that she is worried at baseline but that the fixations on finances and "prostitution" are signs that she is not doing well. He said that she has been having periods of time where she is thinking clearly and making sense but then periods where this lapses. In their most recent call, she had been upset about finances again. Nilesh said that CG will likely be discharged prior to their mother with whom she lives, so Nilesh is willing to be present once she is eventually discharged. He agreed with medication changes (decreasing brexpiprazole and increasing olanzapine).

## 2022-07-20 NOTE — BH SCALES AND SCREENS - NSBPRSUNUTHOCON_PSY_ALL_CORE
5 = Moderately Severe – full delusional conviction, but delusion(s) has only occasional impact on behavior
1 = Not reported (Not Present)

## 2022-07-20 NOTE — BH DISCHARGE NOTE NURSING/SOCIAL WORK/PSYCH REHAB - NSDCINSTRUCTUNIT_PSY_ALL_CORE_FT
[facility] DAILY PROGRESS NOTE  SUBJECTIVE:   Patient seen and examined. She gets out of breath with exertion.      OBJECTIVE:    VITAL SIGNS:     Vital Last Value 24 Hour Range   Temperature 98.8 °F (37.1 °C) (01/08/21 1347) Temp  Min: 97.5 °F (36.4 °C)  Max: 98.8 °F (37.1 °C)   Pulse 78 (01/08/21 1347) Pulse  Min: 56  Max: 78   Respiratory 18 (01/08/21 1347) Resp  Min: 18  Max: 18   Non-Invasive  Blood Pressure 134/62 (01/08/21 1347) BP  Min: 134/62  Max: 180/82   Pulse Oximetry 91 % (01/08/21 1347) SpO2  Min: 91 %  Max: 97 %     Vital Today Admitted   Weight 86.5 kg (190 lb 11.2 oz) (01/05/21 1700) Weight: 86.2 kg (190 lb) (01/05/21 1354)   Height N/A Height: 5' 5\" (165.1 cm) (01/05/21 1354)   Body Mass Index N/A BMI (Calculated): 31.62 (01/05/21 1354)     INTAKE/OUTPUT:      Intake/Output Summary (Last 24 hours) at 1/8/2021 1419  Last data filed at 1/8/2021 1408  Gross per 24 hour   Intake 690 ml   Output --   Net 690 ml         PHYSICAL EXAM:    Physical Exam   Constitutional: She appears well-developed. No distress.   HENT:   Head: Normocephalic.   Eyes: Pupils are equal, round, and reactive to light. Conjunctivae are normal.   Neck: Neck supple.   Cardiovascular: Normal rate and regular rhythm.   Pulmonary/Chest: Breath sounds normal. She has no wheezes.   Decreased breath sounds no wheezing.   Abdominal: Soft. Bowel sounds are normal. She exhibits no distension. There is no abdominal tenderness.   Musculoskeletal:         General: No edema.   Skin: Skin is warm and dry.   Psychiatric: She has a normal mood and affect.      LABORATORY DATA:    CHEM7:  Sodium (mmol/L)   Date Value   01/08/2021 137   11/16/2020 140     Potassium (mmol/L)   Date Value   01/08/2021 4.2   11/16/2020 4.0     Chloride (mmol/L)   Date Value   01/08/2021 100   11/16/2020 104     Glucose (mg/dL)   Date Value   01/08/2021 82   11/16/2020 96     CALCIUM (mg/dL)   Date Value   11/16/2020 9.6     Calcium (mg/dL)   Date Value   01/08/2021 8.7      Carbon Dioxide (mmol/L)   Date Value   01/08/2021 31   11/16/2020 31     BUN (mg/dL)   Date Value   01/08/2021 28 (H)   11/16/2020 26 (H)     Creatinine (mg/dL)   Date Value   01/08/2021 0.84   11/16/2020 0.80       CBC:  WBC (K/mcL)   Date Value   01/08/2021 6.8   11/16/2020 10.4     RBC (mil/mcL)   Date Value   01/08/2021 4.62   11/16/2020 4.64     HCT (%)   Date Value   01/08/2021 44.0   11/16/2020 44.1     HGB (g/dL)   Date Value   01/08/2021 13.2   11/16/2020 13.5     PLT (K/mcL)   Date Value   01/08/2021 191   11/16/2020 253      Coags:  INR (no units)   Date Value   01/08/2021 2.0   01/04/2021 2.6        Hepatic Panel:  AST/SGOT (Units/L)   Date Value   11/16/2020 18     GOT/AST (Units/L)   Date Value   01/08/2021 22     ALT/SGPT (Units/L)   Date Value   11/16/2020 19     GPT/ALT (Units/L)   Date Value   01/08/2021 19     No results found for: GGTP  ALK PHOSPHATASE (Units/L)   Date Value   11/16/2020 92     Alkaline Phosphatase (Units/L)   Date Value   01/08/2021 81     TOTAL BILIRUBIN (mg/dL)   Date Value   11/16/2020 0.5     Bilirubin, Total (mg/dL)   Date Value   01/08/2021 0.3         IMAGING STUDIES:    XR CHEST PA OR AP 1 VIEW   Final Result       Coarse interstitial opacities bilaterally unchanged since 11/16/2020 likely related to interstitial fibrosis with active interstitial disease not excluded.  Portable study limits evaluation of heart size which is felt to be mildly enlarged.  Calcified    atherosclerotic aortic arch.         Electronically Signed by: SIMON SHANE M.D.    Signed on: 1/5/2021 4:31 PM                                      ASSESSMENT/PLAN:       Acute on chronic hypoxic respiratory failure secondary to COVID-19 with underlying COPD exacerbation - improved  - Oxygenating on 3-4 L, monitor closely  - CXR (1/5) showed coarse interstitial opacities bilaterally unchanged since 11/16/2020 likely related to interstitial fibrosis with active interstitial disease not excluded.  -  COVID-19 Test was positive (final 1/5)  - Blood Culture x2 with NGTD, pending  - Inflammatory Markers (1/5 and 1/6): Procal and Lactic Acid Venous WNL  - IV remdesivir course: on day 3/5  - Steroid therapy with IV Decadron  - Resp support with Singulair, Trelegy Ellipta, PRN albuterol and guaifenesin  - Supplements: vitamin C, vitamin D, and vitamin B1  - AC with Coumadin pharm to dose (INR 2.0)  - Self-proning encouraged  - Contact and droplet precautions in place   - Pulmonology (Dr. Carlos) following  -  following     Primary hypertension  - BP controlled in the 130/60s today with losartan and HCTZ     Osteoarthritis - Pain control ordered prn  PHAM - Continue with CPAP  Obesity (BMI 31.73) - Dietary and lifestyle modifications encouraged  History of PE - Continue with Coumadin pharm to dose (INR 2.0)  History of gasttic ulcer and abdominal tumor    Code Status    Code Status: Not on file    DVT PPx: Coumadin (INR 2.0)    DISPOSITION: Continue IV steroid and remdesivir day 3/5. Oxygenating on 4L oxygen.  Possible discharge tomorrow if remains stable.  Left voicemail for son  PCP:  Vinnie Pendleton MD     Charting performed by Gudelia Brink for Dr. Janice Briggs    All medical record entries made by the scribe were at my direction. I have reviewed the chart and agree that the record accurately reflects my personal performance of the history, physical exam, hospital course, and assessment and plan.       [phone]

## 2022-07-21 VITALS — OXYGEN SATURATION: 101 % | TEMPERATURE: 98 F

## 2022-07-21 PROCEDURE — 99231 SBSQ HOSP IP/OBS SF/LOW 25: CPT | Mod: GC

## 2022-07-21 RX ADMIN — GABAPENTIN 600 MILLIGRAM(S): 400 CAPSULE ORAL at 08:46

## 2022-07-21 RX ADMIN — VORTIOXETINE 20 MILLIGRAM(S): 5 TABLET, FILM COATED ORAL at 08:47

## 2022-07-21 RX ADMIN — Medication 30 MILLIGRAM(S): at 08:46

## 2022-07-21 RX ADMIN — Medication 12.5 MICROGRAM(S): at 06:09

## 2022-07-21 RX ADMIN — PANTOPRAZOLE SODIUM 40 MILLIGRAM(S): 20 TABLET, DELAYED RELEASE ORAL at 07:06

## 2022-07-21 NOTE — BH INPATIENT PSYCHIATRY PROGRESS NOTE - NSBHFUPINTERVALCCFT_PSY_A_CORE
f/u psychosis
Psychosis and paranoia 
f/u psychosis
Psychosis and paranoia 
f/u psychosis
f/u psychosis
Psychosis and paranoia 
Psychosis and paranoia 
f/u psychosis
f/u psychosis
Psychosis and paranoia 
f/u psychosis
Psychosis and paranoia 
Psychosis and paranoia 
f/u psychosis
Psychosis and paranoia

## 2022-07-21 NOTE — BH INPATIENT PSYCHIATRY PROGRESS NOTE - NSBHATTESTSTAFFAMEND_PSY_A_CORE
I have personally seen and examined this patient. I fully participated in the care of this patient. I have made amendments to the documentation where appropriate and otherwise agree with the history, physical exam, and plan as documented by the

## 2022-07-21 NOTE — BH INPATIENT PSYCHIATRY PROGRESS NOTE - NSTXMEDICINTERMD_PSY_ALL_CORE
consider MICHELLE, psychoed

## 2022-07-21 NOTE — BH INPATIENT PSYCHIATRY PROGRESS NOTE - NSTXMEDICDATEEST_PSY_ALL_CORE
30-Jun-2022
30-Jun-2022
14-Jul-2022
30-Jun-2022
30-Jun-2022
14-Jul-2022
30-Jun-2022
14-Jul-2022
30-Jun-2022
14-Jul-2022
14-Jul-2022
30-Jun-2022
14-Jul-2022

## 2022-07-21 NOTE — BH INPATIENT PSYCHIATRY PROGRESS NOTE - NSTXPSYCHOGOAL_PSY_ALL_CORE
Will identify 2 coping skills that assist with focus on reality
Will identify 2 coping skills that assist with focus on reality
Will verbalize 1 strategy to successfully cope with psychotic symptoms
Will identify 2 coping skills that assist with focus on reality
Will verbalize 1 strategy to successfully cope with psychotic symptoms
Will identify 2 coping skills that assist with focus on reality
Will identify 2 coping skills that assist with focus on reality
Will verbalize 1 strategy to successfully cope with psychotic symptoms
Will identify 2 coping skills that assist with focus on reality
Will verbalize 1 strategy to successfully cope with psychotic symptoms
Will identify 2 coping skills that assist with focus on reality
Will verbalize 1 strategy to successfully cope with psychotic symptoms
Will identify 2 coping skills that assist with focus on reality
Will verbalize 1 strategy to successfully cope with psychotic symptoms
Will verbalize 1 strategy to successfully cope with psychotic symptoms

## 2022-07-21 NOTE — BH INPATIENT PSYCHIATRY PROGRESS NOTE - NSTXDCOPNODATEEST_PSY_ALL_CORE
15-Jul-2022
15-Jul-2022
07-Jul-2022
24-Jun-2022
07-Jul-2022
15-Jul-2022
07-Jul-2022
07-Jul-2022
15-Jul-2022
24-Jun-2022
07-Jul-2022
24-Jun-2022
07-Jul-2022
24-Jun-2022
01-Jul-2022
07-Jul-2022
01-Jul-2022
24-Jun-2022

## 2022-07-21 NOTE — BH INPATIENT PSYCHIATRY PROGRESS NOTE - NSTXDEPRESDATETRGT_PSY_ALL_CORE
06-Jul-2022
06-Jul-2022
20-Jul-2022
06-Jul-2022
20-Jul-2022
20-Jul-2022
06-Jul-2022
20-Jul-2022
06-Jul-2022
20-Jul-2022
20-Jul-2022
06-Jul-2022

## 2022-07-21 NOTE — BH INPATIENT PSYCHIATRY PROGRESS NOTE - NSTXDEPRESINTERMD_PSY_ALL_CORE
medication management 

## 2022-07-21 NOTE — BH INPATIENT PSYCHIATRY PROGRESS NOTE - NSTXDCOPNODATETRGT_PSY_ALL_CORE
22-Jul-2022
01-Jul-2022
22-Jul-2022
14-Jul-2022
08-Jul-2022
14-Jul-2022
22-Jul-2022
14-Jul-2022
14-Jul-2022
22-Jul-2022
14-Jul-2022
01-Jul-2022
08-Jul-2022
01-Jul-2022

## 2022-07-21 NOTE — BH INPATIENT PSYCHIATRY PROGRESS NOTE - NSBHMETABOLIC_PSY_ALL_CORE_FT
BMI: BMI (kg/m2): 18.9 (06-22-22 @ 19:07)  HbA1c: A1C with Estimated Average Glucose Result: 5.1 % (06-24-22 @ 07:55)    Glucose:   BP: 126/83 (07-19-22 @ 06:51) (126/83 - 126/83)  Lipid Panel: Date/Time: 06-23-22 @ 08:34  Cholesterol, Serum: 177  Direct LDL: --  HDL Cholesterol, Serum: 60  Total Cholesterol/HDL Ration Measurement: --  Triglycerides, Serum: 48

## 2022-07-21 NOTE — BH INPATIENT PSYCHIATRY PROGRESS NOTE - NSTXDISORGGOAL_PSY_ALL_CORE
Will demonstrate purposeful and predictable thoughts/behaviors by making a request
Will demonstrate the ability to maintain reality orientation and communicate clearly with others during 2 conversations with staff daily
Will demonstrate the ability to maintain reality orientation and communicate clearly with others during 2 conversations with staff daily
Will demonstrate purposeful and predictable thoughts/behaviors by making a request
Will demonstrate purposeful and predictable thoughts/behaviors by making a request
Will demonstrate the ability to maintain reality orientation and communicate clearly with others during 2 conversations with staff daily
Will demonstrate the ability to maintain reality orientation and communicate clearly with others during 2 conversations with staff daily
Will demonstrate purposeful and predictable thoughts/behaviors by making a request
Will demonstrate the ability to maintain reality orientation and communicate clearly with others during 2 conversations with staff daily
Will demonstrate the ability to maintain reality orientation and communicate clearly with others during 2 conversations with staff daily
Will make at least 3 goal and reality oriented statements during therapy
Will make at least 3 goal and reality oriented statements during therapy
Will demonstrate purposeful and predictable thoughts/behaviors by making a request
Will make at least 3 goal and reality oriented statements during therapy
Will make at least 3 goal and reality oriented statements during therapy
Will demonstrate the ability to maintain reality orientation and communicate clearly with others during 2 conversations with staff daily
Will demonstrate purposeful and predictable thoughts/behaviors by making a request
Will demonstrate the ability to maintain reality orientation and communicate clearly with others during 2 conversations with staff daily
Will make at least 3 goal and reality oriented statements during therapy

## 2022-07-21 NOTE — BH INPATIENT PSYCHIATRY PROGRESS NOTE - NSBHATTESTBILLINGAW_PSY_A_CORE
[id number]-Wmyyjquybl Inpatient care - moderate complexity - 25 minutes
84868-Rmsgomqplh Inpatient care - moderate complexity - 25 minutes
74278-Yorfekvcoz Inpatient care - moderate complexity - 25 minutes
04520-Sehgzvslpo Inpatient care - low complexity - 15 minutes
30437-Cxwuayqurq Inpatient care - low complexity - 15 minutes
04644-Bldnzxwkab Inpatient care - low complexity - 15 minutes
14144-Imnshfoaah Inpatient care - moderate complexity - 25 minutes
76909-Ksubqoudex Inpatient care - moderate complexity - 25 minutes
40234-Iidmpyamdv Inpatient care - moderate complexity - 25 minutes
45951-Leikxenpxx Inpatient care - moderate complexity - 25 minutes
20820-Seeizrbqeg Inpatient care - low complexity - 15 minutes
53187-Ppctunneem Inpatient care - low complexity - 15 minutes
85022-Lsyapsshka Inpatient care - moderate complexity - 25 minutes
62969-Egdztkiylq Inpatient care - moderate complexity - 25 minutes
37764-Ajqymosjuy Inpatient care - low complexity - 15 minutes
71870-Cayztpfyhk Inpatient care - low complexity - 15 minutes
72990-Llhljewkvh Inpatient care - low complexity - 15 minutes
46962-Qutdpinqyo Inpatient care - low complexity - 15 minutes
83255-Tiyyirxtam Inpatient care - moderate complexity - 25 minutes
05911-Nyoqlikyjn Inpatient care - moderate complexity - 25 minutes
87985-Qyztpvfksm Inpatient care - low complexity - 15 minutes

## 2022-07-21 NOTE — BH INPATIENT PSYCHIATRY PROGRESS NOTE - NSICDXBHTERTIARYDX_PSY_ALL_CORE
R/O OCD (obsessive compulsive disorder)   F42.9  

## 2022-07-21 NOTE — BH INPATIENT PSYCHIATRY PROGRESS NOTE - NSTXDISORGDATEEST_PSY_ALL_CORE
14-Jul-2022
29-Jun-2022
14-Jul-2022
14-Jul-2022
29-Jun-2022
14-Jul-2022
29-Jun-2022
29-Jun-2022
14-Jul-2022
29-Jun-2022
14-Jul-2022
29-Jun-2022

## 2022-07-21 NOTE — BH INPATIENT PSYCHIATRY PROGRESS NOTE - NSTXDISORGPROGRES_PSY_ALL_CORE
No Change

## 2022-07-21 NOTE — BH INPATIENT PSYCHIATRY PROGRESS NOTE - NSBHATTESTTYPEVISIT_PSY_A_CORE
On-site Attending supervising JOSSELYN (99XXX codes)
JOSSELYN without on-site Attending supervision
Attending with Resident/Fellow/Student
JOSSELYN without on-site Attending supervision
Attending with Resident/Fellow/Student

## 2022-07-21 NOTE — BH INPATIENT PSYCHIATRY PROGRESS NOTE - NSTXDISORGINTERMD_PSY_ALL_CORE
medication management

## 2022-07-21 NOTE — BH INPATIENT PSYCHIATRY PROGRESS NOTE - NSTXDEPRESDATEEST_PSY_ALL_CORE
14-Jul-2022
29-Jun-2022
14-Jul-2022
29-Jun-2022
14-Jul-2022
29-Jun-2022
14-Jul-2022
29-Jun-2022
29-Jun-2022
14-Jul-2022
29-Jun-2022
29-Jun-2022
14-Jul-2022

## 2022-07-21 NOTE — BH INPATIENT PSYCHIATRY PROGRESS NOTE - NSICDXBHPRIMARYDX_PSY_ALL_CORE
Schizoaffective disorder-chronic with exacerbation   F25.9  

## 2022-07-21 NOTE — BH INPATIENT PSYCHIATRY PROGRESS NOTE - NSTXMEDICGOAL_PSY_ALL_CORE
Be able to describe the benefit of medication/treatment

## 2022-07-21 NOTE — BH INPATIENT PSYCHIATRY PROGRESS NOTE - NSTXPSYCHODATEEST_PSY_ALL_CORE
16-Jun-2022
16-Jun-2022
23-Jun-2022
23-Jun-2022
14-Jul-2022
23-Jun-2022
16-Jun-2022
16-Jun-2022
14-Jul-2022
14-Jul-2022
23-Jun-2022
30-Jun-2022
23-Jun-2022
23-Jun-2022
14-Jul-2022
23-Jun-2022
16-Jun-2022
16-Jun-2022

## 2022-07-21 NOTE — BH INPATIENT PSYCHIATRY PROGRESS NOTE - NSBHLEGALSTATUSCHANGE_PSY_ALL_CORE
Yes...
Yes...
No
Yes...
No
Yes...
Yes...
No
Yes...
No
Yes...
No
No
Yes...
No

## 2022-07-21 NOTE — BH INPATIENT PSYCHIATRY PROGRESS NOTE - NSTXDEPRESPROGRES_PSY_ALL_CORE
Improving

## 2022-07-21 NOTE — BH INPATIENT PSYCHIATRY PROGRESS NOTE - NSBHFUPINTERVALHXFT_PSY_A_CORE
Patient case reviewed with treatment team. No acute events over night. Compliant with meds.    On approach patient is sitting calmly in her room. Patient states she is ready to go home and agreeable to starting PHP tomorrow. Treatment team reviewed medications with patient and the importance of medication adherence. Patient denies any suicidal ideation, homicidal ideation or auditory/visual hallucinations. Patient feels comfortable going home with brother. Treatment team also confirmed with family and they are agreeable with patient returning home and confirmed that there are no firearms or weapons in the home.

## 2022-07-21 NOTE — BH INPATIENT PSYCHIATRY PROGRESS NOTE - NSBHCONTPROVIDER_PSY_ALL_CORE
No, attempted...
No, attempted...
Yes...
No, attempted...
Yes...
No, attempted...
Yes...
No, attempted...
No, attempted...
Yes...
No, attempted...
Yes...
No, attempted...

## 2022-07-21 NOTE — BH INPATIENT PSYCHIATRY PROGRESS NOTE - NSTXDEPRESGOAL_PSY_ALL_CORE
Report using a coping skill to overcome sadness and worry in order to socialize with peers daily
Report using a coping skill to overcome sadness and worry in order to socialize with peers daily
Report that he/she had enough energy and motivation to meet with a visitor daily
Report using a coping skill to overcome sadness and worry in order to socialize with peers daily
Exhibit improvements in self-grooming, hygiene, sleep and appetite
Report using a coping skill to overcome sadness and worry in order to socialize with peers daily
Report that he/she had enough energy and motivation to meet with a visitor daily
Report using a coping skill to overcome sadness and worry in order to socialize with peers daily
Report that he/she had enough energy and motivation to meet with a visitor daily
Report that he/she had enough energy and motivation to meet with a visitor daily
Report using a coping skill to overcome sadness and worry in order to socialize with peers daily
Exhibit improvements in self-grooming, hygiene, sleep and appetite

## 2022-07-21 NOTE — BH INPATIENT PSYCHIATRY PROGRESS NOTE - NSBHCONSBHPROVDETAILS_PSY_A_CORE  FT
Mateo Smalls -[phone] called, please see note on 6/23/22
Mateo Smalls -[phone] called, please see note on 6/23/22
Mateo Smalls -[phone] called, please see note on 6/23/22
Mateo Smalls -[phone] called, please see note on 6/23/22
Mateo Smalls -[phone] called, please see note on 6/23/22
Mateo Smalls -[phone] called, please see note on 6/23/22
Mateo Smalls -[phone] called, please see note on 6/23/22
Mateo Smalls -[phone] called, please see note on 6/23/22
Mateo Smalls -[phone] called, please see note on 6/23/22
Mateo Smalls -[phone] called, please see note on 6/23/22
Mateo Smalls -[phone] called, please see note on 6/23/22
Mateo Smalls -[phone] called, please see note on 6/23/22
Mateo Smalls -[phone] called, please see note on 6/23/22
Mateo Smalls -[phone] called, please see note on 6/23/22
Mateo Smalls -[phone] called, please see note on 6/23/22
Mateo Smalls -[phone] called, please see note on 6/23/22
Mateo Smalls -[phone] called, please see note on 6/23/22
Mateo Smalls -[phone] called, please see note on 6/23/22
Mateo Smalls -[phone] called, please see note on 6/23/22
Mateo Smalls -[phone] called, please see note on 6/23/22
Mateo Smalls -[phone] called, please see note on 6/23/22

## 2022-07-21 NOTE — BH INPATIENT PSYCHIATRY PROGRESS NOTE - NSTXDISORGDATETRGT_PSY_ALL_CORE
20-Jul-2022
06-Jul-2022
06-Jul-2022
20-Jul-2022
06-Jul-2022
06-Jul-2022
20-Jul-2022
06-Jul-2022
20-Jul-2022
06-Jul-2022
06-Jul-2022

## 2022-07-21 NOTE — BH INPATIENT PSYCHIATRY PROGRESS NOTE - NSBHCHARTREVIEWVS_PSY_A_CORE FT
Vital Signs Last 24 Hrs  T(C): 36.5 (07-21-22 @ 06:13), Max: 36.6 (07-20-22 @ 20:21)  T(F): 97.7 (07-21-22 @ 06:13), Max: 97.9 (07-20-22 @ 20:21)  HR: --  BP: --  BP(mean): --  RR: --  SpO2: 101% (07-21-22 @ 06:13) (100% - 101%)    Orthostatic VS  07-21-22 @ 06:13  Lying BP: --/-- HR: --  Sitting BP: 130/80 HR: 96  Standing BP: 128/78 HR: 82  Site: --  Mode: --  Orthostatic VS  07-20-22 @ 20:21  Lying BP: --/-- HR: --  Sitting BP: 133/71 HR: 82  Standing BP: 122/77 HR: 90  Site: --  Mode: --  Orthostatic VS  07-20-22 @ 06:14  Lying BP: --/-- HR: --  Sitting BP: 138/87 HR: 85  Standing BP: 134/89 HR: 91  Site: --  Mode: --

## 2022-07-21 NOTE — BH INPATIENT PSYCHIATRY PROGRESS NOTE - NSBHATTESTCOMMENTATTENDFT_PSY_A_CORE
The patient continues to have multiple delusions that influence her behavior, but they are less intense, and she is able to question them and challenge them to some degree.  Patient's brother indicates some of these delusions are present at baseline.  Patient has been eating well.  Visible on the unit, social with select peers.  The patient has been compliant with medications, tolerating them well.
The patient has some increase in preoccupation with delusions today.  She expressed paranoid and capgras delusions.  Has trouble reality testing, but can do so to some degree.  She has been eating and sleeping well.  Tolerating medications.  Will increase Zyprexa to 15mg hs.
Patient initially presented with multiple complex delusions including Capgras, delusions of surveillance and poisoning, referential delusions with related AH, thought disorder, and psychotic anxiety. Intensity of delusions varies throughout the day but has been less intense since starting olanzapine with associated recovery of thought process linearity. Overall, AH have been less frequently observable and her anxiety has improved. Though delusions remain (today pt with particular fixation on sexual delusions involving prostitution), at present they have little impact on pt's behavior and safety. Will monitor for continued and sustained improvement. Titrate Zyprexa to 20mg qhs.
Agree with resident findings
The patient continues to be influenced by multiple paranoid delusions and delusions of reference.  She has some anxiety related to her delusions.  She has been sleeping and eating well.  No SI, behavior well controlled.  She has been attending groups.  Tolerating medications well, will continue.
The patient continues to have multiple delusions influencing her behavior, including paranoid and referential delusions and possible AHs.  The patient was encouraged to reality test her delusions, and she is able to do so to some degree when guided.  She has been tolerating medications well, eating and sleeping well.  Will continue Zyprexa trial.
The patient continues to have some anxiety and concrete thinking, but is showing improvement.  She denies any AHs, and denies many of the delusions which were upsetting her earlier in the admission.  She continues to have questions about the medications, but agreed that Zyprexa has been helpful.  Will continue.  Check EKG in am.
Pt remains with a variety of delusions and related anxiety though able to reality test and delusions having no impact on behavior and safety. Since starting Zyprexa delusions seems slightly less intense and AH less frequent. Pt denies SIIP and HIIP. Pt with ongoing wish for discharge, would like to discharge to PHP. Will c/w Zyprexa 20mg qhs and decrease Rexulti to 3mg qhs (may be able to discontinue to minimize polypharmacy). 
Pt remains with a variety of delusions and related anxiety, though delusions are not impacting pt's behavior or safety. Delusions and AH less intense/frequent since starting Zyprexa. Pt tending to ADLs appropriately, in good behavioral control, sleeping well, and with good appetite/PO intake. Pt may be approaching her baseline and with wish for discharge to PHP. Will c/w Zyprexa 20mg (could consider titrating next week). C/w Rexulti 3mg qhs (will consider further taper to minimize polypharmacy). C/w with other home meds. Brother provided with updates. Pt transferred to  for further care for Covid+, hand off provided to Dr. Arzola. 
Agree with resident findings
Patient still paranoid with referential thinking. Minimal insight into illness. Will continue to titrate Zyprexa. 
Pt is a 57yo woman who lives with her mother, with dx schizoaffective disorder vs schizophrenia, several prior hospitalizations, in treatment with outpt psychiatrist Dr. Smalls, recently getting TMS treatments, presenting with acute psychotic decompensation in the context of non-adherence to meds. Pt initially presented with a variety of complex delusions including Capgras delusion, delusions of surveillance and poisoning, referential delusions and related intense AH, formal thought disorder, and related psychotic anxiety (often seeking reassurance from staff). Intensity of psychotic symptoms often varies throughout the day, though since starting Zyprexa delusions are overall less intense, thought process increasingly linear, AH less frequent, and anxiety has improved. Will monitor for continued and sustained improvement. Will c/w Zyprexa 15mg qhs, likely titrate to 20mg this week. C/w Rexulti though will consider taper give limited efficacy and goal to minimize polypharmacy. 
Agree with resident findings
The patient is showing some improvement in symptoms.   Her delusions are less intense, and she denies any AHs.  She has been eating well on the unit.  She is sleeping well.  Tolerating the addition of Zyprexa, agreeable to continuing to increase the dose.

## 2022-07-21 NOTE — BH INPATIENT PSYCHIATRY PROGRESS NOTE - NSTXPSYCHODATETRGT_PSY_ALL_CORE
07-Jul-2022
26-Jul-2022
30-Jun-2022
19-Jul-2022
07-Jul-2022
14-Jul-2022
20-Jul-2022
14-Jul-2022
20-Jul-2022
14-Jul-2022
26-Jul-2022
30-Jun-2022
20-Jul-2022
14-Jul-2022
30-Jun-2022
07-Jul-2022
14-Jul-2022
20-Jul-2022
30-Jun-2022

## 2022-07-21 NOTE — H&P ADULT. - DOES PATIENT MEET CRITERIA FOR SEPSIS
Received request via: Pharmacy    Was the patient seen in the last year in this department? Yes    Does the patient have an active prescription (recently filled or refills available) for medication(s) requested? No   Yes

## 2022-07-21 NOTE — BH INPATIENT PSYCHIATRY PROGRESS NOTE - NSBHMETABOLICLABS_PSY_ALL_CORE
Labs within last 12 months
All labs not within last 12 months, not ordered
Labs within last 12 months

## 2022-07-21 NOTE — BH INPATIENT PSYCHIATRY PROGRESS NOTE - NSTXDCOPNOPROGRES_PSY_ALL_CORE
No Change
Improving
No Change
Improving
No Change

## 2022-07-21 NOTE — BH INPATIENT PSYCHIATRY PROGRESS NOTE - NSTXMEDICDATETRGT_PSY_ALL_CORE
07-Jul-2022
20-Jul-2022
07-Jul-2022
20-Jul-2022
07-Jul-2022
20-Jul-2022
07-Jul-2022
20-Jul-2022

## 2022-07-21 NOTE — BH INPATIENT PSYCHIATRY PROGRESS NOTE - NSBHASSESSSUMMFT_PSY_ALL_CORE
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 schizophrenia, several prior hospitalizations, and consistent outpatient psychiatric treatment including TMS presenting with acute psychotic decompensation in the context of medication non-adherence.     Patient initially presented with multiple complex delusions including Capgras, delusions of surveillance and posing, referential delusions with related AH, thought disorder, and psychotic anxiety. Intensity of delusions varies day by day and throughout each day but has overall been less intense since starting olanzapine.     7/21: Patient to be discharged home today and starting PHP tomorrow.     Patient is at low acute risk for suicide. Patient is at elevated chronic risk given schizoaffective diagnosis. Protective factors include: residential stability, social support (family), lack of substance use, medication adherence/treatment engagement, future-oriented, responsibility to family, limited access to lethal means. Risk factors include: prior psychosis, capgras delusions    1. DC home today on current regimen (Zyprexa 30mg, Vortioxetine 20mg, Gabapentin 600mg TID, Levothyroxine 12.5mg)  2. Psychoeducation provided regarding importance of compliance with outpatient appointments and medications.  3. Pt was advised to remain abstinent with substances.  4. Pt was advised to dial 911 or return to ER if they become danger to self or others

## 2022-07-21 NOTE — BH INPATIENT PSYCHIATRY PROGRESS NOTE - NSTXPSYCHOPROGRES_PSY_ALL_CORE
No Change
Improving
Improving
No Change
Improving
No Change
Improving
No Change
Improving
No Change

## 2022-07-21 NOTE — BH INPATIENT PSYCHIATRY PROGRESS NOTE - NSBHATTESTTYPESTAFF_PSY_A_CORE
Student
Student
Resident
Student
Resident
Student
Resident
Student

## 2022-07-22 ENCOUNTER — OUTPATIENT (OUTPATIENT)
Dept: OUTPATIENT SERVICES | Facility: HOSPITAL | Age: 57
LOS: 1 days | Discharge: HOME | End: 2022-07-22

## 2022-07-27 PROCEDURE — 90792 PSYCH DIAG EVAL W/MED SRVCS: CPT | Mod: 1L

## 2022-08-01 PROCEDURE — 99214 OFFICE O/P EST MOD 30 MIN: CPT

## 2022-08-08 PROCEDURE — 99215 OFFICE O/P EST HI 40 MIN: CPT

## 2022-08-11 PROCEDURE — 99214 OFFICE O/P EST MOD 30 MIN: CPT

## 2022-08-15 PROCEDURE — 99214 OFFICE O/P EST MOD 30 MIN: CPT

## 2022-08-26 PROCEDURE — 99213 OFFICE O/P EST LOW 20 MIN: CPT

## 2022-08-31 ENCOUNTER — APPOINTMENT (OUTPATIENT)
Dept: INTERNAL MEDICINE | Facility: CLINIC | Age: 57
End: 2022-08-31

## 2022-08-31 DIAGNOSIS — F25.1 SCHIZOAFFECTIVE DISORDER, DEPRESSIVE TYPE: ICD-10-CM

## 2022-08-31 NOTE — BH INPATIENT PSYCHIATRY PROGRESS NOTE - NSBHCHARTREVIEWVS_PSY_A_CORE FT
May continue to instill the triamcinolone ointment but try not to place the qtip in too deep   Vital Signs Last 24 Hrs  T(C): 36.7 (06-29-22 @ 06:41), Max: 36.8 (06-28-22 @ 08:43)  T(F): 98 (06-29-22 @ 06:41), Max: 98.2 (06-28-22 @ 08:43)  HR: 91 (06-28-22 @ 08:43) (91 - 91)  BP: 161/84 (06-28-22 @ 08:43) (161/84 - 161/84)  BP(mean): --  RR: --  SpO2: --    Orthostatic VS  06-29-22 @ 06:41  Lying BP: --/-- HR: --  Sitting BP: 127/81 HR: 78  Standing BP: --/-- HR: --  Site: --  Mode: --   Vital Signs Last 24 Hrs  T(C): 36.7 (06-29-22 @ 06:41), Max: 36.7 (06-29-22 @ 06:41)  T(F): 98 (06-29-22 @ 06:41), Max: 98 (06-29-22 @ 06:41)  HR: --  BP: --  BP(mean): --  RR: --  SpO2: --    Orthostatic VS  06-29-22 @ 06:41  Lying BP: --/-- HR: --  Sitting BP: 127/81 HR: 78  Standing BP: --/-- HR: --  Site: --  Mode: --

## 2022-10-10 NOTE — PROGRESS NOTE ADULT - ASSESSMENT
Date of last visit:  4/8/2022  Date of next visit:  Visit date not found    Requested Prescriptions     Pending Prescriptions Disp Refills    tadalafil (CIALIS) 20 MG tablet 30 tablet 1     Sig: Take 1 tablet by mouth daily as needed for Erectile Dysfunction 51F congenital cyclic neutropenia, schizoaffective presenting with cough and fevers and now with a persistent RLL cystic mass consistent with lung abscess

## 2022-10-19 ENCOUNTER — NON-APPOINTMENT (OUTPATIENT)
Age: 57
End: 2022-10-19

## 2022-10-20 ENCOUNTER — LABORATORY RESULT (OUTPATIENT)
Age: 57
End: 2022-10-20

## 2022-10-20 ENCOUNTER — APPOINTMENT (OUTPATIENT)
Dept: INTERNAL MEDICINE | Facility: CLINIC | Age: 57
End: 2022-10-20

## 2022-10-20 VITALS
HEART RATE: 87 BPM | OXYGEN SATURATION: 98 % | DIASTOLIC BLOOD PRESSURE: 77 MMHG | BODY MASS INDEX: 22.49 KG/M2 | TEMPERATURE: 98.2 F | WEIGHT: 131 LBS | SYSTOLIC BLOOD PRESSURE: 114 MMHG
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
## 2022-10-20 PROCEDURE — 36415 COLL VENOUS BLD VENIPUNCTURE: CPT

## 2022-10-20 PROCEDURE — 99214 OFFICE O/P EST MOD 30 MIN: CPT | Mod: 25

## 2022-10-20 NOTE — PHYSICAL EXAM
[No Acute Distress] : no acute distress [Well Nourished] : well nourished [Well Developed] : well developed [Normal Sclera/Conjunctiva] : normal sclera/conjunctiva [PERRL] : pupils equal round and reactive to light [EOMI] : extraocular movements intact [Normal Outer Ear/Nose] : the outer ears and nose were normal in appearance [Normal Oropharynx] : the oropharynx was normal [No JVD] : no jugular venous distention [No Lymphadenopathy] : no lymphadenopathy [Supple] : supple [No Respiratory Distress] : no respiratory distress  [No Accessory Muscle Use] : no accessory muscle use [Clear to Auscultation] : lungs were clear to auscultation bilaterally [Normal Rate] : normal rate  [Normal S1, S2] : normal S1 and S2 [Soft] : abdomen soft [Non Tender] : non-tender [No CVA Tenderness] : no CVA  tenderness [No Spinal Tenderness] : no spinal tenderness [No Joint Swelling] : no joint swelling [Grossly Normal Strength/Tone] : grossly normal strength/tone [Coordination Grossly Intact] : coordination grossly intact [No Focal Deficits] : no focal deficits [Normal Gait] : normal gait [Deep Tendon Reflexes (DTR)] : deep tendon reflexes were 2+ and symmetric [Speech Grossly Normal] : speech grossly normal [Alert and Oriented x3] : oriented to person, place, and time [de-identified] : Dry face-bruising noted.

## 2022-10-20 NOTE — HISTORY OF PRESENT ILLNESS
[FreeTextEntry8] : \par \par Acute Visit\par \par PCP (Dr. PAREDES) present in the exam room, today.\par \par 57 Y O F, with PMH of: anxiety, chronic (long-term) low back pain, constipation, depression, hearing loss, neutropenia, schizoaffective disorder, scoliosis, and subclinical hypothyroid, presents for: Acute Visit.\par (About) 2 days ago, pt fell from her bed, onto the floor (around 8:30 AM). \par She fell, due to: "loss of strength", from taking: "too much Gabapentin".\par Pt got the Rx Gabapentin, from her old "Psychiatrist". \par She is currently looking for a: new "Psychiatrist".\par She denied/ rejects any: loss of consciousness, passing-out, N/V, visual losses, or any headaches.\par Pt is c/o: face-bruising, from the fall.\par Pt fell, onto her face.\par \par

## 2022-10-20 NOTE — REVIEW OF SYSTEMS
[Negative] : Heme/Lymph [FreeTextEntry2] : + fall from 2 days ago. [FreeTextEntry9] : + fall from 2 days ago. [de-identified] : + face-bruising, from the fall.

## 2022-10-20 NOTE — PLAN
[FreeTextEntry1] : \par \par 1) Fall\par CT head ordered.\par Labs ordered.\par ER/ ED precautions, if the s/s worsen.\par Avoid taking Rx Gabapentin.\par Avoid taking any Benzos.\par Ice, the face.\par Fall prevention teachings, reinforced.\par \par \par \par \par 2) Hx Anxiety.\par Find a new Psychiatry, as we discussed.\par Consider: Dr. Beth or Dr. Dupont.\par \par \par \par \par All pt's questions answered. Pt okay with the plan.\par

## 2022-10-21 ENCOUNTER — TRANSCRIPTION ENCOUNTER (OUTPATIENT)
Age: 57
End: 2022-10-21

## 2022-10-21 ENCOUNTER — INPATIENT (INPATIENT)
Facility: HOSPITAL | Age: 57
LOS: 6 days | Discharge: HOME | DRG: 918 | End: 2022-10-28
Attending: STUDENT IN AN ORGANIZED HEALTH CARE EDUCATION/TRAINING PROGRAM | Admitting: INTERNAL MEDICINE
Payer: COMMERCIAL

## 2022-10-21 VITALS
HEIGHT: 64 IN | SYSTOLIC BLOOD PRESSURE: 136 MMHG | TEMPERATURE: 98 F | WEIGHT: 130.07 LBS | DIASTOLIC BLOOD PRESSURE: 88 MMHG | RESPIRATION RATE: 19 BRPM | OXYGEN SATURATION: 100 % | HEART RATE: 77 BPM

## 2022-10-21 DIAGNOSIS — M62.82 RHABDOMYOLYSIS: ICD-10-CM

## 2022-10-21 DIAGNOSIS — Z09 ENCOUNTER FOR FOLLOW-UP EXAMINATION AFTER COMPLETED TREATMENT FOR CONDITIONS OTHER THAN MALIGNANT NEOPLASM: ICD-10-CM

## 2022-10-21 LAB
ALBUMIN SERPL ELPH-MCNC: 3.8 G/DL — SIGNIFICANT CHANGE UP (ref 3.3–5)
ALBUMIN SERPL ELPH-MCNC: 4 G/DL
ALP BLD-CCNC: 83 U/L
ALP SERPL-CCNC: 77 U/L — SIGNIFICANT CHANGE UP (ref 40–120)
ALT FLD-CCNC: 321 U/L — HIGH (ref 10–45)
ALT SERPL-CCNC: 300 U/L
ANION GAP SERPL CALC-SCNC: 10 MMOL/L
ANION GAP SERPL CALC-SCNC: 10 MMOL/L — SIGNIFICANT CHANGE UP (ref 5–17)
APAP SERPL-MCNC: <15 UG/ML — SIGNIFICANT CHANGE UP (ref 10–30)
APPEARANCE UR: CLEAR — SIGNIFICANT CHANGE UP
APTT BLD: 26.5 SEC — LOW (ref 27.5–35.5)
AST SERPL-CCNC: 676 U/L — HIGH (ref 10–40)
AST SERPL-CCNC: 866 U/L
BACTERIA # UR AUTO: NEGATIVE — SIGNIFICANT CHANGE UP
BASE EXCESS BLDV CALC-SCNC: 3.2 MMOL/L — HIGH (ref -2–3)
BASOPHILS # BLD AUTO: 0.02 K/UL — SIGNIFICANT CHANGE UP (ref 0–0.2)
BASOPHILS # BLD AUTO: 0.03 K/UL
BASOPHILS NFR BLD AUTO: 0.9 %
BASOPHILS NFR BLD AUTO: 0.9 % — SIGNIFICANT CHANGE UP (ref 0–2)
BILIRUB SERPL-MCNC: 0.2 MG/DL
BILIRUB SERPL-MCNC: 0.2 MG/DL — SIGNIFICANT CHANGE UP (ref 0.2–1.2)
BILIRUB UR-MCNC: NEGATIVE — SIGNIFICANT CHANGE UP
BUN SERPL-MCNC: 10 MG/DL — SIGNIFICANT CHANGE UP (ref 7–23)
BUN SERPL-MCNC: 18 MG/DL
CA-I SERPL-SCNC: 1.23 MMOL/L — SIGNIFICANT CHANGE UP (ref 1.15–1.33)
CALCIUM SERPL-MCNC: 9.4 MG/DL
CALCIUM SERPL-MCNC: 9.5 MG/DL — SIGNIFICANT CHANGE UP (ref 8.4–10.5)
CHLORIDE BLDV-SCNC: 102 MMOL/L — SIGNIFICANT CHANGE UP (ref 96–108)
CHLORIDE SERPL-SCNC: 101 MMOL/L — SIGNIFICANT CHANGE UP (ref 96–108)
CHLORIDE SERPL-SCNC: 102 MMOL/L
CK SERPL-CCNC: CRITICAL HIGH U/L (ref 25–170)
CO2 BLDV-SCNC: 32 MMOL/L — HIGH (ref 22–26)
CO2 SERPL-SCNC: 26 MMOL/L — SIGNIFICANT CHANGE UP (ref 22–31)
CO2 SERPL-SCNC: 28 MMOL/L
COLOR SPEC: COLORLESS — SIGNIFICANT CHANGE UP
CREAT SERPL-MCNC: 0.87 MG/DL — SIGNIFICANT CHANGE UP (ref 0.5–1.3)
CREAT SERPL-MCNC: 1.15 MG/DL
DIFF PNL FLD: ABNORMAL
EGFR: 56 ML/MIN/1.73M2
EGFR: 78 ML/MIN/1.73M2 — SIGNIFICANT CHANGE UP
EOSINOPHIL # BLD AUTO: 0 K/UL
EOSINOPHIL # BLD AUTO: 0.04 K/UL — SIGNIFICANT CHANGE UP (ref 0–0.5)
EOSINOPHIL NFR BLD AUTO: 0 %
EOSINOPHIL NFR BLD AUTO: 1.7 % — SIGNIFICANT CHANGE UP (ref 0–6)
EPI CELLS # UR: 0 /HPF — SIGNIFICANT CHANGE UP
ETHANOL SERPL-MCNC: <10 MG/DL — SIGNIFICANT CHANGE UP (ref 0–10)
FLUAV AG NPH QL: SIGNIFICANT CHANGE UP
FLUBV AG NPH QL: SIGNIFICANT CHANGE UP
GAS PNL BLDV: 134 MMOL/L — LOW (ref 136–145)
GAS PNL BLDV: SIGNIFICANT CHANGE UP
GAS PNL BLDV: SIGNIFICANT CHANGE UP
GLUCOSE BLDV-MCNC: 85 MG/DL — SIGNIFICANT CHANGE UP (ref 70–99)
GLUCOSE SERPL-MCNC: 89 MG/DL — SIGNIFICANT CHANGE UP (ref 70–99)
GLUCOSE SERPL-MCNC: 94 MG/DL
GLUCOSE UR QL: NEGATIVE — SIGNIFICANT CHANGE UP
HCO3 BLDV-SCNC: 30 MMOL/L — HIGH (ref 22–29)
HCT VFR BLD CALC: 37.9 %
HCT VFR BLD CALC: 38.5 % — SIGNIFICANT CHANGE UP (ref 34.5–45)
HCT VFR BLDA CALC: 38 % — SIGNIFICANT CHANGE UP (ref 34.5–46.5)
HGB BLD CALC-MCNC: 12.5 G/DL — SIGNIFICANT CHANGE UP (ref 11.7–16.1)
HGB BLD-MCNC: 12.1 G/DL
HGB BLD-MCNC: 12.1 G/DL — SIGNIFICANT CHANGE UP (ref 11.5–15.5)
HYALINE CASTS # UR AUTO: 0 /LPF — SIGNIFICANT CHANGE UP (ref 0–2)
INR BLD: 1.06 RATIO — SIGNIFICANT CHANGE UP (ref 0.88–1.16)
KETONES UR-MCNC: NEGATIVE — SIGNIFICANT CHANGE UP
LACTATE BLDV-MCNC: 0.8 MMOL/L — SIGNIFICANT CHANGE UP (ref 0.5–2)
LEUKOCYTE ESTERASE UR-ACNC: NEGATIVE — SIGNIFICANT CHANGE UP
LIDOCAIN IGE QN: 32 U/L — SIGNIFICANT CHANGE UP (ref 7–60)
LYMPHOCYTES # BLD AUTO: 1.17 K/UL — SIGNIFICANT CHANGE UP (ref 1–3.3)
LYMPHOCYTES # BLD AUTO: 2.49 K/UL
LYMPHOCYTES # BLD AUTO: 50.9 % — HIGH (ref 13–44)
LYMPHOCYTES NFR BLD AUTO: 75.6 %
MAN DIFF?: NORMAL
MANUAL SMEAR VERIFICATION: SIGNIFICANT CHANGE UP
MCHC RBC-ENTMCNC: 29.2 PG
MCHC RBC-ENTMCNC: 29.4 PG — SIGNIFICANT CHANGE UP (ref 27–34)
MCHC RBC-ENTMCNC: 31.4 GM/DL — LOW (ref 32–36)
MCHC RBC-ENTMCNC: 31.9 GM/DL
MCV RBC AUTO: 91.5 FL
MCV RBC AUTO: 93.4 FL — SIGNIFICANT CHANGE UP (ref 80–100)
MONOCYTES # BLD AUTO: 0.49 K/UL
MONOCYTES # BLD AUTO: 0.67 K/UL — SIGNIFICANT CHANGE UP (ref 0–0.9)
MONOCYTES NFR BLD AUTO: 14.8 %
MONOCYTES NFR BLD AUTO: 29.3 % — HIGH (ref 2–14)
NEUTROPHILS # BLD AUTO: 0.26 K/UL
NEUTROPHILS # BLD AUTO: 0.4 K/UL — LOW (ref 1.8–7.4)
NEUTROPHILS NFR BLD AUTO: 10.3 % — LOW (ref 43–77)
NEUTROPHILS NFR BLD AUTO: 5.2 %
NEUTS BAND # BLD: 6.9 % — SIGNIFICANT CHANGE UP (ref 0–8)
NITRITE UR-MCNC: NEGATIVE — SIGNIFICANT CHANGE UP
PCO2 BLDV: 56 MMHG — HIGH (ref 39–42)
PH BLDV: 7.34 — SIGNIFICANT CHANGE UP (ref 7.32–7.43)
PH UR: 7.5 — SIGNIFICANT CHANGE UP (ref 5–8)
PLAT MORPH BLD: NORMAL — SIGNIFICANT CHANGE UP
PLATELET # BLD AUTO: 231 K/UL — SIGNIFICANT CHANGE UP (ref 150–400)
PLATELET # BLD AUTO: 243 K/UL
PO2 BLDV: 33 MMHG — SIGNIFICANT CHANGE UP (ref 25–45)
POTASSIUM BLDV-SCNC: 4.1 MMOL/L — SIGNIFICANT CHANGE UP (ref 3.5–5.1)
POTASSIUM SERPL-MCNC: 4.3 MMOL/L — SIGNIFICANT CHANGE UP (ref 3.5–5.3)
POTASSIUM SERPL-SCNC: 4.2 MMOL/L
POTASSIUM SERPL-SCNC: 4.3 MMOL/L — SIGNIFICANT CHANGE UP (ref 3.5–5.3)
PROT SERPL-MCNC: 6.9 G/DL
PROT SERPL-MCNC: 7.5 G/DL — SIGNIFICANT CHANGE UP (ref 6–8.3)
PROT UR-MCNC: NEGATIVE — SIGNIFICANT CHANGE UP
PROTHROM AB SERPL-ACNC: 12.3 SEC — SIGNIFICANT CHANGE UP (ref 10.5–13.4)
RBC # BLD: 4.12 M/UL — SIGNIFICANT CHANGE UP (ref 3.8–5.2)
RBC # BLD: 4.14 M/UL
RBC # FLD: 13.5 % — SIGNIFICANT CHANGE UP (ref 10.3–14.5)
RBC # FLD: 13.9 %
RBC BLD AUTO: SIGNIFICANT CHANGE UP
RBC CASTS # UR COMP ASSIST: 1 /HPF — SIGNIFICANT CHANGE UP (ref 0–4)
RSV RNA NPH QL NAA+NON-PROBE: SIGNIFICANT CHANGE UP
SALICYLATES SERPL-MCNC: <2 MG/DL — LOW (ref 15–30)
SAO2 % BLDV: 42.4 % — LOW (ref 67–88)
SARS-COV-2 RNA SPEC QL NAA+PROBE: SIGNIFICANT CHANGE UP
SODIUM SERPL-SCNC: 137 MMOL/L — SIGNIFICANT CHANGE UP (ref 135–145)
SODIUM SERPL-SCNC: 140 MMOL/L
SP GR SPEC: 1 — LOW (ref 1.01–1.02)
TROPONIN T, HIGH SENSITIVITY RESULT: 320 NG/L — HIGH (ref 0–51)
TROPONIN T, HIGH SENSITIVITY RESULT: 324 NG/L — HIGH (ref 0–51)
TSH SERPL-ACNC: 7 UIU/ML
UROBILINOGEN FLD QL: NEGATIVE — SIGNIFICANT CHANGE UP
WBC # BLD: 2.3 K/UL — LOW (ref 3.8–10.5)
WBC # FLD AUTO: 2.3 K/UL — LOW (ref 3.8–10.5)
WBC # FLD AUTO: 3.29 K/UL
WBC UR QL: 0 /HPF — SIGNIFICANT CHANGE UP (ref 0–5)

## 2022-10-21 PROCEDURE — 93010 ELECTROCARDIOGRAM REPORT: CPT

## 2022-10-21 PROCEDURE — 71260 CT THORAX DX C+: CPT | Mod: 26,MA

## 2022-10-21 PROCEDURE — 73030 X-RAY EXAM OF SHOULDER: CPT | Mod: 26,50

## 2022-10-21 PROCEDURE — 72125 CT NECK SPINE W/O DYE: CPT | Mod: 26,MA

## 2022-10-21 PROCEDURE — 71045 X-RAY EXAM CHEST 1 VIEW: CPT | Mod: 26

## 2022-10-21 PROCEDURE — 76700 US EXAM ABDOM COMPLETE: CPT | Mod: 26

## 2022-10-21 PROCEDURE — 99222 1ST HOSP IP/OBS MODERATE 55: CPT

## 2022-10-21 PROCEDURE — 74177 CT ABD & PELVIS W/CONTRAST: CPT | Mod: 26,MA

## 2022-10-21 PROCEDURE — 72170 X-RAY EXAM OF PELVIS: CPT | Mod: 26

## 2022-10-21 PROCEDURE — 99285 EMERGENCY DEPT VISIT HI MDM: CPT

## 2022-10-21 PROCEDURE — 70450 CT HEAD/BRAIN W/O DYE: CPT | Mod: 26,MA

## 2022-10-21 PROCEDURE — 76857 US EXAM PELVIC LIMITED: CPT | Mod: 26

## 2022-10-21 RX ORDER — PANTOPRAZOLE SODIUM 20 MG/1
40 TABLET, DELAYED RELEASE ORAL
Refills: 0 | Status: DISCONTINUED | OUTPATIENT
Start: 2022-10-21 | End: 2022-10-28

## 2022-10-21 RX ORDER — HEPARIN SODIUM 5000 [USP'U]/ML
5000 INJECTION INTRAVENOUS; SUBCUTANEOUS EVERY 8 HOURS
Refills: 0 | Status: DISCONTINUED | OUTPATIENT
Start: 2022-10-21 | End: 2022-10-28

## 2022-10-21 RX ORDER — SODIUM CHLORIDE 9 MG/ML
1000 INJECTION INTRAMUSCULAR; INTRAVENOUS; SUBCUTANEOUS ONCE
Refills: 0 | Status: COMPLETED | OUTPATIENT
Start: 2022-10-21 | End: 2022-10-21

## 2022-10-21 RX ORDER — LEVOTHYROXINE SODIUM 125 MCG
12.5 TABLET ORAL DAILY
Refills: 0 | Status: DISCONTINUED | OUTPATIENT
Start: 2022-10-21 | End: 2022-10-28

## 2022-10-21 RX ORDER — INFLUENZA VIRUS VACCINE 15; 15; 15; 15 UG/.5ML; UG/.5ML; UG/.5ML; UG/.5ML
0.5 SUSPENSION INTRAMUSCULAR ONCE
Refills: 0 | Status: DISCONTINUED | OUTPATIENT
Start: 2022-10-21 | End: 2022-10-28

## 2022-10-21 RX ORDER — VORTIOXETINE 5 MG/1
1 TABLET, FILM COATED ORAL
Qty: 0 | Refills: 0 | DISCHARGE

## 2022-10-21 RX ORDER — METFORMIN HYDROCHLORIDE 850 MG/1
1 TABLET ORAL
Qty: 0 | Refills: 0 | DISCHARGE

## 2022-10-21 RX ORDER — SODIUM CHLORIDE 9 MG/ML
1000 INJECTION INTRAMUSCULAR; INTRAVENOUS; SUBCUTANEOUS
Refills: 0 | Status: DISCONTINUED | OUTPATIENT
Start: 2022-10-21 | End: 2022-10-24

## 2022-10-21 RX ADMIN — HEPARIN SODIUM 5000 UNIT(S): 5000 INJECTION INTRAVENOUS; SUBCUTANEOUS at 21:11

## 2022-10-21 RX ADMIN — SODIUM CHLORIDE 1000 MILLILITER(S): 9 INJECTION INTRAMUSCULAR; INTRAVENOUS; SUBCUTANEOUS at 14:46

## 2022-10-21 RX ADMIN — SODIUM CHLORIDE 2000 MILLILITER(S): 9 INJECTION INTRAMUSCULAR; INTRAVENOUS; SUBCUTANEOUS at 12:51

## 2022-10-21 RX ADMIN — SODIUM CHLORIDE 100 MILLILITER(S): 9 INJECTION INTRAMUSCULAR; INTRAVENOUS; SUBCUTANEOUS at 17:50

## 2022-10-21 NOTE — ED PROVIDER NOTE - CLINICAL SUMMARY MEDICAL DECISION MAKING FREE TEXT BOX
56 yo F with PMH of neutropenia, schizoaffective presenting with a c/o of a fall and elevated liver enzymes. Differential diagnosis includes but is not limited to hepatitis, infection, overdose, medication s/e, ICH, trauma. would get labs, cxr, CT head/cspine, xray of pelvis and shoulder, ecg. dispo pending w/u. PT unable to reach her psychiatrist so may need to stay if she cannot f/u for medication management or may require dc lounge to secure follow up.

## 2022-10-21 NOTE — BH CONSULTATION LIAISON ASSESSMENT NOTE - OTHER PAST PSYCHIATRIC HISTORY (INCLUDE DETAILS REGARDING ONSET, COURSE OF ILLNESS, INPATIENT/OUTPATIENT TREATMENT)
Patient was hospitalized 4-5 times, last time in [facility] July 2022.   Follows with Dr. Smalls [phone]  Receiving maintenance TMS

## 2022-10-21 NOTE — BH CONSULTATION LIAISON ASSESSMENT NOTE - RISK ASSESSMENT
Risk factors: past psychiatry history, prior hospitalizations, inadequate response to multiple antipsychotics (Geodon, Abilify, Haldol)  protective factors:  employment, supportive family, residential stability, no current SI/SA

## 2022-10-21 NOTE — BH CONSULTATION LIAISON ASSESSMENT NOTE - SUMMARY
Patient is a 55 yo F currently domiciled with mother, employed, currently single w/PMH of MVP, congenital neutropenia, hypothyroidism, PPHx of schizoaffective disorder (diagnosed 2009), multiple prior hospitalizations, last hospitalization at [facility] in 2014, hx of TMS currently on maintenance treatment w/ last treatment 3 weeks ago brought in after liver enzymes were elevated in lab workup in OP visit. Patient reports having a difficult relationship with mother, states yesterday patient had an argument with her as she is not able to recognize her feelings and her struggles with mental health. Per patient, this situation made her very anxious and decided to take Gabapentin, which she normally uses for sleeping or anxiety. Patient states she took 3 pills at the same time as she thought it wouldn't be an issue. Patient later reports sleeping and waking up at very dizzy and had a fall hurting her head, chin, and left arm. Patient denies she took more pills as a SA and denies any current SI with plan or intent. Patient reports she has been sad but not depressed about this situation with mother, adequate sleep and appetite. Per patient she has been gaining weight due to the medication and are currently switching her from Zyprexa to Rexulti. Patient is anxious and worried about her job, states she enjoys work and is going to apply to a better position but now is concern being on the hospital will affect this. Patient denies kiran, paranoia or AVTOH.      Patient currently alert, orientated and cooperative. Patient dysphoric and slightly anxious during the interview being admitted after elevation of liver enzymes and rhabdomyolysis. Patient doesn't exhibits any symptoms of psychosis or safety concern, patient and family denies any SI or SA with medication. Patient has been depressed and anxious but follows regularly therapist and psychiatrist and is compliant with medication. Due to elevated liver enzymes it is recommended to stop current medication as they can worsen liver enzymes, particularly antipsychotics.     PLAN:   - Hold psychiatric medication for now as it can worsen liver enzymes   - Ativan 1 mg PO BID for anxiety   - Ativan 1 mg PO/IM q4 for severe anxiety/agitation   - Will consider re starting medication depending on liver function   Patient is a 55 yo F currently domiciled with mother, employed, currently single w/PMH of MVP, congenital neutropenia, hypothyroidism, PPHx of schizoaffective disorder (diagnosed 2009), multiple prior hospitalizations, last hospitalization at [facility] in 2014, hx of TMS currently on maintenance treatment w/ last treatment 3 weeks ago brought in after liver enzymes were elevated in lab workup in OP visit. Patient reports having a difficult relationship with mother, states yesterday patient had an argument with her as she is not able to recognize her feelings and her struggles with mental health. Per patient, this situation made her very anxious and decided to take Gabapentin, which she normally uses for sleeping or anxiety. Patient states she took 3 pills at the same time as she thought it wouldn't be an issue. Patient later reports sleeping and waking up at very dizzy and had a fall hurting her head, chin, and left arm. Patient denies she took more pills as a SA and denies any current SI with plan or intent. Patient reports she has been sad but not depressed about this situation with mother, adequate sleep and appetite. Per patient she has been gaining weight due to the medication and are currently switching her from Zyprexa to Rexulti. Patient is anxious and worried about her job, states she enjoys work and is going to apply to a better position but now is concern being on the hospital will affect this. Patient denies kiran, paranoia or AVTOH.      Patient currently alert, orientated and cooperative. Patient dysphoric and slightly anxious during the interview being admitted after elevation of liver enzymes and rhabdomyolysis. Patient doesn't exhibits any symptoms of psychosis or safety concern, patient and family denies any SI or SA with medication. Patient has been depressed and anxious but follows regularly therapist and psychiatrist and is compliant with medication. Due to elevated liver enzymes it is recommended to stop current medication as they can worsen liver enzymes, particularly antipsychotics.     PLAN:   - Hold psychiatric medication for now as it can worsen liver enzymes   - Ativan 1 mg PO TID for anxiety   - Ativan 1 mg PO/IM q4 for severe anxiety/agitation   - Will consider re starting medication depending on liver function

## 2022-10-21 NOTE — ED ADULT TRIAGE NOTE - CHIEF COMPLAINT QUOTE
elevated liver enzymes, requesting adjustment of psych meds, s/p trip/fall  x 3 days ago, hit head, no LOC

## 2022-10-21 NOTE — PATIENT PROFILE ADULT - FALL HARM RISK - RISK INTERVENTIONS

## 2022-10-21 NOTE — ED PROVIDER NOTE - PHYSICAL EXAMINATION
General: WN/WD NAD  Head: contusion to R temple, normocephalic  Eyes: EOM grossly in tact, no scleral icterus, no discharge  ENT: moist mucous membranes  Neurology: A&Ox 3, PERRL, CN grossly intact. 5/5 strength and normal sensation throughout all four extremities. Normal FNF. No pronator drift. no facial asymmetry.   Chest: contusion to R chest wall, no crepitus  Respiratory: normal respiratory effort  CV: Extremities warm and well perfused  Abdominal: Soft, non-distended, non-tender, no masses  Extremities: No edema, no deformities, ecchymosis to L shoulder  Skin: warm and dry. No rashes  Spine: no midline spinal tenderness, step off or crepitus

## 2022-10-21 NOTE — H&P ADULT - NSHPPHYSICALEXAM_GEN_ALL_CORE
Vital Signs Last 24 Hrs  T(C): 36.5 (21 Oct 2022 11:27), Max: 36.5 (21 Oct 2022 11:27)  T(F): 97.7 (21 Oct 2022 11:27), Max: 97.7 (21 Oct 2022 11:27)  HR: 70 (21 Oct 2022 14:08) (70 - 77)  BP: 136/88 (21 Oct 2022 11:27) (136/88 - 136/88)  BP(mean): --  RR: 19 (21 Oct 2022 11:27) (19 - 19)  SpO2: 100% (21 Oct 2022 11:27) (100% - 100%)    Parameters below as of 21 Oct 2022 11:27  Patient On (Oxygen Delivery Method): room air          Appearance: Normal, Sitting up in bed, Mild bruise to forehead 	  HEENT:   Normal oral mucosa, PERRL, Fading bruise to forehead   Lymphatic: No lymphadenopathy , no edema  Cardiovascular: Normal S1 S2, No JVD, No murmurs , Peripheral pulses palpable 2+ bilaterally  Respiratory: Lungs clear to auscultation, normal effort 	  Gastrointestinal:  Soft, Non-tender, + BS	  Skin: No rashes, No ecchymoses, No cyanosis, warm to touch  Musculoskeletal: Normal range of motion, normal strength; Non-tender to palpitation shoulders or head, + ROM in arms, abrasions to arms  Psychiatry:  Mood & affect appropriate  Ext: No edema

## 2022-10-21 NOTE — BH CONSULTATION LIAISON ASSESSMENT NOTE - NSBHCHARTREVIEWLAB_PSY_A_CORE FT
LABS:                        12.1   2.30  )-----------( 231      ( 21 Oct 2022 12:18 )             38.5     10-21    137  |  101  |  10  ----------------------------<  89  4.3   |  26  |  0.87    Ca    9.5      21 Oct 2022 12:18    TPro  7.5  /  Alb  3.8  /  TBili  0.2  /  DBili  x   /  AST  676<H>  /  ALT  321<H>  /  AlkPhos  77  10-21    PT/INR - ( 21 Oct 2022 12:18 )   PT: 12.3 sec;   INR: 1.06 ratio         PTT - ( 21 Oct 2022 12:18 )  PTT:26.5 sec

## 2022-10-21 NOTE — ED PROVIDER NOTE - OBJECTIVE STATEMENT
58 yo F with PMH of neutropenia, schizoaffective presenting with a c/o of a fall and elevated liver enzymes. Pt took extra of her gabapentin 3 days ago to help her sleep. She denies SI/HI. She states she fell afterwards multiple times hitting her head. She is complaining of a head contusion and some pain in her shoulders. She mainly presents to ED bc her doctor told her, her liver enzymes were elevated on screening labs. She has some epigastric pain, but denies nausea, vomiting, diarrhea, HA, fever, chills, cough, congestion. She denies tylenol use.

## 2022-10-21 NOTE — H&P ADULT - NSHPREVIEWOFSYSTEMS_GEN_ALL_CORE
REVIEW OF SYSTEMS:    CONSTITUTIONAL: Generalized weakness 2/2 recent fall   EYES/ENT: No visual changes;  No vertigo or throat pain   NECK: No pain or stiffness  RESPIRATORY: No cough, wheezing, hemoptysis; No shortness of breath  CARDIOVASCULAR: No chest pain or palpitations  GASTROINTESTINAL: No nausea, vomiting, or hematemesis; No diarrhea or constipation. No melena or hematochezia.  GENITOURINARY: No dysuria, frequency or hematuria  NEUROLOGICAL: No numbness or weakness  SKIN: + Abrasions and bruise to forehead   All other review of systems is negative unless indicated above.

## 2022-10-21 NOTE — BH CONSULTATION LIAISON ASSESSMENT NOTE - NSBHCHARTREVIEWVS_PSY_A_CORE FT
Vital Signs Last 24 Hrs  T(C): 36.5 (21 Oct 2022 11:27), Max: 36.5 (21 Oct 2022 11:27)  T(F): 97.7 (21 Oct 2022 11:27), Max: 97.7 (21 Oct 2022 11:27)  HR: 70 (21 Oct 2022 14:08) (70 - 77)  BP: 136/88 (21 Oct 2022 11:27) (136/88 - 136/88)  BP(mean): --  RR: 19 (21 Oct 2022 11:27) (19 - 19)  SpO2: 100% (21 Oct 2022 11:27) (100% - 100%)    Parameters below as of 21 Oct 2022 11:27  Patient On (Oxygen Delivery Method): room air

## 2022-10-21 NOTE — ED ADULT NURSE NOTE - NS ED PATIENT SAFETY CONCERN
Good fetal movement. No vaginal bleeding, no leakage of fluid, no contractions.  SVE: ft/50/-3  GBS pos.  All questions answered.  Concerns: none  Reviewed term labor, vaginal bleeding, and fetal movement precautions    Kimberly repeated all of the instructions and states that she understands and agrees with the plan of care.    
No

## 2022-10-21 NOTE — H&P ADULT - NS ATTEND AMEND GEN_ALL_CORE FT
Pt care and plan discussed and reviewed with PA. Plan as outlined above edited by me to reflect our discussion. Advanced care planning/advanced directives discussed with patient/family. DNR status including forceful chest compressions to attempt to restart the heart, ventilator support/artificial breathing, electric shock, artificial nutrition, health care proxy, Molst form all discussed with pt. Pt wishes to consider.

## 2022-10-21 NOTE — H&P ADULT - HISTORY OF PRESENT ILLNESS
Patient is a 57 year old Female with PMHx of neutropenia (states she follows with her PCP, Dr. Walton), Hypothyroidism (on Synthroid 12.5 Qd), and Schizoaffective disorder who presented to [facility] with chief complaint of recent fall on Wednesday 10/19 and elevated LFTs. Patient reports that on Tuesday evening she could not sleep and took 1800 mg of Gabapentin. Her usual Gabapentin dose is 600 Qd. Patient denies SI or HI. Patient states that she fell, hit her head and shoulders. Patient denies SI or HI. Denies chest pain or palpitations, shoulder pain or discomfort. States her discomfort has improved. Denies body aches or pain. Denies headache, lightheadedness or dizziness.

## 2022-10-21 NOTE — BH CONSULTATION LIAISON ASSESSMENT NOTE - NSBHATTESTCOMMENTATTENDFT_PSY_A_CORE
Patient is a 55 yo F currently domiciled with mother, employed, currently single w/PMH of MVP, congenital neutropenia, hypothyroidism, PPHx of schizoaffective disorder (diagnosed 2009), multiple prior hospitalizations, last hospitalization at [facility] in 2014, hx of TMS currently on maintenance treatment w/ last treatment 3 weeks ago brought in after liver enzymes were elevated in lab workup in OP visit. Patient reports having a difficult relationship with mother, states yesterday patient had an argument with her as she is not able to recognize her feelings and her struggles with mental health. Per patient, this situation made her very anxious and decided to take Gabapentin, which she normally uses for sleeping or anxiety. Patient states she took 3 pills at the same time as she thought it wouldn't be an issue. Patient later reports sleeping and waking up at very dizzy and had a fall hurting her head, chin, and left arm. Patient denies she took more pills as a SA and denies any current SI with plan or intent. Patient reports she has been sad but not depressed about this situation with mother, adequate sleep and appetite. Per patient she has been gaining weight due to the medication and are currently switching her from Zyprexa to Rexulti.  Patient dysphoric and slightly anxious during the interview being admitted after elevation of liver enzymes and rhabdomyolysis. Patient doesn't exhibits any symptoms of psychosis or safety concern, patient and family denies any SI or SA with medication. Due to elevated liver enzymes it is recommended to stop current medication as they can worsen liver enzymes, particularly antipsychotics.   Agree with continuing benzodiazepines to prevent withdrawal and for anxiety

## 2022-10-21 NOTE — ED ADULT NURSE NOTE - OBJECTIVE STATEMENT
57Y female, A&Ox3, PMH of lung mass, neutropenia, depression, anxiety. presents to the ED ambulating independently due to abnormal lab results. pt states she got labs drawn outpatient yesterday and was told her liver enzymes were high. pt was advised to come into the ED to be evaluated. pt reports also falling two days ago after taking double her gabapentin dose. pt states she hit her head but denies loc. pt states she took the double dose because she "got upset about something" and was therefore unable to fall asleep at night. pt adamantly states it was not taken in an effort to self harm, she just wanted to be able to rest when it was time for her to sleep. pt denies HI/SI and states "some times I get sad but im healthy right now". pt states since the fall she has had trouble lifting her arms above her head. 57Y female, A&Ox3, PMH of lung mass, neutropenia, depression, anxiety. presents to the ED ambulating independently due to abnormal lab results. pt states she got labs drawn outpatient yesterday and was told her liver enzymes were high. pt was advised to come into the ED to be evaluated. pt reports also falling two days ago after taking double her gabapentin dose. pt states she hit her head but denies loc. pt states she took the double dose because she "got upset about something" and was therefore unable to fall asleep at night. pt adamantly states it was not taken in an effort to self harm, she just wanted to be able to rest when it was time for her to sleep. pt denies HI/SI and states "some times I get sad but im healthy right now". pt states since the fall she has had trouble lifting her arms above her head. pt denies f/n/v/d, headache, vision changes, chest pain, sob, back pain, weakness, numbness/tingling. pt endorses mild epigastric pain. 20g iv placed in right AC. brother at bedside.

## 2022-10-21 NOTE — BH CONSULTATION LIAISON ASSESSMENT NOTE - HPI (INCLUDE ILLNESS QUALITY, SEVERITY, DURATION, TIMING, CONTEXT, MODIFYING FACTORS, ASSOCIATED SIGNS AND SYMPTOMS)
Patient is a 55 yo F currently domiciled with mother, employed, currently single w/PMH of MVP, congenital neutropenia, hypothyroidism, PPHx of schizoaffective disorder (diagnosed 2009), multiple prior hospitalizations, last hospitalization at [facility] in 2014, hx of TMS currently on maintenance treatment w/ last treatment 3 weeks ago brought in after liver enzymes were elevated in lab workup in OP visit.     Patient alert, orientated and cooperative. Patient dysphoric and slightly anxious during the interview. Patient reports having a difficult relationship with mother, states yesterday patient had an argument with her as she is not able to recognize her feelings and her struggles with mental health. Per patient, this situation made her very anxious and decided to take Gabapentin, which she normally uses for sleeping or anxiety. Patient states she took 3 pills at the same time as she thought it wouldn't be an issue. Patient later reports sleeping and waking up at very dizzy and had a fall hurting her head, chin, and left arm. Patient denies she took more pills as a SA and denies any current SI with plan or intent. Patient reports she has been sad but not depressed about this situation with mother, adequate sleep and appetite. Per patient she has been gaining weight due to the medication and are currently switching her from Zyprexa to Rexulti. Patient is anxious and worried about her job, states she enjoys work and is going to apply to a better position but now is concern being on the hospital will affect this. Patient denies kiran, paranoia or AVTOH.      Patient reports her last admission was over the summer and reports being compliant with medication and follows regularly with Dr. Smalls. Patient also states seeing a therapist once a week but feels it is not enough as requires more sessions but her insurance is not covering.     Collateral:  Attempted to call Dr. Smalls but office closed.     Collateral brother Nilesh.   Per patient's brother Nilesh, patient was very upset on Tuesday after her mother made a comment that the patient seemed tired. Brother states patient was upset and could sleep so she took more Gabapentin. Patient then called the brother and he took her to the primary doctor and after test her liver enzymes were elevated and patient was brought to the ED. Per brother, patient has been suffering from anxiety and depression for a long time but reports this is not something new but states she has been more anxious about her job. Per brother, he is not sure how patient is sleeping as he is not living with her. He denies patient took more medicine as a SA, denies patient is currently presenting SI and believes there no safety concern at this point.

## 2022-10-21 NOTE — BH CONSULTATION LIAISON ASSESSMENT NOTE - ADDITIONAL PSYCHIATRIC MEDICATIONS
Gabapentin 600 PO TID   Buspar 30mg PO QD   Fluvoxamine 50mg PO QD  Vortioxetine 20 mg PO QD   Zyprexa 20 mg PO QHS  Rexulti 2 mg PO QD  Klonopin 1mg po bid PRN

## 2022-10-21 NOTE — CONSULT NOTE ADULT - SUBJECTIVE AND OBJECTIVE BOX
DATE OF SERVICE: 10-21-22   CHIEF COMPLAINT:Patient is a 57y old  Female who presents with a chief complaint of     HISTORY OF PRESENT ILLNESS:HPI:  Patient is a 57 year old Female with PMHx of neutropenia (states she follows with her PCP, Dr. Walton), Hypothyroidism (on Synthroid 12.5 Qd), and Schizoaffective disorder who presented to [facility] with chief complaint of recent fall on Wednesday 10/19 and elevated LFTs. Patient reports that on Tuesday evening she could not sleep and took 1800 mg of Gabapentin. Her usual Gabapentin dose is 600 Qd. Patient denies SI or HI. Patient states that she fell, hit her head and shoulders. Patient denies SI or HI. Denies chest pain or palpitations, shoulder pain or discomfort. States her discomfort has improved. Denies body aches or pain. Denies headache, lightheadedness or dizziness.        (21 Oct 2022 15:44)      PAST MEDICAL & SURGICAL HISTORY:  Depression      Anxiety      Congenital neutropenia      Lung mass      No significant past surgical history              MEDICATIONS:  heparin   Injectable 5000 Unit(s) SubCutaneous every 8 hours          pantoprazole    Tablet 40 milliGRAM(s) Oral before breakfast    levothyroxine 12.5 MICROGram(s) Oral daily    sodium chloride 0.9%. 1000 milliLiter(s) IV Continuous <Continuous>      FAMILY HISTORY:  Family history of heart disease (Father)        Non-contributory    SOCIAL HISTORY:    [ ] former 35 year smoker    Allergies    haloperidol (Unknown)    Intolerances    	    REVIEW OF SYSTEMS:  CONSTITUTIONAL: No fever  EYES: No eye pain, visual disturbances, or discharge  ENMT:  No difficulty hearing, tinnitus  NECK: No pain or stiffness  RESPIRATORY: No cough, wheezing,  CARDIOVASCULAR: No chest pain, palpitations, passing out, dizziness, or leg swelling  GASTROINTESTINAL:  No nausea, vomiting, diarrhea or constipation. No melena.  GENITOURINARY: No dysuria, hematuria  NEUROLOGICAL: No stroke like symptoms  SKIN: No burning or lesions   ENDOCRINE: No heat or cold intolerance  MUSCULOSKELETAL: No joint pain or swelling  PSYCHIATRIC: +  anxiety, mood swings  HEME/LYMPH: No bleeding gums  ALLERGY AND IMMUNOLOGIC: No hives or eczema	    All other ROS negative    PHYSICAL EXAM:  T(C): 36.5 (10-21-22 @ 11:27), Max: 36.5 (10-21-22 @ 11:27)  HR: 70 (10-21-22 @ 14:08) (70 - 77)  BP: 136/88 (10-21-22 @ 11:27) (136/88 - 136/88)  RR: 19 (10-21-22 @ 11:27) (19 - 19)  SpO2: 100% (10-21-22 @ 11:27) (100% - 100%)  Wt(kg): --  I&O's Summary      Appearance: Normal	  HEENT:   Normal oral mucosa, EOMI	  Cardiovascular:  S1 S2, No JVD,    Respiratory: Lungs clear to auscultation	  Psychiatry: Alert  Gastrointestinal:  Soft, Non-tender, + BS	  Skin: No rashes   Neurologic: Non-focal  Extremities:  No edema  Vascular: Peripheral pulses palpable    	    	  	  CARDIAC MARKERS:  Labs personally reviewed by me                                  12.1   2.30  )-----------( 231      ( 21 Oct 2022 12:18 )             38.5     10-21    137  |  101  |  10  ----------------------------<  89  4.3   |  26  |  0.87    Ca    9.5      21 Oct 2022 12:18    TPro  7.5  /  Alb  3.8  /  TBili  0.2  /  DBili  x   /  AST  676<H>  /  ALT  321<H>  /  AlkPhos  77  10-21          EKG: Personally reviewed by me - NSR  Radiology: Personally reviewed by me -   < from: CT Chest w/ IV Cont (10.21.22 @ 13:59) >  IMPRESSION:  No evidence of traumatic injury within the chest, abdomen or pelvis.  Normal morphologyliver. No biliary ductal dilatation.  Clear lungs         Assessment and Plan:   Assessment:  · Assessment	  Patient is a 57 year old Female with PMHx of neutropenia (states she follows with her PCP, Dr. Walton), Hypothyroidism (on Synthroid 12.5 Qd), and Schizoaffective disorder who presented to [facility] with chief complaint of recent fall on Wednesday 10/19 and elevated LFTs. Patient reports that on Tuesday evening she could not sleep and took 1800 mg of Gabapentin. Her usual Gabapentin dose is 600 Qd. Patient denies SI or HI. Patient states that she fell, hit her head and shoulders. Patient denies SI or HI. Denies chest pain or palpitations, shoulder pain or discomfort. States her discomfort has improved. Denies body aches or pain. Denies headache, lightheadedness or dizziness.     1. Elevated Troponin  - Trend to peak, likely 2/2 rhabdo  - Check TTE  - will plan for CT coronary once CK/rhabdo clears to avoid excess contrast load on kidneys in setting of rhabdo and risk for WIL    2. Rhabdomyolysis S/P Fall/sycnope  - Trend CK Qd   - Aggressive IVF for hydration --> Start with  CC/Hr  - CT head negative   - CT C spine negative  - Check B/L Shoulder Xrays   - PT consult   - Fall precautions      3. Syncope -- likely 2/2 high dose of gabapentin 1800mg (3x her usual dose)  - monitor on tele  - echo    4. Elevated LFTs  - CT A/P without abnormal liver morphology  - Check LFTs daily to trend  - Check ABD US  - Avoid hepatotoxic agents  - Hold Metformin     5. Schizoaffective Disorder  - Pt reports her home meds are -- Rexulti, Fluvox, Trintellix, Buspirone, Olanzapine, Gabapentin    - Pt denies SI/HI at time of encounter   - Psych eval consulted; F/u recs  - Monitor patient closely     6. Neutropenia  - Pt reports this is known and she follows with her PCP Dr. Walton   - Check CBC with diff   - Monitor counts closely     7. PPX  - Hep 5K Q8  - PPI   - Pt denies allergies - Regular diet ordered          Differential diagnosis and plan of care discussed with patient after the evaluation. Counseling on diet, nutritional counseling, weight management, exercise and medication compliance was done.   Advanced care planning/advanced directives discussed with patient/family. DNR status including forceful chest compressions to attempt to restart the heart, ventilator support/artificial breathing, electric shock, artificial nutrition, health care proxy, Molst form all discussed with pt. Pt wishes to consider. More than fifteen minutes spent on discussing advanced directives.  One hundred ten minutes spent on encounter, of which more than fifty percent of the encounter was spent counseling and/or coordinating care by the attending physician        Bryan Rossi DO [facility]  Cardiovascular Medicine  [address] [place] [address], [address]  Office [phone]  Available via call/text on [employer] Teams

## 2022-10-21 NOTE — H&P ADULT - ASSESSMENT
Patient is a 57 year old Female with PMHx of neutropenia (states she follows with her PCP, Dr. Walton), Hypothyroidism (on Synthroid 12.5 Qd), and Schizoaffective disorder who presented to [facility] with chief complaint of recent fall on Wednesday 10/19 and elevated LFTs. Patient reports that on Tuesday evening she could not sleep and took 1800 mg of Gabapentin. Her usual Gabapentin dose is 600 Qd. Patient denies SI or HI. Patient states that she fell, hit her head and shoulders. Patient denies SI or HI. Denies chest pain or palpitations, shoulder pain or discomfort. States her discomfort has improved. Denies body aches or pain. Denies headache, lightheadedness or dizziness.     Schizoaffective Disorder  - Pt reports her home meds are -- Rexulti, Fluvox, Trintellix, Buspirone, Olanzapine, Gabapentin    - Pt denies SI/HI at time of encounter   - Psych eval consulted; F/u recs  - Monitor patient closely    Rhabdomyolysis S/P Fall  - Trend CK Qd   - Aggressive IVF for hydration --> Start with  CC/Hr  - CT head negative   - CT C spine negative  - Check B/L Shoulder Xrays   - PT consult     Elevated Troponin  - Check EKG  - Check Troponin in AM   - Check TTE  - Monitor on tele  - Monitor electrolytes closely  - Cardio consulted; F/u recs    Elevated LFTs  - CT A/P without abnormal liver morphology  - Check LFTs daily to trend  - Check ABD US  - Avoid hepatotoxic agents  - Hold Metformin     neutropenia  - Pt reports this is known and she follows with her PCP Dr. Walton   - Check CBC with diff   - Monitor counts closely     Hypothyroidism  - Check TSH  - C/w Synthroid 12.5 Qd    L Kidney pole lesion  - Check renal US     PPX  - Hep 5K Q8  - PPI   - Pt denies allergies - Regular diet ordered  - PT eval        Patient is a 57 year old Female with PMHx of neutropenia (states she follows with her PCP, Dr. Walton), Hypothyroidism (on Synthroid 12.5 Qd), and Schizoaffective disorder who presented to [facility] with chief complaint of recent fall on Wednesday 10/19 and elevated LFTs. Patient reports that on Tuesday evening she could not sleep and took 1800 mg of Gabapentin. Her usual Gabapentin dose is 600 Qd. Patient denies SI or HI. Patient states that she fell, hit her head and shoulders. Patient denies SI or HI. Denies chest pain or palpitations, shoulder pain or discomfort. States her discomfort has improved. Denies body aches or pain. Denies headache, lightheadedness or dizziness.     Schizoaffective Disorder  - Pt reports her home meds are -- Rexulti, Fluvox, Trintellix, Buspirone, Olanzapine, Gabapentin    - Pt denies SI/HI at time of encounter   - Psych eval consulted; F/u recs  - Monitor patient closely    Rhabdomyolysis S/P Fall  - Trend CK Qd   - Aggressive IVF for hydration --> Start with  CC/Hr  - CT head negative   - CT C spine negative  - Check B/L Shoulder Xrays   - PT consult   - Fall precautions    Elevated Troponin  - Check EKG  - Check Troponin in AM   - Check TTE  - Monitor on tele  - Monitor electrolytes closely  - Cardio consulted; F/u recs    Elevated LFTs  - CT A/P without abnormal liver morphology  - Check LFTs daily to trend  - Check ABD US  - Avoid hepatotoxic agents  - Hold Metformin     Neutropenia  - Pt reports this is known and she follows with her PCP Dr. Walton   - Check CBC with diff   - Monitor counts closely     Hypothyroidism  - Check TSH  - C/w Synthroid 12.5 Qd    L Kidney pole lesion  - Check renal US     PPX  - Hep 5K Q8  - PPI   - Pt denies allergies - Regular diet ordered  - PT eval        Patient is a 57 year old Female with PMHx of neutropenia (states she follows with her PCP, Dr. Walton), Hypothyroidism (on Synthroid 12.5 Qd), and Schizoaffective disorder who presented to [facility] with chief complaint of recent fall on Wednesday 10/19 and elevated LFTs. Patient reports that on Tuesday evening she could not sleep and took 1800 mg of Gabapentin. Her usual Gabapentin dose is 600 Qd. Patient denies SI or HI. Patient states that she fell, hit her head and shoulders. Patient denies SI or HI. Denies chest pain or palpitations, shoulder pain or discomfort. States her discomfort has improved. Denies body aches or pain. Denies headache, lightheadedness or dizziness.     Schizoaffective Disorder  - Pt reports her home meds are -- Rexulti, Fluvox, Trintellix, Buspirone, Olanzapine, Gabapentin    - Pt denies SI/HI at time of encounter   - Psych eval consulted; F/u recs  - Monitor patient closely    Rhabdomyolysis S/P Fall  - Trend CK Qd   - Aggressive IVF for hydration --> Start with  CC/Hr  - CT head negative   - CT C spine negative  - Check B/L Shoulder Xrays   - Check Utox   - PT consult   - Fall precautions    Elevated Troponin  - Check EKG  - Check Troponin in AM   - Check TTE  - Monitor on tele  - Monitor electrolytes closely  - Cardio consulted; F/u recs    Elevated LFTs  - CT A/P without abnormal liver morphology  - Check LFTs daily to trend  - Check ABD US  - Avoid hepatotoxic agents  - Hold Metformin - Pt reports that she is not a diabetic. States that she is on Metformin 500 Qd for side effects of weight gain related to Olanzipine     Neutropenia  - Pt reports this is known and she follows with her PCP Dr. Walton   - Likely due to Olanzipine as it is known to cause Neutropenia   - Check CBC with diff   - Monitor counts closely     Hypothyroidism  - Check TSH  - C/w Synthroid 12.5 Qd    L Kidney pole lesion  - Check renal US     PPX  - Hep 5K Q8  - PPI   - Pt denies allergies - Regular diet ordered  - PT eval  - Fall precautions         Patient is a 57 year old Female with PMHx of neutropenia (states she follows with her PCP, Dr. Walton), Hypothyroidism (on Synthroid 12.5 Qd), and Schizoaffective disorder who presented to [facility] with chief complaint of recent fall on Wednesday 10/19 and elevated LFTs. Patient reports that on Tuesday evening she could not sleep and took 1800 mg of Gabapentin. Her usual Gabapentin dose is 600 Qd. Patient denies SI or HI. Patient states that she fell, hit her head and shoulders. Patient denies SI or HI. Denies chest pain or palpitations, shoulder pain or discomfort. States her discomfort has improved. Denies body aches or pain. Denies headache, lightheadedness or dizziness.     Schizoaffective Disorder  - Pt reports her home meds are -- Rexulti, Fluvox, Trintellix, Buspirone, Olanzapine, Gabapentin    - Pt denies SI/HI at time of encounter   - Psych eval consulted; F/u recs  - Monitor patient closely    Rhabdomyolysis S/P Fall/ Syncope   - Trend CK Qd   - Aggressive IVF for hydration --> Start with  CC/Hr  - CT head negative   - CT C spine negative  - Check B/L Shoulder Xrays   - Check Utox   - Check EKG   - Check TTE   - Monitor on Tele   - PT consult   - Fall precautions    Elevated Troponin  - Check EKG  - Check Troponin in AM   - Check TTE  - Monitor on tele  - Monitor electrolytes closely  - Cardio consulted; F/u recs --> Plan for CT Coronaries per cardio     Elevated LFTs  - CT A/P without abnormal liver morphology  - Check LFTs daily to trend  - Check ABD US  - Avoid hepatotoxic agents  - Hold Metformin - Pt reports that she is not a diabetic. States that she is on Metformin 500 Qd for side effects of weight gain related to Olanzipine     Fall, R/O Syncope   - Fall/Syncope due to ? 1800mg of Gabapentin (Pt usual dose is 600 Qd)  - Check EKG   - Check TTE   - Cardio on board; F/u recs  - Monitor on Tele     Neutropenia  - Pt reports this is known and she follows with her PCP Dr. Walton   - Likely due to Olanzipine as it is known to cause Neutropenia   - Check CBC with diff   - Monitor counts closely     Hypothyroidism  - Check TSH  - C/w Synthroid 12.5 Qd    L Kidney pole lesion  - Check renal US     PPX  - Hep 5K Q8  - PPI   - Pt denies allergies - Regular diet ordered  - PT eval  - Fall precautions         Patient is a 57 year old Female with PMHx of neutropenia (states she follows with her PCP, Dr. Walton), Hypothyroidism (on Synthroid 12.5 Qd), and Schizoaffective disorder who presented to [facility] with chief complaint of recent fall on Wednesday 10/19 and elevated LFTs. Patient reports that on Tuesday evening she could not sleep and took 1800 mg of Gabapentin. Her usual Gabapentin dose is 600 Qd. Patient denies SI or HI. Patient states that she fell, hit her head and shoulders. Patient denies SI or HI. Denies chest pain or palpitations, shoulder pain or discomfort. States her discomfort has improved. Denies body aches or pain. Denies headache, lightheadedness or dizziness.     Schizoaffective Disorder  - Pt reports her home meds are -- Rexulti, Fluvox, Trintellix, Buspirone, Olanzapine, Gabapentin    - Pt denies SI/HI at time of encounter   - Psych eval consulted; F/u recs  - Monitor patient closely    Rhabdomyolysis S/P Fall/ Syncope   - Trend CK Qd , elevated over 20K  - Aggressive IVF for hydration --> Start with  CC/Hr  - monitor urine output   - CT head negative   - CT C spine negative  - Check B/L Shoulder Xrays   - Check Utox   - Check EKG   - Check TTE   - Monitor on Tele   - PT consult   - Fall precautions    Elevated Troponin  - no chest pain   - Check EKG  - Check Troponin in AM   - Check TTE  - Monitor on tele  - Monitor electrolytes closely  - Cardio consulted; F/u recs --> Plan for CT Coronaries per cardio     Elevated LFTs  - CT A/P without abnormal liver morphology  - Check LFTs daily to trend  - Check ABD US  - Avoid hepatotoxic agents  - Hold Metformin - Pt reports that she is not a diabetic. States that she is on Metformin 500 Qd for side effects of weight gain related to Olanzipine     Fall, R/O Syncope   - Fall/Syncope due to ? 1800mg of Gabapentin (Pt usual dose is 600 Qd)  - Check EKG   - Check TTE   - Cardio on board; F/u recs  - Monitor on Tele     Neutropenia  - Pt reports this is known and she follows with her PCP Dr. Walton   - Likely due to Olanzipine as it is known to cause Neutropenia   - Check CBC with diff   - Monitor counts closely     Hypothyroidism  - Check TSH  - C/w Synthroid 12.5 Qd    L Kidney pole lesion  - Check renal US     PPX  - Hep 5K Q8  - PPI   - Pt denies allergies - Regular diet ordered  - PT eval  - Fall precautions

## 2022-10-21 NOTE — BH CONSULTATION LIAISON ASSESSMENT NOTE - CURRENT MEDICATION
MEDICATIONS  (STANDING):  heparin   Injectable 5000 Unit(s) SubCutaneous every 8 hours  levothyroxine 12.5 MICROGram(s) Oral daily  pantoprazole    Tablet 40 milliGRAM(s) Oral before breakfast  sodium chloride 0.9%. 1000 milliLiter(s) (100 mL/Hr) IV Continuous <Continuous>    MEDICATIONS  (PRN):

## 2022-10-21 NOTE — ED ADULT NURSE REASSESSMENT NOTE - NS ED NURSE REASSESS COMMENT FT1
Assumed care of pt at 19:15 as stated in report from RN. Charting as noted. Patient A&O x3, no pain/discomfort, denies CP/SOB.  pt states she fell wednesday due to taken to much gabapentin and was unable to stand. pt denies SI and states she didn't think this could happen to her. pt Updated on the plan of care. Call bell within reach, bed locked in lowest position. IV site flushed w/ NS. No redness, swelling or pain noted to site. No signs of acute distress noted, safety maintained. pt on CM normal sinus at this time awaiting bed placement

## 2022-10-21 NOTE — ED PROVIDER NOTE - ATTENDING CONTRIBUTION TO CARE
57 F w/ hx of schizoaffectiv d/o here w/ fall 2 days ago w/ ambulatory difficulty pt states that she had labs done outpt and was found to have elevated LFT, pt w/ mild headache, no sob, reports cp that is worse w/ movement, pt w/ no fevers, no chills. pt took too much gabapentin w/ desire to sleep, denies si/hi.   On exam, pt is awake and alert in no distress has L chest wall tenderness w/ palpation of the L chest wall, pt w/ no tenderness in the bilateral lower extremities, she has 5/5 lower extremity strength, she has 5/5  strength,   Plan for labs imaging and reassessment  while in the er found to have elevated troponin, will consult cardiology, pt to be admitted for LFT abnormalities, troponin trending, and PT eval given frequent falls,

## 2022-10-21 NOTE — PATIENT PROFILE ADULT - FUNCTIONAL ASSESSMENT - DAILY ACTIVITY 4.
[Follow-Up Visit] : a follow-up pain management visit [Spouse] : spouse 4 = No assist / stand by assistance

## 2022-10-22 DIAGNOSIS — D70.9 NEUTROPENIA, UNSPECIFIED: ICD-10-CM

## 2022-10-22 DIAGNOSIS — M62.82 RHABDOMYOLYSIS: ICD-10-CM

## 2022-10-22 DIAGNOSIS — R74.01 ELEVATION OF LEVELS OF LIVER TRANSAMINASE LEVELS: ICD-10-CM

## 2022-10-22 DIAGNOSIS — R77.8 OTHER SPECIFIED ABNORMALITIES OF PLASMA PROTEINS: ICD-10-CM

## 2022-10-22 DIAGNOSIS — E03.9 HYPOTHYROIDISM, UNSPECIFIED: ICD-10-CM

## 2022-10-22 DIAGNOSIS — Z29.9 ENCOUNTER FOR PROPHYLACTIC MEASURES, UNSPECIFIED: ICD-10-CM

## 2022-10-22 DIAGNOSIS — F25.9 SCHIZOAFFECTIVE DISORDER, UNSPECIFIED: ICD-10-CM

## 2022-10-22 LAB
A1C WITH ESTIMATED AVERAGE GLUCOSE RESULT: 5.7 % — HIGH (ref 4–5.6)
ALBUMIN SERPL ELPH-MCNC: 3.2 G/DL — LOW (ref 3.3–5)
ALP SERPL-CCNC: 68 U/L — SIGNIFICANT CHANGE UP (ref 40–120)
ALT FLD-CCNC: 258 U/L — HIGH (ref 10–45)
ANION GAP SERPL CALC-SCNC: 11 MMOL/L — SIGNIFICANT CHANGE UP (ref 5–17)
AST SERPL-CCNC: 507 U/L — HIGH (ref 10–40)
BILIRUB DIRECT SERPL-MCNC: <0.1 MG/DL — SIGNIFICANT CHANGE UP (ref 0–0.3)
BILIRUB INDIRECT FLD-MCNC: >0.1 MG/DL — LOW (ref 0.2–1)
BILIRUB SERPL-MCNC: 0.2 MG/DL — SIGNIFICANT CHANGE UP (ref 0.2–1.2)
BUN SERPL-MCNC: 6 MG/DL — LOW (ref 7–23)
CALCIUM SERPL-MCNC: 9 MG/DL — SIGNIFICANT CHANGE UP (ref 8.4–10.5)
CHLORIDE SERPL-SCNC: 108 MMOL/L — SIGNIFICANT CHANGE UP (ref 96–108)
CHOLEST SERPL-MCNC: 186 MG/DL — SIGNIFICANT CHANGE UP
CK SERPL-CCNC: CRITICAL HIGH U/L (ref 25–170)
CO2 SERPL-SCNC: 23 MMOL/L — SIGNIFICANT CHANGE UP (ref 22–31)
CREAT SERPL-MCNC: 0.78 MG/DL — SIGNIFICANT CHANGE UP (ref 0.5–1.3)
CULTURE RESULTS: SIGNIFICANT CHANGE UP
EGFR: 89 ML/MIN/1.73M2 — SIGNIFICANT CHANGE UP
ESTIMATED AVERAGE GLUCOSE: 117 MG/DL — HIGH (ref 68–114)
GLUCOSE SERPL-MCNC: 101 MG/DL — HIGH (ref 70–99)
HAV IGM SER-ACNC: SIGNIFICANT CHANGE UP
HBV CORE IGM SER-ACNC: SIGNIFICANT CHANGE UP
HBV SURFACE AG SER-ACNC: SIGNIFICANT CHANGE UP
HCT VFR BLD CALC: 36.2 % — SIGNIFICANT CHANGE UP (ref 34.5–45)
HCV AB S/CO SERPL IA: 0.24 S/CO — SIGNIFICANT CHANGE UP (ref 0–0.99)
HCV AB S/CO SERPL IA: 0.25 S/CO — SIGNIFICANT CHANGE UP (ref 0–0.99)
HCV AB SERPL-IMP: SIGNIFICANT CHANGE UP
HCV AB SERPL-IMP: SIGNIFICANT CHANGE UP
HDLC SERPL-MCNC: 51 MG/DL — SIGNIFICANT CHANGE UP
HGB BLD-MCNC: 11.5 G/DL — SIGNIFICANT CHANGE UP (ref 11.5–15.5)
LIPID PNL WITH DIRECT LDL SERPL: 119 MG/DL — HIGH
MCHC RBC-ENTMCNC: 29 PG — SIGNIFICANT CHANGE UP (ref 27–34)
MCHC RBC-ENTMCNC: 31.8 GM/DL — LOW (ref 32–36)
MCV RBC AUTO: 91.2 FL — SIGNIFICANT CHANGE UP (ref 80–100)
NON HDL CHOLESTEROL: 136 MG/DL — HIGH
NRBC # BLD: 0 /100 WBCS — SIGNIFICANT CHANGE UP (ref 0–0)
PLATELET # BLD AUTO: 227 K/UL — SIGNIFICANT CHANGE UP (ref 150–400)
POTASSIUM SERPL-MCNC: 3.9 MMOL/L — SIGNIFICANT CHANGE UP (ref 3.5–5.3)
POTASSIUM SERPL-SCNC: 3.9 MMOL/L — SIGNIFICANT CHANGE UP (ref 3.5–5.3)
PROT SERPL-MCNC: 6.6 G/DL — SIGNIFICANT CHANGE UP (ref 6–8.3)
RBC # BLD: 3.97 M/UL — SIGNIFICANT CHANGE UP (ref 3.8–5.2)
RBC # FLD: 13.4 % — SIGNIFICANT CHANGE UP (ref 10.3–14.5)
SODIUM SERPL-SCNC: 142 MMOL/L — SIGNIFICANT CHANGE UP (ref 135–145)
SPECIMEN SOURCE: SIGNIFICANT CHANGE UP
TRIGL SERPL-MCNC: 86 MG/DL — SIGNIFICANT CHANGE UP
TROPONIN T, HIGH SENSITIVITY RESULT: 306 NG/L — HIGH (ref 0–51)
TSH SERPL-MCNC: 5.19 UIU/ML — HIGH (ref 0.27–4.2)
WBC # BLD: 2.28 K/UL — LOW (ref 3.8–10.5)
WBC # FLD AUTO: 2.28 K/UL — LOW (ref 3.8–10.5)

## 2022-10-22 PROCEDURE — 73560 X-RAY EXAM OF KNEE 1 OR 2: CPT | Mod: 26,50

## 2022-10-22 PROCEDURE — 99233 SBSQ HOSP IP/OBS HIGH 50: CPT

## 2022-10-22 PROCEDURE — 99232 SBSQ HOSP IP/OBS MODERATE 35: CPT

## 2022-10-22 PROCEDURE — 72170 X-RAY EXAM OF PELVIS: CPT | Mod: 26

## 2022-10-22 RX ADMIN — HEPARIN SODIUM 5000 UNIT(S): 5000 INJECTION INTRAVENOUS; SUBCUTANEOUS at 05:22

## 2022-10-22 RX ADMIN — Medication 1 MILLIGRAM(S): at 17:18

## 2022-10-22 RX ADMIN — PANTOPRAZOLE SODIUM 40 MILLIGRAM(S): 20 TABLET, DELAYED RELEASE ORAL at 05:22

## 2022-10-22 RX ADMIN — Medication 1 MILLIGRAM(S): at 22:08

## 2022-10-22 RX ADMIN — Medication 1 MILLIGRAM(S): at 10:04

## 2022-10-22 RX ADMIN — Medication 12.5 MICROGRAM(S): at 05:22

## 2022-10-22 NOTE — PROGRESS NOTE ADULT - SUBJECTIVE AND OBJECTIVE BOX
HPI: Patient is a 57 year old Female with PMHx of neutropenia (states she follows with her PCP, Dr. Walton), Hypothyroidism (on Synthroid 12.5 Qd), and Schizoaffective disorder who presented to [facility] with chief complaint of recent fall on Wednesday 10/19 and elevated LFTs. Patient reports that on Tuesday evening she could not sleep and took 1800 mg of Gabapentin. Her usual Gabapentin dose is 600 Qd. Patient denies SI or HI. Patient states that she fell, hit her head and shoulders. Patient denies SI or HI. Denies chest pain or palpitations, shoulder pain or discomfort. States her discomfort has improved. Denies body aches or pain. Denies headache, lightheadedness or dizziness      SUBJECTIVE / OVERNIGHT EVENTS: denies any major concerns, is on IVF and being monitored for rhabdo, transaminitis    MEDICATIONS  (STANDING):  heparin   Injectable 5000 Unit(s) SubCutaneous every 8 hours  influenza   Vaccine 0.5 milliLiter(s) IntraMuscular once  levothyroxine 12.5 MICROGram(s) Oral daily  LORazepam     Tablet 1 milliGRAM(s) Oral two times a day  pantoprazole    Tablet 40 milliGRAM(s) Oral before breakfast  sodium chloride 0.9%. 1000 milliLiter(s) (100 mL/Hr) IV Continuous <Continuous>    MEDICATIONS  (PRN):    Vital Signs Last 24 Hrs  T(C): 36.8 (22 Oct 2022 11:55), Max: 37 (21 Oct 2022 19:55)  T(F): 98.2 (22 Oct 2022 11:55), Max: 98.6 (21 Oct 2022 19:55)  HR: 75 (22 Oct 2022 11:55) (69 - 82)  BP: 136/65 (22 Oct 2022 11:55) (118/68 - 136/84)  BP(mean): --  RR: 18 (22 Oct 2022 11:55) (16 - 18)  SpO2: 98% (22 Oct 2022 11:55) (96% - 100%)    Parameters below as of 22 Oct 2022 11:55  Patient On (Oxygen Delivery Method): room air    CONSTITUTIONAL: NAD, well-developed, well-groomed  EYES: conjunctiva and sclera clear  ENMT: normal  RESPIRATORY: normal respiratory effort; lungs are clear to auscultation bilaterally  CARDIOVASCULAR: S1S2, RRR  ABDOMEN: +BS, NTND  PSYCH: affect appropriate  NEUROLOGY: grossly normal  SKIN: no jaundice    LABS:                                   11.5   2.28  )-----------( 227      ( 22 Oct 2022 07:24 )             36.2     10-22    142  |  108  |  6<L>  ----------------------------<  101<H>  3.9   |  23  |  0.78    Ca    9.0      22 Oct 2022 07:22              RADIOLOGY & ADDITIONAL TESTS:    Imaging Personally Reviewed:    Consultant(s) Notes Reviewed:      Care Discussed with Consultants/Other Providers:

## 2022-10-22 NOTE — PHYSICAL THERAPY INITIAL EVALUATION ADULT - ADDITIONAL COMMENTS
Pt lives with her mother in an apartment, 3 entry steps (+ rail), elevator access inside. Prior to admission pt was independent with all functional mobility including community ambulation without a device. Pt independent with ADL's at baseline.

## 2022-10-22 NOTE — BH CONSULTATION LIAISON PROGRESS NOTE - CURRENT MEDICATION
MEDICATIONS  (STANDING):  heparin   Injectable 5000 Unit(s) SubCutaneous every 8 hours  influenza   Vaccine 0.5 milliLiter(s) IntraMuscular once  levothyroxine 12.5 MICROGram(s) Oral daily  LORazepam     Tablet 1 milliGRAM(s) Oral two times a day  pantoprazole    Tablet 40 milliGRAM(s) Oral before breakfast  sodium chloride 0.9%. 1000 milliLiter(s) (100 mL/Hr) IV Continuous <Continuous>    MEDICATIONS  (PRN):

## 2022-10-22 NOTE — PROGRESS NOTE ADULT - PROBLEM SELECTOR PLAN 4
- US abd 10/21: Suggestion of subcentimeter nonobstructing left renal calculus, otherwise unremarkable abdominal and bladder ultrasound  - ? from psych meds, on hold currently, downtrending - US abd 10/21: Suggestion of subcentimeter nonobstructing left renal calculus, otherwise unremarkable abdominal and bladder ultrasound - would f/u as outpatient  - ? from psych meds, on hold currently, downtrending  - hold Metformin - patient reported that she is not a diabetic; she is on Metformin 500 daily for side effects of weight gain related to olanzapine

## 2022-10-22 NOTE — PROGRESS NOTE ADULT - PROBLEM SELECTOR PLAN 1
-  recs appreciated, home meds currently on hold given transaminitis  - c/w ativan BID  - no complaints today  - monitor

## 2022-10-22 NOTE — PROGRESS NOTE ADULT - PROBLEM SELECTOR PLAN 2
- CK downtrending  - continue to monitor  - c/w IVF NS @ 100cc/h  - check Mg, Phos in AM  - f/u utox

## 2022-10-22 NOTE — PROGRESS NOTE ADULT - ASSESSMENT
Patient is a 57 year old Female with PMHx of neutropenia (states she follows with her PCP, Dr. Walton), Hypothyroidism (on Synthroid 12.5 Qd), and Schizoaffective disorder who presented to [facility] with chief complaint of recent fall on Wednesday 10/19 and elevated LFTs. Patient reports that on Tuesday evening she could not sleep and took 1800 mg of Gabapentin. Her usual Gabapentin dose is 600 Qd. Patient denies SI or HI. Patient states that she fell, hit her head and shoulders. Patient denies SI or HI. Denies chest pain or palpitations, shoulder pain or discomfort. States her discomfort has improved. Denies body aches or pain. Denies headache, lightheadedness or dizziness.       Elevated LFTs  - CT A/P without abnormal liver morphology  - Check LFTs daily to trend  - Check ABD US  - Avoid hepatotoxic agents  - Hold Metformin - Pt reports that she is not a diabetic. States that she is on Metformin 500 Qd for side effects of weight gain related to Olanzipine     Fall, R/O Syncope   - Fall/Syncope due to ? 1800mg of Gabapentin (Pt usual dose is 600 Qd)  - Check EKG   - Check TTE   - Cardio on board; F/u recs  - Monitor on Tele     Neutropenia  - Pt reports this is known and she follows with her PCP Dr. Walton   - Likely due to Olanzipine as it is known to cause Neutropenia   - Check CBC with diff   - Monitor counts closely     Hypothyroidism  - Check TSH  - C/w Synthroid 12.5 Qd    L Kidney pole lesion  - Check renal US     PPX  - Hep 5K Q8  - PPI   - Pt denies allergies - Regular diet ordered  - PT eval  - Fall precautions         Patient is a 57 year old Female with PMHx of neutropenia (states she follows with her PCP, Dr. Walton), Hypothyroidism (on Synthroid 12.5 Qd), and Schizoaffective disorder who presented to [facility] with chief complaint of recent fall on Wednesday 10/19 and elevated LFTs. Patient reports that on Tuesday evening she could not sleep and took 1800 mg of Gabapentin. Her usual Gabapentin dose is 600 Qd. Patient denies SI or HI. Patient states that she fell, hit her head and shoulders. Patient denies SI or HI. Denies chest pain or palpitations, shoulder pain or discomfort. States her discomfort has improved. Denies body aches or pain. Denies headache, lightheadedness or dizziness.

## 2022-10-22 NOTE — PROGRESS NOTE ADULT - SUBJECTIVE AND OBJECTIVE BOX
DATE OF SERVICE: 10-22-22 @ 17:17    Patient is a 57y old  Female who presents with a chief complaint of Fall (22 Oct 2022 12:56)      INTERVAL HISTORY: no complaints     REVIEW OF SYSTEMS:  CONSTITUTIONAL: No weakness  EYES/ENT: No visual changes;  No throat pain   NECK: No pain or stiffness  RESPIRATORY: No cough, wheezing; No shortness of breath  CARDIOVASCULAR: No chest pain or palpitations  GASTROINTESTINAL: No abdominal  pain. No nausea, vomiting, or hematemesis  GENITOURINARY: No dysuria, frequency or hematuria  NEUROLOGICAL: No stroke like symptoms  SKIN: No rashes    MEDICATIONS:        PHYSICAL EXAM:  T(C): 36.8 (10-22-22 @ 11:55), Max: 37 (10-21-22 @ 19:55)  HR: 75 (10-22-22 @ 11:55) (69 - 82)  BP: 136/65 (10-22-22 @ 11:55) (118/68 - 136/84)  RR: 18 (10-22-22 @ 11:55) (16 - 18)  SpO2: 98% (10-22-22 @ 11:55) (96% - 100%)  Wt(kg): --  I&O's Summary    Height (cm): 165.1 (10-21 @ 19:55)  Weight (kg): 61.6 (10-21 @ 19:55)  BMI (kg/m2): 22.6 (10-21 @ 19:55)  BSA (m2): 1.68 (10-21 @ 19:55)    Appearance: In no distress	  HEENT:    PERRL, EOMI	  Cardiovascular:  S1 S2, No JVD  Respiratory: Lungs clear to auscultation	  Gastrointestinal:  Soft, Non-tender, + BS	  Vascularature:  No edema of LE  Psychiatric: Appropriate affect   Neuro: no acute focal deficits                               11.5   2.28  )-----------( 227      ( 22 Oct 2022 07:24 )             36.2     10-22    142  |  108  |  6<L>  ----------------------------<  101<H>  3.9   |  23  |  0.78    Ca    9.0      22 Oct 2022 07:22    TPro  6.6  /  Alb  3.2<L>  /  TBili  0.2  /  DBili  <0.1  /  AST  507<H>  /  ALT  258<H>  /  AlkPhos  68  10-22        Labs personally reviewed      ASSESSMENT/PLAN: 	  Patient is a 57 year old Female with PMHx of neutropenia (states she follows with her PCP, Dr. Walton), Hypothyroidism (on Synthroid 12.5 Qd), and Schizoaffective disorder who presented to [facility] with chief complaint of recent fall on Wednesday 10/19 and elevated LFTs. Patient reports that on Tuesday evening she could not sleep and took 1800 mg of Gabapentin. Her usual Gabapentin dose is 600 Qd. Patient denies SI or HI. Patient states that she fell, hit her head and shoulders. Patient denies SI or HI. Denies chest pain or palpitations, shoulder pain or discomfort. States her discomfort has improved. Denies body aches or pain. Denies headache, lightheadedness or dizziness.     1. Elevated Troponin  - Trend to peak, likely 2/2 rhabdo  - Check TTE  - will plan for CT coronary once CK/rhabdo clears to avoid excess contrast load on kidneys in setting of rhabdo and risk for WIL      2. Rhabdomyolysis S/P Fall/sycnope  - Trend CK Qd   - Aggressive IVF for hydration --> Start with  CC/Hr  - CT head negative   - CT C spine negative  - Check B/L Shoulder Xrays   - PT consult   - Fall precautions      3. Syncope -- likely 2/2 high dose of gabapentin 1800mg (3x her usual dose)  - monitor on tele  - echo    4. Elevated LFTs  - CT A/P without abnormal liver morphology  - Check LFTs daily to trend  - Check ABD US  - Avoid hepatotoxic agents  - Hold Metformin     5. Schizoaffective Disorder  - Pt reports her home meds are -- Rexulti, Fluvox, Trintellix, Buspirone, Olanzapine, Gabapentin    - Pt denies SI/HI at time of encounter   - Psych eval consulted; F/u recs  - Monitor patient closely     6. Neutropenia  - Pt reports this is known and she follows with her PCP Dr. Walton   - Check CBC with diff   - Monitor counts closely     7. PPX  - Hep 5K Q8          BRANDY Gilbert DO [facility]  Cardiovascular Medicine  [address], [address]  Office: [phone]  Cell: [phone] DATE OF SERVICE: 10-22-22 @ 17:17    Patient is a 57y old  Female who presents with a chief complaint of Fall (22 Oct 2022 12:56)      INTERVAL HISTORY: no complaints     REVIEW OF SYSTEMS:  CONSTITUTIONAL: No weakness  EYES/ENT: No visual changes;  No throat pain   NECK: No pain or stiffness  RESPIRATORY: No cough, wheezing; No shortness of breath  CARDIOVASCULAR: No chest pain or palpitations  GASTROINTESTINAL: No abdominal  pain. No nausea, vomiting, or hematemesis  GENITOURINARY: No dysuria, frequency or hematuria  NEUROLOGICAL: No stroke like symptoms  SKIN: No rashes    MEDICATIONS:        PHYSICAL EXAM:  T(C): 36.8 (10-22-22 @ 11:55), Max: 37 (10-21-22 @ 19:55)  HR: 75 (10-22-22 @ 11:55) (69 - 82)  BP: 136/65 (10-22-22 @ 11:55) (118/68 - 136/84)  RR: 18 (10-22-22 @ 11:55) (16 - 18)  SpO2: 98% (10-22-22 @ 11:55) (96% - 100%)  Wt(kg): --  I&O's Summary    Height (cm): 165.1 (10-21 @ 19:55)  Weight (kg): 61.6 (10-21 @ 19:55)  BMI (kg/m2): 22.6 (10-21 @ 19:55)  BSA (m2): 1.68 (10-21 @ 19:55)    Appearance: In no distress	  HEENT:    PERRL, EOMI	  Cardiovascular:  S1 S2, No JVD  Respiratory: Lungs clear to auscultation	  Gastrointestinal:  Soft, Non-tender, + BS	  Vascularature:  No edema of LE  Psychiatric: Appropriate affect   Neuro: no acute focal deficits                               11.5   2.28  )-----------( 227      ( 22 Oct 2022 07:24 )             36.2     10-22    142  |  108  |  6<L>  ----------------------------<  101<H>  3.9   |  23  |  0.78    Ca    9.0      22 Oct 2022 07:22    TPro  6.6  /  Alb  3.2<L>  /  TBili  0.2  /  DBili  <0.1  /  AST  507<H>  /  ALT  258<H>  /  AlkPhos  68  10-22        Labs personally reviewed      ASSESSMENT/PLAN: 	  Patient is a 57 year old Female with PMHx of neutropenia (states she follows with her PCP, Dr. Walton), Hypothyroidism (on Synthroid 12.5 Qd), and Schizoaffective disorder who presented to [facility] with chief complaint of recent fall on Wednesday 10/19 and elevated LFTs. Patient reports that on Tuesday evening she could not sleep and took 1800 mg of Gabapentin. Her usual Gabapentin dose is 600 Qd. Patient denies SI or HI. Patient states that she fell, hit her head and shoulders. Patient denies SI or HI. Denies chest pain or palpitations, shoulder pain or discomfort. States her discomfort has improved. Denies body aches or pain. Denies headache, lightheadedness or dizziness.     1. Elevated Troponin  - Trend to peak, likely 2/2 rhabdo  - Check TTE  - will plan for CT coronary once CK/rhabdo clears to avoid excess contrast load on kidneys in setting of rhabdo and risk for WIL      2. Rhabdomyolysis S/P Fall/sycnope  - Trend CK Qd   - Aggressive IVF for hydration --> Start with  CC/Hr  - CT head negative   - CT C spine negative  - Check B/L Shoulder Xrays   - PT consult   - Fall precautions      3. Syncope -- likely 2/2 high dose of gabapentin 1800mg (3x her usual dose)  - monitor on tele  - echo    4. Elevated LFTs  - CT A/P without abnormal liver morphology  - Check LFTs daily to trend  - Check ABD US  - Avoid hepatotoxic agents  - Hold Metformin   - unclear etiology    5. Schizoaffective Disorder  - Pt reports her home meds are -- Rexulti, Fluvox, Trintellix, Buspirone, Olanzapine, Gabapentin    - Pt denies SI/HI at time of encounter   - Psych eval consulted; F/u recs  - Monitor patient closely     6. Neutropenia  - Pt reports this is known and she follows with her PCP Dr. Walton   - Check CBC with diff   - Monitor counts closely     7. PPX  - Hep 5K Q8          BRANDY Gilbert DO [facility]  Cardiovascular Medicine  [address] [place] [address], [address]  Office: [phone]  Cell: [phone]

## 2022-10-23 LAB
ALBUMIN SERPL ELPH-MCNC: 2.8 G/DL — LOW (ref 3.3–5)
ALP SERPL-CCNC: 57 U/L — SIGNIFICANT CHANGE UP (ref 40–120)
ALT FLD-CCNC: 189 U/L — HIGH (ref 10–45)
ANION GAP SERPL CALC-SCNC: 10 MMOL/L — SIGNIFICANT CHANGE UP (ref 5–17)
AST SERPL-CCNC: 267 U/L — HIGH (ref 10–40)
BILIRUB DIRECT SERPL-MCNC: <0.1 MG/DL — SIGNIFICANT CHANGE UP (ref 0–0.3)
BILIRUB INDIRECT FLD-MCNC: >0.2 MG/DL — SIGNIFICANT CHANGE UP (ref 0.2–1)
BILIRUB SERPL-MCNC: 0.3 MG/DL — SIGNIFICANT CHANGE UP (ref 0.2–1.2)
BUN SERPL-MCNC: 7 MG/DL — SIGNIFICANT CHANGE UP (ref 7–23)
CALCIUM SERPL-MCNC: 8.4 MG/DL — SIGNIFICANT CHANGE UP (ref 8.4–10.5)
CHLORIDE SERPL-SCNC: 109 MMOL/L — HIGH (ref 96–108)
CK SERPL-CCNC: 9758 U/L — HIGH (ref 25–170)
CO2 SERPL-SCNC: 22 MMOL/L — SIGNIFICANT CHANGE UP (ref 22–31)
CREAT SERPL-MCNC: 0.7 MG/DL — SIGNIFICANT CHANGE UP (ref 0.5–1.3)
EGFR: 101 ML/MIN/1.73M2 — SIGNIFICANT CHANGE UP
GLUCOSE SERPL-MCNC: 90 MG/DL — SIGNIFICANT CHANGE UP (ref 70–99)
MAGNESIUM SERPL-MCNC: 1.9 MG/DL — SIGNIFICANT CHANGE UP (ref 1.6–2.6)
PHOSPHATE SERPL-MCNC: 4.3 MG/DL — SIGNIFICANT CHANGE UP (ref 2.5–4.5)
POTASSIUM SERPL-MCNC: 4 MMOL/L — SIGNIFICANT CHANGE UP (ref 3.5–5.3)
POTASSIUM SERPL-SCNC: 4 MMOL/L — SIGNIFICANT CHANGE UP (ref 3.5–5.3)
PROT SERPL-MCNC: 5.7 G/DL — LOW (ref 6–8.3)
SODIUM SERPL-SCNC: 141 MMOL/L — SIGNIFICANT CHANGE UP (ref 135–145)
TROPONIN T, HIGH SENSITIVITY RESULT: 240 NG/L — HIGH (ref 0–51)

## 2022-10-23 PROCEDURE — 99233 SBSQ HOSP IP/OBS HIGH 50: CPT

## 2022-10-23 RX ORDER — IBUPROFEN 200 MG
200 TABLET ORAL ONCE
Refills: 0 | Status: DISCONTINUED | OUTPATIENT
Start: 2022-10-23 | End: 2022-10-23

## 2022-10-23 RX ORDER — LANOLIN ALCOHOL/MO/W.PET/CERES
5 CREAM (GRAM) TOPICAL AT BEDTIME
Refills: 0 | Status: COMPLETED | OUTPATIENT
Start: 2022-10-23 | End: 2022-10-23

## 2022-10-23 RX ADMIN — Medication 5 MILLIGRAM(S): at 21:08

## 2022-10-23 RX ADMIN — Medication 1 MILLIGRAM(S): at 05:03

## 2022-10-23 RX ADMIN — PANTOPRAZOLE SODIUM 40 MILLIGRAM(S): 20 TABLET, DELAYED RELEASE ORAL at 05:03

## 2022-10-23 RX ADMIN — Medication 1 MILLIGRAM(S): at 17:32

## 2022-10-23 RX ADMIN — Medication 12.5 MICROGRAM(S): at 05:03

## 2022-10-23 RX ADMIN — SODIUM CHLORIDE 100 MILLILITER(S): 9 INJECTION INTRAMUSCULAR; INTRAVENOUS; SUBCUTANEOUS at 21:07

## 2022-10-23 NOTE — PROGRESS NOTE ADULT - PROBLEM SELECTOR PLAN 4
- US abd 10/21: Suggestion of subcentimeter nonobstructing left renal calculus, otherwise unremarkable abdominal and bladder ultrasound - would f/u as outpatient  - transaminitis likely from muscle breakdown, CTM

## 2022-10-23 NOTE — PROGRESS NOTE ADULT - SUBJECTIVE AND OBJECTIVE BOX
DATE OF SERVICE: 10-23-22 @ 14:01    Patient is a 57y old  Female who presents with a chief complaint of Fall (22 Oct 2022 17:17)      INTERVAL HISTORY: no complaints     REVIEW OF SYSTEMS:  CONSTITUTIONAL: No weakness  EYES/ENT: No visual changes;  No throat pain   NECK: No pain or stiffness  RESPIRATORY: No cough, wheezing; No shortness of breath  CARDIOVASCULAR: No chest pain or palpitations  GASTROINTESTINAL: No abdominal  pain. No nausea, vomiting, or hematemesis  GENITOURINARY: No dysuria, frequency or hematuria  NEUROLOGICAL: No stroke like symptoms  SKIN: No rashes  	  MEDICATIONS:        PHYSICAL EXAM:  T(C): 36.6 (10-23-22 @ 11:48), Max: 37.2 (10-22-22 @ 20:04)  HR: 78 (10-23-22 @ 11:48) (69 - 78)  BP: 125/86 (10-23-22 @ 11:48) (107/68 - 148/89)  RR: 18 (10-23-22 @ 11:48) (18 - 18)  SpO2: 98% (10-23-22 @ 11:48) (95% - 99%)  Wt(kg): --  I&O's Summary        Appearance: In no distress	  HEENT:    PERRL, EOMI	  Cardiovascular:  S1 S2, No JVD  Respiratory: Lungs clear to auscultation	  Gastrointestinal:  Soft, Non-tender, + BS	  Vascularature:  No edema of LE  Psychiatric: Appropriate affect   Neuro: no acute focal deficits                               11.5   2.28  )-----------( 227      ( 22 Oct 2022 07:24 )             36.2     10-23    141  |  109<H>  |  7   ----------------------------<  90  4.0   |  22  |  0.70    Ca    8.4      23 Oct 2022 07:32  Phos  4.3     10-23  Mg     1.9     10-23    TPro  5.7<L>  /  Alb  2.8<L>  /  TBili  0.3  /  DBili  <0.1  /  AST  267<H>  /  ALT  189<H>  /  AlkPhos  57  10-23        Labs personally reviewed      ASSESSMENT/PLAN: 	  Patient is a 57 year old Female with PMHx of neutropenia (states she follows with her PCP, Dr. Walton), Hypothyroidism (on Synthroid 12.5 Qd), and Schizoaffective disorder who presented to [facility] with chief complaint of recent fall on Wednesday 10/19 and elevated LFTs. Patient reports that on Tuesday evening she could not sleep and took 1800 mg of Gabapentin. Her usual Gabapentin dose is 600 Qd. Patient denies SI or HI. Patient states that she fell, hit her head and shoulders. Patient denies SI or HI. Denies chest pain or palpitations, shoulder pain or discomfort. States her discomfort has improved. Denies body aches or pain. Denies headache, lightheadedness or dizziness.     1. Elevated Troponin  - Trend to peak, likely 2/2 rhabdo  - Check TTE  - will plan for CT coronary once CK/rhabdo clears to avoid excess contrast load on kidneys in setting of rhabdo and risk for WIL      2. Rhabdomyolysis S/P Fall/sycnope  - Trend CK Qd   - Aggressive IVF for hydration --> Start with  CC/Hr  - CT head negative   - CT C spine negative  - Check B/L Shoulder Xrays   - PT consult   - Fall precautions      3. Syncope -- likely 2/2 high dose of gabapentin 1800mg (3x her usual dose)  - monitor on tele  - echo    4. Elevated LFTs  - CT A/P without abnormal liver morphology  - Check LFTs daily to trend  - Check ABD US  - Avoid hepatotoxic agents  - Hold Metformin   - unclear etiology    5. Schizoaffective Disorder  - Pt reports her home meds are -- Rexulti, Fluvox, Trintellix, Buspirone, Olanzapine, Gabapentin    - Pt denies SI/HI at time of encounter   - Psych eval consulted; F/u recs  - Monitor patient closely     6. Neutropenia  - Pt reports this is known and she follows with her PCP Dr. Walton   - Check CBC with diff   - Monitor counts closely     7. PPX  - Hep 5K Q8        BRANDY Gilbert DO [facility]  Cardiovascular Medicine  [address] [place] [address], [address]  Office: [phone]  Cell: [phone] DATE OF SERVICE: 10-23-22 @ 14:01    Patient is a 57y old  Female who presents with a chief complaint of Fall (22 Oct 2022 17:17)      INTERVAL HISTORY: no complaints     REVIEW OF SYSTEMS:  CONSTITUTIONAL: No weakness  EYES/ENT: No visual changes;  No throat pain   NECK: No pain or stiffness  RESPIRATORY: No cough, wheezing; No shortness of breath  CARDIOVASCULAR: No chest pain or palpitations  GASTROINTESTINAL: No abdominal  pain. No nausea, vomiting, or hematemesis  GENITOURINARY: No dysuria, frequency or hematuria  NEUROLOGICAL: No stroke like symptoms  SKIN: No rashes  	  MEDICATIONS:        PHYSICAL EXAM:  T(C): 36.6 (10-23-22 @ 11:48), Max: 37.2 (10-22-22 @ 20:04)  HR: 78 (10-23-22 @ 11:48) (69 - 78)  BP: 125/86 (10-23-22 @ 11:48) (107/68 - 148/89)  RR: 18 (10-23-22 @ 11:48) (18 - 18)  SpO2: 98% (10-23-22 @ 11:48) (95% - 99%)  Wt(kg): --  I&O's Summary        Appearance: In no distress	  HEENT:    PERRL, EOMI	  Cardiovascular:  S1 S2, No JVD  Respiratory: Lungs clear to auscultation	  Gastrointestinal:  Soft, Non-tender, + BS	  Vascularature:  No edema of LE  Psychiatric: Appropriate affect   Neuro: no acute focal deficits                               11.5   2.28  )-----------( 227      ( 22 Oct 2022 07:24 )             36.2     10-23    141  |  109<H>  |  7   ----------------------------<  90  4.0   |  22  |  0.70    Ca    8.4      23 Oct 2022 07:32  Phos  4.3     10-23  Mg     1.9     10-23    TPro  5.7<L>  /  Alb  2.8<L>  /  TBili  0.3  /  DBili  <0.1  /  AST  267<H>  /  ALT  189<H>  /  AlkPhos  57  10-23        Labs personally reviewed      ASSESSMENT/PLAN: 	  Patient is a 57 year old Female with PMHx of neutropenia (states she follows with her PCP, Dr. Walton), Hypothyroidism (on Synthroid 12.5 Qd), and Schizoaffective disorder who presented to [facility] with chief complaint of recent fall on Wednesday 10/19 and elevated LFTs. Patient reports that on Tuesday evening she could not sleep and took 1800 mg of Gabapentin. Her usual Gabapentin dose is 600 Qd. Patient denies SI or HI. Patient states that she fell, hit her head and shoulders. Patient denies SI or HI. Denies chest pain or palpitations, shoulder pain or discomfort. States her discomfort has improved. Denies body aches or pain. Denies headache, lightheadedness or dizziness.     1. Elevated Troponin  - Trend to peak, likely 2/2 rhabdo  - Check TTE  - will plan for CT coronary Monday    2. Rhabdomyolysis S/P Fall/sycnope  - Trend CK Qd   - Aggressive IVF for hydration --> Start with  CC/Hr  - CT head negative   - CT C spine negative  - Check B/L Shoulder Xrays   - PT consult   - Fall precautions      3. Syncope -- likely 2/2 high dose of gabapentin 1800mg (3x her usual dose)  - monitor on tele  - echo    4. Elevated LFTs  - CT A/P without abnormal liver morphology  - Check LFTs daily to trend  - Check ABD US  - Avoid hepatotoxic agents  - Hold Metformin   - unclear etiology    5. Schizoaffective Disorder  - Pt reports her home meds are -- Rexulti, Fluvox, Trintellix, Buspirone, Olanzapine, Gabapentin    - Pt denies SI/HI at time of encounter   - Psych eecs appreciated   - Monitor patient closely     6. Neutropenia  - Pt reports this is known and she follows with her PCP Dr. Walton   - Check CBC with diff   - Monitor counts closely     7. PPX  - Hep 5K Q8        BRANDY Gilbert DO [facility]  Cardiovascular Medicine  [address], [address]  Office: [phone]  Cell: [phone]

## 2022-10-23 NOTE — PROGRESS NOTE ADULT - ASSESSMENT
yes Patient is a 57 year old Female with PMHx of neutropenia (states she follows with her PCP, Dr. Walton), Hypothyroidism (on Synthroid 12.5 Qd), and Schizoaffective disorder who presented to [facility] with chief complaint of recent fall on Wednesday 10/19 and elevated LFTs. Patient reports that on Tuesday evening she could not sleep and took 1800 mg of Gabapentin. Her usual Gabapentin dose is 600 Qd. Patient denies SI or HI. Patient states that she fell, hit her head and shoulders. Patient denies SI or HI. Denies chest pain or palpitations, shoulder pain or discomfort. States her discomfort has improved. Denies body aches or pain. Denies headache, lightheadedness or dizziness.

## 2022-10-23 NOTE — PROGRESS NOTE ADULT - PROBLEM SELECTOR PLAN 2
- CK downtrending  - continue to monitor  - c/w IVF NS @ 100cc/h, re-assess need daily  - utox neg - CK downtrending  - continue to monitor  - c/w IVF NS @ 100cc/h, re-assess need daily  - serum tox neg

## 2022-10-23 NOTE — PROGRESS NOTE ADULT - SUBJECTIVE AND OBJECTIVE BOX
[facility] Division of Hospital Medicine  Janes Escobar MD  Available via MS Teams    SUBJECTIVE / OVERNIGHT EVENTS: No acute events overnight. Pt seen and examined at bedside. Pt feels well, denies any chest pain, muscle pain, headache. "Fall" overnight, pt was reaching down to pick something up from under her bed but lost strength/balance and fell on her behind. Denies any pain.     ADDITIONAL REVIEW OF SYSTEMS:    MEDICATIONS  (STANDING):  heparin   Injectable 5000 Unit(s) SubCutaneous every 8 hours  influenza   Vaccine 0.5 milliLiter(s) IntraMuscular once  levothyroxine 12.5 MICROGram(s) Oral daily  LORazepam     Tablet 1 milliGRAM(s) Oral two times a day  pantoprazole    Tablet 40 milliGRAM(s) Oral before breakfast  sodium chloride 0.9%. 1000 milliLiter(s) (100 mL/Hr) IV Continuous <Continuous>    MEDICATIONS  (PRN):  LORazepam     Tablet 1 milliGRAM(s) Oral every 6 hours PRN Anxiety      I&O's Summary      PHYSICAL EXAM:  Vital Signs Last 24 Hrs  T(C): 36.6 (23 Oct 2022 11:48), Max: 37.2 (22 Oct 2022 20:04)  T(F): 97.8 (23 Oct 2022 11:48), Max: 98.9 (22 Oct 2022 20:04)  HR: 78 (23 Oct 2022 11:48) (69 - 78)  BP: 125/86 (23 Oct 2022 11:48) (107/68 - 148/89)  BP(mean): --  RR: 18 (23 Oct 2022 11:48) (18 - 18)  SpO2: 98% (23 Oct 2022 11:48) (95% - 99%)    Parameters below as of 23 Oct 2022 11:48  Patient On (Oxygen Delivery Method): room air      CONSTITUTIONAL: NAD, well-developed, well-groomed  EYES: PERRLA; conjunctiva and sclera clear  ENMT: Moist oral mucosa, no pharyngeal injection or exudates; normal dentition  NECK: Supple, no palpable masses; no thyromegaly  RESPIRATORY: Normal respiratory effort; lungs are clear to auscultation bilaterally  CARDIOVASCULAR: Regular rate and rhythm, normal S1 and S2, no murmur/rub/gallop; No lower extremity edema; Peripheral pulses are 2+ bilaterally  ABDOMEN: Nontender to palpation, normoactive bowel sounds, no rebound/guarding; No hepatosplenomegaly  MUSCULOSKELETAL: no clubbing or cyanosis of digits; no joint swelling or tenderness to palpation  PSYCH: A+O to person, place, and time; affect appropriate  NEUROLOGY: CN 2-12 are intact and symmetric; no gross sensory deficits   SKIN: No rashes; no palpable lesions    LABS:                        11.5   2.28  )-----------( 227      ( 22 Oct 2022 07:24 )             36.2     10-23    141  |  109<H>  |  7   ----------------------------<  90  4.0   |  22  |  0.70    Ca    8.4      23 Oct 2022 07:32  Phos  4.3     10-23  Mg     1.9     10-23    TPro  5.7<L>  /  Alb  2.8<L>  /  TBili  0.3  /  DBili  <0.1  /  AST  267<H>  /  ALT  189<H>  /  AlkPhos  57  10-23      CARDIAC MARKERS ( 23 Oct 2022 07:32 )  x     / x     / 9758 U/L / x     / x      CARDIAC MARKERS ( 22 Oct 2022 07:22 )  x     / x     / 85742 U/L / x     / x              Culture - Urine (collected 21 Oct 2022 12:59)  Source: Clean Catch Clean Catch (Midstream)  Final Report (22 Oct 2022 14:13):    <10,000 CFU/mL Normal Urogenital Noni      COVID-19 PCR: NotDetec (16 Jul 2022 10:20)  COVID-19 PCR: NotDetec (15 Jul 2022 12:49)  COVID-19 PCR: Detected (08 Jul 2022 07:35)  SARS-CoV-2: NotDetec (07 Jul 2022 06:47)  COVID-19 PCR: NotDetec (04 Jul 2022 22:41)      RADIOLOGY & ADDITIONAL TESTS:  New Results Reviewed Today:   New Imaging Personally Reviewed Today:  New Electrocardiogram Personally Reviewed Today:  Prior or Outpatient Records Reviewed Today:    COMMUNICATION:  Care Discussed with Consultants/Other Providers and Details of Discussion: Discussed with ACP  Discussions with Patient/Family:  PCP Communication: [facility] Division of Hospital Medicine  Janes Escobar MD  Available via MS Teams    SUBJECTIVE / OVERNIGHT EVENTS: No acute events overnight. Pt seen and examined at bedside. Pt feels well, denies any chest pain, muscle pain, headache. "Fall" overnight, pt was reaching down to pick something up from under her bed but lost strength/balance and fell on her behind. Denies any pain.     ADDITIONAL REVIEW OF SYSTEMS:    MEDICATIONS  (STANDING):  heparin   Injectable 5000 Unit(s) SubCutaneous every 8 hours  influenza   Vaccine 0.5 milliLiter(s) IntraMuscular once  levothyroxine 12.5 MICROGram(s) Oral daily  LORazepam     Tablet 1 milliGRAM(s) Oral two times a day  pantoprazole    Tablet 40 milliGRAM(s) Oral before breakfast  sodium chloride 0.9%. 1000 milliLiter(s) (100 mL/Hr) IV Continuous <Continuous>    MEDICATIONS  (PRN):  LORazepam     Tablet 1 milliGRAM(s) Oral every 6 hours PRN Anxiety      I&O's Summary      PHYSICAL EXAM:  Vital Signs Last 24 Hrs  T(C): 36.6 (23 Oct 2022 11:48), Max: 37.2 (22 Oct 2022 20:04)  T(F): 97.8 (23 Oct 2022 11:48), Max: 98.9 (22 Oct 2022 20:04)  HR: 78 (23 Oct 2022 11:48) (69 - 78)  BP: 125/86 (23 Oct 2022 11:48) (107/68 - 148/89)  BP(mean): --  RR: 18 (23 Oct 2022 11:48) (18 - 18)  SpO2: 98% (23 Oct 2022 11:48) (95% - 99%)    Parameters below as of 23 Oct 2022 11:48  Patient On (Oxygen Delivery Method): room air      CONSTITUTIONAL: NAD, well-developed, well-groomed, sits up without assistance  EYES: PERRL; conjunctiva and sclera clear  NECK: Supple, no palpable masses; no thyromegaly  RESPIRATORY: Normal respiratory effort; lungs are clear to auscultation bilaterally  CARDIOVASCULAR: Regular rate and rhythm, normal S1 and S2, no murmur/rub/gallop; No lower extremity edema; Peripheral pulses are 2+ bilaterally  ABDOMEN: Nontender to palpation, normoactive bowel sounds, no rebound/guarding; No hepatosplenomegaly  MUSCULOSKELETAL: no msk pain, or muscle tenderness  PSYCH: A+O to person, place, and time; affect appropriate  NEUROLOGY: CN 2-12 are intact and symmetric; no gross sensory deficits   SKIN: No rashes; no palpable lesions    LABS:                        11.5   2.28  )-----------( 227      ( 22 Oct 2022 07:24 )             36.2     10-23    141  |  109<H>  |  7   ----------------------------<  90  4.0   |  22  |  0.70    Ca    8.4      23 Oct 2022 07:32  Phos  4.3     10-23  Mg     1.9     10-23    TPro  5.7<L>  /  Alb  2.8<L>  /  TBili  0.3  /  DBili  <0.1  /  AST  267<H>  /  ALT  189<H>  /  AlkPhos  57  10-23      CARDIAC MARKERS ( 23 Oct 2022 07:32 )  x     / x     / 9758 U/L / x     / x      CARDIAC MARKERS ( 22 Oct 2022 07:22 )  x     / x     / 85717 U/L / x     / x              Culture - Urine (collected 21 Oct 2022 12:59)  Source: Clean Catch Clean Catch (Midstream)  Final Report (22 Oct 2022 14:13):    <10,000 CFU/mL Normal Urogenital Noni      COVID-19 PCR: NotDetec (16 Jul 2022 10:20)  COVID-19 PCR: NotDetec (15 Jul 2022 12:49)  COVID-19 PCR: Detected (08 Jul 2022 07:35)  SARS-CoV-2: NotDetec (07 Jul 2022 06:47)  COVID-19 PCR: NotDetec (04 Jul 2022 22:41)      RADIOLOGY & ADDITIONAL TESTS:  New Results Reviewed Today:   New Imaging Personally Reviewed Today:  New Electrocardiogram Personally Reviewed Today:  Prior or Outpatient Records Reviewed Today:    COMMUNICATION:  Care Discussed with Consultants/Other Providers and Details of Discussion: Discussed with ACP  Discussions with Patient/Family:  PCP Communication:

## 2022-10-24 ENCOUNTER — NON-APPOINTMENT (OUTPATIENT)
Age: 57
End: 2022-10-24

## 2022-10-24 LAB
ALBUMIN SERPL ELPH-MCNC: 2.8 G/DL — LOW (ref 3.3–5)
ALP SERPL-CCNC: 54 U/L — SIGNIFICANT CHANGE UP (ref 40–120)
ALT FLD-CCNC: 154 U/L — HIGH (ref 10–45)
ANION GAP SERPL CALC-SCNC: 9 MMOL/L — SIGNIFICANT CHANGE UP (ref 5–17)
AST SERPL-CCNC: 190 U/L — HIGH (ref 10–40)
BILIRUB SERPL-MCNC: 0.3 MG/DL — SIGNIFICANT CHANGE UP (ref 0.2–1.2)
BUN SERPL-MCNC: 14 MG/DL — SIGNIFICANT CHANGE UP (ref 7–23)
CALCIUM SERPL-MCNC: 8.5 MG/DL — SIGNIFICANT CHANGE UP (ref 8.4–10.5)
CHLORIDE SERPL-SCNC: 108 MMOL/L — SIGNIFICANT CHANGE UP (ref 96–108)
CK SERPL-CCNC: 6031 U/L — HIGH (ref 25–170)
CO2 SERPL-SCNC: 23 MMOL/L — SIGNIFICANT CHANGE UP (ref 22–31)
CREAT SERPL-MCNC: 0.66 MG/DL — SIGNIFICANT CHANGE UP (ref 0.5–1.3)
EGFR: 102 ML/MIN/1.73M2 — SIGNIFICANT CHANGE UP
GLUCOSE SERPL-MCNC: 98 MG/DL — SIGNIFICANT CHANGE UP (ref 70–99)
POTASSIUM SERPL-MCNC: 3.7 MMOL/L — SIGNIFICANT CHANGE UP (ref 3.5–5.3)
POTASSIUM SERPL-SCNC: 3.7 MMOL/L — SIGNIFICANT CHANGE UP (ref 3.5–5.3)
PROT SERPL-MCNC: 5.7 G/DL — LOW (ref 6–8.3)
SODIUM SERPL-SCNC: 140 MMOL/L — SIGNIFICANT CHANGE UP (ref 135–145)

## 2022-10-24 PROCEDURE — 93306 TTE W/DOPPLER COMPLETE: CPT | Mod: 26

## 2022-10-24 PROCEDURE — 99233 SBSQ HOSP IP/OBS HIGH 50: CPT

## 2022-10-24 RX ORDER — SODIUM CHLORIDE 9 MG/ML
1000 INJECTION INTRAMUSCULAR; INTRAVENOUS; SUBCUTANEOUS
Refills: 0 | Status: DISCONTINUED | OUTPATIENT
Start: 2022-10-24 | End: 2022-10-24

## 2022-10-24 RX ORDER — SODIUM CHLORIDE 9 MG/ML
1000 INJECTION INTRAMUSCULAR; INTRAVENOUS; SUBCUTANEOUS
Refills: 0 | Status: DISCONTINUED | OUTPATIENT
Start: 2022-10-24 | End: 2022-10-25

## 2022-10-24 RX ADMIN — Medication 1 MILLIGRAM(S): at 05:05

## 2022-10-24 RX ADMIN — SODIUM CHLORIDE 100 MILLILITER(S): 9 INJECTION INTRAMUSCULAR; INTRAVENOUS; SUBCUTANEOUS at 09:52

## 2022-10-24 RX ADMIN — PANTOPRAZOLE SODIUM 40 MILLIGRAM(S): 20 TABLET, DELAYED RELEASE ORAL at 05:05

## 2022-10-24 RX ADMIN — SODIUM CHLORIDE 125 MILLILITER(S): 9 INJECTION INTRAMUSCULAR; INTRAVENOUS; SUBCUTANEOUS at 11:44

## 2022-10-24 RX ADMIN — Medication 12.5 MICROGRAM(S): at 05:05

## 2022-10-24 RX ADMIN — Medication 1 MILLIGRAM(S): at 20:44

## 2022-10-24 NOTE — PROVIDER CONTACT NOTE (MEDICATION) - ASSESSMENT
Pt is AxOx4, all VSS, pt is ambulatory throughout the day. Pt educated on importance of heparin, pt verbalized understanding.

## 2022-10-24 NOTE — PROGRESS NOTE ADULT - ASSESSMENT
Patient is a 57 year old Female with PMHx of neutropenia (states she follows with her PCP, Dr. Walton), Hypothyroidism (on Synthroid 12.5 Qd), and Schizoaffective disorder who presented to [facility] with chief complaint of recent fall on Wednesday 10/19 and elevated LFTs. Patient reports that on Tuesday evening she could not sleep and took 1800 mg of Gabapentin. Her usual Gabapentin dose is 600 Qd. Patient denies SI or HI. Patient states that she fell, hit her head and shoulders. Patient denies SI or HI. Denies chest pain or palpitations, shoulder pain or discomfort. States her discomfort has improved. Denies body aches or pain. Denies headache, lightheadedness or dizziness.

## 2022-10-24 NOTE — PROGRESS NOTE ADULT - SUBJECTIVE AND OBJECTIVE BOX
[facility] Division of Hospital Medicine  Charles Carrion MD  Available via MS Teams  Pager: [phone]    SUBJECTIVE / OVERNIGHT EVENTS:  -This Am, patient reports overall feeling better. Has some left sided chest tenderness when she leans forwards, but is improved from prior. Denies any other issues, denies nausea, vomiting, headaches, shortness of breath, headaches.     ADDITIONAL REVIEW OF SYSTEMS:    MEDICATIONS  (STANDING):  heparin   Injectable 5000 Unit(s) SubCutaneous every 8 hours  influenza   Vaccine 0.5 milliLiter(s) IntraMuscular once  levothyroxine 12.5 MICROGram(s) Oral daily  LORazepam     Tablet 1 milliGRAM(s) Oral two times a day  pantoprazole    Tablet 40 milliGRAM(s) Oral before breakfast  sodium chloride 0.9%. 1000 milliLiter(s) (125 mL/Hr) IV Continuous <Continuous>    MEDICATIONS  (PRN):  LORazepam     Tablet 1 milliGRAM(s) Oral every 6 hours PRN Anxiety      I&O's Summary    23 Oct 2022 07:01  -  24 Oct 2022 07:00  --------------------------------------------------------  IN: 1600 mL / OUT: 0 mL / NET: 1600 mL    24 Oct 2022 07:01  -  24 Oct 2022 15:24  --------------------------------------------------------  IN: 480 mL / OUT: 0 mL / NET: 480 mL        PHYSICAL EXAM:  Vital Signs Last 24 Hrs  T(C): 36.8 (24 Oct 2022 12:34), Max: 36.9 (23 Oct 2022 20:55)  T(F): 98.3 (24 Oct 2022 12:34), Max: 98.5 (23 Oct 2022 20:55)  HR: 88 (24 Oct 2022 12:34) (74 - 88)  BP: 113/77 (24 Oct 2022 12:34) (111/74 - 120/81)  BP(mean): --  RR: 17 (24 Oct 2022 12:34) (17 - 18)  SpO2: 96% (24 Oct 2022 12:34) (96% - 97%)    Parameters below as of 24 Oct 2022 12:34  Patient On (Oxygen Delivery Method): room air      CONSTITUTIONAL: NAD, well-developed, well-groomed, sits up without assistance  EYES: PERRL; conjunctiva and sclera clear  NECK: Supple, no palpable masses; no thyromegaly  RESPIRATORY: Normal respiratory effort; lungs are clear to auscultation bilaterally  CARDIOVASCULAR: Regular rate and rhythm, normal S1 and S2, no murmur/rub/gallop; No lower extremity edema; Peripheral pulses are 2+ bilaterally  ABDOMEN: Nontender to palpation, normoactive bowel sounds, no rebound/guarding; No hepatosplenomegaly  MUSCULOSKELETAL: no msk pain, or muscle tenderness  PSYCH: A+O to person, place, and time; affect appropriate  NEUROLOGY: CN 2-12 are intact and symmetric; no gross sensory deficits   SKIN: No rashes; no palpable lesions    LABS:    10-24    140  |  108  |  14  ----------------------------<  98  3.7   |  23  |  0.66    Ca    8.5      24 Oct 2022 07:47  Phos  4.3     10-23  Mg     1.9     10-23    TPro  5.7<L>  /  Alb  2.8<L>  /  TBili  0.3  /  DBili  x   /  AST  190<H>  /  ALT  154<H>  /  AlkPhos  54  10-24      CARDIAC MARKERS ( 24 Oct 2022 07:47 )  x     / x     / 6031 U/L / x     / x      CARDIAC MARKERS ( 23 Oct 2022 07:32 )  x     / x     / 9758 U/L / x     / x              COVID-19 PCR: NotDetec (16 Jul 2022 10:20)  COVID-19 PCR: NotDetec (15 Jul 2022 12:49)  COVID-19 PCR: Detected (08 Jul 2022 07:35)  SARS-CoV-2: NotDetec (07 Jul 2022 06:47)  COVID-19 PCR: NotDetec (04 Jul 2022 22:41)      RADIOLOGY & ADDITIONAL TESTS:  New Results Reviewed Today: bmp mg phos, ck  New Imaging Personally Reviewed Today: n/a  New Electrocardiogram Personally Reviewed Today: n/a  Prior or Outpatient Records Reviewed Today: n/a    COMMUNICATION:  Care Discussed with Consultants/Other Providers and Details of Discussion: cards  Discussions with Patient/Family:  PCP Communication:

## 2022-10-24 NOTE — PROGRESS NOTE ADULT - PROBLEM SELECTOR PLAN 1
-  recs appreciated, home meds currently on hold given transaminitis  - c/w ativan BID  - no complaints today  - monitor  - will need outpatient follow-up; pt states she needs to establish care with new psychiatrist. will refer to [facility] on discharge

## 2022-10-24 NOTE — PROGRESS NOTE ADULT - PROBLEM SELECTOR PLAN 2
- CK downtrending slowly but surely, now 6031  - continue to monitor  - c/w IVF NS @ 125cc/h, re-assess need daily  - serum tox neg

## 2022-10-24 NOTE — PROVIDER CONTACT NOTE (MEDICATION) - BACKGROUND
Pt admitted with rhabdomylosis, has a history of schizoaffective disorder, neutropenia, came in with elevated LFTs.

## 2022-10-24 NOTE — PROGRESS NOTE ADULT - SUBJECTIVE AND OBJECTIVE BOX
DATE OF SERVICE: 10-24-22 @ 11:34    Patient is a 57y old  Female who presents with a chief complaint of Fall, rhabdo (23 Oct 2022 16:30)      INTERVAL HISTORY: Feels ok.     REVIEW OF SYSTEMS:  CONSTITUTIONAL: No weakness  EYES/ENT: No visual changes;  No throat pain   NECK: No pain or stiffness  RESPIRATORY: No cough, wheezing; No shortness of breath  CARDIOVASCULAR: No chest pain or palpitations  GASTROINTESTINAL: No abdominal  pain. No nausea, vomiting, or hematemesis  GENITOURINARY: No dysuria, frequency or hematuria  NEUROLOGICAL: No stroke like symptoms  SKIN: No rashes    TELEMETRY Personally reviewed:  70-90  	  MEDICATIONS:        PHYSICAL EXAM:  T(C): 36.9 (10-24-22 @ 04:35), Max: 36.9 (10-23-22 @ 20:55)  HR: 75 (10-24-22 @ 04:35) (74 - 78)  BP: 120/81 (10-24-22 @ 04:35) (111/74 - 125/86)  RR: 18 (10-24-22 @ 04:35) (18 - 18)  SpO2: 97% (10-24-22 @ 04:35) (96% - 98%)  Wt(kg): --  I&O's Summary    23 Oct 2022 07:01  -  24 Oct 2022 07:00  --------------------------------------------------------  IN: 1600 mL / OUT: 0 mL / NET: 1600 mL          Appearance: In no distress	  HEENT:    PERRL, EOMI	  Cardiovascular:  S1 S2, No JVD  Respiratory: Lungs clear to auscultation	  Gastrointestinal:  Soft, Non-tender, + BS	  Vascularature:  No edema of LE  Psychiatric: Appropriate affect   Neuro: no acute focal deficits           10-24    140  |  108  |  14  ----------------------------<  98  3.7   |  23  |  0.66    Ca    8.5      24 Oct 2022 07:47  Phos  4.3     10-23  Mg     1.9     10-23    TPro  5.7<L>  /  Alb  2.8<L>  /  TBili  0.3  /  DBili  x   /  AST  190<H>  /  ALT  154<H>  /  AlkPhos  54  10-24        Labs personally reviewed      ASSESSMENT/PLAN: 	    Patient is a 57 year old Female with PMHx of neutropenia (states she follows with her PCP, Dr. Walton), Hypothyroidism (on Synthroid 12.5 Qd), and Schizoaffective disorder who presented to [facility] with chief complaint of recent fall on Wednesday 10/19 and elevated LFTs. Patient reports that on Tuesday evening she could not sleep and took 1800 mg of Gabapentin. Her usual Gabapentin dose is 600 Qd. Patient denies SI or HI. Patient states that she fell, hit her head and shoulders. Patient denies SI or HI. Denies chest pain or palpitations, shoulder pain or discomfort. States her discomfort has improved. Denies body aches or pain. Denies headache, lightheadedness or dizziness.     1. Elevated Troponin  - Trend to peak, likely 2/2 rhabdo  - Check TTEe  - will plan for CT coronary when CK wnl    2. Rhabdomyolysis S/P Fall/sycnope  - Trend CK Qd   - Aggressive IVF for hydration --> Will increase NS to 150 CC/Hr  - CT head negative   - CT C spine negative  - PT consult   - Fall precautions      3. Syncope -- likely 2/2 high dose of gabapentin 1800mg (3x her usual dose)  - monitor on tele  - echo    4. Elevated LFTs  - CT A/P without abnormal liver morphology  - Check LFTs daily to trend- now downtrending  - Check ABD US : Suggestion of subcentimeter nonobstructing left renal calculus, otherwise unremarkable abdominal and bladder ultrasound  - Avoid hepatotoxic agents  - Hold Metformin   - unclear etiology    5. Schizoaffective Disorder  - Pt reports her home meds are -- Rexulti, Fluvox, Trintellix, Buspirone, Olanzapine, Gabapentin    - Pt denies SI/HI at time of encounter   - Psych recs appreciated   - Monitor patient closely     6. Neutropenia  - Pt reports this is known and she follows with her PCP Dr. Walton   - Check CBC with diff   - Monitor counts closely     7. PPX  - Hep 5K Q8        Zulema Dietrich, AG-NP   Bryan Rossi,  [facility]  Cardiovascular Medicine  [address], [address]  Available through call or text on [employer] TE[employer]  Office: [phone]   DATE OF SERVICE: 10-24-22 @ 11:34    Patient is a 57y old  Female who presents with a chief complaint of Fall, rhabdo (23 Oct 2022 16:30)      INTERVAL HISTORY: Feels ok.     REVIEW OF SYSTEMS:  CONSTITUTIONAL: No weakness  EYES/ENT: No visual changes;  No throat pain   NECK: No pain or stiffness  RESPIRATORY: No cough, wheezing; No shortness of breath  CARDIOVASCULAR: No chest pain or palpitations  GASTROINTESTINAL: No abdominal  pain. No nausea, vomiting, or hematemesis  GENITOURINARY: No dysuria, frequency or hematuria  NEUROLOGICAL: No stroke like symptoms  SKIN: No rashes    TELEMETRY Personally reviewed:  70-90  	  MEDICATIONS:        PHYSICAL EXAM:  T(C): 36.9 (10-24-22 @ 04:35), Max: 36.9 (10-23-22 @ 20:55)  HR: 75 (10-24-22 @ 04:35) (74 - 78)  BP: 120/81 (10-24-22 @ 04:35) (111/74 - 125/86)  RR: 18 (10-24-22 @ 04:35) (18 - 18)  SpO2: 97% (10-24-22 @ 04:35) (96% - 98%)  Wt(kg): --  I&O's Summary    23 Oct 2022 07:01  -  24 Oct 2022 07:00  --------------------------------------------------------  IN: 1600 mL / OUT: 0 mL / NET: 1600 mL          Appearance: In no distress	  HEENT:    PERRL, EOMI	  Cardiovascular:  S1 S2, No JVD  Respiratory: Lungs clear to auscultation	  Gastrointestinal:  Soft, Non-tender, + BS	  Vascularature:  No edema of LE  Psychiatric: Appropriate affect   Neuro: no acute focal deficits           10-24    140  |  108  |  14  ----------------------------<  98  3.7   |  23  |  0.66    Ca    8.5      24 Oct 2022 07:47  Phos  4.3     10-23  Mg     1.9     10-23    TPro  5.7<L>  /  Alb  2.8<L>  /  TBili  0.3  /  DBili  x   /  AST  190<H>  /  ALT  154<H>  /  AlkPhos  54  10-24        Labs personally reviewed      < from: Transthoracic Echocardiogram (10.24.22 @ 14:10) >  Conclusions:  1. Normal mitral valve. Mild mitral regurgitation.  2. Moderately dilated left atrium.  LA volume index = 44  cc/m2.  3. Normal left ventricular internal dimensions and wall  thicknesses.Normal left ventricular systolic function. No  segmental wall motion abnormalities. LVEF calculated using  biplane Lomax's method is 62%.  Indeterminate diastolic  function.  4.Normal right atrium.Normal right ventricular size and  function.  5. Normal tricuspid valve. Mild tricuspid  regurgitation.Estimated pulmonary artery systolic pressure  equals 39  mm Hg, assuming right atrial pressure equals 3  mm Hg, consistent with borderline pulmonary pressures.  6. Smallpericardial effusion.  *** No previous Echo exam.    < end of copied text >      ASSESSMENT/PLAN: 	    Patient is a 57 year old Female with PMHx of neutropenia (states she follows with her PCP, Dr. Walton), Hypothyroidism (on Synthroid 12.5 Qd), and Schizoaffective disorder who presented to [facility] with chief complaint of recent fall on Wednesday 10/19 and elevated LFTs. Patient reports that on Tuesday evening she could not sleep and took 1800 mg of Gabapentin. Her usual Gabapentin dose is 600 Qd. Patient denies SI or HI. Patient states that she fell, hit her head and shoulders. Patient denies SI or HI. Denies chest pain or palpitations, shoulder pain or discomfort. States her discomfort has improved. Denies body aches or pain. Denies headache, lightheadedness or dizziness.     1. Elevated Troponin  - Trend to peak, likely 2/2 rhabdo  - TTE unremarkable   - CTA coronaries ordered     2. Rhabdomyolysis S/P Fall/sycnope  - Trend CK Qd   - Aggressive IVF for hydration --> Will increase NS to 150 CC/Hr  - CT head negative   - CT C spine negative  - PT consult   - Fall precautions      3. Syncope -- likely 2/2 high dose of gabapentin 1800mg (3x her usual dose)  - monitor on tele  - echo unremarkable     4. Elevated LFTs  - CT A/P without abnormal liver morphology  - Check LFTs daily to trend- now downtrending  - Check ABD US : Suggestion of subcentimeter nonobstructing left renal calculus, otherwise unremarkable abdominal and bladder ultrasound  - Avoid hepatotoxic agents  - Hold Metformin   - likely from muscle breakdown    5. Schizoaffective Disorder  - Pt reports her home meds are -- Rexulti, Fluvox, Trintellix, Buspirone, Olanzapine, Gabapentin    - Pt denies SI/HI at time of encounter   - Psych recs appreciated   - Monitor patient closely     6. Neutropenia  - Pt reports this is known and she follows with her PCP Dr. Walton   - Check CBC with diff   - Monitor counts closely     7. PPX  - Hep 5K Q8        Zulema Dietrich, AG-NP   [facility]ryan Rossi DO [facility]  Cardiovascular Medicine  [address] [place] [address], [address]  Available through call or text on [employer] TEAM[employer]  Office: [phone]

## 2022-10-25 LAB
ALBUMIN SERPL ELPH-MCNC: 2.8 G/DL — LOW (ref 3.3–5)
ALP SERPL-CCNC: 55 U/L — SIGNIFICANT CHANGE UP (ref 40–120)
ALT FLD-CCNC: 183 U/L — HIGH (ref 10–45)
ANION GAP SERPL CALC-SCNC: 11 MMOL/L — SIGNIFICANT CHANGE UP (ref 5–17)
AST SERPL-CCNC: 194 U/L — HIGH (ref 10–40)
BASOPHILS # BLD AUTO: 0.1 K/UL — SIGNIFICANT CHANGE UP (ref 0–0.2)
BASOPHILS NFR BLD AUTO: 3.6 % — HIGH (ref 0–2)
BILIRUB SERPL-MCNC: 0.3 MG/DL — SIGNIFICANT CHANGE UP (ref 0.2–1.2)
BUN SERPL-MCNC: 8 MG/DL — SIGNIFICANT CHANGE UP (ref 7–23)
CALCIUM SERPL-MCNC: 8.5 MG/DL — SIGNIFICANT CHANGE UP (ref 8.4–10.5)
CHLORIDE SERPL-SCNC: 109 MMOL/L — HIGH (ref 96–108)
CK SERPL-CCNC: 3836 U/L — HIGH (ref 25–170)
CO2 SERPL-SCNC: 22 MMOL/L — SIGNIFICANT CHANGE UP (ref 22–31)
CREAT SERPL-MCNC: 0.63 MG/DL — SIGNIFICANT CHANGE UP (ref 0.5–1.3)
EGFR: 103 ML/MIN/1.73M2 — SIGNIFICANT CHANGE UP
EOSINOPHIL # BLD AUTO: 0.13 K/UL — SIGNIFICANT CHANGE UP (ref 0–0.5)
EOSINOPHIL NFR BLD AUTO: 4.8 % — SIGNIFICANT CHANGE UP (ref 0–6)
GLUCOSE SERPL-MCNC: 100 MG/DL — HIGH (ref 70–99)
HCT VFR BLD CALC: 33.4 % — LOW (ref 34.5–45)
HGB BLD-MCNC: 10.7 G/DL — LOW (ref 11.5–15.5)
LYMPHOCYTES # BLD AUTO: 1.24 K/UL — SIGNIFICANT CHANGE UP (ref 1–3.3)
LYMPHOCYTES # BLD AUTO: 45.8 % — HIGH (ref 13–44)
MAGNESIUM SERPL-MCNC: 1.8 MG/DL — SIGNIFICANT CHANGE UP (ref 1.6–2.6)
MANUAL SMEAR VERIFICATION: SIGNIFICANT CHANGE UP
MCHC RBC-ENTMCNC: 29.2 PG — SIGNIFICANT CHANGE UP (ref 27–34)
MCHC RBC-ENTMCNC: 32 GM/DL — SIGNIFICANT CHANGE UP (ref 32–36)
MCV RBC AUTO: 91.3 FL — SIGNIFICANT CHANGE UP (ref 80–100)
MONOCYTES # BLD AUTO: 0.46 K/UL — SIGNIFICANT CHANGE UP (ref 0–0.9)
MONOCYTES NFR BLD AUTO: 16.9 % — HIGH (ref 2–14)
NEUTROPHILS # BLD AUTO: 0.78 K/UL — LOW (ref 1.8–7.4)
NEUTROPHILS NFR BLD AUTO: 26.5 % — LOW (ref 43–77)
NEUTS BAND # BLD: 2.4 % — SIGNIFICANT CHANGE UP (ref 0–8)
NRBC # BLD: 2 /100 — HIGH (ref 0–0)
PHOSPHATE SERPL-MCNC: 4 MG/DL — SIGNIFICANT CHANGE UP (ref 2.5–4.5)
PLAT MORPH BLD: NORMAL — SIGNIFICANT CHANGE UP
PLATELET # BLD AUTO: 202 K/UL — SIGNIFICANT CHANGE UP (ref 150–400)
POTASSIUM SERPL-MCNC: 3.6 MMOL/L — SIGNIFICANT CHANGE UP (ref 3.5–5.3)
POTASSIUM SERPL-SCNC: 3.6 MMOL/L — SIGNIFICANT CHANGE UP (ref 3.5–5.3)
PROT SERPL-MCNC: 5.5 G/DL — LOW (ref 6–8.3)
RBC # BLD: 3.66 M/UL — LOW (ref 3.8–5.2)
RBC # FLD: 13.6 % — SIGNIFICANT CHANGE UP (ref 10.3–14.5)
RBC BLD AUTO: NORMAL — SIGNIFICANT CHANGE UP
SODIUM SERPL-SCNC: 142 MMOL/L — SIGNIFICANT CHANGE UP (ref 135–145)
WBC # BLD: 2.7 K/UL — LOW (ref 3.8–10.5)
WBC # FLD AUTO: 2.7 K/UL — LOW (ref 3.8–10.5)

## 2022-10-25 PROCEDURE — 99233 SBSQ HOSP IP/OBS HIGH 50: CPT

## 2022-10-25 PROCEDURE — 75574 CT ANGIO HRT W/3D IMAGE: CPT | Mod: 26

## 2022-10-25 PROCEDURE — 99232 SBSQ HOSP IP/OBS MODERATE 35: CPT

## 2022-10-25 RX ORDER — METOPROLOL TARTRATE 50 MG
25 TABLET ORAL ONCE
Refills: 0 | Status: COMPLETED | OUTPATIENT
Start: 2022-10-25 | End: 2022-10-25

## 2022-10-25 RX ORDER — SODIUM CHLORIDE 9 MG/ML
1000 INJECTION INTRAMUSCULAR; INTRAVENOUS; SUBCUTANEOUS
Refills: 0 | Status: DISCONTINUED | OUTPATIENT
Start: 2022-10-25 | End: 2022-10-28

## 2022-10-25 RX ADMIN — Medication 12.5 MICROGRAM(S): at 05:10

## 2022-10-25 RX ADMIN — SODIUM CHLORIDE 125 MILLILITER(S): 9 INJECTION INTRAMUSCULAR; INTRAVENOUS; SUBCUTANEOUS at 11:29

## 2022-10-25 RX ADMIN — Medication 1 MILLIGRAM(S): at 18:17

## 2022-10-25 RX ADMIN — PANTOPRAZOLE SODIUM 40 MILLIGRAM(S): 20 TABLET, DELAYED RELEASE ORAL at 05:10

## 2022-10-25 RX ADMIN — Medication 1 MILLIGRAM(S): at 06:18

## 2022-10-25 RX ADMIN — SODIUM CHLORIDE 100 MILLILITER(S): 9 INJECTION INTRAMUSCULAR; INTRAVENOUS; SUBCUTANEOUS at 18:17

## 2022-10-25 RX ADMIN — Medication 1 MILLIGRAM(S): at 23:59

## 2022-10-25 RX ADMIN — Medication 25 MILLIGRAM(S): at 11:26

## 2022-10-25 NOTE — PROGRESS NOTE ADULT - SUBJECTIVE AND OBJECTIVE BOX
DATE OF SERVICE: 10-25-22 @ 12:16    Patient is a 57y old  Female who presents with a chief complaint of Fall (24 Oct 2022 15:24)      INTERVAL HISTORY: Feels ok. Anxious to resume psychiatric meds and  be discharged.    REVIEW OF SYSTEMS:  CONSTITUTIONAL: No weakness  EYES/ENT: No visual changes;  No throat pain   NECK: No pain or stiffness  RESPIRATORY: No cough, wheezing; No shortness of breath  CARDIOVASCULAR: No chest pain or palpitations  GASTROINTESTINAL: No abdominal  pain. No nausea, vomiting, or hematemesis  GENITOURINARY: No dysuria, frequency or hematuria  NEUROLOGICAL: No stroke like symptoms  SKIN: No rashes    TELEMETRY Personally reviewed: SR 70-90  	  MEDICATIONS:        PHYSICAL EXAM:  T(C): 36.8 (10-25-22 @ 11:31), Max: 37.1 (10-24-22 @ 21:11)  HR: 67 (10-25-22 @ 11:31) (67 - 89)  BP: 128/85 (10-25-22 @ 11:31) (102/66 - 128/85)  RR: 18 (10-25-22 @ 11:31) (17 - 18)  SpO2: 97% (10-25-22 @ 11:31) (96% - 97%)  Wt(kg): --  I&O's Summary    24 Oct 2022 07:01  -  25 Oct 2022 07:00  --------------------------------------------------------  IN: 2620 mL / OUT: 0 mL / NET: 2620 mL    25 Oct 2022 07:01  -  25 Oct 2022 12:16  --------------------------------------------------------  IN: 240 mL / OUT: 0 mL / NET: 240 mL          Appearance: In no distress	  HEENT:    PERRL, EOMI	  Cardiovascular:  S1 S2, No JVD  Respiratory: Lungs clear to auscultation	  Gastrointestinal:  Soft, Non-tender, + BS	  Vascularature:  No edema of LE  Psychiatric: Appropriate affect   Neuro: no acute focal deficits                               10.7   2.70  )-----------( 202      ( 25 Oct 2022 06:44 )             33.4     10-25    142  |  109<H>  |  8   ----------------------------<  100<H>  3.6   |  22  |  0.63    Ca    8.5      25 Oct 2022 06:44  Phos  4.0     10-25  Mg     1.8     10-25    TPro  5.5<L>  /  Alb  2.8<L>  /  TBili  0.3  /  DBili  x   /  AST  194<H>  /  ALT  183<H>  /  AlkPhos  55  10-25        Labs personally reviewed      ASSESSMENT/PLAN: 	    Patient is a 57 year old Female with PMHx of neutropenia (states she follows with her PCP, Dr. Walton), Hypothyroidism (on Synthroid 12.5 Qd), and Schizoaffective disorder who presented to [facility] with chief complaint of recent fall on Wednesday 10/19 and elevated LFTs. Patient reports that on Tuesday evening she could not sleep and took 1800 mg of Gabapentin. Her usual Gabapentin dose is 600 Qd. Patient denies SI or HI. Patient states that she fell, hit her head and shoulders. Patient denies SI or HI. Denies chest pain or palpitations, shoulder pain or discomfort. States her discomfort has improved. Denies body aches or pain. Denies headache, lightheadedness or dizziness.     1. Elevated Troponin  - Trend to peak, likely 2/2 rhabdo  - TTE unremarkable   - CTA coronaries ordered     2. Rhabdomyolysis S/P Fall/sycnope  - Trend CK Qd   - Aggressive IVF for hydration --> Will increase NS to 150 CC/Hr  - CT head negative   - CT C spine negative  - PT consult   - Fall precautions    3. Syncope -- likely 2/2 high dose of gabapentin 1800mg (3x her usual dose)  - monitor on tele  - echo unremarkable     4. Elevated LFTs  - CT A/P without abnormal liver morphology  - Check LFTs daily to trend- now downtrending  - Check ABD US : Suggestion of subcentimeter nonobstructing left renal calculus, otherwise unremarkable abdominal and bladder ultrasound  - Avoid hepatotoxic agents  - Hold Metformin   - likely from muscle breakdown    5. Schizoaffective Disorder  - Pt reports her home meds are -- Rexulti, Fluvox, Trintellix, Buspirone, Olanzapine, Gabapentin    - Pt denies SI/HI at time of encounter   - Psych recs appreciated   - Monitor patient closely     6. Neutropenia  - Pt reports this is known and she follows with her PCP Dr. Walton   - Check CBC with diff   - Monitor counts closely     7. PPX  - Hep 5K Q8          Zulema Dietrich, AG-NP   Bryan Rossi DO [facility]  Cardiovascular Medicine  [address] [place] [address], [address]  Available through call or text on [employer] TEAM[employer]  Office: [phone]   DATE OF SERVICE: 10-25-22 @ 12:16    Patient is a 57y old  Female who presents with a chief complaint of Fall (24 Oct 2022 15:24)      INTERVAL HISTORY: Feels ok. Anxious to resume psychiatric meds and  be discharged.    REVIEW OF SYSTEMS:  CONSTITUTIONAL: No weakness  EYES/ENT: No visual changes;  No throat pain   NECK: No pain or stiffness  RESPIRATORY: No cough, wheezing; No shortness of breath  CARDIOVASCULAR: No chest pain or palpitations  GASTROINTESTINAL: No abdominal  pain. No nausea, vomiting, or hematemesis  GENITOURINARY: No dysuria, frequency or hematuria  NEUROLOGICAL: No stroke like symptoms  SKIN: No rashes    TELEMETRY Personally reviewed: SR 70-90  	  MEDICATIONS:        PHYSICAL EXAM:  T(C): 36.8 (10-25-22 @ 11:31), Max: 37.1 (10-24-22 @ 21:11)  HR: 67 (10-25-22 @ 11:31) (67 - 89)  BP: 128/85 (10-25-22 @ 11:31) (102/66 - 128/85)  RR: 18 (10-25-22 @ 11:31) (17 - 18)  SpO2: 97% (10-25-22 @ 11:31) (96% - 97%)  Wt(kg): --  I&O's Summary    24 Oct 2022 07:01  -  25 Oct 2022 07:00  --------------------------------------------------------  IN: 2620 mL / OUT: 0 mL / NET: 2620 mL    25 Oct 2022 07:01  -  25 Oct 2022 12:16  --------------------------------------------------------  IN: 240 mL / OUT: 0 mL / NET: 240 mL          Appearance: In no distress	  HEENT:    PERRL, EOMI	  Cardiovascular:  S1 S2, No JVD  Respiratory: Lungs clear to auscultation	  Gastrointestinal:  Soft, Non-tender, + BS	  Vascularature:  No edema of LE  Psychiatric: Appropriate affect   Neuro: no acute focal deficits                               10.7   2.70  )-----------( 202      ( 25 Oct 2022 06:44 )             33.4     10-25    142  |  109<H>  |  8   ----------------------------<  100<H>  3.6   |  22  |  0.63    Ca    8.5      25 Oct 2022 06:44  Phos  4.0     10-25  Mg     1.8     10-25    TPro  5.5<L>  /  Alb  2.8<L>  /  TBili  0.3  /  DBili  x   /  AST  194<H>  /  ALT  183<H>  /  AlkPhos  55  10-25        Labs personally reviewed      ASSESSMENT/PLAN: 	    Patient is a 57 year old Female with PMHx of neutropenia (states she follows with her PCP, Dr. Walton), Hypothyroidism (on Synthroid 12.5 Qd), and Schizoaffective disorder who presented to [facility] with chief complaint of recent fall on Wednesday 10/19 and elevated LFTs. Patient reports that on Tuesday evening she could not sleep and took 1800 mg of Gabapentin. Her usual Gabapentin dose is 600 Qd. Patient denies SI or HI. Patient states that she fell, hit her head and shoulders. Patient denies SI or HI. Denies chest pain or palpitations, shoulder pain or discomfort. States her discomfort has improved. Denies body aches or pain. Denies headache, lightheadedness or dizziness.     1. Elevated Troponin  - Trend to peak, likely 2/2 rhabdo  - TTE unremarkable   - CTA coronaries with mild to moderate nonobstructive CAD  - will manage medically with ASA and statin  - will hold off initiating statin at this time given rhabdo  - will FU with me as OP in 2-4 weeks and will initiate at the time     2. Rhabdomyolysis S/P Fall/sycnope  - Trend CK Qd   - Aggressive IVF for hydration   - CT head negative   - CT C spine negative  - CK trending down, now at close to 4k    3. Syncope -- likely 2/2 high dose of gabapentin 1800mg (3x her usual dose)  - monitor on tele  - echo unremarkable     4. Elevated LFTs  - CT A/P without abnormal liver morphology  - Check LFTs daily to trend- now downtrending  - Check ABD US : Suggestion of subcentimeter nonobstructing left renal calculus, otherwise unremarkable abdominal and bladder ultrasound  - Avoid hepatotoxic agents  - Hold Metformin   - likely from muscle breakdown    5. Schizoaffective Disorder  - Pt reports her home meds are -- Rexulti, Fluvox, Trintellix, Buspirone, Olanzapine, Gabapentin    - Pt denies SI/HI at time of encounter   - Psych recs appreciated   - Monitor patient closely     6. Neutropenia  - Pt reports this is known and she follows with her PCP Dr. Walton   - Check CBC with diff   - Monitor counts closely     7. PPX  - Hep 5K Q8          Zulema Dietrich, AG-NP   Bryan Rossi DO [facility]  Cardiovascular Medicine  [address] [place] [address], [address]  Available through call or text on [employer] TEAMs  Office: [phone]

## 2022-10-25 NOTE — PROGRESS NOTE ADULT - PROBLEM SELECTOR PLAN 1
-  recs appreciated, home meds currently on hold given transaminitis  - c/w ativan BID  - no complaints today  - monitor  - pt states she needs to establish care with new psychiatrist. will refer to [facility] on discharge  - LFTs are actually improving, when she is closer to dc, will discuss with psychiatry if we should restart her medications now, or await 1-2 weeks. Will need close followup on the outpatient

## 2022-10-25 NOTE — PROGRESS NOTE ADULT - PROBLEM SELECTOR PLAN 4
- US abd 10/21: Suggestion of subcentimeter nonobstructing left renal calculus, otherwise unremarkable abdominal and bladder ultrasound - would f/u as outpatient  - transaminitis likely from muscle breakdown, CTM  - improving

## 2022-10-25 NOTE — PROGRESS NOTE ADULT - PROBLEM SELECTOR PLAN 2
- CK downtrending now 3836.  - continue to monitor  - c/w IVF NS @ 125cc/h, re-assess need daily  - serum tox neg

## 2022-10-25 NOTE — BH CONSULTATION LIAISON PROGRESS NOTE - CURRENT MEDICATION
MEDICATIONS  (STANDING):  heparin   Injectable 5000 Unit(s) SubCutaneous every 8 hours  influenza   Vaccine 0.5 milliLiter(s) IntraMuscular once  levothyroxine 12.5 MICROGram(s) Oral daily  LORazepam     Tablet 1 milliGRAM(s) Oral four times a day  pantoprazole    Tablet 40 milliGRAM(s) Oral before breakfast  sodium chloride 0.9%. 1000 milliLiter(s) (100 mL/Hr) IV Continuous <Continuous>    MEDICATIONS  (PRN):  LORazepam     Tablet 1 milliGRAM(s) Oral every 6 hours PRN Anxiety

## 2022-10-25 NOTE — PROGRESS NOTE ADULT - ASSESSMENT
Patient is a 57 year old Female with PMHx of neutropenia (states she follows with her PCP, Dr. Walton), Hypothyroidism (on Synthroid 12.5 Qd), and Schizoaffective disorder who presented to [facility] with chief complaint of recent fall on Wednesday 10/19 and elevated LFTs, found to have rhabdomyolysis and elevated troponin. Being evaluated for CAD with CT coronaries on 10/25.

## 2022-10-25 NOTE — PROGRESS NOTE ADULT - SUBJECTIVE AND OBJECTIVE BOX
[facility] Division of Hospital Medicine  Charles Carrion MD  Available via MS Teams  Pager: [phone]    SUBJECTIVE / OVERNIGHT EVENTS:  -Denies any acute issues overnight. is concerned that she is still not taking her psychiatric medications. Denies any nausea, vomiting, headaches, shortness of breath, abdominal pain, cough.       ADDITIONAL REVIEW OF SYSTEMS:    MEDICATIONS  (STANDING):  heparin   Injectable 5000 Unit(s) SubCutaneous every 8 hours  influenza   Vaccine 0.5 milliLiter(s) IntraMuscular once  levothyroxine 12.5 MICROGram(s) Oral daily  LORazepam     Tablet 1 milliGRAM(s) Oral two times a day  pantoprazole    Tablet 40 milliGRAM(s) Oral before breakfast  sodium chloride 0.9%. 1000 milliLiter(s) (125 mL/Hr) IV Continuous <Continuous>    MEDICATIONS  (PRN):  LORazepam     Tablet 1 milliGRAM(s) Oral every 6 hours PRN Anxiety      I&O's Summary    24 Oct 2022 07:01  -  25 Oct 2022 07:00  --------------------------------------------------------  IN: 2620 mL / OUT: 0 mL / NET: 2620 mL    25 Oct 2022 07:01  -  25 Oct 2022 14:49  --------------------------------------------------------  IN: 480 mL / OUT: 0 mL / NET: 480 mL        PHYSICAL EXAM:  Vital Signs Last 24 Hrs  T(C): 36.8 (25 Oct 2022 11:31), Max: 37.1 (24 Oct 2022 21:11)  T(F): 98.3 (25 Oct 2022 11:31), Max: 98.8 (24 Oct 2022 21:11)  HR: 67 (25 Oct 2022 11:31) (67 - 89)  BP: 128/85 (25 Oct 2022 11:31) (102/66 - 128/85)  BP(mean): --  RR: 18 (25 Oct 2022 11:31) (18 - 18)  SpO2: 97% (25 Oct 2022 11:31) (96% - 97%)    Parameters below as of 25 Oct 2022 11:31  Patient On (Oxygen Delivery Method): room air      CONSTITUTIONAL: NAD, well-developed, well-groomed,  EYES: PERRL; conjunctiva and sclera clear  NECK: Supple, no palpable masses; no thyromegaly  RESPIRATORY: Normal respiratory effort; lungs are clear to auscultation bilaterally  CARDIOVASCULAR: Regular rate and rhythm, normal S1 and S2, no murmur/rub/gallop; No lower extremity edema; Peripheral pulses are 2+ bilaterally  ABDOMEN: Nontender to palpation, normoactive bowel sounds, no rebound/guarding; No hepatosplenomegaly  MUSCULOSKELETAL: no msk pain, or muscle tenderness. no left sided chest wall tenderness  PSYCH: A+O to person, place, and time; + anxious   NEUROLOGY: CN 2-12 are intact and symmetric; no gross sensory deficits   SKIN: No rashes; no palpable lesions    LABS:                        10.7   2.70  )-----------( 202      ( 25 Oct 2022 06:44 )             33.4     10-25    142  |  109<H>  |  8   ----------------------------<  100<H>  3.6   |  22  |  0.63    Ca    8.5      25 Oct 2022 06:44  Phos  4.0     10-25  Mg     1.8     10-25    TPro  5.5<L>  /  Alb  2.8<L>  /  TBili  0.3  /  DBili  x   /  AST  194<H>  /  ALT  183<H>  /  AlkPhos  55  10-25      CARDIAC MARKERS ( 25 Oct 2022 06:44 )  x     / x     / 3836 U/L / x     / x      CARDIAC MARKERS ( 24 Oct 2022 07:47 )  x     / x     / 6031 U/L / x     / x              COVID-19 PCR: NotDetec (16 Jul 2022 10:20)  COVID-19 PCR: NotDetec (15 Jul 2022 12:49)  COVID-19 PCR: Detected (08 Jul 2022 07:35)  SARS-CoV-2: NotDetec (07 Jul 2022 06:47)  COVID-19 PCR: NotDetec (04 Jul 2022 22:41)      RADIOLOGY & ADDITIONAL TESTS:  New Results Reviewed Today:   New Imaging Personally Reviewed Today:  New Electrocardiogram Personally Reviewed Today:  Prior or Outpatient Records Reviewed Today:    COMMUNICATION:  Care Discussed with Consultants/Other Providers and Details of Discussion:  Discussions with Patient/Family:  PCP Communication:

## 2022-10-26 LAB
ALBUMIN SERPL ELPH-MCNC: 3.2 G/DL — LOW (ref 3.3–5)
ALP SERPL-CCNC: 62 U/L — SIGNIFICANT CHANGE UP (ref 40–120)
ALT FLD-CCNC: 165 U/L — HIGH (ref 10–45)
ANION GAP SERPL CALC-SCNC: 10 MMOL/L — SIGNIFICANT CHANGE UP (ref 5–17)
AST SERPL-CCNC: 118 U/L — HIGH (ref 10–40)
BILIRUB SERPL-MCNC: 0.3 MG/DL — SIGNIFICANT CHANGE UP (ref 0.2–1.2)
BUN SERPL-MCNC: 7 MG/DL — SIGNIFICANT CHANGE UP (ref 7–23)
CALCIUM SERPL-MCNC: 8.5 MG/DL — SIGNIFICANT CHANGE UP (ref 8.4–10.5)
CHLORIDE SERPL-SCNC: 107 MMOL/L — SIGNIFICANT CHANGE UP (ref 96–108)
CK SERPL-CCNC: 2505 U/L — HIGH (ref 25–170)
CO2 SERPL-SCNC: 24 MMOL/L — SIGNIFICANT CHANGE UP (ref 22–31)
CREAT SERPL-MCNC: 0.58 MG/DL — SIGNIFICANT CHANGE UP (ref 0.5–1.3)
EGFR: 105 ML/MIN/1.73M2 — SIGNIFICANT CHANGE UP
GLUCOSE SERPL-MCNC: 95 MG/DL — SIGNIFICANT CHANGE UP (ref 70–99)
POTASSIUM SERPL-MCNC: 3.4 MMOL/L — LOW (ref 3.5–5.3)
POTASSIUM SERPL-SCNC: 3.4 MMOL/L — LOW (ref 3.5–5.3)
PROT SERPL-MCNC: 5.8 G/DL — LOW (ref 6–8.3)
SODIUM SERPL-SCNC: 141 MMOL/L — SIGNIFICANT CHANGE UP (ref 135–145)

## 2022-10-26 PROCEDURE — 99233 SBSQ HOSP IP/OBS HIGH 50: CPT

## 2022-10-26 PROCEDURE — 99232 SBSQ HOSP IP/OBS MODERATE 35: CPT

## 2022-10-26 RX ORDER — POTASSIUM CHLORIDE 20 MEQ
20 PACKET (EA) ORAL ONCE
Refills: 0 | Status: COMPLETED | OUTPATIENT
Start: 2022-10-26 | End: 2022-10-26

## 2022-10-26 RX ADMIN — Medication 20 MILLIEQUIVALENT(S): at 12:26

## 2022-10-26 RX ADMIN — PANTOPRAZOLE SODIUM 40 MILLIGRAM(S): 20 TABLET, DELAYED RELEASE ORAL at 05:34

## 2022-10-26 RX ADMIN — Medication 1 MILLIGRAM(S): at 17:59

## 2022-10-26 RX ADMIN — Medication 1 MILLIGRAM(S): at 12:27

## 2022-10-26 RX ADMIN — Medication 12.5 MICROGRAM(S): at 05:34

## 2022-10-26 RX ADMIN — Medication 1 MILLIGRAM(S): at 05:33

## 2022-10-26 NOTE — PROGRESS NOTE ADULT - SUBJECTIVE AND OBJECTIVE BOX
[facility] Division of Hospital Medicine  Charles Carrion MD  Available via MS Teams  Pager: [phone]    SUBJECTIVE / OVERNIGHT EVENTS:  -Says her mood is better than prior. Denies any chest pain, nausea, vomiting, headaches, shortness of breath, abdominal pain. Wondering when she can go home.     ADDITIONAL REVIEW OF SYSTEMS:    MEDICATIONS  (STANDING):  heparin   Injectable 5000 Unit(s) SubCutaneous every 8 hours  influenza   Vaccine 0.5 milliLiter(s) IntraMuscular once  levothyroxine 12.5 MICROGram(s) Oral daily  LORazepam     Tablet 1 milliGRAM(s) Oral four times a day  pantoprazole    Tablet 40 milliGRAM(s) Oral before breakfast  sodium chloride 0.9%. 1000 milliLiter(s) (100 mL/Hr) IV Continuous <Continuous>    MEDICATIONS  (PRN):  LORazepam     Tablet 1 milliGRAM(s) Oral every 6 hours PRN Anxiety      I&O's Summary    25 Oct 2022 07:01  -  26 Oct 2022 07:00  --------------------------------------------------------  IN: 1680 mL / OUT: 0 mL / NET: 1680 mL        PHYSICAL EXAM:  Vital Signs Last 24 Hrs  T(C): 36.6 (26 Oct 2022 12:09), Max: 37.3 (25 Oct 2022 19:19)  T(F): 97.9 (26 Oct 2022 12:09), Max: 99.2 (25 Oct 2022 19:19)  HR: 82 (26 Oct 2022 12:09) (69 - 85)  BP: 134/81 (26 Oct 2022 12:09) (119/81 - 134/81)  BP(mean): --  RR: 18 (26 Oct 2022 12:09) (18 - 18)  SpO2: 98% (26 Oct 2022 12:09) (97% - 98%)    Parameters below as of 26 Oct 2022 12:09  Patient On (Oxygen Delivery Method): room air      CONSTITUTIONAL: NAD, well-developed, well-groomed,  EYES: PERRL; conjunctiva and sclera clear  NECK: Supple, no palpable masses; no thyromegaly  RESPIRATORY: Normal respiratory effort; lungs are clear to auscultation bilaterally  CARDIOVASCULAR: Regular rate and rhythm, normal S1 and S2, no murmur/rub/gallop; No lower extremity edema; Peripheral pulses are 2+ bilaterally  ABDOMEN: Nontender to palpation, normoactive bowel sounds, no rebound/guarding; No hepatosplenomegaly  MUSCULOSKELETAL: no msk pain, or muscle tenderness. no left sided chest wall tenderness  PSYCH: A+O to person, place, and time; + anxious   NEUROLOGY: CN 2-12 are intact and symmetric; no gross sensory deficits   SKIN: No rashes; no palpable lesions    LABS:                        10.7   2.70  )-----------( 202      ( 25 Oct 2022 06:44 )             33.4     10-26    141  |  107  |  7   ----------------------------<  95  3.4<L>   |  24  |  0.58    Ca    8.5      26 Oct 2022 07:02  Phos  4.0     10-25  Mg     1.8     10-25    TPro  5.8<L>  /  Alb  3.2<L>  /  TBili  0.3  /  DBili  x   /  AST  118<H>  /  ALT  165<H>  /  AlkPhos  62  10-26      CARDIAC MARKERS ( 26 Oct 2022 07:02 )  x     / x     / 2505 U/L / x     / x      CARDIAC MARKERS ( 25 Oct 2022 06:44 )  x     / x     / 3836 U/L / x     / x              COVID-19 PCR: NotDetec (16 Jul 2022 10:20)  COVID-19 PCR: NotDetec (15 Jul 2022 12:49)  COVID-19 PCR: Detected (08 Jul 2022 07:35)  SARS-CoV-2: NotDetec (07 Jul 2022 06:47)  COVID-19 PCR: NotDetec (04 Jul 2022 22:41)      RADIOLOGY & ADDITIONAL TESTS:  New Results Reviewed Today: cbc cmp  New Imaging Personally Reviewed Today: n/a  New Electrocardiogram Personally Reviewed Today: n/a  Prior or Outpatient Records Reviewed Today: n/a    COMMUNICATION:  Care Discussed with Consultants/Other Providers and Details of Discussion: psychiatry   Discussions with Patient/Family: n/a  PCP Communication: n/a

## 2022-10-26 NOTE — PROGRESS NOTE ADULT - PROBLEM SELECTOR PLAN 1
-  recs appreciated, home meds currently on hold given transaminitis  - c/w ativan q6h  - monitor  - pt states she needs to establish care with new psychiatrist. will refer to [facility] on discharge  - discussed with psychiatry attending Dr. Drummond - we agreed to continue monitoring until LFTs improve, when they improve and when CK <1000, we can restart her antipsychotic medications  - Patient should be on her antipsychotic medications prior to discharge given her history.

## 2022-10-26 NOTE — PROGRESS NOTE ADULT - PROBLEM SELECTOR PLAN 2
- CK downtrending now  - continue to monitor  - c/w IVF NS @ 125cc/hr for one more day to ensure CK<1000  - serum tox neg

## 2022-10-26 NOTE — PROGRESS NOTE ADULT - SUBJECTIVE AND OBJECTIVE BOX
DATE OF SERVICE: 10-26-22 @ 13:08    Patient is a 57y old  Female who presents with a chief complaint of Fall (25 Oct 2022 14:49)      INTERVAL HISTORY: Feels ok.     REVIEW OF SYSTEMS:  CONSTITUTIONAL: No weakness  EYES/ENT: No visual changes;  No throat pain   NECK: No pain or stiffness  RESPIRATORY: No cough, wheezing; No shortness of breath  CARDIOVASCULAR: No chest pain or palpitations  GASTROINTESTINAL: No abdominal  pain. No nausea, vomiting, or hematemesis  GENITOURINARY: No dysuria, frequency or hematuria  NEUROLOGICAL: No stroke like symptoms  SKIN: No rashes    TELEMETRY Personally reviewed: SB/SR 50-90  	  MEDICATIONS:        PHYSICAL EXAM:  T(C): 36.6 (10-26-22 @ 12:09), Max: 37.3 (10-25-22 @ 19:19)  HR: 82 (10-26-22 @ 12:09) (69 - 85)  BP: 134/81 (10-26-22 @ 12:09) (119/81 - 134/81)  RR: 18 (10-26-22 @ 12:09) (18 - 18)  SpO2: 98% (10-26-22 @ 12:09) (97% - 98%)  Wt(kg): --  I&O's Summary    25 Oct 2022 07:01  -  26 Oct 2022 07:00  --------------------------------------------------------  IN: 1680 mL / OUT: 0 mL / NET: 1680 mL          Appearance: In no distress	  HEENT:    PERRL, EOMI	  Cardiovascular:  S1 S2, No JVD  Respiratory: Lungs clear to auscultation	  Gastrointestinal:  Soft, Non-tender, + BS	  Vascularature:  No edema of LE  Psychiatric: Appropriate affect   Neuro: no acute focal deficits                               10.7   2.70  )-----------( 202      ( 25 Oct 2022 06:44 )             33.4     10-26    141  |  107  |  7   ----------------------------<  95  3.4<L>   |  24  |  0.58    Ca    8.5      26 Oct 2022 07:02  Phos  4.0     10-25  Mg     1.8     10-25    TPro  5.8<L>  /  Alb  3.2<L>  /  TBili  0.3  /  DBili  x   /  AST  118<H>  /  ALT  165<H>  /  AlkPhos  62  10-26        Labs personally reviewed      ASSESSMENT/PLAN: 	      Patient is a 57 year old Female with PMHx of neutropenia (states she follows with her PCP, Dr. Walton), Hypothyroidism (on Synthroid 12.5 Qd), and Schizoaffective disorder who presented to [facility] with chief complaint of recent fall on Wednesday 10/19 and elevated LFTs. Patient reports that on Tuesday evening she could not sleep and took 1800 mg of Gabapentin. Her usual Gabapentin dose is 600 Qd. Patient denies SI or HI. Patient states that she fell, hit her head and shoulders. Patient denies SI or HI. Denies chest pain or palpitations, shoulder pain or discomfort. States her discomfort has improved. Denies body aches or pain. Denies headache, lightheadedness or dizziness.     1. Elevated Troponin  - Trend to peak, likely 2/2 rhabdo  - TTE unremarkable   - CTA coronaries with mild to moderate nonobstructive CAD  - will manage medically with ASA and statin  - will hold off initiating statin at this time given rhabdo  - will FU with me as OP in 2-4 weeks and will initiate at the time     2. Rhabdomyolysis S/P Fall/sycnope  - Trend CK Qd   - Aggressive IVF for hydration   - CT head negative   - CT C spine negative  - CK trending down, now at close to 4k    3. Syncope -- likely 2/2 high dose of gabapentin 1800mg (3x her usual dose)  - monitor on tele  - echo unremarkable     4. Elevated LFTs  - CT A/P without abnormal liver morphology  - Check LFTs daily to trend- now downtrending  - Check ABD US : Suggestion of subcentimeter nonobstructing left renal calculus, otherwise unremarkable abdominal and bladder ultrasound  - Avoid hepatotoxic agents  - Hold Metformin   - likely from muscle breakdown    5. Schizoaffective Disorder  - Pt reports her home meds are -- Rexulti, Fluvox, Trintellix, Buspirone, Olanzapine, Gabapentin    - Pt denies SI/HI at time of encounter   - Psych recs appreciated   - Monitor patient closely     6. Neutropenia  - Pt reports this is known and she follows with her PCP Dr. Walton   - Check CBC with diff   - Monitor counts closely     7. PPX  - Hep 5K Q8        YESSI Valles-NAIF Rossi DO [facility]  Cardiovascular Medicine  [address] [place] [address], [address]  Available through call or text on [employer] TE[employer]  Office: [phone]

## 2022-10-26 NOTE — PROGRESS NOTE ADULT - ASSESSMENT
Patient is a 57 year old Female with PMHx of neutropenia (states she follows with her PCP, Dr. Walton), Hypothyroidism (on Synthroid 12.5 Qd), and Schizoaffective disorder who presented to [facility] with chief complaint of recent fall on Wednesday 10/19 and elevated LFTs, found to have rhabdomyolysis and elevated troponin, both of which are improving. CT coronaries with mild disease.

## 2022-10-27 LAB
ALBUMIN SERPL ELPH-MCNC: 3.4 G/DL — SIGNIFICANT CHANGE UP (ref 3.3–5)
ALP SERPL-CCNC: 66 U/L — SIGNIFICANT CHANGE UP (ref 40–120)
ALT FLD-CCNC: 132 U/L — HIGH (ref 10–45)
ANION GAP SERPL CALC-SCNC: 10 MMOL/L — SIGNIFICANT CHANGE UP (ref 5–17)
AST SERPL-CCNC: 69 U/L — HIGH (ref 10–40)
BASOPHILS # BLD AUTO: 0.05 K/UL — SIGNIFICANT CHANGE UP (ref 0–0.2)
BASOPHILS NFR BLD AUTO: 1.7 % — SIGNIFICANT CHANGE UP (ref 0–2)
BILIRUB SERPL-MCNC: 0.2 MG/DL — SIGNIFICANT CHANGE UP (ref 0.2–1.2)
BUN SERPL-MCNC: 9 MG/DL — SIGNIFICANT CHANGE UP (ref 7–23)
CALCIUM SERPL-MCNC: 8.4 MG/DL — SIGNIFICANT CHANGE UP (ref 8.4–10.5)
CHLORIDE SERPL-SCNC: 106 MMOL/L — SIGNIFICANT CHANGE UP (ref 96–108)
CK SERPL-CCNC: 1607 U/L — HIGH (ref 25–170)
CO2 SERPL-SCNC: 24 MMOL/L — SIGNIFICANT CHANGE UP (ref 22–31)
CREAT SERPL-MCNC: 0.61 MG/DL — SIGNIFICANT CHANGE UP (ref 0.5–1.3)
EGFR: 104 ML/MIN/1.73M2 — SIGNIFICANT CHANGE UP
EOSINOPHIL # BLD AUTO: 0.14 K/UL — SIGNIFICANT CHANGE UP (ref 0–0.5)
EOSINOPHIL NFR BLD AUTO: 5.2 % — SIGNIFICANT CHANGE UP (ref 0–6)
GIANT PLATELETS BLD QL SMEAR: PRESENT — SIGNIFICANT CHANGE UP
GLUCOSE SERPL-MCNC: 77 MG/DL — SIGNIFICANT CHANGE UP (ref 70–99)
HCT VFR BLD CALC: 33.5 % — LOW (ref 34.5–45)
HGB BLD-MCNC: 10.9 G/DL — LOW (ref 11.5–15.5)
LYMPHOCYTES # BLD AUTO: 1.52 K/UL — SIGNIFICANT CHANGE UP (ref 1–3.3)
LYMPHOCYTES # BLD AUTO: 56.9 % — HIGH (ref 13–44)
MAGNESIUM SERPL-MCNC: 1.9 MG/DL — SIGNIFICANT CHANGE UP (ref 1.6–2.6)
MANUAL SMEAR VERIFICATION: SIGNIFICANT CHANGE UP
MCHC RBC-ENTMCNC: 29.4 PG — SIGNIFICANT CHANGE UP (ref 27–34)
MCHC RBC-ENTMCNC: 32.5 GM/DL — SIGNIFICANT CHANGE UP (ref 32–36)
MCV RBC AUTO: 90.3 FL — SIGNIFICANT CHANGE UP (ref 80–100)
MONOCYTES # BLD AUTO: 0.62 K/UL — SIGNIFICANT CHANGE UP (ref 0–0.9)
MONOCYTES NFR BLD AUTO: 23.3 % — HIGH (ref 2–14)
NEUTROPHILS # BLD AUTO: 0.35 K/UL — LOW (ref 1.8–7.4)
NEUTROPHILS NFR BLD AUTO: 12.9 % — LOW (ref 43–77)
PHOSPHATE SERPL-MCNC: 4.1 MG/DL — SIGNIFICANT CHANGE UP (ref 2.5–4.5)
PLAT MORPH BLD: ABNORMAL
PLATELET # BLD AUTO: 242 K/UL — SIGNIFICANT CHANGE UP (ref 150–400)
POTASSIUM SERPL-MCNC: 3.5 MMOL/L — SIGNIFICANT CHANGE UP (ref 3.5–5.3)
POTASSIUM SERPL-SCNC: 3.5 MMOL/L — SIGNIFICANT CHANGE UP (ref 3.5–5.3)
PROT SERPL-MCNC: 6.3 G/DL — SIGNIFICANT CHANGE UP (ref 6–8.3)
RBC # BLD: 3.71 M/UL — LOW (ref 3.8–5.2)
RBC # FLD: 13.9 % — SIGNIFICANT CHANGE UP (ref 10.3–14.5)
RBC BLD AUTO: SIGNIFICANT CHANGE UP
SODIUM SERPL-SCNC: 140 MMOL/L — SIGNIFICANT CHANGE UP (ref 135–145)
WBC # BLD: 2.68 K/UL — LOW (ref 3.8–10.5)
WBC # FLD AUTO: 2.68 K/UL — LOW (ref 3.8–10.5)

## 2022-10-27 PROCEDURE — 99232 SBSQ HOSP IP/OBS MODERATE 35: CPT

## 2022-10-27 PROCEDURE — 99233 SBSQ HOSP IP/OBS HIGH 50: CPT

## 2022-10-27 RX ADMIN — Medication 12.5 MICROGRAM(S): at 05:42

## 2022-10-27 RX ADMIN — PANTOPRAZOLE SODIUM 40 MILLIGRAM(S): 20 TABLET, DELAYED RELEASE ORAL at 05:41

## 2022-10-27 RX ADMIN — Medication 1 MILLIGRAM(S): at 18:13

## 2022-10-27 RX ADMIN — Medication 1 MILLIGRAM(S): at 05:41

## 2022-10-27 RX ADMIN — Medication 1 MILLIGRAM(S): at 00:06

## 2022-10-27 NOTE — PROGRESS NOTE ADULT - SUBJECTIVE AND OBJECTIVE BOX
DATE OF SERVICE: 10-27-22 @ 12:39    Patient is a 57y old  Female who presents with a chief complaint of Fall (26 Oct 2022 15:07)      INTERVAL HISTORY: Feels ok.     REVIEW OF SYSTEMS:  CONSTITUTIONAL: No weakness  EYES/ENT: No visual changes;  No throat pain   NECK: No pain or stiffness  RESPIRATORY: No cough, wheezing; No shortness of breath  CARDIOVASCULAR: No chest pain or palpitations  GASTROINTESTINAL: No abdominal  pain. No nausea, vomiting, or hematemesis  GENITOURINARY: No dysuria, frequency or hematuria  NEUROLOGICAL: No stroke like symptoms  SKIN: No rashes    	  MEDICATIONS:        PHYSICAL EXAM:  T(C): 36.6 (10-27-22 @ 04:25), Max: 36.7 (10-26-22 @ 20:16)  HR: 88 (10-27-22 @ 04:25) (69 - 88)  BP: 127/72 (10-27-22 @ 04:25) (120/77 - 127/72)  RR: 18 (10-27-22 @ 04:25) (18 - 18)  SpO2: 99% (10-27-22 @ 04:25) (97% - 99%)  Wt(kg): --  I&O's Summary    26 Oct 2022 07:01  -  27 Oct 2022 07:00  --------------------------------------------------------  IN: 240 mL / OUT: 0 mL / NET: 240 mL          Appearance: In no distress	  HEENT:    PERRL, EOMI	  Cardiovascular:  S1 S2, No JVD  Respiratory: Lungs clear to auscultation	  Gastrointestinal:  Soft, Non-tender, + BS	  Vascularature:  No edema of LE  Psychiatric: Appropriate affect   Neuro: no acute focal deficits                               10.9   2.68  )-----------( 242      ( 27 Oct 2022 06:56 )             33.5     10-27    140  |  106  |  9   ----------------------------<  77  3.5   |  24  |  0.61    Ca    8.4      27 Oct 2022 06:59  Phos  4.1     10-27  Mg     1.9     10-27    TPro  6.3  /  Alb  3.4  /  TBili  0.2  /  DBili  x   /  AST  69<H>  /  ALT  132<H>  /  AlkPhos  66  10-27        Labs personally reviewed      ASSESSMENT/PLAN: 	    Patient is a 57 year old Female with PMHx of neutropenia (states she follows with her PCP, Dr. Walton), Hypothyroidism (on Synthroid 12.5 Qd), and Schizoaffective disorder who presented to [facility] with chief complaint of recent fall on Wednesday 10/19 and elevated LFTs. Patient reports that on Tuesday evening she could not sleep and took 1800 mg of Gabapentin. Her usual Gabapentin dose is 600 Qd. Patient denies SI or HI. Patient states that she fell, hit her head and shoulders. Patient denies SI or HI. Denies chest pain or palpitations, shoulder pain or discomfort. States her discomfort has improved. Denies body aches or pain. Denies headache, lightheadedness or dizziness.     1. Elevated Troponin  - Trend to peak, likely 2/2 rhabdo  - TTE unremarkable   - CTA coronaries with mild to moderate nonobstructive CAD  - will manage medically with ASA and statin  - will hold off initiating statin at this time given rhabdo  - will FU with me as OP in 2-4 weeks and will initiate at the time     2. Rhabdomyolysis S/P Fall/sycnope  - Trend CK Qd   - Aggressive IVF for hydration   - CT head negative   - CT C spine negative  - CK trending down, now 1607    3. Syncope -- likely 2/2 high dose of gabapentin 1800mg (3x her usual dose)  - monitor on tele  - echo unremarkable     4. Elevated LFTs  - CT A/P without abnormal liver morphology  - Check LFTs daily to trend- now downtrending  - Check ABD US : Suggestion of subcentimeter nonobstructing left renal calculus, otherwise unremarkable abdominal and bladder ultrasound  - Avoid hepatotoxic agents  - Hold Metformin   - likely from muscle breakdown    5. Schizoaffective Disorder  - Pt reports her home meds are -- Rexulti, Fluvox, Trintellix, Buspirone, Olanzapine, Gabapentin    - Pt denies SI/HI at time of encounter   - Psych recs appreciated - c/w benzos until able to resume OP regimen  - Monitor patient closely  -  Consult appreciated     6. Neutropenia  - Pt reports this is known and she follows with her PCP Dr. Walton   - Check CBC with diff   - Monitor counts closely     7. PPX  - Hep 5K Q8            YESSI Valles-NP   Bryan Rossi,  [facility]  Cardiovascular Medicine  [address] [place] [address], [address]  Available through call or text on [employer] TEAM[employer]  Office: [phone]   DATE OF SERVICE: 10-27-22 @ 12:39    Patient is a 57y old  Female who presents with a chief complaint of Fall (26 Oct 2022 15:07)      INTERVAL HISTORY: Feels ok.     REVIEW OF SYSTEMS:  CONSTITUTIONAL: No weakness  EYES/ENT: No visual changes;  No throat pain   NECK: No pain or stiffness  RESPIRATORY: No cough, wheezing; No shortness of breath  CARDIOVASCULAR: No chest pain or palpitations  GASTROINTESTINAL: No abdominal  pain. No nausea, vomiting, or hematemesis  GENITOURINARY: No dysuria, frequency or hematuria  NEUROLOGICAL: No stroke like symptoms  SKIN: No rashes    	  MEDICATIONS:        PHYSICAL EXAM:  T(C): 36.6 (10-27-22 @ 04:25), Max: 36.7 (10-26-22 @ 20:16)  HR: 88 (10-27-22 @ 04:25) (69 - 88)  BP: 127/72 (10-27-22 @ 04:25) (120/77 - 127/72)  RR: 18 (10-27-22 @ 04:25) (18 - 18)  SpO2: 99% (10-27-22 @ 04:25) (97% - 99%)  Wt(kg): --  I&O's Summary    26 Oct 2022 07:01  -  27 Oct 2022 07:00  --------------------------------------------------------  IN: 240 mL / OUT: 0 mL / NET: 240 mL          Appearance: In no distress	  HEENT:    PERRL, EOMI	  Cardiovascular:  S1 S2, No JVD  Respiratory: Lungs clear to auscultation	  Gastrointestinal:  Soft, Non-tender, + BS	  Vascularature:  No edema of LE  Psychiatric: Appropriate affect   Neuro: no acute focal deficits                               10.9   2.68  )-----------( 242      ( 27 Oct 2022 06:56 )             33.5     10-27    140  |  106  |  9   ----------------------------<  77  3.5   |  24  |  0.61    Ca    8.4      27 Oct 2022 06:59  Phos  4.1     10-27  Mg     1.9     10-27    TPro  6.3  /  Alb  3.4  /  TBili  0.2  /  DBili  x   /  AST  69<H>  /  ALT  132<H>  /  AlkPhos  66  10-27        Labs personally reviewed      ASSESSMENT/PLAN: 	    Patient is a 57 year old Female with PMHx of neutropenia (states she follows with her PCP, Dr. Walton), Hypothyroidism (on Synthroid 12.5 Qd), and Schizoaffective disorder who presented to [facility] with chief complaint of recent fall on Wednesday 10/19 and elevated LFTs. Patient reports that on Tuesday evening she could not sleep and took 1800 mg of Gabapentin. Her usual Gabapentin dose is 600 Qd. Patient denies SI or HI. Patient states that she fell, hit her head and shoulders. Patient denies SI or HI. Denies chest pain or palpitations, shoulder pain or discomfort. States her discomfort has improved. Denies body aches or pain. Denies headache, lightheadedness or dizziness.     1. Elevated Troponin  - Trend to peak, likely 2/2 rhabdo  - TTE unremarkable   - CTA coronaries with mild to moderate nonobstructive CAD  - will manage medically with ASA and statin  - will hold off initiating statin at this time given rhabdo  - will FU with me as OP in 2-4 weeks and will initiate at the time     2. Rhabdomyolysis S/P Fall/sycnope  - Trend CK Qd   - Aggressive IVF for hydration   - CT head negative   - CT C spine negative  - CK trending down, now ~1000    3. Syncope -- likely 2/2 high dose of gabapentin 1800mg (3x her usual dose)  - monitor on tele  - echo unremarkable     4. Elevated LFTs  - CT A/P without abnormal liver morphology  - Check LFTs daily to trend- now downtrending  - Check ABD US : Suggestion of subcentimeter nonobstructing left renal calculus, otherwise unremarkable abdominal and bladder ultrasound  - Avoid hepatotoxic agents  - Hold Metformin   - likely from muscle breakdown    5. Schizoaffective Disorder  - Pt reports her home meds are -- Rexulti, Fluvox, Trintellix, Buspirone, Olanzapine, Gabapentin    - Pt denies SI/HI at time of encounter   - Psych recs appreciated - c/w benzos until able to resume OP regimen  - Monitor patient closely  -  Consult appreciated  - holding psych meds until CK trends down <1000    6, Neutropenia - Heme consult pending  - ? 2/2 Olanzapine vs other disorder   - Monitor counts closely     7. PPX  - Hep 5K Q8            Zulema Dietrich, AG-NP   Bryan Rossi DO [facility]  Cardiovascular Medicine  [address] [place] [address], [address]  Available through call or text on [employer] TEAMs  Office: [phone]

## 2022-10-27 NOTE — PROGRESS NOTE ADULT - PROBLEM SELECTOR PLAN 2
- monitor  - possible 2/2 olanzapine  - given patient also has low hemoglobin and somewhat worsening neutropenia, Dr. Drummond requested hematology consultation for worsening neutropenia and safety of restarting her psychiatric medications. hematology consulted and will see patient

## 2022-10-27 NOTE — PROGRESS NOTE ADULT - SUBJECTIVE AND OBJECTIVE BOX
[facility] Division of Hospital Medicine  Charles Carrion MD  Available via MS Teams  Pager: [phone]    SUBJECTIVE / OVERNIGHT EVENTS:  -denies any nausea, vomiting, headaches, shortness of breath, abdominal pain. Feels well overall and wishes to go home.       ADDITIONAL REVIEW OF SYSTEMS:    MEDICATIONS  (STANDING):  heparin   Injectable 5000 Unit(s) SubCutaneous every 8 hours  influenza   Vaccine 0.5 milliLiter(s) IntraMuscular once  levothyroxine 12.5 MICROGram(s) Oral daily  LORazepam     Tablet 1 milliGRAM(s) Oral four times a day  pantoprazole    Tablet 40 milliGRAM(s) Oral before breakfast  sodium chloride 0.9%. 1000 milliLiter(s) (100 mL/Hr) IV Continuous <Continuous>    MEDICATIONS  (PRN):  LORazepam     Tablet 1 milliGRAM(s) Oral every 6 hours PRN Anxiety      I&O's Summary    26 Oct 2022 07:01  -  27 Oct 2022 07:00  --------------------------------------------------------  IN: 240 mL / OUT: 0 mL / NET: 240 mL        PHYSICAL EXAM:  Vital Signs Last 24 Hrs  T(C): 36.7 (27 Oct 2022 13:45), Max: 36.7 (26 Oct 2022 20:16)  T(F): 98.1 (27 Oct 2022 13:45), Max: 98.1 (26 Oct 2022 20:16)  HR: 85 (27 Oct 2022 13:45) (69 - 88)  BP: 132/86 (27 Oct 2022 13:45) (120/77 - 132/86)  BP(mean): --  RR: 18 (27 Oct 2022 13:45) (18 - 18)  SpO2: 98% (27 Oct 2022 13:45) (97% - 99%)    Parameters below as of 27 Oct 2022 13:45  Patient On (Oxygen Delivery Method): room air      CONSTITUTIONAL: NAD, well-developed, well-groomed,  EYES: PERRL; conjunctiva and sclera clear  NECK: Supple, no palpable masses; no thyromegaly  RESPIRATORY: Normal respiratory effort; lungs are clear to auscultation bilaterally  CARDIOVASCULAR: Regular rate and rhythm, normal S1 and S2, no murmur/rub/gallop; No lower extremity edema; Peripheral pulses are 2+ bilaterally  ABDOMEN: Nontender to palpation, normoactive bowel sounds, no rebound/guarding; No hepatosplenomegaly  MUSCULOSKELETAL: no msk pain, or muscle tenderness. no left sided chest wall tenderness  PSYCH: A+O to person, place, and time; + anxious mildly  NEUROLOGY: no gross sensory deficits   SKIN: No rashes; no palpable lesions    LABS:                        10.9   2.68  )-----------( 242      ( 27 Oct 2022 06:56 )             33.5     10-27    140  |  106  |  9   ----------------------------<  77  3.5   |  24  |  0.61    Ca    8.4      27 Oct 2022 06:59  Phos  4.1     10-27  Mg     1.9     10-27    TPro  6.3  /  Alb  3.4  /  TBili  0.2  /  DBili  x   /  AST  69<H>  /  ALT  132<H>  /  AlkPhos  66  10-27      CARDIAC MARKERS ( 27 Oct 2022 06:59 )  x     / x     / 1607 U/L / x     / x      CARDIAC MARKERS ( 26 Oct 2022 07:02 )  x     / x     / 2505 U/L / x     / x              COVID-19 PCR: NotDetec (16 Jul 2022 10:20)  COVID-19 PCR: NotDetec (15 Jul 2022 12:49)  COVID-19 PCR: Detected (08 Jul 2022 07:35)  SARS-CoV-2: NotDetec (07 Jul 2022 06:47)  COVID-19 PCR: NotDetec (04 Jul 2022 22:41)      RADIOLOGY & ADDITIONAL TESTS:  New Results Reviewed Today: cbc cmp  New Imaging Personally Reviewed Today: n/a  New Electrocardiogram Personally Reviewed Today: n/a  Prior or Outpatient Records Reviewed Today: n/a    COMMUNICATION:  Care Discussed with Consultants/Other Providers and Details of Discussion: psychiatrist   Discussions with Patient/Family: n/a  PCP Communication: na

## 2022-10-27 NOTE — PROGRESS NOTE ADULT - PROBLEM SELECTOR PLAN 1
- home meds currently on hold given transaminitis  - c/w ativan q6h  - pt states she needs to establish care with new psychiatrist. will refer to [facility] on discharge  - discussed with psychiatry attending Dr. Drummond - we agreed to continue monitoring until LFTs improve, when they improve and when CK <1000, we can restart her antipsychotic medications  - Patient should be on her antipsychotic medications prior to discharge given her history.

## 2022-10-28 ENCOUNTER — TRANSCRIPTION ENCOUNTER (OUTPATIENT)
Age: 57
End: 2022-10-28

## 2022-10-28 VITALS
OXYGEN SATURATION: 99 % | TEMPERATURE: 98 F | HEART RATE: 86 BPM | SYSTOLIC BLOOD PRESSURE: 133 MMHG | DIASTOLIC BLOOD PRESSURE: 86 MMHG | RESPIRATION RATE: 18 BRPM

## 2022-10-28 LAB
ALBUMIN SERPL ELPH-MCNC: 3.7 G/DL — SIGNIFICANT CHANGE UP (ref 3.3–5)
ALP SERPL-CCNC: 71 U/L — SIGNIFICANT CHANGE UP (ref 40–120)
ALT FLD-CCNC: 111 U/L — HIGH (ref 10–45)
ANION GAP SERPL CALC-SCNC: 10 MMOL/L — SIGNIFICANT CHANGE UP (ref 5–17)
ANISOCYTOSIS BLD QL: SLIGHT — SIGNIFICANT CHANGE UP
AST SERPL-CCNC: 45 U/L — HIGH (ref 10–40)
BASOPHILS # BLD AUTO: 0.02 K/UL — SIGNIFICANT CHANGE UP (ref 0–0.2)
BASOPHILS NFR BLD AUTO: 0.9 % — SIGNIFICANT CHANGE UP (ref 0–2)
BILIRUB SERPL-MCNC: 0.3 MG/DL — SIGNIFICANT CHANGE UP (ref 0.2–1.2)
BUN SERPL-MCNC: 9 MG/DL — SIGNIFICANT CHANGE UP (ref 7–23)
BURR CELLS BLD QL SMEAR: SLIGHT — SIGNIFICANT CHANGE UP
CALCIUM SERPL-MCNC: 9.2 MG/DL — SIGNIFICANT CHANGE UP (ref 8.4–10.5)
CHLORIDE SERPL-SCNC: 105 MMOL/L — SIGNIFICANT CHANGE UP (ref 96–108)
CK SERPL-CCNC: 1161 U/L — HIGH (ref 25–170)
CO2 SERPL-SCNC: 25 MMOL/L — SIGNIFICANT CHANGE UP (ref 22–31)
CREAT SERPL-MCNC: 0.62 MG/DL — SIGNIFICANT CHANGE UP (ref 0.5–1.3)
EGFR: 104 ML/MIN/1.73M2 — SIGNIFICANT CHANGE UP
ELLIPTOCYTES BLD QL SMEAR: SLIGHT — SIGNIFICANT CHANGE UP
EOSINOPHIL # BLD AUTO: 0.04 K/UL — SIGNIFICANT CHANGE UP (ref 0–0.5)
EOSINOPHIL NFR BLD AUTO: 1.7 % — SIGNIFICANT CHANGE UP (ref 0–6)
GIANT PLATELETS BLD QL SMEAR: PRESENT — SIGNIFICANT CHANGE UP
GLUCOSE SERPL-MCNC: 99 MG/DL — SIGNIFICANT CHANGE UP (ref 70–99)
HCT VFR BLD CALC: 35.3 % — SIGNIFICANT CHANGE UP (ref 34.5–45)
HGB BLD-MCNC: 11.4 G/DL — LOW (ref 11.5–15.5)
LYMPHOCYTES # BLD AUTO: 1.53 K/UL — SIGNIFICANT CHANGE UP (ref 1–3.3)
LYMPHOCYTES # BLD AUTO: 59 % — HIGH (ref 13–44)
MACROCYTES BLD QL: SLIGHT — SIGNIFICANT CHANGE UP
MAGNESIUM SERPL-MCNC: 2.1 MG/DL — SIGNIFICANT CHANGE UP (ref 1.6–2.6)
MANUAL SMEAR VERIFICATION: SIGNIFICANT CHANGE UP
MCHC RBC-ENTMCNC: 29.2 PG — SIGNIFICANT CHANGE UP (ref 27–34)
MCHC RBC-ENTMCNC: 32.3 GM/DL — SIGNIFICANT CHANGE UP (ref 32–36)
MCV RBC AUTO: 90.3 FL — SIGNIFICANT CHANGE UP (ref 80–100)
MONOCYTES # BLD AUTO: 0.62 K/UL — SIGNIFICANT CHANGE UP (ref 0–0.9)
MONOCYTES NFR BLD AUTO: 23.9 % — HIGH (ref 2–14)
NEUTROPHILS # BLD AUTO: 0.38 K/UL — LOW (ref 1.8–7.4)
NEUTROPHILS NFR BLD AUTO: 14.5 % — LOW (ref 43–77)
PHOSPHATE SERPL-MCNC: 4.4 MG/DL — SIGNIFICANT CHANGE UP (ref 2.5–4.5)
PLAT MORPH BLD: NORMAL — SIGNIFICANT CHANGE UP
PLATELET # BLD AUTO: 251 K/UL — SIGNIFICANT CHANGE UP (ref 150–400)
POIKILOCYTOSIS BLD QL AUTO: SLIGHT — SIGNIFICANT CHANGE UP
POTASSIUM SERPL-MCNC: 3.6 MMOL/L — SIGNIFICANT CHANGE UP (ref 3.5–5.3)
POTASSIUM SERPL-SCNC: 3.6 MMOL/L — SIGNIFICANT CHANGE UP (ref 3.5–5.3)
PROT SERPL-MCNC: 6.8 G/DL — SIGNIFICANT CHANGE UP (ref 6–8.3)
RBC # BLD: 3.91 M/UL — SIGNIFICANT CHANGE UP (ref 3.8–5.2)
RBC # FLD: 13.9 % — SIGNIFICANT CHANGE UP (ref 10.3–14.5)
RBC BLD AUTO: ABNORMAL
SARS-COV-2 RNA SPEC QL NAA+PROBE: DETECTED
SARS-COV-2 RNA SPEC QL NAA+PROBE: SIGNIFICANT CHANGE UP
SARS-COV-2 RNA SPEC QL NAA+PROBE: SIGNIFICANT CHANGE UP
SODIUM SERPL-SCNC: 140 MMOL/L — SIGNIFICANT CHANGE UP (ref 135–145)
TARGETS BLD QL SMEAR: SLIGHT — SIGNIFICANT CHANGE UP
WBC # BLD: 2.6 K/UL — LOW (ref 3.8–10.5)
WBC # FLD AUTO: 2.6 K/UL — LOW (ref 3.8–10.5)

## 2022-10-28 PROCEDURE — 71045 X-RAY EXAM CHEST 1 VIEW: CPT

## 2022-10-28 PROCEDURE — U0003: CPT

## 2022-10-28 PROCEDURE — 73030 X-RAY EXAM OF SHOULDER: CPT

## 2022-10-28 PROCEDURE — 80307 DRUG TEST PRSMV CHEM ANLYZR: CPT

## 2022-10-28 PROCEDURE — 99232 SBSQ HOSP IP/OBS MODERATE 35: CPT

## 2022-10-28 PROCEDURE — 85018 HEMOGLOBIN: CPT

## 2022-10-28 PROCEDURE — 82550 ASSAY OF CK (CPK): CPT

## 2022-10-28 PROCEDURE — 70450 CT HEAD/BRAIN W/O DYE: CPT | Mod: MA

## 2022-10-28 PROCEDURE — 84100 ASSAY OF PHOSPHORUS: CPT

## 2022-10-28 PROCEDURE — 83690 ASSAY OF LIPASE: CPT

## 2022-10-28 PROCEDURE — 76700 US EXAM ABDOM COMPLETE: CPT

## 2022-10-28 PROCEDURE — 75574 CT ANGIO HRT W/3D IMAGE: CPT

## 2022-10-28 PROCEDURE — 82435 ASSAY OF BLOOD CHLORIDE: CPT

## 2022-10-28 PROCEDURE — 97161 PT EVAL LOW COMPLEX 20 MIN: CPT

## 2022-10-28 PROCEDURE — 72170 X-RAY EXAM OF PELVIS: CPT

## 2022-10-28 PROCEDURE — 84295 ASSAY OF SERUM SODIUM: CPT

## 2022-10-28 PROCEDURE — 83605 ASSAY OF LACTIC ACID: CPT

## 2022-10-28 PROCEDURE — 85610 PROTHROMBIN TIME: CPT

## 2022-10-28 PROCEDURE — 80053 COMPREHEN METABOLIC PANEL: CPT

## 2022-10-28 PROCEDURE — 72125 CT NECK SPINE W/O DYE: CPT | Mod: MA

## 2022-10-28 PROCEDURE — 82947 ASSAY GLUCOSE BLOOD QUANT: CPT

## 2022-10-28 PROCEDURE — 80074 ACUTE HEPATITIS PANEL: CPT

## 2022-10-28 PROCEDURE — 82330 ASSAY OF CALCIUM: CPT

## 2022-10-28 PROCEDURE — 87637 SARSCOV2&INF A&B&RSV AMP PRB: CPT

## 2022-10-28 PROCEDURE — 76857 US EXAM PELVIC LIMITED: CPT

## 2022-10-28 PROCEDURE — 84132 ASSAY OF SERUM POTASSIUM: CPT

## 2022-10-28 PROCEDURE — 71260 CT THORAX DX C+: CPT | Mod: MA

## 2022-10-28 PROCEDURE — 85730 THROMBOPLASTIN TIME PARTIAL: CPT

## 2022-10-28 PROCEDURE — 82803 BLOOD GASES ANY COMBINATION: CPT

## 2022-10-28 PROCEDURE — 81001 URINALYSIS AUTO W/SCOPE: CPT

## 2022-10-28 PROCEDURE — 85014 HEMATOCRIT: CPT

## 2022-10-28 PROCEDURE — 99285 EMERGENCY DEPT VISIT HI MDM: CPT

## 2022-10-28 PROCEDURE — 82248 BILIRUBIN DIRECT: CPT

## 2022-10-28 PROCEDURE — 87086 URINE CULTURE/COLONY COUNT: CPT

## 2022-10-28 PROCEDURE — 36415 COLL VENOUS BLD VENIPUNCTURE: CPT

## 2022-10-28 PROCEDURE — 84443 ASSAY THYROID STIM HORMONE: CPT

## 2022-10-28 PROCEDURE — 83735 ASSAY OF MAGNESIUM: CPT

## 2022-10-28 PROCEDURE — 86803 HEPATITIS C AB TEST: CPT

## 2022-10-28 PROCEDURE — 80061 LIPID PANEL: CPT

## 2022-10-28 PROCEDURE — 87635 SARS-COV-2 COVID-19 AMP PRB: CPT

## 2022-10-28 PROCEDURE — 74177 CT ABD & PELVIS W/CONTRAST: CPT | Mod: MA

## 2022-10-28 PROCEDURE — U0005: CPT

## 2022-10-28 PROCEDURE — 73560 X-RAY EXAM OF KNEE 1 OR 2: CPT

## 2022-10-28 PROCEDURE — 85025 COMPLETE CBC W/AUTO DIFF WBC: CPT

## 2022-10-28 PROCEDURE — 99239 HOSP IP/OBS DSCHRG MGMT >30: CPT

## 2022-10-28 PROCEDURE — 84484 ASSAY OF TROPONIN QUANT: CPT

## 2022-10-28 PROCEDURE — 83036 HEMOGLOBIN GLYCOSYLATED A1C: CPT

## 2022-10-28 PROCEDURE — 93306 TTE W/DOPPLER COMPLETE: CPT

## 2022-10-28 RX ORDER — OLANZAPINE 15 MG/1
5 TABLET, FILM COATED ORAL ONCE
Refills: 0 | Status: DISCONTINUED | OUTPATIENT
Start: 2022-10-28 | End: 2022-10-28

## 2022-10-28 RX ORDER — FLUVOXAMINE MALEATE 25 MG/1
1 TABLET ORAL
Qty: 30 | Refills: 0
Start: 2022-10-28 | End: 2022-11-26

## 2022-10-28 RX ORDER — OLANZAPINE 15 MG/1
1 TABLET, FILM COATED ORAL
Qty: 0 | Refills: 0 | DISCHARGE
Start: 2022-10-28

## 2022-10-28 RX ORDER — GABAPENTIN 400 MG/1
1 CAPSULE ORAL
Qty: 90 | Refills: 0
Start: 2022-10-28 | End: 2022-11-26

## 2022-10-28 RX ORDER — BREXPIPRAZOLE 0.25 MG/1
1 TABLET ORAL
Qty: 30 | Refills: 0
Start: 2022-10-28 | End: 2022-11-26

## 2022-10-28 RX ORDER — CLONAZEPAM 1 MG
0.5 TABLET ORAL THREE TIMES A DAY
Refills: 0 | Status: DISCONTINUED | OUTPATIENT
Start: 2022-10-28 | End: 2022-10-28

## 2022-10-28 RX ORDER — BREXPIPRAZOLE 0.25 MG/1
1 TABLET ORAL
Qty: 0 | Refills: 0 | DISCHARGE

## 2022-10-28 RX ORDER — GABAPENTIN 400 MG/1
1 CAPSULE ORAL
Qty: 0 | Refills: 0 | DISCHARGE

## 2022-10-28 RX ORDER — CLONAZEPAM 1 MG
1 TABLET ORAL
Qty: 90 | Refills: 0
Start: 2022-10-28 | End: 2022-11-26

## 2022-10-28 RX ORDER — GABAPENTIN 400 MG/1
300 CAPSULE ORAL THREE TIMES A DAY
Refills: 0 | Status: DISCONTINUED | OUTPATIENT
Start: 2022-10-28 | End: 2022-10-28

## 2022-10-28 RX ORDER — CLONAZEPAM 1 MG
1 TABLET ORAL
Qty: 60 | Refills: 0
Start: 2022-10-28 | End: 2022-11-26

## 2022-10-28 RX ORDER — OLANZAPINE 15 MG/1
1 TABLET, FILM COATED ORAL
Qty: 30 | Refills: 0
Start: 2022-10-28 | End: 2022-11-26

## 2022-10-28 RX ORDER — OLANZAPINE 15 MG/1
10 TABLET, FILM COATED ORAL ONCE
Refills: 0 | Status: COMPLETED | OUTPATIENT
Start: 2022-10-28 | End: 2022-10-28

## 2022-10-28 RX ORDER — CLONAZEPAM 1 MG
1 TABLET ORAL
Qty: 0 | Refills: 0 | DISCHARGE

## 2022-10-28 RX ORDER — OLANZAPINE 15 MG/1
1 TABLET, FILM COATED ORAL
Qty: 0 | Refills: 0 | DISCHARGE

## 2022-10-28 RX ORDER — OLANZAPINE 15 MG/1
20 TABLET, FILM COATED ORAL AT BEDTIME
Refills: 0 | Status: DISCONTINUED | OUTPATIENT
Start: 2022-10-28 | End: 2022-10-28

## 2022-10-28 RX ADMIN — Medication 1 MILLIGRAM(S): at 06:14

## 2022-10-28 RX ADMIN — OLANZAPINE 10 MILLIGRAM(S): 15 TABLET, FILM COATED ORAL at 14:42

## 2022-10-28 RX ADMIN — PANTOPRAZOLE SODIUM 40 MILLIGRAM(S): 20 TABLET, DELAYED RELEASE ORAL at 06:14

## 2022-10-28 RX ADMIN — Medication 12.5 MICROGRAM(S): at 06:16

## 2022-10-28 RX ADMIN — Medication 1 MILLIGRAM(S): at 17:19

## 2022-10-28 RX ADMIN — Medication 1 MILLIGRAM(S): at 12:04

## 2022-10-28 RX ADMIN — Medication 1 MILLIGRAM(S): at 00:08

## 2022-10-28 NOTE — PROGRESS NOTE ADULT - PROBLEM SELECTOR PLAN 7
- outpatient PT recommended per PT consult  - heparin SC for DVT ppx
- outpatient PT recommended per PT consult  - heparin SC for DVT ppx  ______________  Yordy Badillo MD  Palliative Medicine  [facility]   of Geriatric and Palliative Medicine  [phone]

## 2022-10-28 NOTE — PROGRESS NOTE ADULT - PROBLEM SELECTOR PROBLEM 3
Elevated troponin
Rhabdomyolysis
Rhabdomyolysis

## 2022-10-28 NOTE — PROGRESS NOTE ADULT - SUBJECTIVE AND OBJECTIVE BOX
[facility] Division of Hospital Medicine  Charles Carrion MD  Available via MS Teams  Pager: [phone]    SUBJECTIVE / OVERNIGHT EVENTS:  naeon, no complaints this AM.    ADDITIONAL REVIEW OF SYSTEMS:    MEDICATIONS  (STANDING):  clonazePAM  Tablet 0.5 milliGRAM(s) Oral three times a day  gabapentin 300 milliGRAM(s) Oral three times a day  heparin   Injectable 5000 Unit(s) SubCutaneous every 8 hours  influenza   Vaccine 0.5 milliLiter(s) IntraMuscular once  levothyroxine 12.5 MICROGram(s) Oral daily  LORazepam     Tablet 1 milliGRAM(s) Oral four times a day  OLANZapine 20 milliGRAM(s) Oral at bedtime  pantoprazole    Tablet 40 milliGRAM(s) Oral before breakfast  sodium chloride 0.9%. 1000 milliLiter(s) (100 mL/Hr) IV Continuous <Continuous>    MEDICATIONS  (PRN):  LORazepam     Tablet 1 milliGRAM(s) Oral every 6 hours PRN Anxiety      I&O's Summary      PHYSICAL EXAM:  Vital Signs Last 24 Hrs  T(C): 36.8 (28 Oct 2022 12:30), Max: 36.8 (28 Oct 2022 12:30)  T(F): 98.2 (28 Oct 2022 12:30), Max: 98.2 (28 Oct 2022 12:30)  HR: 86 (28 Oct 2022 12:30) (73 - 86)  BP: 133/86 (28 Oct 2022 12:30) (119/71 - 165/80)  BP(mean): --  RR: 18 (28 Oct 2022 12:30) (18 - 18)  SpO2: 99% (28 Oct 2022 12:30) (96% - 99%)    Parameters below as of 28 Oct 2022 12:30  Patient On (Oxygen Delivery Method): room air      CONSTITUTIONAL: NAD, well-developed, well-groomed  EYES: PERRLA; conjunctiva and sclera clear  ENMT: Moist oral mucosa, no pharyngeal injection or exudates; normal dentition  NECK: Supple, no palpable masses; no thyromegaly  RESPIRATORY: Normal respiratory effort; lungs are clear to auscultation bilaterally  CARDIOVASCULAR: Regular rate and rhythm, normal S1 and S2, no murmur/rub/gallop; No lower extremity edema; Peripheral pulses are 2+ bilaterally  ABDOMEN: Nontender to palpation, normoactive bowel sounds, no rebound/guarding; No hepatosplenomegaly  MUSCULOSKELETAL:  Normal gait; no clubbing or cyanosis of digits; no joint swelling or tenderness to palpation  PSYCH: A+O to person, place, and time; affect appropriate  NEUROLOGY: CN 2-12 are intact and symmetric; no gross sensory deficits   SKIN: No rashes; no palpable lesions    LABS:                        11.4   2.60  )-----------( 251      ( 28 Oct 2022 06:47 )             35.3     10-28    140  |  105  |  9   ----------------------------<  99  3.6   |  25  |  0.62    Ca    9.2      28 Oct 2022 06:46  Phos  4.4     10-28  Mg     2.1     10-28    TPro  6.8  /  Alb  3.7  /  TBili  0.3  /  DBili  x   /  AST  45<H>  /  ALT  111<H>  /  AlkPhos  71  10-28      CARDIAC MARKERS ( 28 Oct 2022 06:46 )  x     / x     / 1161 U/L / x     / x      CARDIAC MARKERS ( 27 Oct 2022 06:59 )  x     / x     / 1607 U/L / x     / x              COVID-19 PCR: NotDetec (28 Oct 2022 12:48)  COVID-19 PCR: NotDetec (28 Oct 2022 02:27)  COVID-19 PCR: Detected (27 Oct 2022 18:58)  COVID-19 PCR: NotDetec (16 Jul 2022 10:20)  COVID-19 PCR: NotDetec (15 Jul 2022 12:49)  COVID-19 PCR: Detected (08 Jul 2022 07:35)  SARS-CoV-2: NotDetec (07 Jul 2022 06:47)  COVID-19 PCR: NotDetec (04 Jul 2022 22:41)      RADIOLOGY & ADDITIONAL TESTS:  New Results Reviewed Today:   New Imaging Personally Reviewed Today:  New Electrocardiogram Personally Reviewed Today:  Prior or Outpatient Records Reviewed Today:    COMMUNICATION:  Care Discussed with Consultants/Other Providers and Details of Discussion:  Discussions with Patient/Family:  PCP Communication:

## 2022-10-28 NOTE — BH CONSULTATION LIAISON PROGRESS NOTE - NSBHFUPINTERVALHXFT_PSY_A_CORE
Pt is concerned that she is now off her medications and not sure which ones she may restart once her medical condition improves.  She appears anxious and may benefit from increasing the Ativan to 1mg po four times daily and as a prn. She remains off her antipsychotic medications both Rexulti and Zyprexa because of her elevation of liver enzymes and rhabdomyolysis.  Pt says that she has been reluctant to return to her regular outpt psychiatrist Dr Venkat Smalls [phone] /[phone] but agrees to have him contacted to ask about f/u. She understands that she would need to restart her psychotropic meds prior to discharge.
Pt is now cleared to restart her meds since hematology saw her and confirmed that her low WBC is a chronic condition.     Pt says that she has been reluctant to return to her regular outpt psychiatrist Dr Venkat Smalls [phone] /[phone] but agrees to have him contacted to ask about f/u. She understands that she would need to f/u with him or with [facility] and has the numbers for the walk in clinic [phone]  
Pt is concerned that she is now off her medications and not sure which ones she may restart once her medical condition improves.  She appears anxious and may benefit from Ativan both standing and as a prn. She remains off her antipsychotic medications both Rexulti and Zyprexa because of her elevation of liver enzymes and rhabdomyolysis.
Pt is concerned that she is now off her medications and not sure which ones she may restart once her medical condition improves.  She remains off her antipsychotic medications both Rexulti and Zyprexa because of her elevation of liver enzymes and rhabdomyolysis.  Pt says that she has been reluctant to return to her regular outpt psychiatrist Dr Venkat Smalls [phone] /[phone] but agrees to have him contacted to ask about f/u. She understands that she would need to restart her psychotropic meds prior to discharge.  Pt has a decrease in her WBC, neutrophile count so discussed with medicine about a hematology consult to confirm that pt may start antipsychotic medications. 
Pt is concerned that she is now off her medications and not sure which ones she may restart once her medical condition improves.  She appears anxious and may benefit from increasing the Ativan to 1mg po four times daily and as a prn. She remains off her antipsychotic medications both Rexulti and Zyprexa because of her elevation of liver enzymes and rhabdomyolysis.

## 2022-10-28 NOTE — BH CONSULTATION LIAISON PROGRESS NOTE - NSBHATTESTBILLINGAW_PSY_A_CORE
[id number] Inpatient care - moderate complexity - 25 minutes
[id number] Inpatient care - moderate complexity - 25 minutes
[id number]-Gdihmhhxvo Inpatient care - moderate complexity - 25 minutes
[id number] Inpatient care - moderate complexity - 25 minutes
[id number] Inpatient care - moderate complexity - 25 minutes

## 2022-10-28 NOTE — PROGRESS NOTE ADULT - PROBLEM SELECTOR PROBLEM 4
Transaminitis
Elevated troponin
Elevated troponin
Transaminitis

## 2022-10-28 NOTE — DISCHARGE NOTE PROVIDER - NSFOLLOWUPCLINICS_GEN_ALL_ED_FT
[facility] General Internal Medicine  General Internal Medicine  [address] [place] [address]  Phone: [phone]  Fax:   Follow Up Time: 1 week    [facility] Psychiatry  Psychiatry  [phone] [place] [address]  Phone: [phone]  Fax:

## 2022-10-28 NOTE — DISCHARGE NOTE PROVIDER - NSDCFUADDAPPT_GEN_ALL_CORE_FT
APPTS ARE READY TO BE MADE: [X ] YES    Best Family or Patient Contact (if needed):    Additional Information about above appointments (if needed):    1: Primary care within 1-2 weeks of discharge (you requesting to follow up with a new PCP).   2:  Dr. Dhaliwal at [facility] within 2 weeks of discharge  3: Dr. Venkat Smalls [phone] /[phone]     Other comments or requests:    APPTS ARE READY TO BE MADE: [X ] YES    Best Family or Patient Contact (if needed):    Additional Information about above appointments (if needed):    1: Primary care within 1-2 weeks of discharge (you requesting to follow up with a new PCP).   2:  Dr. Dhaliwal at [facility] within 2 weeks of discharge   3: Dr. Venkat Smalls [phone] /[phone] or [facility] at [phone]    Other comments or requests:    APPTS ARE READY TO BE MADE: [X ] YES    Best Family or Patient Contact (if needed):    Additional Information about above appointments (if needed):    1: Primary care within 1-2 weeks of discharge (you requesting to follow up with a new PCP).   2:  Dr. Dhaliwal at [facility] within 2 weeks of discharge   3: Dr. Venkat Smalls [phone] /[phone] or [facility] at [phone]  Patient was provided with follow up request details and was advised to call to schedule follow up within specified time frame.   Other comments or requests:

## 2022-10-28 NOTE — BH CONSULTATION LIAISON PROGRESS NOTE - NSBHASSESSMENTFT_PSY_ALL_CORE
Patient is a 57 yo F currently domiciled with mother, employed, currently single w/PMH of MVP, congenital neutropenia, hypothyroidism, PPHx of schizoaffective disorder (diagnosed 2009), multiple prior hospitalizations, last hospitalization at [facility] in 2014, hx of TMS currently on maintenance treatment w/ last treatment 3 weeks ago brought in after liver enzymes were elevated in lab workup in OP visit. Patient reports having a difficult relationship with mother, states yesterday patient had an argument with her as she is not able to recognize her feelings and her struggles with mental health. Per patient, this situation made her very anxious and decided to take Gabapentin, which she normally uses for sleeping or anxiety. Patient states she took 3 pills at the same time as she thought it wouldn't be an issue. Patient later reports sleeping and waking up at very dizzy and had a fall hurting her head, chin, and left arm. Patient denies she took more pills as a SA and denies any current SI with plan or intent. Patient reports she has been sad but not depressed about this situation with mother, adequate sleep and appetite. Per patient she has been gaining weight due to the medication and are currently switching her from Zyprexa to Rexulti.  Patient dysphoric and slightly anxious during the interview being admitted after elevation of liver enzymes and rhabdomyolysis. Patient doesn't exhibits any symptoms of psychosis or safety concern, patient and family denies any SI or SA with medication. Due to elevated liver enzymes it is recommended to stop current medication as they can worsen liver enzymes, particularly antipsychotics.   Plan is for continuing benzodiazepines to prevent withdrawal and for anxiety  Ativan 1mg po BID and  Ativan 1mg po q6hrs prn anxiety
Patient is a 55 yo F currently domiciled with mother, employed, currently single w/PMH of MVP, congenital neutropenia, hypothyroidism, PPHx of schizoaffective disorder (diagnosed 2009), multiple prior hospitalizations, last hospitalization at [facility] in 2014, hx of TMS currently on maintenance treatment w/ last treatment 3 weeks ago brought in after liver enzymes were elevated in lab workup in OP visit. Patient reports having a difficult relationship with mother, states yesterday patient had an argument with her as she is not able to recognize her feelings and her struggles with mental health. Per patient, this situation made her very anxious and decided to take Gabapentin, which she normally uses for sleeping or anxiety. Patient states she took 3 pills at the same time as she thought it wouldn't be an issue. Patient later reports sleeping and waking up at very dizzy and had a fall hurting her head, chin, and left arm. Patient denies she took more pills as a SA and denies any current SI with plan or intent. Patient reports she has been sad but not depressed about this situation with mother, adequate sleep and appetite. Per patient she has been gaining weight due to the medication and are currently switching her from Zyprexa to Rexulti.  Patient dysphoric and slightly anxious during the interview being admitted after elevation of liver enzymes and rhabdomyolysis. Patient doesn't exhibits any symptoms of psychosis or safety concern, patient and family denies any SI or SA with medication. Due to elevated liver enzymes it is recommended to stop current medication as they can worsen liver enzymes, particularly antipsychotics.   Plan is for restarting meds  Zyprexa 20mg po qhs  Rexulti 2mg daily  Clonazepam 0.5mg po TID  Neurontin 300mg po TID  Pt is now cleared to restart her meds since hematology saw her and confirmed that her low WBC is a chronic condition.     Pt says that she has been reluctant to return to her regular outpt psychiatrist Dr Venkat Smalls [phone] /[phone] but agrees to have him contacted to ask about f/u. She understands that she would need to f/u with him or with [facility] and has the numbers for the walk in clinic [phone]  
Patient is a 57 yo F currently domiciled with mother, employed, currently single w/PMH of MVP, congenital neutropenia, hypothyroidism, PPHx of schizoaffective disorder (diagnosed 2009), multiple prior hospitalizations, last hospitalization at [facility] in 2014, hx of TMS currently on maintenance treatment w/ last treatment 3 weeks ago brought in after liver enzymes were elevated in lab workup in OP visit. Patient reports having a difficult relationship with mother, states yesterday patient had an argument with her as she is not able to recognize her feelings and her struggles with mental health. Per patient, this situation made her very anxious and decided to take Gabapentin, which she normally uses for sleeping or anxiety. Patient states she took 3 pills at the same time as she thought it wouldn't be an issue. Patient later reports sleeping and waking up at very dizzy and had a fall hurting her head, chin, and left arm. Patient denies she took more pills as a SA and denies any current SI with plan or intent. Patient reports she has been sad but not depressed about this situation with mother, adequate sleep and appetite. Per patient she has been gaining weight due to the medication and are currently switching her from Zyprexa to Rexulti.  Patient dysphoric and slightly anxious during the interview being admitted after elevation of liver enzymes and rhabdomyolysis. Patient doesn't exhibits any symptoms of psychosis or safety concern, patient and family denies any SI or SA with medication. Due to elevated liver enzymes it is recommended to stop current medication as they can worsen liver enzymes, particularly antipsychotics.   Plan is for continuing benzodiazepines to prevent withdrawal and for anxiety  Ativan 1mg po four times daily and  Ativan 1mg po q6hrs prn anxiety  Pt says that she has been reluctant to return to her regular outpt psychiatrist Dr Venkat Smalls [phone] but agrees to have him contacted to ask about f/u. She understands that she would need to restart her psychotropic meds prior to discharge.
Patient is a 57 yo F currently domiciled with mother, employed, currently single w/PMH of MVP, congenital neutropenia, hypothyroidism, PPHx of schizoaffective disorder (diagnosed 2009), multiple prior hospitalizations, last hospitalization at [facility] in 2014, hx of TMS currently on maintenance treatment w/ last treatment 3 weeks ago brought in after liver enzymes were elevated in lab workup in OP visit. Patient reports having a difficult relationship with mother, states yesterday patient had an argument with her as she is not able to recognize her feelings and her struggles with mental health. Per patient, this situation made her very anxious and decided to take Gabapentin, which she normally uses for sleeping or anxiety. Patient states she took 3 pills at the same time as she thought it wouldn't be an issue. Patient later reports sleeping and waking up at very dizzy and had a fall hurting her head, chin, and left arm. Patient denies she took more pills as a SA and denies any current SI with plan or intent. Patient reports she has been sad but not depressed about this situation with mother, adequate sleep and appetite. Per patient she has been gaining weight due to the medication and are currently switching her from Zyprexa to Rexulti.  Patient dysphoric and slightly anxious during the interview being admitted after elevation of liver enzymes and rhabdomyolysis. Patient doesn't exhibits any symptoms of psychosis or safety concern, patient and family denies any SI or SA with medication. Due to elevated liver enzymes it is recommended to stop current medication as they can worsen liver enzymes, particularly antipsychotics.   Plan is for continuing benzodiazepines to prevent withdrawal and for anxiety  Ativan 1mg po four times daily and  Ativan 1mg po q6hrs prn anxiety
Patient is a 55 yo F currently domiciled with mother, employed, currently single w/PMH of MVP, congenital neutropenia, hypothyroidism, PPHx of schizoaffective disorder (diagnosed 2009), multiple prior hospitalizations, last hospitalization at [facility] in 2014, hx of TMS currently on maintenance treatment w/ last treatment 3 weeks ago brought in after liver enzymes were elevated in lab workup in OP visit. Patient reports having a difficult relationship with mother, states yesterday patient had an argument with her as she is not able to recognize her feelings and her struggles with mental health. Per patient, this situation made her very anxious and decided to take Gabapentin, which she normally uses for sleeping or anxiety. Patient states she took 3 pills at the same time as she thought it wouldn't be an issue. Patient later reports sleeping and waking up at very dizzy and had a fall hurting her head, chin, and left arm. Patient denies she took more pills as a SA and denies any current SI with plan or intent. Patient reports she has been sad but not depressed about this situation with mother, adequate sleep and appetite. Per patient she has been gaining weight due to the medication and are currently switching her from Zyprexa to Rexulti.  Patient dysphoric and slightly anxious during the interview being admitted after elevation of liver enzymes and rhabdomyolysis. Patient doesn't exhibits any symptoms of psychosis or safety concern, patient and family denies any SI or SA with medication. Due to elevated liver enzymes it is recommended to stop current medication as they can worsen liver enzymes, particularly antipsychotics.   Plan is for continuing benzodiazepines to prevent withdrawal and for anxiety  Ativan 1mg po four times daily and  Ativan 1mg po q6hrs prn anxiety  Pt says that she has been reluctant to return to her regular outpt psychiatrist Dr Venkat Smalls [phone] but agrees to have him contacted to ask about f/u. She understands that she would need to restart her psychotropic meds prior to discharge.  Pt has a decrease in her WBC, neutrophile count so discussed with medicine about a hematology consult to confirm that pt may start antipsychotic medications.

## 2022-10-28 NOTE — BH CONSULTATION LIAISON PROGRESS NOTE - NSBHCHARTREVIEWVS_PSY_A_CORE FT
Vital Signs Last 24 Hrs  T(C): 36.8 (28 Oct 2022 12:30), Max: 36.8 (28 Oct 2022 12:30)  T(F): 98.2 (28 Oct 2022 12:30), Max: 98.2 (28 Oct 2022 12:30)  HR: 86 (28 Oct 2022 12:30) (73 - 86)  BP: 133/86 (28 Oct 2022 12:30) (119/71 - 165/80)  BP(mean): --  RR: 18 (28 Oct 2022 12:30) (18 - 18)  SpO2: 99% (28 Oct 2022 12:30) (96% - 99%)    Parameters below as of 28 Oct 2022 12:30  Patient On (Oxygen Delivery Method): room air    
Vital Signs Last 24 Hrs  T(C): 36.7 (27 Oct 2022 13:45), Max: 36.7 (26 Oct 2022 20:16)  T(F): 98.1 (27 Oct 2022 13:45), Max: 98.1 (26 Oct 2022 20:16)  HR: 85 (27 Oct 2022 13:45) (69 - 88)  BP: 132/86 (27 Oct 2022 13:45) (120/77 - 132/86)  BP(mean): --  RR: 18 (27 Oct 2022 13:45) (18 - 18)  SpO2: 98% (27 Oct 2022 13:45) (97% - 99%)    Parameters below as of 27 Oct 2022 13:45  Patient On (Oxygen Delivery Method): room air    
Vital Signs Last 24 Hrs  T(C): 36.8 (22 Oct 2022 11:55), Max: 37 (21 Oct 2022 19:55)  T(F): 98.2 (22 Oct 2022 11:55), Max: 98.6 (21 Oct 2022 19:55)  HR: 75 (22 Oct 2022 11:55) (69 - 82)  BP: 136/65 (22 Oct 2022 11:55) (118/68 - 136/84)  BP(mean): --  RR: 18 (22 Oct 2022 11:55) (16 - 18)  SpO2: 98% (22 Oct 2022 11:55) (96% - 100%)    Parameters below as of 22 Oct 2022 11:55  Patient On (Oxygen Delivery Method): room air    
Vital Signs Last 24 Hrs  T(C): 36.6 (26 Oct 2022 12:09), Max: 37.3 (25 Oct 2022 19:19)  T(F): 97.9 (26 Oct 2022 12:09), Max: 99.2 (25 Oct 2022 19:19)  HR: 82 (26 Oct 2022 12:09) (69 - 85)  BP: 134/81 (26 Oct 2022 12:09) (119/81 - 134/81)  BP(mean): --  RR: 18 (26 Oct 2022 12:09) (18 - 18)  SpO2: 98% (26 Oct 2022 12:09) (97% - 98%)    Parameters below as of 26 Oct 2022 12:09  Patient On (Oxygen Delivery Method): room air    
Vital Signs Last 24 Hrs  T(C): 36.8 (25 Oct 2022 11:31), Max: 37.1 (24 Oct 2022 21:11)  T(F): 98.3 (25 Oct 2022 11:31), Max: 98.8 (24 Oct 2022 21:11)  HR: 67 (25 Oct 2022 11:31) (67 - 89)  BP: 128/85 (25 Oct 2022 11:31) (102/66 - 128/85)  BP(mean): --  RR: 18 (25 Oct 2022 11:31) (18 - 18)  SpO2: 97% (25 Oct 2022 11:31) (96% - 97%)    Parameters below as of 25 Oct 2022 11:31  Patient On (Oxygen Delivery Method): room air

## 2022-10-28 NOTE — PROGRESS NOTE ADULT - PROBLEM SELECTOR PROBLEM 1
Schizoaffective disorder

## 2022-10-28 NOTE — BH CONSULTATION LIAISON PROGRESS NOTE - NSBHCONSULTFOLLOWAFTERCARE_PSY_A_CORE FT
Pt may return to her regular outpt psychiatrist Dr Venkat Smalls [phone] /[phone] but agrees to have him contacted to ask about f/u. She understands that she would need to f/u with him or with [facility] and has the numbers for the walk in clinic [phone]

## 2022-10-28 NOTE — BH CONSULTATION LIAISON PROGRESS NOTE - NSBHCONSULTMEDANXIETY_PSY_A_CORE FT
Ativan 1 mg PO/IM q4 for severe anxiety

## 2022-10-28 NOTE — BH CONSULTATION LIAISON PROGRESS NOTE - CURRENT MEDICATION
MEDICATIONS  (STANDING):  heparin   Injectable 5000 Unit(s) SubCutaneous every 8 hours  influenza   Vaccine 0.5 milliLiter(s) IntraMuscular once  levothyroxine 12.5 MICROGram(s) Oral daily  LORazepam     Tablet 1 milliGRAM(s) Oral four times a day  OLANZapine 10 milliGRAM(s) Oral once  pantoprazole    Tablet 40 milliGRAM(s) Oral before breakfast  sodium chloride 0.9%. 1000 milliLiter(s) (100 mL/Hr) IV Continuous <Continuous>    MEDICATIONS  (PRN):  LORazepam     Tablet 1 milliGRAM(s) Oral every 6 hours PRN Anxiety

## 2022-10-28 NOTE — PROGRESS NOTE ADULT - PROBLEM SELECTOR PLAN 2
- monitor  - possible 2/2 olanzapine  - d/w hematology -> chronic issues, has not worsened with home psych meds

## 2022-10-28 NOTE — PROGRESS NOTE ADULT - PROBLEM SELECTOR PLAN 3
- downtrending  - TTE wnl  - CT Coronaries with mild disease -> medical management
- CK downtrending now  - continue to monitor  - serum tox neg
- downtrending  - TTE wnl  - cards recs appreciated: CT Coronaries today 10/25. follow up
- downtrending  - per cards, possible CT coronary once rhabdo improving  - f/u TTE
- CK downtrending now  - continue to monitor  - serum tox neg
- downtrending  - per cards, possible CT coronary once rhabdo improving  - f/u TTE
- downtrending  - per cards, possible CT coronary once rhabdo improving  - f/u TTE

## 2022-10-28 NOTE — DISCHARGE NOTE PROVIDER - NSDCMRMEDTOKEN_GEN_ALL_CORE_FT
busPIRone 30 mg oral tablet: 1 tab(s) orally 2 times a day  levothyroxine 25 mcg (0.025 mg) oral capsule: 0.5 cap(s) orally once a day   metFORMIN 500 mg oral tablet: 1 tab(s) orally once a day  OLANZapine 20 mg oral tablet: 1 tab(s) orally once a day  Rexulti 2 mg oral tablet: 1 tab(s) orally once a day  Trintellix 20 mg oral tablet: 1 tab(s) orally once a day   busPIRone 30 mg oral tablet: 1 tab(s) orally 2 times a day  clonazePAM 0.5 mg oral tablet: 1 tab(s) orally 3 times a day x 30 days MDD:1.5mg anxiety  gabapentin 300 mg oral capsule: 1 cap(s) orally 3 times a day x 30 days MDD:900mg mood dx  levothyroxine 25 mcg (0.025 mg) oral capsule: 0.5 cap(s) orally once a day   metFORMIN 500 mg oral tablet: 1 tab(s) orally once a day  OLANZapine 20 mg oral tablet: 1 tab(s) orally once a day (at bedtime)  OLANZapine 20 mg oral tablet: 1 tab(s) orally once a day x 30 days MDD:20mg Mood dx  Rexulti 2 mg oral tablet: 1 tab(s) orally once a day x 30 days MDD:2mg  Mood dx  Trintellix 20 mg oral tablet: 1 tab(s) orally once a day   busPIRone 30 mg oral tablet: 1 tab(s) orally 2 times a day  levothyroxine 25 mcg (0.025 mg) oral capsule: 0.5 cap(s) orally once a day   metFORMIN 500 mg oral tablet: 1 tab(s) orally once a day  OLANZapine 20 mg oral tablet: 1 tab(s) orally once a day (at bedtime)  OLANZapine 20 mg oral tablet: 1 tab(s) orally once a day x 30 days MDD:20mg Mood dx  Rexulti 2 mg oral tablet: 1 tab(s) orally once a day x 30 days MDD:2mg  Mood dx  Trintellix 20 mg oral tablet: 1 tab(s) orally once a day

## 2022-10-28 NOTE — PROVIDER CONTACT NOTE (OTHER) - NAME OF MD/NP/PA/DO NOTIFIED:
Pt left voicemail stating he was calling back.    Pt called but no answer. VM left for pt to return call.   Luana Gandara ACP

## 2022-10-28 NOTE — PROGRESS NOTE ADULT - PROBLEM SELECTOR PLAN 5
- monitor  - possible 2/2 olanzapine
- US abd 10/21: Suggestion of subcentimeter nonobstructing left renal calculus, otherwise unremarkable abdominal and bladder ultrasound - would f/u as outpatient  - transaminitis likely from muscle breakdown, CTM  - improving
- US abd 10/21: Suggestion of subcentimeter nonobstructing left renal calculus, otherwise unremarkable abdominal and bladder ultrasound - would f/u as outpatient  - transaminitis likely from muscle breakdown, CTM  - improving
- monitor  - possible 2/2 olanzapine
- monitor  - possible 2/2 olanzapine

## 2022-10-28 NOTE — DISCHARGE NOTE PROVIDER - ATTENDING DISCHARGE PHYSICAL EXAMINATION:
CONSTITUTIONAL: NAD, well-developed, well-groomed,  EYES: PERRL; conjunctiva and sclera clear  NECK: Supple, no palpable masses; no thyromegaly  RESPIRATORY: Normal respiratory effort; lungs are clear to auscultation bilaterally  CARDIOVASCULAR: Regular rate and rhythm, normal S1 and S2, no murmur/rub/gallop; No lower extremity edema; Peripheral pulses are 2+ bilaterally  ABDOMEN: Nontender to palpation, normoactive bowel sounds, no rebound/guarding; No hepatosplenomegaly  MUSCULOSKELETAL: no msk pain, or muscle tenderness. no left sided chest wall tenderness  PSYCH: A+O to person, place, and time; + anxious mildly  NEUROLOGY: no gross sensory deficits   SKIN: No rashes; no palpable lesions

## 2022-10-28 NOTE — BH CONSULTATION LIAISON PROGRESS NOTE - NSBHCHARTREVIEWLAB_PSY_A_CORE FT
Detail Level: Detailed
  LABS:                        12.1   2.30  )-----------( 231      ( 21 Oct 2022 12:18 )             38.5     10-21    137  |  101  |  10  ----------------------------<  89  4.3   |  26  |  0.87    Ca    9.5      21 Oct 2022 12:18    TPro  7.5  /  Alb  3.8  /  TBili  0.2  /  DBili  x   /  AST  676<H>  /  ALT  321<H>  /  AlkPhos  77  10-21    PT/INR - ( 21 Oct 2022 12:18 )   PT: 12.3 sec;   INR: 1.06 ratio         PTT - ( 21 Oct 2022 12:18 )  PTT:26.5 sec    
Detail Level: Zone
Detail Level: Generalized
  LABS:                        12.1   2.30  )-----------( 231      ( 21 Oct 2022 12:18 )             38.5     10-21    137  |  101  |  10  ----------------------------<  89  4.3   |  26  |  0.87    Ca    9.5      21 Oct 2022 12:18    TPro  7.5  /  Alb  3.8  /  TBili  0.2  /  DBili  x   /  AST  676<H>  /  ALT  321<H>  /  AlkPhos  77  10-21    PT/INR - ( 21 Oct 2022 12:18 )   PT: 12.3 sec;   INR: 1.06 ratio         PTT - ( 21 Oct 2022 12:18 )  PTT:26.5 sec    

## 2022-10-28 NOTE — DISCHARGE NOTE PROVIDER - PROVIDER TOKENS
PROVIDER:[TOKEN:[[id number]]] PROVIDER:[TOKEN:[[id number]]],PROVIDER:[TOKEN:[[id number]],FOLLOWUP:[2 weeks]]

## 2022-10-28 NOTE — PROGRESS NOTE ADULT - SUBJECTIVE AND OBJECTIVE BOX
DATE OF SERVICE: 10-28-22 @ 12:55    Patient is a 57y old  Female who presents with a chief complaint of Fall (28 Oct 2022 12:10)      INTERVAL HISTORY: Feels ok.     REVIEW OF SYSTEMS:  CONSTITUTIONAL: No weakness  EYES/ENT: No visual changes;  No throat pain   NECK: No pain or stiffness  RESPIRATORY: No cough, wheezing; No shortness of breath  CARDIOVASCULAR: No chest pain or palpitations  GASTROINTESTINAL: No abdominal  pain. No nausea, vomiting, or hematemesis  GENITOURINARY: No dysuria, frequency or hematuria  NEUROLOGICAL: No stroke like symptoms  SKIN: No rashes      	  MEDICATIONS:        PHYSICAL EXAM:  T(C): 36.6 (10-28-22 @ 05:10), Max: 36.7 (10-27-22 @ 13:45)  HR: 80 (10-28-22 @ 05:10) (73 - 85)  BP: 165/80 (10-28-22 @ 05:10) (119/71 - 165/80)  RR: 18 (10-28-22 @ 05:10) (18 - 18)  SpO2: 98% (10-28-22 @ 05:10) (96% - 98%)  Wt(kg): --  I&O's Summary        Appearance: In no distress	  HEENT:    PERRL, EOMI	  Cardiovascular:  S1 S2, No JVD  Respiratory: Lungs clear to auscultation	  Gastrointestinal:  Soft, Non-tender, + BS	  Vascularature:  No edema of LE  Psychiatric: Appropriate affect   Neuro: no acute focal deficits                               11.4   2.60  )-----------( 251      ( 28 Oct 2022 06:47 )             35.3     10-28    140  |  105  |  9   ----------------------------<  99  3.6   |  25  |  0.62    Ca    9.2      28 Oct 2022 06:46  Phos  4.4     10-28  Mg     2.1     10-28    TPro  6.8  /  Alb  3.7  /  TBili  0.3  /  DBili  x   /  AST  45<H>  /  ALT  111<H>  /  AlkPhos  71  10-28        Labs personally reviewed      ASSESSMENT/PLAN: 	    Patient is a 57 year old Female with PMHx of neutropenia (states she follows with her PCP, Dr. Walton), Hypothyroidism (on Synthroid 12.5 Qd), and Schizoaffective disorder who presented to [facility] with chief complaint of recent fall on Wednesday 10/19 and elevated LFTs. Patient reports that on Tuesday evening she could not sleep and took 1800 mg of Gabapentin. Her usual Gabapentin dose is 600 Qd. Patient denies SI or HI. Patient states that she fell, hit her head and shoulders. Patient denies SI or HI. Denies chest pain or palpitations, shoulder pain or discomfort. States her discomfort has improved. Denies body aches or pain. Denies headache, lightheadedness or dizziness.     1. Elevated Troponin  - Trend to peak, likely 2/2 rhabdo  - TTE unremarkable   - CTA coronaries with mild to moderate nonobstructive CAD  - will manage medically with ASA and statin  - will hold off initiating statin at this time given rhabdo  - will FU with me as OP in 2-4 weeks and will initiate at the time     2. Rhabdomyolysis S/P Fall/sycnope  - Trend CK Qd   - Aggressive IVF for hydration   - CT head negative   - CT C spine negative  - CK trending down, now ~1000    3. Syncope -- likely 2/2 high dose of gabapentin 1800mg (3x her usual dose)  - monitor on tele  - echo unremarkable     4. Elevated LFTs  - CT A/P without abnormal liver morphology  - Check LFTs daily to trend- now downtrending  - Check ABD US : Suggestion of subcentimeter nonobstructing left renal calculus, otherwise unremarkable abdominal and bladder ultrasound  - Avoid hepatotoxic agents  - Hold Metformin   - likely from muscle breakdown    5. Schizoaffective Disorder  - Pt reports her home meds are -- Rexulti, Fluvox, Trintellix, Buspirone, Olanzapine, Gabapentin    - Pt denies SI/HI at time of encounter   - Psych recs appreciated - c/w benzos until able to resume OP regimen  - Monitor patient closely  -  Consult appreciated  - holding psych meds until CK trends down <1000    6, Neutropenia - Heme consult pending  - ? 2/2 Olanzapine vs other disorder   - Monitor counts closely     7. PPX  - Hep 5K Q8        Zulema Dietrich, AG-NP   Bryan Rossi,  [facility]  Cardiovascular Medicine  [address], [address]  Available through call or text on [employer]  Office: [phone]

## 2022-10-28 NOTE — DISCHARGE NOTE PROVIDER - CARE PROVIDERS DIRECT ADDRESSES
[email or web address]

## 2022-10-28 NOTE — DISCHARGE NOTE PROVIDER - HOSPITAL COURSE
Patient is a 57 year old Female with PMHx of neutropenia (states she follows with her PCP, Dr. Walton), Hypothyroidism (on Synthroid 12.5 Qd), and Schizoaffective disorder who presented to [facility] with chief complaint of recent fall on Wednesday 10/19 and elevated LFTs, found to have rhabdomyolysis and elevated troponin, both of which are improving. CT coronaries with mild disease.   : Schizoaffective disorder.   home meds currently on hold given transaminitis; transaminitis resolved.   - pt states she needs to establish care with new psychiatrist. will refer to [facility] on discharge  - discussed with psychiatry attending Dr. Drummond - we agreed to continue monitoring until LFTs improve, when they improve and when CK <1000, we can restart her antipsychotic medications- resumed on day of discharge per psych  - Patient should be on her antipsychotic medications prior to discharge given her history.    Neutropenia.  possible 2/2 olanzapine  - given patient also has low hemoglobin and somewhat worsening neutropenia, Dr. Drummond requested hematology consultation for worsening neutropenia and safety of restarting her psychiatric medications. 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 This is a normal variant. There is no evidence that there is an effect on neutrophil count from psychotropic medications, as patient's ANC is at baseline.. Patient should follow up with Dr. Dhaliwal at [facility] after discharge.    Rhabdomyolysis. Stable    Elevated troponin. downtrended  - TTE wnl  - CT Coronaries with mild disease -> medical management.    Transaminitis. US abd 10/21: Suggestion of subcentimeter nonobstructing left renal calculus, otherwise unremarkable abdominal and bladder ultrasound - would f/u as outpatient  - transaminitis likely from muscle breakdown, CTM    Hypothyroidism. c/w synthroid. TSH 5.19.    Patient medically cleared per Dr. Carrion. Patient is a 57 year old Female with PMHx of neutropenia (states she follows with her PCP, Dr. Wlaton), Hypothyroidism (on Synthroid 12.5 Qd), and Schizoaffective disorder who presented to [facility] with chief complaint of recent fall on Wednesday 10/19 and elevated LFTs, found to have rhabdomyolysis and elevated troponin, both of which are improving. CT coronaries with mild disease.   : Schizoaffective disorder.   home meds currently on hold given transaminitis; transaminitis resolved.   - pt states she needs to establish care with new psychiatrist. will refer to [facility] on discharge  - discussed with psychiatry attending Dr. Drummond - we agreed to continue monitoring until LFTs improve, when they improve and when CK <1000, we can restart her antipsychotic medications- resumed on day of discharge per psych  - Patient should be on her antipsychotic medications prior to discharge given her history.    Neutropenia.  possible 2/2 olanzapine  - given patient also has low hemoglobin and somewhat worsening neutropenia, Dr. Drummond requested hematology consultation for worsening neutropenia and safety of restarting her psychiatric medications. 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 This is a normal variant. There is no evidence that there is an effect on neutrophil count from psychotropic medications, as patient's ANC is at baseline.. Patient should follow up with Dr. Dhaliwal at [facility] after discharge.    Rhabdomyolysis. Stable    Elevated troponin. downtrended  - TTE wnl  - CT Coronaries with mild disease -> medical management.    Transaminitis. US abd 10/21: Suggestion of subcentimeter nonobstructing left renal calculus, otherwise unremarkable abdominal and bladder ultrasound - would f/u as outpatient  - transaminitis likely from muscle breakdown, CTM    Hypothyroidism. c/w synthroid. TSH 5.19.    Patient medically cleared per Dr. Carrion.   ISTOP:  Reference #: [id number]- Klonopin sent to [facility] originally but patient requested to send to [facility]- spoke with pharmacist at [facility] who will cancel Rx so Rx can be filled at [facility].

## 2022-10-28 NOTE — PROGRESS NOTE ADULT - PROBLEM SELECTOR PLAN 1
- home meds currently on hold given transaminitis  - c/w ativan q6h  - pt states she needs to establish care with new psychiatrist. will refer to [facility] on discharge  - restarting psych meds prior to dc

## 2022-10-28 NOTE — BH CONSULTATION LIAISON PROGRESS NOTE - NSBHCONSFOLLOWNEEDS_PSY_ALL_CORE
No psychiatric contraindications to discharge
Needs further psych safety assessment prior to discharge

## 2022-10-28 NOTE — PROGRESS NOTE ADULT - NS ATTEND AMEND GEN_ALL_CORE FT
Patient care and plan discussed and reviewed with Advanced Care Provider. Plan as outlined above edited by me to reflect our discussion.
Patient care and plan discussed and reviewed with Advanced Care Provider. Plan as outlined above edited by me to reflect our discussion.
Patient care and plan discussed and reviewed with Advanced Care Provider. Plan as outlined above edited by me to reflect our discussion.  Thirty five minutes spent on encounter, of which more than fifty percent of the encounter was spent on counseling and/or coordinating care by the attending physician.
Patient care and plan discussed and reviewed with Advanced Care Provider. Plan as outlined above edited by me to reflect our discussion. I had a prolonged conversation with the patient/family regarding hospital course, differential diagnosis and results of diagnostic tests.  Plan of care discussed with patient/family after the evaluation. Patient/family express clear understanding and satisfaction with the plan of care.  Sixty five minutes spent on encounter, of which more than fifty percent of the encounter was spent on counseling and/or coordinating care by the attending physician.
Patient care and plan discussed and reviewed with Advanced Care Provider. Plan as outlined above edited by me to reflect our discussion. I had a prolonged conversation with the patient/family regarding hospital course, differential diagnosis and results of diagnostic tests.  Plan of care discussed with patient/family after the evaluation. Patient/family express clear understanding and satisfaction with the plan of care.  Sixty five minutes spent on encounter, of which more than fifty percent of the encounter was spent on counseling and/or coordinating care by the attending physician.
Patient care and plan discussed and reviewed with Advanced Care Provider. Plan as outlined above edited by me to reflect our discussion.
Patient care and plan discussed and reviewed with Advanced Care Provider. Plan as outlined above edited by me to reflect our discussion. I had a prolonged conversation with the patient/family regarding hospital course, differential diagnosis and results of diagnostic tests.  Plan of care discussed with patient/family after the evaluation. Patient/family express clear understanding and satisfaction with the plan of care.  Sixty five minutes spent on encounter, of which more than fifty percent of the encounter was spent on counseling and/or coordinating care by the attending physician.

## 2022-10-28 NOTE — DISCHARGE NOTE PROVIDER - NSDCCPCAREPLAN_GEN_ALL_CORE_FT
PRINCIPAL DISCHARGE DIAGNOSIS  Diagnosis: Rhabdomyolysis  Assessment and Plan of Treatment: Occured due to fall.   Labs were trended and have improved with fluids.   Continue to ensure drinking plenty of fluids.         SECONDARY DISCHARGE DIAGNOSES  Diagnosis: Transaminitis  Assessment and Plan of Treatment: You had an ultrasound on the 21st of October.   Findings listed below:   Suggestion of subcentimeter nonobstructing left renal calculus, otherwise unremarkable abdominal and bladder ultrasound - follow up with your PCP outpatient for further management  - transaminitis likely from muscle breakdown    Diagnosis: Hypothyroidism  Assessment and Plan of Treatment: Follow up with PCP and/or endocrinology routinely for this.   Make sure you take synthroid on an empty stomach 1 hour prior to and not have any calcium, iron, or magnesium within 4 hours prior to or after taking synthroid.      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   This is a normal variant. There is no evidence that there is an effect on neutrophil count from psychotropic medications, as patient's ANC is at baseline.. Patient should follow up with Dr. Dhaliwal at [facility] after discharge.    Diagnosis: Schizoaffective disorder  Assessment and Plan of Treatment: Continue your psych medications as prescribed.   Follow up outpatient

## 2022-10-28 NOTE — DISCHARGE NOTE PROVIDER - CARE PROVIDER_API CALL
Farhan Dhaliwal)  Hematology; HospicePalliative Medicine; Internal Medicine; Medical Oncology  [address] [place] [address]  Phone: [phone]  Fax: [phone]  Follow Up Time:    Farhan Dhaliwal)  Hematology; HospicePalliative Medicine; Internal Medicine; Medical Oncology  [address] [place] [address]  Phone: [phone]  Fax: [phone]  Follow Up Time:     Shaun Hamlin)  Internal Medicine  [facility] [facility], [facility] [place] [address]  [place] [address]  Phone: [phone]  Fax: [phone]  Follow Up Time: 2 weeks

## 2022-10-28 NOTE — PROGRESS NOTE ADULT - PROBLEM SELECTOR PLAN 6
- c/w synthroid  - TSH 5.19
- c/w synthroid  - f/u TSH

## 2022-10-28 NOTE — PROGRESS NOTE ADULT - PROVIDER SPECIALTY LIST ADULT
Cardiology
Hospitalist
Internal Medicine
Hospitalist

## 2022-10-28 NOTE — PROGRESS NOTE ADULT - NSPROGADDITIONALINFOA_GEN_ALL_CORE
d/w acp and Dr. Drummond (Psychiatry)  dc today, 43 min    Charles Carrion MD  [facility] Division of Hospital Medicine  Available via [place] Teams  Pager: [phone]
d/w acp and Dr. Drummond (Psychiatry)    Charles Carrion MD  [facility] Division of Hospital Medicine  Available via MS Teams  Pager: [phone]
d/w acp    Charles Carrion MD  [facility] Division of Hospital Medicine  Available via MS Teams  Pager: [phone]
d/w ACP and Dr. Hoda Carrion MD  [facility] Division of Hospital Medicine  Available via MS Teams  Pager: [phone]
d/w acp    Charles Carrion MD  [facility] Division of Hospital Medicine  Available via MS Teams  Pager: [phone]

## 2022-10-28 NOTE — BH CONSULTATION LIAISON PROGRESS NOTE - NSBHFUPINTERVALCCFT_PSY_A_CORE
"I feel a little depressed that I'm still here. "
"I'll call my brother to take me home. "
"When can I restart my medications and which ones will I be taking?"
"I feel a little depressed that I'm still here. "
"I feel depressed because I'm in the hospital. I can't take my meds yet. "

## 2022-10-28 NOTE — DISCHARGE NOTE NURSING/CASE MANAGEMENT/SOCIAL WORK - PATIENT PORTAL LINK FT
You can access the FollowMyHealth Patient Portal offered by [facility] by registering at the following website: [email or web address]. By joining [employer]’s FollowMyHealth portal, you will also be able to view your health information using other applications (apps) compatible with our system.

## 2022-10-28 NOTE — PROGRESS NOTE ADULT - ASSESSMENT
Patient is a 57 year old Female with PMHx of neutropenia (states she follows with her PCP, Dr. Walton), Hypothyroidism (on Synthroid 12.5 Qd), and Schizoaffective disorder who presented to [facility] with chief complaint of recent fall on Wednesday 10/19 and elevated LFTs, found to have rhabdomyolysis and elevated troponin, both of which are improving. CT coronaries with mild disease.

## 2022-10-28 NOTE — BH CONSULTATION LIAISON PROGRESS NOTE - NSBHCONSULTMEDAGITATION_PSY_A_CORE FT
Ativan 1 mg PO/IM q4 for agitation 

## 2022-10-28 NOTE — PROGRESS NOTE ADULT - NS ATTEND OPT1 GEN_ALL_CORE
I independently performed the documented:
I attest my time as attending is greater than 50% of the total combined time spent on qualifying patient care activities by the PA/NP and attending.
I independently performed the documented:

## 2022-10-28 NOTE — DISCHARGE NOTE NURSING/CASE MANAGEMENT/SOCIAL WORK - NSDCFUADDAPPT_GEN_ALL_CORE_FT
APPTS ARE READY TO BE MADE: [X ] YES    Best Family or Patient Contact (if needed):    Additional Information about above appointments (if needed):    1: Primary care within 1-2 weeks of discharge (you requesting to follow up with a new PCP).   2:  Dr. Dhaliwal at [facility] within 2 weeks of discharge   3: Dr. Venkat Smalls [phone] /[phone] or [facility] at [phone]    Other comments or requests:

## 2022-10-31 ENCOUNTER — NON-APPOINTMENT (OUTPATIENT)
Age: 57
End: 2022-10-31

## 2022-11-07 RX ORDER — OLANZAPINE 15 MG/1
1 TABLET, FILM COATED ORAL
Qty: 60 | Refills: 0
Start: 2022-11-07 | End: 2022-12-06

## 2022-11-07 NOTE — CHART NOTE - NSCHARTNOTESELECT_GEN_ALL_CORE
D/C/Event Note
Event Note
Hematology
In hospital fall/Event Note
d/c appointment/Event Note
d/c appt/Event Note

## 2022-11-09 ENCOUNTER — APPOINTMENT (OUTPATIENT)
Dept: INTERNAL MEDICINE | Facility: CLINIC | Age: 57
End: 2022-11-09

## 2022-11-09 VITALS
WEIGHT: 128 LBS | DIASTOLIC BLOOD PRESSURE: 84 MMHG | BODY MASS INDEX: 21.85 KG/M2 | TEMPERATURE: 97.6 F | HEART RATE: 82 BPM | HEIGHT: 64 IN | OXYGEN SATURATION: 98 % | SYSTOLIC BLOOD PRESSURE: 136 MMHG

## 2022-11-09 DIAGNOSIS — I51.9 HEART DISEASE, UNSPECIFIED: ICD-10-CM

## 2022-11-09 DIAGNOSIS — Z23 ENCOUNTER FOR IMMUNIZATION: ICD-10-CM

## 2022-11-09 PROCEDURE — 36415 COLL VENOUS BLD VENIPUNCTURE: CPT

## 2022-11-09 PROCEDURE — 90686 IIV4 VACC NO PRSV 0.5 ML IM: CPT

## 2022-11-09 PROCEDURE — G0008: CPT

## 2022-11-09 PROCEDURE — 99214 OFFICE O/P EST MOD 30 MIN: CPT | Mod: 25

## 2022-11-09 NOTE — HISTORY OF PRESENT ILLNESS
[Other: _____] : [unfilled] [FreeTextEntry1] : Hospital: Follow-up Exam.\par \par  [de-identified] : \par \par Patient was admitted to [facility] on 10/21/2022. \par Patient was discharged to home on 10/28/2022. \par Discharge diagnosis: Abnormal Labs/ Rhabdomyolysis/ Post-Fall.\par \par \par \par Patient is a 57 Y O Female, with PMH: of neutropenia, Hypothyroidism (on Synthroid 12.5 Qd), Anxiety, and Schizoaffective disorder who presented to: [facility] with cc of: fall on Wednesday 10/19 and elevated LFTs, found to have: rhabdomyolysis and elevated troponin. \par CT coronaries with mild disease.\par Pt feels: better (overall), since Hospital discharge. (According to the pt today).\par Pertinent negatives today, pt denies/ rejects any acute/ new: headaches, numbness/tingling, fevers, chills, body aches, sore throats, difficulty swallowing, chest pains (angina), SOB/ wheezing, coughing, stomach pains, N/V, diarrhea/ constipation, fatigue, polyuria, dysuria (painful urination), polydipsia, or any musculoskeletal pains.\par Pt still using: Rx Gabapentin, for: sleep/ anxiety, but not as much as before.  She is taking (about) 900 mg Gabapentin a day.\par \par

## 2022-11-09 NOTE — PHYSICAL EXAM
[No Acute Distress] : no acute distress [Well Nourished] : well nourished [Well Developed] : well developed [Well-Appearing] : well-appearing [Normal Sclera/Conjunctiva] : normal sclera/conjunctiva [PERRL] : pupils equal round and reactive to light [EOMI] : extraocular movements intact [Normal Outer Ear/Nose] : the outer ears and nose were normal in appearance [Normal Oropharynx] : the oropharynx was normal [No JVD] : no jugular venous distention [No Lymphadenopathy] : no lymphadenopathy [Supple] : supple [No Respiratory Distress] : no respiratory distress  [No Accessory Muscle Use] : no accessory muscle use [Clear to Auscultation] : lungs were clear to auscultation bilaterally [Normal Rate] : normal rate  [Normal S1, S2] : normal S1 and S2 [Soft] : abdomen soft [Non Tender] : non-tender [Normal Bowel Sounds] : normal bowel sounds [No CVA Tenderness] : no CVA  tenderness [No Spinal Tenderness] : no spinal tenderness [No Joint Swelling] : no joint swelling [Grossly Normal Strength/Tone] : grossly normal strength/tone [No Rash] : no rash [Coordination Grossly Intact] : coordination grossly intact [No Focal Deficits] : no focal deficits [Normal Gait] : normal gait [Normal Affect] : the affect was normal [Alert and Oriented x3] : oriented to person, place, and time [Normal Insight/Judgement] : insight and judgment were intact [de-identified] : No cardiac distress noted.

## 2022-11-09 NOTE — PLAN
[FreeTextEntry1] : \par \par \par \par 1) Follow-up exam, from the Hospital (Rhabdomyolysis) (Elevated Liver Enzymes)\par CMP and Serum CK ordered today.\par Pt feels: better, since discharge.\par \par \par \par 2) Anxiety\par Follow-up with: Psychiatrist soon, as we discussed today.\par \par \par 3) Post-Fall\par She feels better (according to the pt).\par \par \par \par 4) CT coronaries with mild disease.\par Will monitor closely.\par Consider: Rx Ator, once the liver enzymes normalize.\par \par \par \par 5) Health Maintenance\par Flu shot given to pt, by RN today.\par \par \par \par Plan discussed with Dr. JOSE today.\par \par All pt's questions answered. Pt okay with the plan.\par \par \par

## 2022-11-10 LAB
ALBUMIN SERPL ELPH-MCNC: 4.1 G/DL
ALP BLD-CCNC: 94 U/L
ALT SERPL-CCNC: 16 U/L
ANION GAP SERPL CALC-SCNC: 11 MMOL/L
AST SERPL-CCNC: 13 U/L
BILIRUB SERPL-MCNC: 0.2 MG/DL
BUN SERPL-MCNC: 11 MG/DL
CALCIUM SERPL-MCNC: 9.5 MG/DL
CHLORIDE SERPL-SCNC: 105 MMOL/L
CK SERPL-CCNC: 71 U/L
CO2 SERPL-SCNC: 25 MMOL/L
CREAT SERPL-MCNC: 0.73 MG/DL
EGFR: 96 ML/MIN/1.73M2
GLUCOSE SERPL-MCNC: 80 MG/DL
POTASSIUM SERPL-SCNC: 4.5 MMOL/L
PROT SERPL-MCNC: 7.1 G/DL
SODIUM SERPL-SCNC: 141 MMOL/L

## 2022-11-11 PROBLEM — I51.9 HEART DISEASE: Status: ACTIVE | Noted: 2022-11-11

## 2022-11-21 NOTE — PROGRESS NOTE ADULT - SUBJECTIVE AND OBJECTIVE BOX
[id number]  CHIEF COMPLAINT: cough and fevers    Interval Events: Await cytology of Lung Abscess aspiration  Had MBS this morning - awaiting official results  Feels better    REVIEW OF SYSTEMS:  Cardiovascular: No chest pain or palpitations.  Respiratory: Cough with intermittent phlegm. No SOB  GI: no abdominal pain, nausea or vomiting  [x ] All other systems negative  [ ] Unable to assess ROS because ________    OBJECTIVE:  ICU Vital Signs Last 24 Hrs  T(C): 36.9, Max: 37.4 (06-21 @ 21:25)  T(F): 98.5, Max: 99.4 (06-21 @ 21:25)  HR: 71 (71 - 85)  BP: 108/70 (106/72 - 109/74)  RR: 18 (18 - 18)  SpO2: 96% (96% - 98%)    I & Os for current day (as of 06-22 @ 11:13)  =============================================  IN: 720 ml / OUT: 725 ml / NET: -5 ml    CAPILLARY BLOOD GLUCOSE      HOSPITAL MEDICATIONS:  heparin  Injectable 5000Unit(s) SubCutaneous every 8 hours    vancomycin  IVPB 1000milliGRAM(s) IV Intermittent every 12 hours  cefepime  IVPB 2000milliGRAM(s) IV Intermittent every 8 hours    benzonatate 100milliGRAM(s) Oral every 8 hours PRN  guaiFENesin    Syrup 200milliGRAM(s) Oral every 4 hours PRN    fluvoxaMINE 150milliGRAM(s) Oral two times a day  OLANZapine 20milliGRAM(s) Oral at bedtime  OLANZapine 5milliGRAM(s) Oral <User Schedule>  clonazePAM Tablet 1milliGRAM(s) Oral two times a day PRN    docusate sodium 200milliGRAM(s) Oral at bedtime  senna 2Tablet(s) Oral at bedtime    cholecalciferol 1000Unit(s) Oral daily  dextrose 5% + sodium chloride 0.45%. 1000milliLiter(s) IV Continuous <Continuous>    fluticasone propionate 50 MICROgram(s)/spray Nasal Spray 1Spray(s) Both Nostrils two times a day      LABS:                        9.7    2.46  )-----------( 321      ( 22 Jun 2017 07:21 )             30.7     Hgb Trend: 9.7<--, 9.9<--, 9.8<--, 10.0<--  06-22    141  |  103  |  6<L>  ----------------------------<  114<H>  4.1   |  26  |  0.64    Ca    8.7      22 Jun 2017 07:30      Creatinine Trend: 0.64<--, 0.74<--, 0.64<--, 0.83<--, 0.80<--    MICROBIOLOGY:     RADIOLOGY:  [ ] Reviewed and interpreted by me Wound Care: Petrolatum

## 2022-11-25 ENCOUNTER — APPOINTMENT (OUTPATIENT)
Dept: INTERNAL MEDICINE | Facility: CLINIC | Age: 57
End: 2022-11-25

## 2022-12-01 ENCOUNTER — APPOINTMENT (OUTPATIENT)
Dept: INTERNAL MEDICINE | Facility: CLINIC | Age: 57
End: 2022-12-01

## 2022-12-01 VITALS
DIASTOLIC BLOOD PRESSURE: 71 MMHG | BODY MASS INDEX: 22.71 KG/M2 | SYSTOLIC BLOOD PRESSURE: 101 MMHG | TEMPERATURE: 97.4 F | HEART RATE: 75 BPM | WEIGHT: 133 LBS | HEIGHT: 64 IN | OXYGEN SATURATION: 98 %

## 2022-12-01 DIAGNOSIS — T79.6XXS TRAUMATIC ISCHEMIA OF MUSCLE, SEQUELA: ICD-10-CM

## 2022-12-01 DIAGNOSIS — R74.8 ABNORMAL LEVELS OF OTHER SERUM ENZYMES: ICD-10-CM

## 2022-12-01 DIAGNOSIS — I25.10 ATHEROSCLEROTIC HEART DISEASE OF NATIVE CORONARY ARTERY W/OUT ANGINA PECTORIS: ICD-10-CM

## 2022-12-01 DIAGNOSIS — E03.8 OTHER SPECIFIED HYPOTHYROIDISM: ICD-10-CM

## 2022-12-01 DIAGNOSIS — F32.A DEPRESSION, UNSPECIFIED: ICD-10-CM

## 2022-12-01 DIAGNOSIS — D70.0 CONGENITAL AGRANULOCYTOSIS: ICD-10-CM

## 2022-12-01 DIAGNOSIS — U07.1 COVID-19: ICD-10-CM

## 2022-12-01 PROCEDURE — 36415 COLL VENOUS BLD VENIPUNCTURE: CPT

## 2022-12-01 PROCEDURE — 99214 OFFICE O/P EST MOD 30 MIN: CPT | Mod: 25

## 2022-12-01 NOTE — ASSESSMENT
[FreeTextEntry1] : Patient is a 57-year-old female with history of depression, dense breasts, coronary disease, hyperlipidemia, transaminases, former smoker, neutropenia, history of rhabdomyolysis secondary trauma who presents for follow-up\par \par 1. Coronary disease\par schedule stress test less than 50% asymptomatic present time\par baby aspirin 81 mg a day\par \par 2. Hyperlipidemia\par continue Crestor five\par check lipid profile to advance as tolerated\par \par 3 depression\par continue sertraline and metformin as per psychiatry\par \par 4. Subclinical hypothyroidism\par check TFTs\par \par 5 history of rhabdomyolysis\par check CKs most likely secondary trauma for multiple times getting up and falling again.\par \par 6 history of neutropenic in general\par check CBC\par

## 2022-12-01 NOTE — HISTORY OF PRESENT ILLNESS
[FreeTextEntry1] : Follow-up for coronary artery disease noncritical, rhabdomyolysis and subclinical hypothyroidism [de-identified] : The patient is a 57-year-old female with history of congenital neutropenia, depression, former smoker, rhabdomyolysis secondary to multiple trauma, scoliosis, subclinical hypothyroidism, coronary artery disease and hyperlipidemia who presents for follow-up patient feels well is seen psych started on metformin fiber BID with her psych meds for prevention of weight gain she denies chest pain, shortness of breath polyuria polydipsia muscle pain or fatigue tolerating statins well

## 2022-12-02 LAB
ALBUMIN SERPL ELPH-MCNC: 4.1 G/DL
ALP BLD-CCNC: 80 U/L
ALT SERPL-CCNC: 18 U/L
ANION GAP SERPL CALC-SCNC: 11 MMOL/L
AST SERPL-CCNC: 15 U/L
BILIRUB SERPL-MCNC: 0.2 MG/DL
BUN SERPL-MCNC: 8 MG/DL
CALCIUM SERPL-MCNC: 9.5 MG/DL
CHLORIDE SERPL-SCNC: 103 MMOL/L
CK SERPL-CCNC: 65 U/L
CO2 SERPL-SCNC: 26 MMOL/L
CREAT SERPL-MCNC: 0.69 MG/DL
EGFR: 101 ML/MIN/1.73M2
GLUCOSE SERPL-MCNC: 101 MG/DL
POTASSIUM SERPL-SCNC: 4.1 MMOL/L
PROT SERPL-MCNC: 6.9 G/DL
SODIUM SERPL-SCNC: 140 MMOL/L
TSH SERPL-ACNC: 1.75 UIU/ML

## 2022-12-05 LAB
BASOPHILS # BLD AUTO: 0.04 K/UL
BASOPHILS NFR BLD AUTO: 1.5 %
CHOLEST SERPL-MCNC: 141 MG/DL
EOSINOPHIL # BLD AUTO: 0.04 K/UL
EOSINOPHIL NFR BLD AUTO: 1.5 %
HCT VFR BLD CALC: 38.3 %
HDLC SERPL-MCNC: 68 MG/DL
HGB BLD-MCNC: 11.9 G/DL
IMM GRANULOCYTES NFR BLD AUTO: 0 %
LDLC SERPL CALC-MCNC: 61 MG/DL
LYMPHOCYTES # BLD AUTO: 1.6 K/UL
LYMPHOCYTES NFR BLD AUTO: 59.9 %
MAN DIFF?: NORMAL
MCHC RBC-ENTMCNC: 30.1 PG
MCHC RBC-ENTMCNC: 31.1 GM/DL
MCV RBC AUTO: 97 FL
MONOCYTES # BLD AUTO: 0.54 K/UL
MONOCYTES NFR BLD AUTO: 20.2 %
NEUTROPHILS # BLD AUTO: 0.45 K/UL
NEUTROPHILS NFR BLD AUTO: 16.9 %
NONHDLC SERPL-MCNC: 73 MG/DL
PLATELET # BLD AUTO: 235 K/UL
RBC # BLD: 3.95 M/UL
RBC # FLD: 14.4 %
TRIGL SERPL-MCNC: 58 MG/DL
WBC # FLD AUTO: 2.67 K/UL

## 2022-12-08 ENCOUNTER — APPOINTMENT (OUTPATIENT)
Dept: HEMATOLOGY ONCOLOGY | Facility: CLINIC | Age: 57
End: 2022-12-08

## 2022-12-15 ENCOUNTER — APPOINTMENT (OUTPATIENT)
Dept: INTERNAL MEDICINE | Facility: CLINIC | Age: 57
End: 2022-12-15

## 2022-12-30 ENCOUNTER — OUTPATIENT (OUTPATIENT)
Dept: OUTPATIENT SERVICES | Facility: HOSPITAL | Age: 57
LOS: 1 days | End: 2022-12-30
Payer: COMMERCIAL

## 2022-12-30 ENCOUNTER — APPOINTMENT (OUTPATIENT)
Dept: CV DIAGNOSTICS | Facility: HOSPITAL | Age: 57
End: 2022-12-30

## 2022-12-30 DIAGNOSIS — I25.10 ATHEROSCLEROTIC HEART DISEASE OF NATIVE CORONARY ARTERY WITHOUT ANGINA PECTORIS: ICD-10-CM

## 2022-12-30 PROCEDURE — 93016 CV STRESS TEST SUPVJ ONLY: CPT

## 2022-12-30 PROCEDURE — 93018 CV STRESS TEST I&R ONLY: CPT

## 2022-12-30 PROCEDURE — 93017 CV STRESS TEST TRACING ONLY: CPT

## 2023-01-01 NOTE — BH DISCHARGE NOTE NURSING/SOCIAL WORK/PSYCH REHAB - NSDCPRRECOMMEND_PSY_ALL_CORE
nontender.../soft The patient is a 2m Male complaining of abdominal pain. pt will benefit from following up with [facility] program for psychotherapy, medication management and socialization; utilize reality testing/check the facts skill.

## 2023-02-20 ENCOUNTER — NON-APPOINTMENT (OUTPATIENT)
Age: 58
End: 2023-02-20

## 2023-02-22 DIAGNOSIS — T15.90XA FOREIGN BODY ON EXTERNAL EYE, PART UNSPECIFIED, UNSPECIFIED EYE, INITIAL ENCOUNTER: ICD-10-CM

## 2023-02-24 ENCOUNTER — NON-APPOINTMENT (OUTPATIENT)
Age: 58
End: 2023-02-24

## 2023-02-24 ENCOUNTER — APPOINTMENT (OUTPATIENT)
Dept: OPHTHALMOLOGY | Facility: CLINIC | Age: 58
End: 2023-02-24
Payer: COMMERCIAL

## 2023-02-24 PROCEDURE — 92002 INTRM OPH EXAM NEW PATIENT: CPT

## 2023-03-06 NOTE — ED PROVIDER NOTE - WR ORDER STATUS 1
Performed PRINCIPAL DISCHARGE DIAGNOSIS  Diagnosis: Bipolar disorder with psychotic features  Assessment and Plan of Treatment:       SECONDARY DISCHARGE DIAGNOSES  Diagnosis: Cannabis use disorder, mild, abuse  Assessment and Plan of Treatment:      Resulted

## 2023-03-20 NOTE — BH TREATMENT PLAN - NSTXPSYCHOINTERMD_PSY_ALL_CORE
PT INSTRUCTED ON CLEAR LIQ DIET AND PO SPLIT PREP LAST BM SHOULD BE CLEAR  PT CAN TAKE gabapentin, apresoline, hydrocodone   WITH A SIP OF WATER IN THE AM OF THE PROCEDURE ARRIVAL TIME  am ON 3/23/23   
medication management

## 2023-05-22 ENCOUNTER — APPOINTMENT (OUTPATIENT)
Dept: INTERNAL MEDICINE | Facility: CLINIC | Age: 58
End: 2023-05-22
Payer: COMMERCIAL

## 2023-05-22 VITALS
OXYGEN SATURATION: 98 % | TEMPERATURE: 97.8 F | HEART RATE: 76 BPM | DIASTOLIC BLOOD PRESSURE: 66 MMHG | SYSTOLIC BLOOD PRESSURE: 101 MMHG | HEIGHT: 64 IN | BODY MASS INDEX: 22.53 KG/M2 | WEIGHT: 132 LBS

## 2023-05-22 DIAGNOSIS — J18.9 PNEUMONIA, UNSPECIFIED ORGANISM: ICD-10-CM

## 2023-05-22 PROCEDURE — 99212 OFFICE O/P EST SF 10 MIN: CPT

## 2023-05-22 NOTE — PHYSICAL EXAM
[No Acute Distress] : no acute distress [Normal Sclera/Conjunctiva] : normal sclera/conjunctiva [Normal Outer Ear/Nose] : the outer ears and nose were normal in appearance [No Respiratory Distress] : no respiratory distress  [Clear to Auscultation] : lungs were clear to auscultation bilaterally [No Accessory Muscle Use] : no accessory muscle use [Normal Rate] : normal rate  [Regular Rhythm] : with a regular rhythm [Normal S1, S2] : normal S1 and S2 [No Edema] : there was no peripheral edema [No Murmur] : no murmur heard [de-identified] : Answering questions appropriately. Gets on exam table and ambulates without assistance

## 2023-05-22 NOTE — HISTORY OF PRESENT ILLNESS
[FreeTextEntry8] : Carli Mendez is a 56yo F w/ neutropenia, CAD, schizoaffective d/o who presents for ER follow-up.\par \par Went to ER 5/13 for fever to 103. Some coughing but no SOB. CXR showed infiltrate in the right midlung, was discharged home w/ abx finished 5/20. She overall feels much better--having some HA which she thinks are tension due to being stressed from the PNA, but this is also resolved today. No more fevers or cough. Requesting return to work note

## 2023-05-23 NOTE — BH INPATIENT PSYCHIATRY PROGRESS NOTE - NSTXDCOPNODATENEW_PSY_ALL_CORE
14-Jul-2022
no

## 2023-05-25 ENCOUNTER — APPOINTMENT (OUTPATIENT)
Dept: INTERNAL MEDICINE | Facility: CLINIC | Age: 58
End: 2023-05-25

## 2023-06-15 RX ORDER — ROSUVASTATIN CALCIUM 10 MG/1
10 TABLET, FILM COATED ORAL
Qty: 90 | Refills: 1 | Status: ACTIVE | COMMUNITY
Start: 2022-11-11 | End: 1900-01-01

## 2023-06-15 NOTE — ED BEHAVIORAL HEALTH ASSESSMENT NOTE - DOMICILE TYPE
Patient is new admission to unit. Patient affect is anxious. Patient was agreeable to completing the admission process and was able to verbalize why he is in unit and what his goals and expectations are at this time during admission. Patient is agreeable to body check. Patient admits to active SI that is fleeting. Denies current HI/AVH. Patient fell asleep after the admission process. Patient given written information on camera monitoring. Patient stated, \" I do not want to feel this way I want to get better. Even when I am in a good place mentally, I still have those thoughts of why I'm still here and that I should be better off dead\". Safety measures remain upheld. Updated on POC.    Private Residence

## 2023-06-15 NOTE — CHART NOTE - NSCHARTNOTEFT_GEN_A_CORE
CC: In Hospital Fall    HPI: Called by RN to evaluate pt. for UNWITNESSED in hospital fall.  Pt. seen and examined at bedside, A+Ox3, in NAD.  Pt. reports that she was squatting down to  her glasses from the floor and lost her balance.  Further reports that she fell on her knees and buttocks.  Pt. denies hitting her head.  Denies weakness, HAs, dizziness, LOC/syncope, mental status changes, paresthesias, changes in vision, chest pain, palpitations, dyspnea, abdominal pain, and any other musculoskeletal pain.    Vital Signs Last 24 Hrs  T(C): 36.9 (22 Oct 2022 20:22), Max: 37.2 (22 Oct 2022 20:04)  T(F): 98.4 (22 Oct 2022 20:22), Max: 98.9 (22 Oct 2022 20:04)  HR: 73 (22 Oct 2022 20:22) (72 - 82)  BP: 148/89 (22 Oct 2022 20:22) (118/79 - 148/89)  BP(mean): --  RR: 18 (22 Oct 2022 20:22) (18 - 18)  SpO2: 98% (22 Oct 2022 20:22) (96% - 99%)    Parameters below as of 22 Oct 2022 20:22  Patient On (Oxygen Delivery Method): room air      PHYSICAL EXAM:  General: NAD, A+Ox3  Skin: Old abrasions chin, bilat elbows; no active bleeding, ecchymosis/hematomas, soft tissue swelling, scars, rashes, or any other lesions   Head: Atraumatic, normocephalic  Neuro: CN II-XII grossly intact; PERRL; no focal deficits   Resp: Lungs CTAB, no wheezes, rales, rhonchi; normal respiratory effort  CV: S1S2, RRR  GI: BS x4 normoactive, soft, NT/ND   MSK: FROM bilat upper and lower extremities; strength 5/5 in bilat upper and lower extremities; no TTP  Ext: 2+ peripheral pulses, no edema, cyanosis    A/P  HPI:  57F w/ PMHx of neutropenia (states she follows w/ her PCP, Dr. Walton), Hypothyroidism (on Synthroid 12.5 Qd), and Schizoaffective disorder who presented to [facility] w/ cc of recent fall on Wednesday 10/19 and elevated LFTs.  Pt. reports that on Tuesday evening she could not sleep and took 1800 mg of Gabapentin.  Her usual Gabapentin dose is 600 Qd.  Pt. denies SI or HI.  Pt. states that she fell, hit her head and shoulders.  Now presenting s/p unwitnessed in hospital fall.      # UNWITNESSED In Hospital Fall  > URGENT x-ray pelvis and bilat knees to r/o fx or dislocation   > Fall/safety precautions  > Bed alarm   > Neuro checks  > Continue tele   > Monitor vital signs   > Dr. Cox, [facility] notified of pt's fall status, agrees w/ above plan    > Monitoring in progress    Will endorse to primary team in am.  Attending to follow.    Reina Palacios, P-BC  [phone]
Left 1 message for patient in regards to follow up care with callback information.
Left 2 messages for the patient in regards to follow up care with callback information.
Paged by primary team for neutropenia. Charts, labs, and outpatient records reviewed. Patient was evaluated by Dr. Farhan Dhaliwal at [facility] (formerly known as [facility] for neutropenia. Labs from outpatient records show an ANC ranging from 0.26 to 0.81 dating as for back as October 2017. 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 This is a normal variant. There is no evidence that there is an effect on neutrophil count from psychotropic medications, as patient's ANC is at baseline.. Patient should follow up with Dr. Dhaliwal at [facility] after discharge.    Kristal Bravo M.D.  Hematology and Medical Oncology Fellow  Pager: [phone]  For weekends and evenings (5 pm - 8 am), please page Heme/Onc fellow on call.
Patient medically cleared per Dr. Carrion.   D/C paperwork completed.   Per psych, medications resumed/refills sent by psych to patient preferred pharmacy.   VSS.     [id number]
Pt called to request a refill on her Zyprexa since she was not yet able to see her psychiatrist.  She says that she used to take Olanzapine twice daily and is currently only on 20mg qhs but feels that she needs to go back to 20mg BID since she is not yet transitioned to the Rexulti. She has no acute psychiatric symptoms and is hopeful about returning to work.
[Former] : former
[TextBox_4] : S/p Covid infection 1/2022 with rhinorrhea but did not require therapy.\par Pt with cough and SOB for 2 months and was started on Albuterol therapy as needed with some improvement initially but is no longer helping. She was given steroids for 5 days without improvement but was not given abx.\par Pt had chronic cough even prior to current URI symptoms.\par H/o Flonase use for PNDS\par Pt reports significant decrease in marijuana use but reports breathing discomfort when she does use marijuana so recommended total cessation again.
[TextBox_11] : 1
[TextBox_13] : 25
[YearQuit] : 2022

## 2023-07-17 ENCOUNTER — NON-APPOINTMENT (OUTPATIENT)
Age: 58
End: 2023-07-17

## 2023-07-26 ENCOUNTER — APPOINTMENT (OUTPATIENT)
Dept: PULMONOLOGY | Facility: CLINIC | Age: 58
End: 2023-07-26

## 2023-08-10 ENCOUNTER — RX RENEWAL (OUTPATIENT)
Age: 58
End: 2023-08-10

## 2023-08-10 RX ORDER — ROSUVASTATIN CALCIUM 5 MG/1
5 TABLET, FILM COATED ORAL
Qty: 90 | Refills: 1 | Status: ACTIVE | COMMUNITY
Start: 2023-08-10 | End: 1900-01-01

## 2023-10-26 NOTE — PROGRESS NOTE ADULT - PROBLEM SELECTOR PLAN 2
n/a -continue antibiotics as per ID  -sputum culture with normal respiratory renny  -aspiration cultures growing alpha strep  -f/u ID regarding duration of antibiotics - patient has defervesced and had clinical improvement of cough with antibiotics and aspiration of fluid  -quant gold and fungitell negative

## 2023-10-31 NOTE — DISCHARGE NOTE ADULT - CARE PROVIDERS DIRECT ADDRESSES
Detail Level: Simple
Render Risk Assessment In Note?: no
Comment: Discussed promotional pricing for November 16th with Dr. Izabella Ball at $12/unit.
[email or web address]

## 2023-11-09 NOTE — ED ADULT TRIAGE NOTE - HEIGHT IN INCHES
Chief Complaint   Patient presents with    Routine Prenatal Visit     Ob follow up       HPI: Yaneth is a  currently at 32w4d who today reports the following: She reports good fetal movement.  She was evaluated on labor and delivery for contractions 23, discharged home with  labor precautions.   Contractions - No; Leaking - No; Vaginal bleeding -  No; Swelling of extremities - No.    ROS:  GI: Nausea - No; has decreased appetite Constipation - No; Diarrhea - No    Neuro: Headache -  on occasion ; Visual change - No      EXAM:  Vitals: See prenatal flowsheet   Abdomen: See prenatal flowsheet   Urine glucose/protein: See prenatal flowsheet   Pelvic: See prenatal flowsheet   MDM:   Impression: Supervision of low risk pregnancy   Tests done today: U/S for EFW - 40% ac 43%   Topics discussed: Continue with PNV's  Prenatal labs reviewed   labor signs and symptoms  Symptoms of preeclampsia  Kick counts reviewed   Discussed gatorade in ams with adequate water intake throughout the day. Tylenol as needed for headache  Discussed decreased appetite, recommend an ensure or boost once a day  Encouraged stretching and use of maternity belt   Tests scheduled today for her next visit:   none  Plan repeat growth u/s at 36 weeks        4

## 2023-11-22 NOTE — PATIENT PROFILE ADULT. - HOW PATIENT ADDRESSED, PROFILE
Instructions rewritten and mailed.    Called Dr. Elam office spoke with Connie, she will ensure the clearance form gets com pelted today. Refaxed to [phone]   Carli

## 2023-12-09 NOTE — BH INPATIENT PSYCHIATRY ASSESSMENT NOTE - NS_RISKASSESSMENTINTER_PSY_ALL_CORE
Anemia   - Possibly related to anemia of chronic disease vs related to hormone therapy. Follow up on cbc. Monitoring for signs of bleeding. Maintain active type and screen.     Hyponatremia   - Sodium 130. Differential diagnosis included SIADH. Repeat bmp     T2DM   - A1c pending.     Prostate cancer   - Saw radiation oncologist and oncologist. PSA level of ~1000 on december 5th. Alk phos elevated likely to bone involvement.   - CT scan showing Nonspecific multifocal lytic lesions involving the thoracic vertebrae are nonspecific. The largest involve the T10 and T12 vertebral bodies. MRI of the spine recommended   - Oncology consult for further management   - Urinary ...       - Vertebral compression deformity. -  CT scan showing Mild to moderate compression deformity of the L1 vertebral body appears chronic. PT ordered for further management.    Anemia   - Possibly related to anemia of chronic disease vs related to hormone therapy. Follow up on cbc. Monitoring for signs of bleeding. Maintain active type and screen.     Hyponatremia   - Sodium 130. Differential diagnosis included SIADH. Repeat bmp            - Vertebral compression deformity. -  CT scan showing Mild to moderate compression deformity of the L1 vertebral body appears chronic. PT ordered for further management.  Low Acute Suicide Risk Mr. Lucas Cordova is a 83 year old man with past medical history of diabetes mellitus, hyperlipidemia, hypothyroidism, anemia,  prostate cancer s/p radiation who presents for present presents for bone pain for over week, found with bone lesion likely from metastatic prostate cancer, pending MRI and PT assessment

## 2023-12-24 NOTE — ED ADULT TRIAGE NOTE - TEMPERATURE IN CELSIUS (DEGREES C)
Pt presents with cough and congestion x 3 days. Pt reports daughter diagnosed with Flu A. Pt developed fever of 104 x 2 days. Fever relieved by yesterday. 36.6

## 2024-01-11 ENCOUNTER — APPOINTMENT (OUTPATIENT)
Dept: GASTROENTEROLOGY | Facility: CLINIC | Age: 59
End: 2024-01-11

## 2024-01-18 ENCOUNTER — APPOINTMENT (OUTPATIENT)
Dept: OBGYN | Facility: CLINIC | Age: 59
End: 2024-01-18
Payer: COMMERCIAL

## 2024-01-18 VITALS
BODY MASS INDEX: 22.02 KG/M2 | SYSTOLIC BLOOD PRESSURE: 124 MMHG | WEIGHT: 129 LBS | HEIGHT: 64 IN | DIASTOLIC BLOOD PRESSURE: 81 MMHG

## 2024-01-18 DIAGNOSIS — Z01.419 ENCOUNTER FOR GYNECOLOGICAL EXAMINATION (GENERAL) (ROUTINE) W/OUT ABNORMAL FINDINGS: ICD-10-CM

## 2024-01-18 PROCEDURE — ZZZZZ: CPT

## 2024-01-18 PROCEDURE — 99396 PREV VISIT EST AGE 40-64: CPT

## 2024-01-22 LAB — HPV HIGH+LOW RISK DNA PNL CVX: NOT DETECTED

## 2024-01-24 LAB — CYTOLOGY CVX/VAG DOC THIN PREP: ABNORMAL

## 2024-01-31 ENCOUNTER — APPOINTMENT (OUTPATIENT)
Dept: GASTROENTEROLOGY | Facility: CLINIC | Age: 59
End: 2024-01-31
Payer: COMMERCIAL

## 2024-01-31 VITALS
HEIGHT: 64 IN | SYSTOLIC BLOOD PRESSURE: 108 MMHG | WEIGHT: 131 LBS | HEART RATE: 78 BPM | TEMPERATURE: 97 F | DIASTOLIC BLOOD PRESSURE: 80 MMHG | RESPIRATION RATE: 14 BRPM | OXYGEN SATURATION: 99 % | BODY MASS INDEX: 22.36 KG/M2

## 2024-01-31 DIAGNOSIS — K21.9 GASTRO-ESOPHAGEAL REFLUX DISEASE W/OUT ESOPHAGITIS: ICD-10-CM

## 2024-01-31 DIAGNOSIS — D64.9 ANEMIA, UNSPECIFIED: ICD-10-CM

## 2024-01-31 DIAGNOSIS — K59.00 CONSTIPATION, UNSPECIFIED: ICD-10-CM

## 2024-01-31 PROCEDURE — 99203 OFFICE O/P NEW LOW 30 MIN: CPT

## 2024-01-31 RX ORDER — PANTOPRAZOLE SODIUM 40 MG/1
40 TABLET, DELAYED RELEASE ORAL
Refills: 0 | Status: ACTIVE | COMMUNITY

## 2024-01-31 RX ORDER — PANTOPRAZOLE 40 MG/1
40 TABLET, DELAYED RELEASE ORAL
Qty: 90 | Refills: 2 | Status: ACTIVE | COMMUNITY
Start: 2024-01-31 | End: 1900-01-01

## 2024-01-31 RX ORDER — SUVOREXANT 10 MG/1
10 TABLET, FILM COATED ORAL
Qty: 30 | Refills: 0 | Status: DISCONTINUED | COMMUNITY
Start: 2019-11-29 | End: 2024-01-31

## 2024-01-31 RX ORDER — LINACLOTIDE 72 UG/1
72 CAPSULE, GELATIN COATED ORAL
Qty: 30 | Refills: 3 | Status: ACTIVE | COMMUNITY
Start: 2024-01-31 | End: 1900-01-01

## 2024-01-31 RX ORDER — VORTIOXETINE 20 MG/1
TABLET, FILM COATED ORAL
Refills: 0 | Status: ACTIVE | COMMUNITY

## 2024-01-31 RX ORDER — LINACLOTIDE 145 UG/1
145 CAPSULE, GELATIN COATED ORAL
Qty: 90 | Refills: 3 | Status: ACTIVE | COMMUNITY
Start: 2024-01-31 | End: 1900-01-01

## 2024-01-31 RX ORDER — CLONAZEPAM 2 MG/1
TABLET ORAL
Refills: 0 | Status: ACTIVE | COMMUNITY

## 2024-01-31 RX ORDER — SERTRALINE 25 MG/1
25 TABLET, FILM COATED ORAL
Qty: 120 | Refills: 0 | Status: DISCONTINUED | COMMUNITY
Start: 2018-03-06 | End: 2024-01-31

## 2024-01-31 NOTE — PHYSICAL EXAM

## 2024-01-31 NOTE — ASSESSMENT
[FreeTextEntry1] : 59 yo female, hx of cad and anemia. States had egd and colonoscopy with Kendrick Root MD in last two months ( will request) states colonoscopy was normal and endoscopy with reflux. Uses linzes 145 ug qam and pantopraozle 40 mg daily. UTD with pap; pending mammogram.  IMP: 1. anemia 2. chronic constipation 3. GERD  PLAN: 1. She  was advised to undergo video capsule endoscopy to which she agreed. She ] was explained the risks of the procedure not limited to non-diagnosis of small intestinal disease or malignancy, as well as possible retention of the capsule which may necessitate surgical removal. 2. Records from Kendrick Root MD

## 2024-02-28 NOTE — ED ADULT NURSE NOTE - NS_SISCREENINGSR_GEN_ALL_ED
Patient admitted to unit with wound placed on lower back/coccyx area. Wound vac not in use and dressing was coming off. Writer removed dressing and placed wet to dry dressing and placed wound consult order. Patient also has multiple old scars from previous surgeries to her spine, abdomen, and right rear leg.    Negative

## 2024-03-02 NOTE — BH INPATIENT PSYCHIATRY PROGRESS NOTE - LEVEL OF CONSCIOUSNESS
If your condition worsens or fails to improve, we recommend that you receive another evaluation at the ER immediately, contact your PCP to discuss your concerns, or return here.  You must understand that you've received an urgent care treatment only, and that you may be released before all your medical problems are known or treated.  You, the patient, will arrange for follow-up care as instructed.     If you were prescribed a narcotic or muscle relaxant, do not drive or operate heavy machinery while taking these medication.    Tylenol or Ibuprofen can also be used as directed for pain unless you have an allergy to them or medical condition (such as stomach ulcers, kidney or liver disease, or use blood thinners, etc.) for which you should not be taking these type of medications.     If you were given a prescription NSAID here, do not also take any over the counter NSAIDs like Ibuprofen, Aleve, Advil, Motrin, etc.  RICE (rest, ice, compression, and elevation) are helpful, as well as gentle stretching, unless otherwise directed.     If you have low back pain and develop bowel or bladder symptoms or increased pain going down your legs, go to the ER immediately.     If you were given a splint, wear it at all times.  If you were given crutches, use them as instructed.  Do not rest your armpits on the foam pad, or you risk compressing the nerves and the vessels there.      
Alert

## 2024-04-02 ENCOUNTER — EMERGENCY (EMERGENCY)
Facility: HOSPITAL | Age: 59
LOS: 1 days | Discharge: HOME | End: 2024-04-02
Attending: EMERGENCY MEDICINE
Payer: COMMERCIAL

## 2024-04-02 VITALS
SYSTOLIC BLOOD PRESSURE: 109 MMHG | OXYGEN SATURATION: 98 % | TEMPERATURE: 98 F | DIASTOLIC BLOOD PRESSURE: 71 MMHG | RESPIRATION RATE: 16 BRPM | HEART RATE: 60 BPM

## 2024-04-02 VITALS
RESPIRATION RATE: 20 BRPM | DIASTOLIC BLOOD PRESSURE: 69 MMHG | TEMPERATURE: 98 F | HEIGHT: 65 IN | SYSTOLIC BLOOD PRESSURE: 97 MMHG | HEART RATE: 91 BPM | WEIGHT: 134.92 LBS | OXYGEN SATURATION: 97 %

## 2024-04-02 LAB
ALBUMIN SERPL ELPH-MCNC: 3.6 G/DL — SIGNIFICANT CHANGE UP (ref 3.3–5)
ALP SERPL-CCNC: 67 U/L — SIGNIFICANT CHANGE UP (ref 40–120)
ALT FLD-CCNC: 11 U/L — SIGNIFICANT CHANGE UP (ref 10–45)
ANION GAP SERPL CALC-SCNC: 12 MMOL/L — SIGNIFICANT CHANGE UP (ref 5–17)
APPEARANCE UR: CLEAR — SIGNIFICANT CHANGE UP
APTT BLD: 27.9 SEC — SIGNIFICANT CHANGE UP (ref 24.5–35.6)
AST SERPL-CCNC: 12 U/L — SIGNIFICANT CHANGE UP (ref 10–40)
BACTERIA # UR AUTO: NEGATIVE /HPF — SIGNIFICANT CHANGE UP
BASE EXCESS BLDV CALC-SCNC: 0.8 MMOL/L — SIGNIFICANT CHANGE UP (ref -2–3)
BASOPHILS # BLD AUTO: 0.08 K/UL — SIGNIFICANT CHANGE UP (ref 0–0.2)
BASOPHILS NFR BLD AUTO: 1.7 % — SIGNIFICANT CHANGE UP (ref 0–2)
BILIRUB SERPL-MCNC: 0.3 MG/DL — SIGNIFICANT CHANGE UP (ref 0.2–1.2)
BILIRUB UR-MCNC: NEGATIVE — SIGNIFICANT CHANGE UP
BUN SERPL-MCNC: 11 MG/DL — SIGNIFICANT CHANGE UP (ref 7–23)
CA-I SERPL-SCNC: 1.22 MMOL/L — SIGNIFICANT CHANGE UP (ref 1.15–1.33)
CALCIUM SERPL-MCNC: 8.7 MG/DL — SIGNIFICANT CHANGE UP (ref 8.4–10.5)
CAST: 0 /LPF — SIGNIFICANT CHANGE UP (ref 0–4)
CHLORIDE BLDV-SCNC: 100 MMOL/L — SIGNIFICANT CHANGE UP (ref 96–108)
CHLORIDE SERPL-SCNC: 98 MMOL/L — SIGNIFICANT CHANGE UP (ref 96–108)
CO2 BLDV-SCNC: 26 MMOL/L — SIGNIFICANT CHANGE UP (ref 22–26)
CO2 SERPL-SCNC: 23 MMOL/L — SIGNIFICANT CHANGE UP (ref 22–31)
COLOR SPEC: YELLOW — SIGNIFICANT CHANGE UP
CREAT SERPL-MCNC: 0.97 MG/DL — SIGNIFICANT CHANGE UP (ref 0.5–1.3)
DIFF PNL FLD: NEGATIVE — SIGNIFICANT CHANGE UP
EGFR: 68 ML/MIN/1.73M2 — SIGNIFICANT CHANGE UP
EOSINOPHIL # BLD AUTO: 0 K/UL — SIGNIFICANT CHANGE UP (ref 0–0.5)
EOSINOPHIL NFR BLD AUTO: 0 % — SIGNIFICANT CHANGE UP (ref 0–6)
FLUAV AG NPH QL: SIGNIFICANT CHANGE UP
FLUBV AG NPH QL: SIGNIFICANT CHANGE UP
GAS PNL BLDV: 132 MMOL/L — LOW (ref 136–145)
GAS PNL BLDV: SIGNIFICANT CHANGE UP
GLUCOSE BLDV-MCNC: 103 MG/DL — HIGH (ref 70–99)
GLUCOSE SERPL-MCNC: 105 MG/DL — HIGH (ref 70–99)
GLUCOSE UR QL: NEGATIVE MG/DL — SIGNIFICANT CHANGE UP
HCO3 BLDV-SCNC: 25 MMOL/L — SIGNIFICANT CHANGE UP (ref 22–29)
HCT VFR BLD CALC: 30.5 % — LOW (ref 34.5–45)
HCT VFR BLDA CALC: 29 % — LOW (ref 34.5–46.5)
HGB BLD CALC-MCNC: 9.8 G/DL — LOW (ref 11.7–16.1)
HGB BLD-MCNC: 9.6 G/DL — LOW (ref 11.5–15.5)
HYPOCHROMIA BLD QL: SLIGHT — SIGNIFICANT CHANGE UP
INR BLD: 1.16 RATIO — SIGNIFICANT CHANGE UP (ref 0.85–1.18)
KETONES UR-MCNC: NEGATIVE MG/DL — SIGNIFICANT CHANGE UP
LACTATE BLDV-MCNC: 1.2 MMOL/L — SIGNIFICANT CHANGE UP (ref 0.5–2)
LEUKOCYTE ESTERASE UR-ACNC: NEGATIVE — SIGNIFICANT CHANGE UP
LYMPHOCYTES # BLD AUTO: 1.57 K/UL — SIGNIFICANT CHANGE UP (ref 1–3.3)
LYMPHOCYTES # BLD AUTO: 35.3 % — SIGNIFICANT CHANGE UP (ref 13–44)
MANUAL SMEAR VERIFICATION: SIGNIFICANT CHANGE UP
MCHC RBC-ENTMCNC: 25.9 PG — LOW (ref 27–34)
MCHC RBC-ENTMCNC: 31.5 GM/DL — LOW (ref 32–36)
MCV RBC AUTO: 82.2 FL — SIGNIFICANT CHANGE UP (ref 80–100)
MONOCYTES # BLD AUTO: 0.67 K/UL — SIGNIFICANT CHANGE UP (ref 0–0.9)
MONOCYTES NFR BLD AUTO: 15.1 % — HIGH (ref 2–14)
NEUTROPHILS # BLD AUTO: 2.13 K/UL — SIGNIFICANT CHANGE UP (ref 1.8–7.4)
NEUTROPHILS NFR BLD AUTO: 44.5 % — SIGNIFICANT CHANGE UP (ref 43–77)
NEUTS BAND # BLD: 3.4 % — SIGNIFICANT CHANGE UP (ref 0–8)
NITRITE UR-MCNC: NEGATIVE — SIGNIFICANT CHANGE UP
PCO2 BLDV: 39 MMHG — SIGNIFICANT CHANGE UP (ref 39–42)
PH BLDV: 7.42 — SIGNIFICANT CHANGE UP (ref 7.32–7.43)
PH UR: 7.5 — SIGNIFICANT CHANGE UP (ref 5–8)
PLAT MORPH BLD: NORMAL — SIGNIFICANT CHANGE UP
PLATELET # BLD AUTO: 261 K/UL — SIGNIFICANT CHANGE UP (ref 150–400)
PO2 BLDV: 51 MMHG — HIGH (ref 25–45)
POTASSIUM BLDV-SCNC: 4.2 MMOL/L — SIGNIFICANT CHANGE UP (ref 3.5–5.1)
POTASSIUM SERPL-MCNC: 3.7 MMOL/L — SIGNIFICANT CHANGE UP (ref 3.5–5.3)
POTASSIUM SERPL-SCNC: 3.7 MMOL/L — SIGNIFICANT CHANGE UP (ref 3.5–5.3)
PROT SERPL-MCNC: 7 G/DL — SIGNIFICANT CHANGE UP (ref 6–8.3)
PROT UR-MCNC: NEGATIVE MG/DL — SIGNIFICANT CHANGE UP
PROTHROM AB SERPL-ACNC: 12.1 SEC — SIGNIFICANT CHANGE UP (ref 9.5–13)
RBC # BLD: 3.71 M/UL — LOW (ref 3.8–5.2)
RBC # FLD: 16 % — HIGH (ref 10.3–14.5)
RBC BLD AUTO: SIGNIFICANT CHANGE UP
RBC CASTS # UR COMP ASSIST: 0 /HPF — SIGNIFICANT CHANGE UP (ref 0–4)
REVIEW: SIGNIFICANT CHANGE UP
RSV RNA NPH QL NAA+NON-PROBE: SIGNIFICANT CHANGE UP
SAO2 % BLDV: 82 % — SIGNIFICANT CHANGE UP (ref 67–88)
SARS-COV-2 RNA SPEC QL NAA+PROBE: SIGNIFICANT CHANGE UP
SODIUM SERPL-SCNC: 133 MMOL/L — LOW (ref 135–145)
SP GR SPEC: <1.005 — LOW (ref 1–1.03)
SQUAMOUS # UR AUTO: 0 /HPF — SIGNIFICANT CHANGE UP (ref 0–5)
UROBILINOGEN FLD QL: 0.2 MG/DL — SIGNIFICANT CHANGE UP (ref 0.2–1)
WBC # BLD: 4.44 K/UL — SIGNIFICANT CHANGE UP (ref 3.8–10.5)
WBC # FLD AUTO: 4.44 K/UL — SIGNIFICANT CHANGE UP (ref 3.8–10.5)
WBC UR QL: 0 /HPF — SIGNIFICANT CHANGE UP (ref 0–5)

## 2024-04-02 PROCEDURE — 84132 ASSAY OF SERUM POTASSIUM: CPT

## 2024-04-02 PROCEDURE — 83605 ASSAY OF LACTIC ACID: CPT

## 2024-04-02 PROCEDURE — 87086 URINE CULTURE/COLONY COUNT: CPT

## 2024-04-02 PROCEDURE — 82947 ASSAY GLUCOSE BLOOD QUANT: CPT

## 2024-04-02 PROCEDURE — 85730 THROMBOPLASTIN TIME PARTIAL: CPT

## 2024-04-02 PROCEDURE — 81001 URINALYSIS AUTO W/SCOPE: CPT

## 2024-04-02 PROCEDURE — 71045 X-RAY EXAM CHEST 1 VIEW: CPT | Mod: 26

## 2024-04-02 PROCEDURE — 99285 EMERGENCY DEPT VISIT HI MDM: CPT | Mod: 25

## 2024-04-02 PROCEDURE — 85025 COMPLETE CBC W/AUTO DIFF WBC: CPT

## 2024-04-02 PROCEDURE — 80053 COMPREHEN METABOLIC PANEL: CPT

## 2024-04-02 PROCEDURE — 82803 BLOOD GASES ANY COMBINATION: CPT

## 2024-04-02 PROCEDURE — 85018 HEMOGLOBIN: CPT

## 2024-04-02 PROCEDURE — 36415 COLL VENOUS BLD VENIPUNCTURE: CPT

## 2024-04-02 PROCEDURE — 85014 HEMATOCRIT: CPT

## 2024-04-02 PROCEDURE — 82435 ASSAY OF BLOOD CHLORIDE: CPT

## 2024-04-02 PROCEDURE — 82330 ASSAY OF CALCIUM: CPT

## 2024-04-02 PROCEDURE — 71045 X-RAY EXAM CHEST 1 VIEW: CPT

## 2024-04-02 PROCEDURE — 85610 PROTHROMBIN TIME: CPT

## 2024-04-02 PROCEDURE — 87637 SARSCOV2&INF A&B&RSV AMP PRB: CPT

## 2024-04-02 PROCEDURE — 84295 ASSAY OF SERUM SODIUM: CPT

## 2024-04-02 PROCEDURE — 99285 EMERGENCY DEPT VISIT HI MDM: CPT

## 2024-04-02 PROCEDURE — 93005 ELECTROCARDIOGRAM TRACING: CPT

## 2024-04-02 PROCEDURE — 87040 BLOOD CULTURE FOR BACTERIA: CPT

## 2024-04-02 RX ORDER — SODIUM CHLORIDE 9 MG/ML
1000 INJECTION INTRAMUSCULAR; INTRAVENOUS; SUBCUTANEOUS ONCE
Refills: 0 | Status: COMPLETED | OUTPATIENT
Start: 2024-04-02 | End: 2024-04-02

## 2024-04-02 RX ADMIN — SODIUM CHLORIDE 1000 MILLILITER(S): 9 INJECTION INTRAMUSCULAR; INTRAVENOUS; SUBCUTANEOUS at 12:10

## 2024-04-02 NOTE — ED ADULT NURSE NOTE - CAS EDP DISCH TYPE
Detail Level: Zone
Quality 431: Preventive Care And Screening: Unhealthy Alcohol Use - Screening: Patient screened for unhealthy alcohol use using a single question and scores less than 2 times per year
Quality 226: Preventive Care And Screening: Tobacco Use: Screening And Cessation Intervention: Patient screened for tobacco and never smoked
Home

## 2024-04-02 NOTE — ED PROVIDER NOTE - ATTENDING APP SHARED VISIT CONTRIBUTION OF CARE
57yo F with HLD, neutropenia, schizoaffective disorder, presenting with fever last night.  Reports fever to 102F, took advil around 3am.  With no complaints at this time Such as cough URI symptoms UTI symptoms no rash patient is well-appearing at this time asymptomatic but concerned about her neutropenia.  Patient is seen heme-onc in the past but stated that her counts are fine and she was well-appearing there is nothing to do.  Septic workup performed here but vital signs are stable check her white count usually runs in the 2 range which seems to be her baseline.

## 2024-04-02 NOTE — ED PROVIDER NOTE - CPE EDP EYES NORM
Is This A New Presentation, Or A Follow-Up?: Acne
Additional Comments (Use Complete Sentences): Patient has used various medications but has allergic reactions to everything. Patient and patient’s dad states it is almost like a give breakout. Patient uses Cetaphil and Purpose. Patent has tried Proactive and had a reaction to that product
normal...

## 2024-04-02 NOTE — ED ADULT NURSE NOTE - OBJECTIVE STATEMENT
57 y/o female came to the ED with complaints of neutropenia, fevers 103 at home, weakness. History of schizoaffective which she states makes her neutropenic because of the medications and was told to come to ED for further workup. Afebrile upon arrival. Took Motrin early this am when she had the fever. Denies CP, SOB, N, V, D, dysuria.

## 2024-04-02 NOTE — ED PROVIDER NOTE - OBJECTIVE STATEMENT
59yo F with HLD, neutropenia, schizoaffective disorder, presenting with fever last night.  Reports fever to 102F, took advil around 3am and now feeling much better. Reports she felt very weak when she had fever, now mildly fatigued. Reports she also had a fever 4 days ago, no fever the days in between then and last night. Reports she called her PCP and was told that her most recent labs showed worsening neutropenia (does not know values), and was advised to go to ER to get checked in setting of new fever. Denies any HA, sore throat, cough, abdominal pain, n/v/d, urinary symptoms, rash, recent travel, sick contacts.   * Denies foot pain, reports this was entered in error in triage.

## 2024-04-02 NOTE — ED PROVIDER NOTE - PROGRESS NOTE DETAILS
All results d/w patient. Reports she is aware of anemia and has upcoming GI studies planned. - Torie Kauffman PA-C

## 2024-04-02 NOTE — ED ADULT NURSE NOTE - NSFALLUNIVINTERV_ED_ALL_ED
Bed/Stretcher in lowest position, wheels locked, appropriate side rails in place/Call bell, personal items and telephone in reach/Instruct patient to call for assistance before getting out of bed/chair/stretcher/Non-slip footwear applied when patient is off stretcher/[place] to call system/Physically safe environment - no spills, clutter or unnecessary equipment/Purposeful proactive rounding/Room/bathroom lighting operational, light cord in reach

## 2024-04-02 NOTE — ED PROVIDER NOTE - PATIENT PORTAL LINK FT
You can access the FollowMyHealth Patient Portal offered by [facility] by registering at the following website: [email or web address]. By joining [facility]’s FollowMyHealth portal, you will also be able to view your health information using other applications (apps) compatible with our system.

## 2024-04-03 LAB
CULTURE RESULTS: SIGNIFICANT CHANGE UP
SPECIMEN SOURCE: SIGNIFICANT CHANGE UP

## 2024-04-04 ENCOUNTER — APPOINTMENT (OUTPATIENT)
Dept: GASTROENTEROLOGY | Facility: CLINIC | Age: 59
End: 2024-04-04
Payer: COMMERCIAL

## 2024-04-04 PROCEDURE — 91110 GI TRC IMG INTRAL ESOPH-ILE: CPT

## 2024-04-19 ENCOUNTER — NON-APPOINTMENT (OUTPATIENT)
Age: 59
End: 2024-04-19

## 2024-04-19 ENCOUNTER — TRANSCRIPTION ENCOUNTER (OUTPATIENT)
Age: 59
End: 2024-04-19

## 2024-04-26 NOTE — BH INPATIENT PSYCHIATRY ASSESSMENT NOTE - NSTXDEPRESGOAL_PSY_ALL_CORE
Exhibit improvements in self-grooming, hygiene, sleep and appetite [

## 2024-05-28 NOTE — BH INPATIENT PSYCHIATRY DISCHARGE NOTE - NSDCMRMEDTOKEN_GEN_ALL_CORE_FT
Patient is a 52 yo man with HTN, HLD, GERD, and Parkinson's.  He presents with complaint of shortness of breath and dizziness over the three days prior to presentation.  He is also reporting that he had symptoms of chest pain, not necessarily related with exertion.  No other associated cardiac complaints or exertional limitations.  He otherwise denies having had a recent illness.    My review of her 12-lead ECG and bedside telemetry --> SR, no events  Cardiac biomarkers flat.  CXR unremarkable for acute findings.  CTA chest negative for obvious PE, ?cardiomegaly, possible vascular congestion.  CTA neck no acute findings.    At the time of my evaluation, the patient appeared clinically and hemodynamically stable.  Unremarkable physical exam.  He appeared euvolemic on my exam.    He has already been scheduled to undergo an ischemic evaluation with a nuclear stress test, which was unremarkable for ischemia.    Patient was reassured.  From the cardiac perspective, the patient may be discharged to home without the need for further inpatient cardiac testing.  He has been advised to f/u with his PMD within 1-2 weeks for further evaluation as needed.   pantoprazole 40 mg oral delayed release tablet: 1 tab(s) orally once a day (before a meal)   busPIRone 30 mg oral tablet: 1 tab(s) orally 2 times a day  gabapentin 600 mg oral tablet: 1 tab(s) orally 3 times a day  OLANZapine 15 mg oral tablet: 2 tab(s) orally once a day (at bedtime)  pantoprazole 40 mg oral delayed release tablet: 1 tab(s) orally once a day (before a meal)  pantoprazole 40 mg oral delayed release tablet: 1 tab(s) orally once a day (before a meal)  vortioxetine 20 mg oral tablet: 1 tab(s) orally once a day   busPIRone 30 mg oral tablet: 1 tab(s) orally 2 times a day  gabapentin 600 mg oral tablet: 1 tab(s) orally 3 times a day  levothyroxine 25 mcg (0.025 mg) oral capsule: 0.5 cap(s) orally once a day   OLANZapine 15 mg oral tablet: 2 tab(s) orally once a day (at bedtime)  pantoprazole 40 mg oral delayed release tablet: 1 tab(s) orally once a day (before a meal)  vortioxetine 20 mg oral tablet: 1 tab(s) orally once a day

## 2024-06-12 NOTE — ED PROVIDER NOTE - NSICDXPASTSURGICALHX_GEN_ALL_CORE_FT
Potential access sites were evaluated for patency using ultrasound.   The left femoral artery was selected. Access was obtained under with Sonosite guidance using a standard 18 guage needle with direct visualization of needle entry.    PAST SURGICAL HISTORY:  No significant past surgical history

## 2024-06-18 ENCOUNTER — APPOINTMENT (OUTPATIENT)
Dept: GASTROENTEROLOGY | Facility: CLINIC | Age: 59
End: 2024-06-18

## 2024-07-20 ENCOUNTER — TRANSCRIPTION ENCOUNTER (OUTPATIENT)
Age: 59
End: 2024-07-20

## 2024-08-24 ENCOUNTER — RX RENEWAL (OUTPATIENT)
Age: 59
End: 2024-08-24

## 2024-10-01 NOTE — ED PROVIDER NOTE - ED STEMI HIDDEN
Procedure Date  10/1/24     Impression  Overall Impression:   Grade D esophagitis  Performed forceps biopsies in the esophagus  Severe erythematous, friable, ulcerated mucosa with exudate in the lower third of the esophagus  Performed forceps biopsies in the lower third of the esophagus  6 cm hiatal hernia  Performed forceps biopsies in the stomach to rule out H. pylori  The duodenal bulb and 2nd part of the duodenum appeared normal.        Recommendation  - Coffee ground emesis is related to severe erosive esophagitis  - Plan Protonix 40 mg PO BID and Carafate 1 g PO QID  - Repeat EGD in 3 months to ensure healing  - If no healing, Voquezna may be next step in treatment  - Discharge patient to home  - Advance diet as tolerated  - Continue present medications  - Await pathology results  - Patient has a contact number available for emergencies. The signs and symptoms of potential delayed complications were discussed with the patient. Return to normal activities tomorrow. Written discharge instructions were provided to the patient.  NO DRIVING, OPERATING EQUIPMENT, OR SIGNING LEGAL DOCUMENTS FOR 24 HOURS.  
hide

## 2024-10-23 ENCOUNTER — APPOINTMENT (OUTPATIENT)
Dept: ORTHOPEDIC SURGERY | Facility: CLINIC | Age: 59
End: 2024-10-23

## 2024-11-12 ENCOUNTER — NON-APPOINTMENT (OUTPATIENT)
Age: 59
End: 2024-11-12

## 2024-11-12 ENCOUNTER — APPOINTMENT (OUTPATIENT)
Dept: OTOLARYNGOLOGY | Facility: CLINIC | Age: 59
End: 2024-11-12
Payer: COMMERCIAL

## 2024-11-12 VITALS — BODY MASS INDEX: 21.16 KG/M2 | WEIGHT: 127 LBS | HEIGHT: 65 IN

## 2024-11-12 DIAGNOSIS — S09.92XA UNSPECIFIED INJURY OF NOSE, INITIAL ENCOUNTER: ICD-10-CM

## 2024-11-12 DIAGNOSIS — H93.293 OTHER ABNORMAL AUDITORY PERCEPTIONS, BILATERAL: ICD-10-CM

## 2024-11-12 DIAGNOSIS — J34.2 DEVIATED NASAL SEPTUM: ICD-10-CM

## 2024-11-12 PROCEDURE — 31231 NASAL ENDOSCOPY DX: CPT

## 2024-11-12 PROCEDURE — 99203 OFFICE O/P NEW LOW 30 MIN: CPT | Mod: 25

## 2024-11-18 NOTE — PROGRESS NOTE ADULT - ASSESSMENT
[facility]  H+P  Consult  OP Visit  FU Visit   CC/HPI   CC Followup visit for cardiac conditions detailed in assessment and plan below.   Intervention CABG   General Doing well.  No new concerns.     Cardiac Sx -CP, -SOB, -dizziness, -syncope, -edema, -orthopnea, -pnd, -fatigue, -palpitations   HISTORY/ALLERGY/ROS   M/S/S/F Hx Reviewed in Epic and updated as appropriate   ALLERG Lisinopril   ROS Full ROS obtained and negative except as mentioned in HPI   MEDICATIONS   Cardiac medications reviewed in Epic during visit with patient.   PHYSICAL EXAM   Vitals /70 (Site: Right Upper Arm, Position: Sitting, Cuff Size: Medium Adult)   Pulse 90   Ht 1.803 m (5' 11\")   Wt 97.1 kg (214 lb)   SpO2 98%   BMI 29.85 kg/m²    Gen Alert, coop, no distress Heart  RRR, no MRG   Lung CTA-B, unlabored, no DTP Extrem Edema -Grade 0 (0mm)      COMPLIANCE   Discussed and counseled on diet, exercise, weight loss, smoking, alcohol, drugs.  All questions answered.   CODING   SCI (10183) - 30-39 mins spent reviewing hx/tests/consults, performing exam, counseling/educating, ordering meds/tests/procedures, referring/communicating w/PCP/consultants, documenting in EMR, interpreting results, communicating medical information and plan with family.   SCRIBE ATTESTATION   Nurse I, Jeannine Nevarez, RN, am scribing for and in the presence of Jessee Terry MD. 11/18/2024 4:35 PM EST     Doctor Jeannine Nevarez is working as scribe for and in presence of me and may have prepopulated components of note with my historical intellectual property (IP) under my supervision.  Any additions to this IP performed in my presence and at my direction. Content and accuracy of this note reviewed by me (Jessee Terry MD).   NEW MEDICATIONS   Pt verbalizes understanding of the need for treatment and education provided at today's visit.  Additional education provided in AVS.   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 Resolved fever. Clinically improved.   Afebrile now, cytopath still pending.    Plan:   Continue with cefepime at 2 gm iv q8h.  Stopped vancomycin.  S/P biopsy with aspiration of purulent material.  Culture growing kamari strep.  Negative fungitell, galactomannan negative.  Crypt serum antigen negative.  HIV negative. Quantiferon gold negative.  Histo ag, syphilis screen negative.  Pulmonary on board.  S/P swallow eval with positive aspiration.

## 2025-01-30 NOTE — BEHAVIORAL HEALTH ASSESSMENT NOTE - HPI (INCLUDE ILLNESS QUALITY, SEVERITY, DURATION, TIMING, CONTEXT, MODIFYING FACTORS, ASSOCIATED SIGNS AND SYMPTOMS)
likely multifactorial  creatinine stable at 1.4continue to monitor 52 y/o female with h/o cyclic neutropenia, Schizophrenia and Depressive Dos NOS came in with c/o cough, RLL lung mass identified approx 1 month ago. About one week ago, she again developed a cough productive of clear sputum, mild dyspnea with exertion, low grade fevers  MBS showed aspiration, consult to assess for antipsychotic side-effects.    Seen at bedside, alert and orientedx4. Reports she is doing better physically, cough improved. Aware of plan for antibiotics before D/C and then f/up with fluid cytology report to r/o malignancy. No SI/HI/hallucinations, paranoia at this time. No delusions elicited. Has been psychiatrically stable since her D/C from [facility] in 2014. Has been on current regimen for 2 years and stable. But was asking psychiatrist to change Zyprexa to something else due to involuntary foot tapping she has noticed for few months, Zyprexa decreased to 25 mg from 30.   Suicide attempt by OD years ago and gesture to jump out of window before her hospitalization in 2014. No recent ideation, intent or plan.  No h/o substance abuse.   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  Patient informed about risks and benefits, pulmonologist told her this morning that the abscess is not likely due to the antipsychotics. She'd like to continue regiment for now and discuss with her outpatient psychiatrist.      Called and spoke to psychiatrist Dr. Smalls, has been stable on Zyprexa for 2 years. Has only recently reported some vague side-effects, has h/o somatization. She is very psychiatrically stable on current regimen. He was informed about concerns for esophageal dysmotility and dysphagia due to antipsychotic. He will discuss medication regimen with patient at next appointment on 6/27/17. 50 y/o female with h/o cyclic neutropenia, Schizophrenia and Depressive Dos NOS came in with c/o cough, RLL lung mass identified approx 1 month ago. About one week ago, she again developed a cough productive of clear sputum, mild dyspnea with exertion, low grade fevers  MBS showed aspiration, consult to assess for antipsychotic side-effects.    Seen at bedside, alert and orientedx4. Reports she is doing better physically, cough improved. Aware of plan for antibiotics before D/C and then f/up with fluid cytology report to r/o malignancy. No SI/HI/hallucinations, paranoia at this time. No delusions elicited. Has been psychiatrically stable since her D/C from [facility] in 2014. Has been on current regimen for 2 years and stable. But was asking psychiatrist to change Zyprexa to something else due to involuntary foot tapping she has noticed for few months, Zyprexa decreased to 25 mg from 30.   Suicide attempt by OD years ago and gesture to jump out of window before her hospitalization in 2014. No recent ideation, intent or plan.  No h/o substance abuse.   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  Patient informed about risks and benefits, pulmonologist told her this morning that the abscess is not likely due to the antipsychotics. She'd like to continue regiment for now and discuss with her outpatient psychiatrist.  Mother at bedside confirms info and expresses no safety concerns.    Called and spoke to psychiatrist Dr. Smalls, has been stable on Zyprexa for 2 years. Has only recently reported some vague side-effects, has h/o somatization. She is very psychiatrically stable on current regimen. He was informed about concerns for esophageal dysmotility and dysphagia due to antipsychotic. He will discuss medication regimen with patient at next appointment on 6/27/17. 50 y/o female with h/o cyclic neutropenia, Schizophrenia and Depressive Dos NOS came in with c/o cough, RLL lung mass identified approx 1 month ago. About one week ago, she again developed a cough productive of clear sputum, mild dyspnea with exertion, low grade fevers, MBS showed aspiration, consult to assess for antipsychotic side-effects.    Seen at bedside, alert and orientedx4. Reports she is doing better physically, cough improved. Aware of plan for antibiotics before D/C and then f/up with fluid cytology report to r/o malignancy. No SI/HI/hallucinations, paranoia at this time. No delusions elicited. Has been psychiatrically stable since her D/C from [facility] in 2014. Has been on current regimen for 2 years and stable. But was asking psychiatrist to change Zyprexa to something else due to involuntary foot tapping she has noticed for few months, Zyprexa decreased to 25 mg from 30.   Suicide attempt by OD years ago and gesture to jump out of window before her hospitalization in 2014. No recent ideation, intent or plan.  No h/o substance abuse.   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  Patient informed about risks and benefits, pulmonologist told her this morning that the abscess is not likely due to the antipsychotics. She'd like to continue regiment for now and discuss with her outpatient psychiatrist.  Mother at bedside confirms info and expresses no safety concerns.    Called and spoke to psychiatrist Dr. Smalls, has been stable on Zyprexa for 2 years. Has only recently reported some vague side-effects, has h/o somatization. She is very psychiatrically stable on current regimen. He was informed about concerns for esophageal dysmotility and dysphagia due to antipsychotic. He will discuss medication regimen with patient at next appointment on 6/27/17.

## 2025-02-18 ENCOUNTER — APPOINTMENT (OUTPATIENT)
Dept: OTOLARYNGOLOGY | Facility: CLINIC | Age: 60
End: 2025-02-18
Payer: COMMERCIAL

## 2025-02-18 VITALS
WEIGHT: 129 LBS | BODY MASS INDEX: 21.47 KG/M2 | SYSTOLIC BLOOD PRESSURE: 132 MMHG | TEMPERATURE: 98 F | DIASTOLIC BLOOD PRESSURE: 80 MMHG | HEART RATE: 58 BPM

## 2025-02-18 DIAGNOSIS — J34.2 DEVIATED NASAL SEPTUM: ICD-10-CM

## 2025-02-18 DIAGNOSIS — R49.0 DYSPHONIA: ICD-10-CM

## 2025-02-18 DIAGNOSIS — R09.89 OTHER SPECIFIED SYMPTOMS AND SIGNS INVOLVING THE CIRCULATORY AND RESPIRATORY SYSTEMS: ICD-10-CM

## 2025-02-18 PROCEDURE — 99214 OFFICE O/P EST MOD 30 MIN: CPT | Mod: 25

## 2025-02-18 PROCEDURE — 31575 DIAGNOSTIC LARYNGOSCOPY: CPT

## 2025-02-18 RX ORDER — METHYLPREDNISOLONE 4 MG/1
4 TABLET ORAL
Qty: 1 | Refills: 0 | Status: ACTIVE | COMMUNITY
Start: 2025-02-18 | End: 1900-01-01

## 2025-02-18 RX ORDER — FLUTICASONE PROPIONATE 50 UG/1
50 SPRAY, METERED NASAL DAILY
Qty: 1 | Refills: 3 | Status: ACTIVE | COMMUNITY
Start: 2025-02-18 | End: 1900-01-01

## 2025-05-21 RX ORDER — PANTOPRAZOLE 20 MG/1
20 TABLET, DELAYED RELEASE ORAL DAILY
Qty: 90 | Refills: 0 | Status: ACTIVE | COMMUNITY
Start: 2025-05-21 | End: 1900-01-01

## 2025-05-27 ENCOUNTER — APPOINTMENT (OUTPATIENT)
Dept: OBGYN | Facility: CLINIC | Age: 60
End: 2025-05-27
Payer: COMMERCIAL

## 2025-05-27 ENCOUNTER — APPOINTMENT (OUTPATIENT)
Dept: GASTROENTEROLOGY | Facility: CLINIC | Age: 60
End: 2025-05-27

## 2025-05-27 ENCOUNTER — RX RENEWAL (OUTPATIENT)
Age: 60
End: 2025-05-27

## 2025-05-27 ENCOUNTER — NON-APPOINTMENT (OUTPATIENT)
Age: 60
End: 2025-05-27

## 2025-05-27 VITALS
WEIGHT: 129 LBS | BODY MASS INDEX: 21.49 KG/M2 | DIASTOLIC BLOOD PRESSURE: 81 MMHG | HEIGHT: 65 IN | SYSTOLIC BLOOD PRESSURE: 132 MMHG

## 2025-05-27 DIAGNOSIS — Z01.419 ENCOUNTER FOR GYNECOLOGICAL EXAMINATION (GENERAL) (ROUTINE) W/OUT ABNORMAL FINDINGS: ICD-10-CM

## 2025-05-27 DIAGNOSIS — Z13.31 ENCOUNTER FOR SCREENING FOR DEPRESSION: ICD-10-CM

## 2025-05-27 DIAGNOSIS — R92.30 DENSE BREASTS, UNSPECIFIED: ICD-10-CM

## 2025-05-27 PROCEDURE — 99396 PREV VISIT EST AGE 40-64: CPT

## 2025-05-27 PROCEDURE — G0444 DEPRESSION SCREEN ANNUAL: CPT | Mod: 59

## 2025-05-27 PROCEDURE — 99459 PELVIC EXAMINATION: CPT | Mod: NC

## 2025-06-24 NOTE — ED ADULT TRIAGE NOTE - RESPIRATORY RATE (BREATHS/MIN)
I called patients wife. Patient wife concerned that he is not being honest about his symptoms. He has an appointment this afternoon. Discussed wife's concerns and patient having chest pain requiring nitroglycerin usage which does relieve chest pain. Has had some indigestion as well and thought it was related to new medication but has had episodes since stopping that medication. Discussed we will discuss further at appointment this afternoon and she verbalized understanding.    20

## 2025-07-10 ENCOUNTER — NON-APPOINTMENT (OUTPATIENT)
Age: 60
End: 2025-07-10

## 2025-07-17 NOTE — BH PATIENT PROFILE - NSBHSNSOFSAFETY_PSY_A_CORE
What Type Of Note Output Would You Prefer (Optional)?: Standard Output Hpi Title: Evaluation of Skin Lesions talking to someone Elidel Counseling: Patient may experience a mild burning sensation during topical application. Elidel is not approved in children less than 2 years of age. There have been case reports of hematologic and skin malignancies in patients using topical calcineurin inhibitors although causality is questionable.

## 2025-08-28 ENCOUNTER — APPOINTMENT (OUTPATIENT)
Dept: GASTROENTEROLOGY | Facility: CLINIC | Age: 60
End: 2025-08-28